# Patient Record
Sex: FEMALE | Race: BLACK OR AFRICAN AMERICAN | NOT HISPANIC OR LATINO | Employment: OTHER | URBAN - METROPOLITAN AREA
[De-identification: names, ages, dates, MRNs, and addresses within clinical notes are randomized per-mention and may not be internally consistent; named-entity substitution may affect disease eponyms.]

---

## 2023-04-27 LAB
CANCER AG 125 (U/ML) IN SERUM: 29.9 U/ML (ref 0–30.2)
CANCER AG 19-9 (U/ML) IN SER/PLAS: 59.81 U/ML
CARCINOEMBRYONIC AG (NG/ML) IN SER/PLAS: 0.6 UG/L

## 2023-05-04 LAB — VALPROIC ACID (UG/ML) IN SER/PLAS: <10 UG/ML (ref 50–100)

## 2023-05-08 LAB
MISCELLANEUOUS TEST RESULT: NORMAL
NAME OF SENDOUT TEST: NORMAL
OXCARB OR ESLICARB METABOLITE (MHD): 10 UG/ML (ref 3–35)

## 2023-08-25 PROBLEM — G40.909 SEIZURE DISORDER DURING PREGNANCY, ANTEPARTUM (MULTI): Status: ACTIVE | Noted: 2023-08-25

## 2023-08-25 PROBLEM — G43.709 CHRONIC MIGRAINE WITHOUT AURA WITHOUT STATUS MIGRAINOSUS, NOT INTRACTABLE: Status: ACTIVE | Noted: 2023-08-25

## 2023-08-25 PROBLEM — R97.8 ELEVATED TUMOR MARKERS: Status: ACTIVE | Noted: 2023-08-25

## 2023-08-25 PROBLEM — G43.109 MIGRAINE WITH AURA: Status: ACTIVE | Noted: 2023-08-25

## 2023-08-25 PROBLEM — O21.9 NAUSEA AND VOMITING OF PREGNANCY, ANTEPARTUM (HHS-HCC): Status: ACTIVE | Noted: 2023-08-25

## 2023-08-25 PROBLEM — O09.219 PREVIOUS PRETERM DELIVERY, ANTEPARTUM (HHS-HCC): Status: ACTIVE | Noted: 2023-08-25

## 2023-08-25 PROBLEM — U07.1 COVID-19 VIRUS INFECTION: Status: ACTIVE | Noted: 2023-08-25

## 2023-08-25 PROBLEM — B34.9 VIRAL INFECTION: Status: ACTIVE | Noted: 2023-08-25

## 2023-08-25 PROBLEM — O99.350 SEIZURE DISORDER DURING PREGNANCY, ANTEPARTUM (MULTI): Status: ACTIVE | Noted: 2023-08-25

## 2023-08-25 PROBLEM — R87.619 ABNORMAL PAP SMEAR OF CERVIX: Status: ACTIVE | Noted: 2023-08-25

## 2023-08-25 PROBLEM — O34.219 PREVIOUS CESAREAN DELIVERY AFFECTING PREGNANCY (HHS-HCC): Status: ACTIVE | Noted: 2023-08-25

## 2023-08-25 PROBLEM — G40.909: Status: ACTIVE | Noted: 2023-08-25

## 2023-08-25 PROBLEM — N94.6 MENSTRUAL CRAMPS: Status: ACTIVE | Noted: 2023-08-25

## 2023-08-25 PROBLEM — N83.299 OVARIAN CYST, COMPLEX: Status: ACTIVE | Noted: 2023-08-25

## 2023-08-25 PROBLEM — Z98.891 HISTORY OF UTERINE SCAR FROM PREVIOUS SURGERY: Status: ACTIVE | Noted: 2023-08-25

## 2023-08-25 PROBLEM — G40.219 LOCALIZATION-RELATED (FOCAL) (PARTIAL) SYMPTOMATIC EPILEPSY AND EPILEPTIC SYNDROMES WITH COMPLEX PARTIAL SEIZURES, INTRACTABLE, WITHOUT STATUS EPILEPTICUS (MULTI): Status: ACTIVE | Noted: 2023-08-25

## 2023-08-25 PROBLEM — O99.210 OBESITY IN PREGNANCY, ANTEPARTUM (HHS-HCC): Status: ACTIVE | Noted: 2023-08-25

## 2023-08-25 RX ORDER — IBUPROFEN 800 MG/1
1 TABLET ORAL 3 TIMES DAILY PRN
COMMUNITY
Start: 2021-06-22 | End: 2023-10-04 | Stop reason: WASHOUT

## 2023-08-25 RX ORDER — TOPIRAMATE 100 MG/1
1 TABLET, FILM COATED ORAL NIGHTLY
COMMUNITY
Start: 2020-10-26 | End: 2023-10-04 | Stop reason: WASHOUT

## 2023-08-25 RX ORDER — SUMATRIPTAN 50 MG/1
TABLET, FILM COATED ORAL
COMMUNITY
Start: 2020-10-26 | End: 2023-10-04 | Stop reason: WASHOUT

## 2023-08-25 RX ORDER — OXCARBAZEPINE 60 MG/ML
17 SUSPENSION ORAL DAILY
COMMUNITY
Start: 2018-02-19 | End: 2023-10-04 | Stop reason: WASHOUT

## 2023-08-25 RX ORDER — CYCLOBENZAPRINE HCL 10 MG
1 TABLET ORAL 3 TIMES DAILY PRN
COMMUNITY
Start: 2023-06-21 | End: 2023-10-04 | Stop reason: WASHOUT

## 2023-08-25 RX ORDER — DIVALPROEX SODIUM 250 MG/1
1 TABLET, DELAYED RELEASE ORAL 2 TIMES DAILY
COMMUNITY
Start: 2023-01-09 | End: 2024-02-14 | Stop reason: HOSPADM

## 2023-08-25 RX ORDER — KETOROLAC TROMETHAMINE 10 MG/1
1 TABLET, FILM COATED ORAL EVERY 8 HOURS PRN
COMMUNITY
Start: 2023-05-10 | End: 2023-10-04 | Stop reason: WASHOUT

## 2023-08-25 RX ORDER — DIVALPROEX SODIUM 500 MG/1
1 TABLET, DELAYED RELEASE ORAL 2 TIMES DAILY
COMMUNITY
Start: 2023-05-01 | End: 2023-12-18 | Stop reason: SDUPTHER

## 2023-08-25 RX ORDER — NALOXONE HYDROCHLORIDE 4 MG/.1ML
1 SPRAY NASAL AS NEEDED
COMMUNITY
Start: 2023-06-21 | End: 2023-10-04 | Stop reason: WASHOUT

## 2023-08-25 RX ORDER — DOCUSATE SODIUM 50 MG AND SENNOSIDES 8.6 MG 8.6; 5 MG/1; MG/1
1 TABLET, FILM COATED ORAL 2 TIMES DAILY
COMMUNITY
Start: 2023-06-20 | End: 2023-10-04 | Stop reason: WASHOUT

## 2023-08-25 RX ORDER — METHOCARBAMOL 500 MG/1
2 TABLET, FILM COATED ORAL 2 TIMES DAILY
COMMUNITY
Start: 2023-06-13 | End: 2023-10-04 | Stop reason: WASHOUT

## 2023-08-25 RX ORDER — OXCARBAZEPINE 600 MG/1
2 TABLET, FILM COATED ORAL 2 TIMES DAILY
COMMUNITY
Start: 2023-05-01 | End: 2024-04-26

## 2023-08-27 PROBLEM — U07.1 COVID-19 VIRUS INFECTION: Status: RESOLVED | Noted: 2023-08-25 | Resolved: 2023-08-27

## 2023-08-27 PROBLEM — B34.9 VIRAL INFECTION: Status: RESOLVED | Noted: 2023-08-25 | Resolved: 2023-08-27

## 2023-08-27 PROBLEM — G40.909 SEIZURE DISORDER (MULTI): Status: ACTIVE | Noted: 2023-08-27

## 2023-09-25 LAB — VALPROIC ACID (UG/ML) IN SER/PLAS: 28 UG/ML (ref 50–100)

## 2023-09-28 LAB — OXCARB OR ESLICARB METABOLITE (MHD): 3 UG/ML (ref 3–35)

## 2023-09-30 PROCEDURE — 99285 EMERGENCY DEPT VISIT HI MDM: CPT | Performed by: EMERGENCY MEDICINE

## 2023-09-30 ASSESSMENT — COLUMBIA-SUICIDE SEVERITY RATING SCALE - C-SSRS
2. HAVE YOU ACTUALLY HAD ANY THOUGHTS OF KILLING YOURSELF?: NO
6. HAVE YOU EVER DONE ANYTHING, STARTED TO DO ANYTHING, OR PREPARED TO DO ANYTHING TO END YOUR LIFE?: NO
1. IN THE PAST MONTH, HAVE YOU WISHED YOU WERE DEAD OR WISHED YOU COULD GO TO SLEEP AND NOT WAKE UP?: NO

## 2023-10-01 ENCOUNTER — HOSPITAL ENCOUNTER (EMERGENCY)
Facility: HOSPITAL | Age: 33
Discharge: HOME | End: 2023-10-01
Attending: EMERGENCY MEDICINE
Payer: MEDICARE

## 2023-10-01 ENCOUNTER — APPOINTMENT (OUTPATIENT)
Dept: RADIOLOGY | Facility: HOSPITAL | Age: 33
End: 2023-10-01
Payer: MEDICARE

## 2023-10-01 VITALS
SYSTOLIC BLOOD PRESSURE: 134 MMHG | HEART RATE: 87 BPM | OXYGEN SATURATION: 98 % | HEIGHT: 65 IN | BODY MASS INDEX: 42.45 KG/M2 | RESPIRATION RATE: 14 BRPM | WEIGHT: 254.79 LBS | TEMPERATURE: 98.3 F | DIASTOLIC BLOOD PRESSURE: 71 MMHG

## 2023-10-01 DIAGNOSIS — N83.201 CYST OF RIGHT OVARY: Primary | ICD-10-CM

## 2023-10-01 LAB
ALBUMIN SERPL BCP-MCNC: 3.8 G/DL (ref 3.4–5)
ALP SERPL-CCNC: 41 U/L (ref 33–110)
ALT SERPL W P-5'-P-CCNC: 16 U/L (ref 7–45)
ANION GAP SERPL CALC-SCNC: 8 MMOL/L (ref 10–20)
APPEARANCE UR: CLEAR
AST SERPL W P-5'-P-CCNC: 19 U/L (ref 9–39)
B-HCG SERPL-ACNC: <2 MIU/ML
BILIRUB SERPL-MCNC: 0.2 MG/DL (ref 0–1.2)
BILIRUB UR STRIP.AUTO-MCNC: NEGATIVE MG/DL
BUN SERPL-MCNC: 12 MG/DL (ref 6–23)
CALCIUM SERPL-MCNC: 8.7 MG/DL (ref 8.6–10.3)
CHLORIDE SERPL-SCNC: 104 MMOL/L (ref 98–107)
CO2 SERPL-SCNC: 28 MMOL/L (ref 21–32)
COLOR UR: YELLOW
CREAT SERPL-MCNC: 0.84 MG/DL (ref 0.5–1.05)
ERYTHROCYTE [DISTWIDTH] IN BLOOD BY AUTOMATED COUNT: 13.8 % (ref 11.5–14.5)
GFR SERPL CREATININE-BSD FRML MDRD: >90 ML/MIN/1.73M*2
GLUCOSE SERPL-MCNC: 103 MG/DL (ref 74–99)
GLUCOSE UR STRIP.AUTO-MCNC: NEGATIVE MG/DL
HCT VFR BLD AUTO: 34.3 % (ref 36–46)
HGB BLD-MCNC: 11.6 G/DL (ref 12–16)
KETONES UR STRIP.AUTO-MCNC: NEGATIVE MG/DL
LEUKOCYTE ESTERASE UR QL STRIP.AUTO: NEGATIVE
LIPASE SERPL-CCNC: 19 U/L (ref 9–82)
MCH RBC QN AUTO: 29.1 PG (ref 26–34)
MCHC RBC AUTO-ENTMCNC: 33.8 G/DL (ref 32–36)
MCV RBC AUTO: 86 FL (ref 80–100)
NITRITE UR QL STRIP.AUTO: NEGATIVE
NRBC BLD-RTO: 0 /100 WBCS (ref 0–0)
PH UR STRIP.AUTO: 8 [PH]
PLATELET # BLD AUTO: 396 X10*3/UL (ref 150–450)
PMV BLD AUTO: 10.6 FL (ref 7.5–11.5)
POTASSIUM SERPL-SCNC: 3.8 MMOL/L (ref 3.5–5.3)
PROT SERPL-MCNC: 7.7 G/DL (ref 6.4–8.2)
PROT UR STRIP.AUTO-MCNC: NEGATIVE MG/DL
RBC # BLD AUTO: 3.99 X10*6/UL (ref 4–5.2)
RBC # UR STRIP.AUTO: NEGATIVE /UL
SODIUM SERPL-SCNC: 136 MMOL/L (ref 136–145)
SP GR UR STRIP.AUTO: 1.01
UROBILINOGEN UR STRIP.AUTO-MCNC: 2 MG/DL
WBC # BLD AUTO: 7.8 X10*3/UL (ref 4.4–11.3)

## 2023-10-01 PROCEDURE — 74177 CT ABD & PELVIS W/CONTRAST: CPT | Mod: FOREIGN READ | Performed by: RADIOLOGY

## 2023-10-01 PROCEDURE — 2550000001 HC RX 255 CONTRASTS: Performed by: EMERGENCY MEDICINE

## 2023-10-01 PROCEDURE — 36415 COLL VENOUS BLD VENIPUNCTURE: CPT

## 2023-10-01 PROCEDURE — 76830 TRANSVAGINAL US NON-OB: CPT | Performed by: RADIOLOGY

## 2023-10-01 PROCEDURE — 83690 ASSAY OF LIPASE: CPT

## 2023-10-01 PROCEDURE — 96375 TX/PRO/DX INJ NEW DRUG ADDON: CPT

## 2023-10-01 PROCEDURE — 2500000004 HC RX 250 GENERAL PHARMACY W/ HCPCS (ALT 636 FOR OP/ED)

## 2023-10-01 PROCEDURE — 96376 TX/PRO/DX INJ SAME DRUG ADON: CPT

## 2023-10-01 PROCEDURE — 80053 COMPREHEN METABOLIC PANEL: CPT

## 2023-10-01 PROCEDURE — 81003 URINALYSIS AUTO W/O SCOPE: CPT

## 2023-10-01 PROCEDURE — 96374 THER/PROPH/DIAG INJ IV PUSH: CPT | Mod: XU

## 2023-10-01 PROCEDURE — 76856 US EXAM PELVIC COMPLETE: CPT | Mod: FR

## 2023-10-01 PROCEDURE — 76856 US EXAM PELVIC COMPLETE: CPT | Mod: FOREIGN READ | Performed by: RADIOLOGY

## 2023-10-01 PROCEDURE — 74177 CT ABD & PELVIS W/CONTRAST: CPT | Mod: FR

## 2023-10-01 PROCEDURE — 85027 COMPLETE CBC AUTOMATED: CPT

## 2023-10-01 PROCEDURE — 84702 CHORIONIC GONADOTROPIN TEST: CPT

## 2023-10-01 RX ORDER — ONDANSETRON HYDROCHLORIDE 2 MG/ML
4 INJECTION, SOLUTION INTRAVENOUS ONCE
Status: COMPLETED | OUTPATIENT
Start: 2023-10-01 | End: 2023-10-01

## 2023-10-01 RX ORDER — HYDROCODONE BITARTRATE AND ACETAMINOPHEN 5; 325 MG/1; MG/1
1 TABLET ORAL EVERY 6 HOURS PRN
Qty: 4 TABLET | Refills: 0 | Status: SHIPPED | OUTPATIENT
Start: 2023-10-01 | End: 2023-10-02 | Stop reason: SDUPTHER

## 2023-10-01 RX ORDER — MORPHINE SULFATE 4 MG/ML
4 INJECTION, SOLUTION INTRAMUSCULAR; INTRAVENOUS ONCE
Status: COMPLETED | OUTPATIENT
Start: 2023-10-01 | End: 2023-10-01

## 2023-10-01 RX ORDER — ACETAMINOPHEN 500 MG
1000 TABLET ORAL EVERY 8 HOURS PRN
Qty: 18 TABLET | Refills: 0 | Status: SHIPPED | OUTPATIENT
Start: 2023-10-01 | End: 2023-10-04

## 2023-10-01 RX ADMIN — IOHEXOL 75 ML: 350 INJECTION, SOLUTION INTRAVENOUS at 05:44

## 2023-10-01 RX ADMIN — MORPHINE SULFATE 4 MG: 4 INJECTION, SOLUTION INTRAMUSCULAR; INTRAVENOUS at 06:11

## 2023-10-01 RX ADMIN — MORPHINE SULFATE 4 MG: 4 INJECTION, SOLUTION INTRAMUSCULAR; INTRAVENOUS at 01:09

## 2023-10-01 RX ADMIN — ONDANSETRON 4 MG: 2 INJECTION INTRAMUSCULAR; INTRAVENOUS at 01:09

## 2023-10-01 ASSESSMENT — ENCOUNTER SYMPTOMS
DYSURIA: 0
FREQUENCY: 0
CONSTIPATION: 0
VOMITING: 0
HEMATOCHEZIA: 0
WEIGHT LOSS: 0
DIARRHEA: 0
FEVER: 0
NAUSEA: 1
ABDOMINAL PAIN: 1
BELCHING: 0
MYALGIAS: 0
ARTHRALGIAS: 1
HEADACHES: 0
ANOREXIA: 1
FLATUS: 0
HEMATURIA: 0

## 2023-10-01 ASSESSMENT — PAIN DESCRIPTION - PROGRESSION: CLINICAL_PROGRESSION: NOT CHANGED

## 2023-10-01 ASSESSMENT — PAIN DESCRIPTION - DESCRIPTORS: DESCRIPTORS: STABBING

## 2023-10-01 ASSESSMENT — PAIN DESCRIPTION - LOCATION: LOCATION: ABDOMEN

## 2023-10-01 ASSESSMENT — PAIN DESCRIPTION - PAIN TYPE: TYPE: ACUTE PAIN

## 2023-10-01 ASSESSMENT — PAIN - FUNCTIONAL ASSESSMENT: PAIN_FUNCTIONAL_ASSESSMENT: 0-10

## 2023-10-01 ASSESSMENT — PAIN SCALES - GENERAL
PAINLEVEL_OUTOF10: 10 - WORST POSSIBLE PAIN
PAINLEVEL_OUTOF10: 9
PAINLEVEL_OUTOF10: 7
PAINLEVEL_OUTOF10: 0 - NO PAIN

## 2023-10-01 ASSESSMENT — PAIN DESCRIPTION - ORIENTATION: ORIENTATION: LOWER

## 2023-10-01 NOTE — ED PROVIDER NOTES
"HPI   Chief Complaint   Patient presents with    Abdominal Pain     Inguinal pain that radiates down into leg       33-year-old female with past medical history of migraines, epilepsy, and endometriosis presents the ED today for chief concern of right-sided pelvic pain.  Patient states symptoms started about 14 hours ago.  She states that she was getting up when she felt an intense pain in her right inguinal area.  She states that due to the pain she has felt slightly nauseated but denies any vomiting.  She denies any fever.  Denies any diarrhea or hematochezia.  Denies any dysuria, urinary urgency/frequency, hematuria, vaginal discharge.  Her first date of last menstrual period was 5 days ago.  She states in the past she has been diagnosed with an enlarged right ovary and states it was \"slightly twisted\".  She states she has ovarian cyst as well and has had a partial hysterectomy in the past for this.  She states that she does have some chronic pelvic pain however she states this feels slightly different than her typical pelvic pain.  She did take some ibuprofen at home that helped her symptoms slightly.  No other symptoms or concerns at this time.      History provided by:  Patient   used: No                        Dina Coma Scale Score: 15                  Patient History   Past Medical History:   Diagnosis Date    Chlamydial infection, unspecified     Chlamydia    Encounter for initial prescription of injectable contraceptive 12/14/2015    Initiation of Depo Provera    Encounter for screening for malignant neoplasm of vagina     Vaginal Pap smear    Other conditions influencing health status     Menstruation    Other conditions influencing health status     H/O pregnancy    Other conditions influencing health status     LGSIL (low grade squamous intraepithelial dysplasia)    Personal history of other diseases of the female genital tract     History of vaginal bleeding    Personal history " of other diseases of the nervous system and sense organs     History of partial seizures    Personal history of other diseases of the nervous system and sense organs     History of migraine    Personal history of other specified conditions     History of abnormal Pap smear    Personal history of other specified conditions     History of abdominal pain    Type A blood, Rh positive     Blood type A+     Past Surgical History:   Procedure Laterality Date    BREAST LUMPECTOMY  2014    Breast Surgery Lumpectomy     SECTION, LOW TRANSVERSE  2014     Section Low Transverse    COLPOSCOPY  2014    Colposcopy    OTHER SURGICAL HISTORY  2014    Surgical Treatment Of Spontaneous      Family History   Problem Relation Name Age of Onset    Hypertension Mother      Stroke Mother      Heart attack Mother      Breast cancer Mother's Sister      Seizures Mother's Brother      Seizures Father's Brother      Breast cancer Maternal Grandmother      Other (cerebral infarction) Maternal Grandfather      Seizures Other Cousin      Social History     Tobacco Use    Smoking status: Not on file    Smokeless tobacco: Not on file   Substance Use Topics    Alcohol use: Not on file    Drug use: Not on file       Physical Exam   ED Triage Vitals   Temp Pulse Resp BP   -- -- -- --      SpO2 Temp src Heart Rate Source Patient Position   -- -- -- --      BP Location FiO2 (%)     -- --       Physical Exam  Constitutional:       Appearance: Normal appearance.   HENT:      Head: Normocephalic and atraumatic.      Nose: Nose normal.      Mouth/Throat:      Mouth: Mucous membranes are moist.      Pharynx: No oropharyngeal exudate or posterior oropharyngeal erythema.   Eyes:      General: No scleral icterus.        Right eye: No discharge.         Left eye: No discharge.      Extraocular Movements: Extraocular movements intact.      Conjunctiva/sclera: Conjunctivae normal.      Pupils: Pupils are equal,  round, and reactive to light.   Neck:      Vascular: No carotid bruit.   Cardiovascular:      Rate and Rhythm: Normal rate and regular rhythm.      Pulses: Normal pulses.      Heart sounds: No murmur heard.     No friction rub. No gallop.   Pulmonary:      Effort: Pulmonary effort is normal. No respiratory distress.      Breath sounds: Normal breath sounds. No stridor. No wheezing, rhonchi or rales.   Abdominal:      General: Abdomen is flat. There is no distension.      Palpations: Abdomen is soft. There is no mass.      Tenderness: There is no guarding or rebound.      Comments: Abdomen is soft and nondistended.  Minimal tenderness in right inguinal area.  Remainder of the abdomen is nontender.  No rebound tenderness or guarding.  No abdominal masses.  No pulsatile masses.  No CVA tenderness.  No hernias noted.   Musculoskeletal:         General: Normal range of motion.      Cervical back: Normal range of motion and neck supple.      Right lower leg: No edema.      Left lower leg: No edema.   Lymphadenopathy:      Cervical: No cervical adenopathy.   Skin:     General: Skin is warm.      Capillary Refill: Capillary refill takes less than 2 seconds.      Findings: No rash.   Neurological:      General: No focal deficit present.      Mental Status: She is alert and oriented to person, place, and time.      Cranial Nerves: No cranial nerve deficit.      Sensory: No sensory deficit.      Motor: No weakness.      Coordination: Coordination normal.      Gait: Gait normal.      Deep Tendon Reflexes: Reflexes normal.   Psychiatric:         Mood and Affect: Mood normal.         Behavior: Behavior normal.         Thought Content: Thought content normal.         Judgment: Judgment normal.         ED Course & [facility]   ED Course as of 10/03/23 1119   Sun Oct 01, 2023   0421 Urinalysis with Reflex Microscopic(!) []   0421 Lipase []   0421 CBC(!) []   0421 Comprehensive metabolic panel(!) []   0421 Human Chorionic  Gonadotropin, Serum Quantitative []   0421 Lactate []   Tue Oct 03, 2023   1118 CBC shows hemoglobin 11.6.  No leukocytosis.  CMP grossly within normal limits.  No KEN or acute liver injury.  Beta-hCG urinalysis shows no UTI.  Lipase unremarkable.  CT abdomen pelvis with IV contrast shows enlarging right ovarian lesion.  Consider follow-up MRI as outpatient or surgical consult since this lesion is now larger than 5.1 cm in AP diameter and nearly 7 cm on coronal images.  Cholelithiasis also found.  GYN ultrasound shows complex lesion of right ovary and adjacent posterior more uniform echoic lesion.  Right ovarian lesion has stable appearance.  Other lesion with more homogenous echoes could not be clearly identified on previous CT scan.  Could represent hemorrhagic cyst.  Patient was given a printed out copy of the CT and ultrasound findings. []      ED Course User Index  [] Marcell Arzate PA-C         Diagnoses as of 10/03/23 1119   Cyst of right ovary       Medical Decision Making  33-year-old female with past medical history of migraines, epilepsy, and endometriosis presents to the ED today for chief concern of right-sided pelvic pain.  Vital signs are reassuring.  Patient overall appears well and is nontoxic-appearing. No evidence of acute intra-abdominal pathology at this time.  No evidence of hernia.  No concern for any UTI or pyelonephritis.  Signs and symptoms likely related to ovarian cyst versus hemorrhagic cyst.  Discussed my impression and findings with patient.  I do not think a surgical consultation is necessary at this time.  Patient's pain is under control.  She does have an appointment with her GYN already scheduled in 2 days.  She was given morphine and Zofran in the ED with improvement in her symptoms.  We will treat with Norco at this time until patient is able to follow-up with her GYN.  Discussed my impression and findings with patient and she feels comfortable returning home.  We  discussed very strict return precautions including returning for any new or worsening signs or symptoms.  Patient is in agreement this plan.  She will follow-up with her GYN within 3 days.  She will follow-up with her PCP within 3 days.  Discussed very strict return precautions.    Differential diagnosis: Ovarian torsion, appendicitis, ruptured ovarian cyst, ruptured hemorrhagic cyst, acute intra-abdominal pathology, hernia, UTI, pyelonephritis    Shared decision-making was used patient feels comfortable returning home          Procedure  Procedures     Marcell Arzate PA-C  10/01/23 0033       Marcell Arzate PA-C  10/03/23 1121

## 2023-10-02 DIAGNOSIS — N83.201 CYST OF RIGHT OVARY: ICD-10-CM

## 2023-10-02 RX ORDER — HYDROCODONE BITARTRATE AND ACETAMINOPHEN 5; 325 MG/1; MG/1
1 TABLET ORAL EVERY 6 HOURS PRN
Qty: 3 TABLET | Refills: 0 | Status: SHIPPED | OUTPATIENT
Start: 2023-10-02 | End: 2023-10-05

## 2023-10-02 RX ORDER — HYDROCODONE BITARTRATE AND ACETAMINOPHEN 5; 325 MG/1; MG/1
1 TABLET ORAL EVERY 6 HOURS PRN
Qty: 3 TABLET | Refills: 0 | Status: SHIPPED | OUTPATIENT
Start: 2023-10-02 | End: 2023-10-03

## 2023-10-02 RX ORDER — HYDROCODONE BITARTRATE AND ACETAMINOPHEN 5; 325 MG/1; MG/1
1 TABLET ORAL EVERY 6 HOURS PRN
Qty: 3 TABLET | Refills: 0 | Status: CANCELLED | OUTPATIENT
Start: 2023-10-02 | End: 2023-10-05

## 2023-10-02 NOTE — PROGRESS NOTES
"Division of Minimally Invasive Gynecologic Surgery  [facility]    10/02/23 Gynecology Consult     HISTORY OF PRESENT ILLNESS:  Danette Buenrostro 33 y.o. 32 yo P2 (CS x2 +BTL) presents in referral from Dr. Lopez to discuss path-proven endometriosis and pelvic pain.    She has a long history of severe dysmenorrhea. Pain has limited her from attending school/work/activities multiple times over the years. This past April, she reached a breaking point with her pain and began going to the ER for pain flares.     Underwent robotic LSO and R cystectomy/salpingectomy on 2023. She did have a prior BTL. She was started on JESSICA's following surgery, but has had multiple ED visits for pain following surgery. Path was positive for endometriosis.     Her current pain is daily and severe. It is having a huge negative impact on her quality of life and ability to function on a daily basis. In addition, she reports irregular and breakthrough bleeding on JESSICA w/ increase in pain on bleeding days.     Labs:   - Recent CBC w/ Hgb 10.9   - Recent CMP unremarkable   Screening:   - Last pap  WNL per pt, no hx of CIN2-3  - Last mammogram NA  PMHx: obesity, partial complex seizures   PSHx: CS x 2 w/ BTL    Past Medical History:   Diagnosis Date    Endometriosis     Epilepsy with partial complex seizures (CMS/HCC)     Obesity     Pelvic floor dysfunction in female     Type A blood, Rh positive     Blood type A+     Past Surgical History:   Procedure Laterality Date    BREAST LUMPECTOMY  2014    Breast Surgery Lumpectomy     SECTION, LOW TRANSVERSE  04/14/2014    x 2 w/ BTL at second    COLPOSCOPY  2014    Colposcopy    OTHER SURGICAL HISTORY  2014    Surgical Treatment Of Spontaneous      PHYSICAL EXAMINATION:  VITAL SIGNS:  Ht 1.651 m (5' 5\")   Wt 118 kg (261 lb)   LMP 10/01/2023 (Exact Date)   Breastfeeding No   BMI 43.43 kg/m²      Constitutional:  No acute " distress, well-nourished and well-developed  HEENT: EOM grossly intact, MMM, neck supple and with full ROM  Pulm:  Effort normal. No accessory muscle usage.  No respiratory distress.  Neurological:  She is alert and oriented to person place and time.  Skin: Warm, no pallor.  Psychiatric:  She has normal mood and affect.    ASSESSMENT:  Danette Buenrostro 33 y.o. 32 yo P2 (CS x2 +BTL) presents in referral from Dr. Lopez to discuss path-proven endometriosis and pelvic pain.    We reviewed the etiology, natural history, and impact on endometriosis on pain symptoms today at length. We discussed contributory pain generators including pelvic floor dysfunction (commonly 2/2 to endometriosis), GI etiologies, and centralization of pain.     I suspect she is currently inadequately suppressed on JESSICA given significant breakthrough bleeding and pain symptoms. Trileptal can decrease JESSICA levels in the blood stream, which may be contributing to inadequate suppression. We discussed a switch to Myfembree today, as relugolix has no known interactions w/ either of her AEDs and offers improved suppression. Trileptal may decrease serum concentration of  hormonal components, so she will reach out if she has intolerable vasomotor symptoms. I would favor increased progesterone dosage should this issue arise. Divalproex levels can be decreased estrogens/progesterones, but as she has been on Estarylla without incident, I anticipate she will tolerate the estrogen/progesterone in Myfembree. None the less, I will reach out to her neurologist to make this provider aware. Staff message sent to Dr. Sigala.     Given symptoms she described today, but Danette and I agree that she likely suffers from high tone pelvic floor dysfunction. We discussed treatment options for this, including Flexeril, vaginal Valium, and PFPT. She elected to start w/ Flexeril (given severity of current pain levels) and PFPT. Web site information given.     We did  discuss the option of surgical intervention, but we will try to defer this as possible given recent surgery in June. I do think she will require more extensive excision and we also discussed the option of hysterectomy, which she is interested in undergoing.     PLAN:  [facility] Dr. Sigala (neuro) made aware  - PFPT and Flexeril PRN  - Consider future excisional surgery +/- hysterectomy  - RTC in 3 months for progress check, earlier PRN     Saritha Theodore MD  10/02/23  1:05 PM

## 2023-10-04 ENCOUNTER — OFFICE VISIT (OUTPATIENT)
Dept: OBSTETRICS AND GYNECOLOGY | Facility: CLINIC | Age: 33
End: 2023-10-04
Payer: MEDICARE

## 2023-10-04 VITALS — WEIGHT: 261 LBS | BODY MASS INDEX: 43.49 KG/M2 | HEIGHT: 65 IN

## 2023-10-04 DIAGNOSIS — N80.9 ENDOMETRIOSIS: ICD-10-CM

## 2023-10-04 DIAGNOSIS — R10.2 PELVIC PAIN: Primary | ICD-10-CM

## 2023-10-04 DIAGNOSIS — E66.9 OBESITY, UNSPECIFIED CLASSIFICATION, UNSPECIFIED OBESITY TYPE, UNSPECIFIED WHETHER SERIOUS COMORBIDITY PRESENT: ICD-10-CM

## 2023-10-04 DIAGNOSIS — N93.9 ABNORMAL UTERINE BLEEDING (AUB): ICD-10-CM

## 2023-10-04 DIAGNOSIS — M62.89 PELVIC FLOOR DYSFUNCTION: ICD-10-CM

## 2023-10-04 PROBLEM — Z30.013 ENCOUNTER FOR INITIAL PRESCRIPTION OF INJECTABLE CONTRACEPTIVE: Status: ACTIVE | Noted: 2023-10-04

## 2023-10-04 PROCEDURE — 99214 OFFICE O/P EST MOD 30 MIN: CPT | Performed by: STUDENT IN AN ORGANIZED HEALTH CARE EDUCATION/TRAINING PROGRAM

## 2023-10-04 PROCEDURE — 1036F TOBACCO NON-USER: CPT | Performed by: STUDENT IN AN ORGANIZED HEALTH CARE EDUCATION/TRAINING PROGRAM

## 2023-10-04 PROCEDURE — 3008F BODY MASS INDEX DOCD: CPT | Performed by: STUDENT IN AN ORGANIZED HEALTH CARE EDUCATION/TRAINING PROGRAM

## 2023-10-04 RX ORDER — CYCLOBENZAPRINE HCL 5 MG
5 TABLET ORAL 2 TIMES DAILY PRN
Qty: 30 TABLET | Refills: 2 | Status: SHIPPED | OUTPATIENT
Start: 2023-10-04 | End: 2023-11-16 | Stop reason: SDUPTHER

## 2023-10-04 ASSESSMENT — PAIN SCALES - GENERAL: PAINLEVEL: 8

## 2023-10-06 ENCOUNTER — TELEPHONE (OUTPATIENT)
Dept: OBSTETRICS AND GYNECOLOGY | Facility: CLINIC | Age: 33
End: 2023-10-06
Payer: COMMERCIAL

## 2023-10-11 ENCOUNTER — TELEPHONE (OUTPATIENT)
Dept: OBSTETRICS AND GYNECOLOGY | Facility: CLINIC | Age: 33
End: 2023-10-11
Payer: COMMERCIAL

## 2023-10-23 ENCOUNTER — APPOINTMENT (OUTPATIENT)
Dept: RADIOLOGY | Facility: HOSPITAL | Age: 33
End: 2023-10-23
Payer: MEDICARE

## 2023-10-23 ENCOUNTER — HOSPITAL ENCOUNTER (EMERGENCY)
Facility: HOSPITAL | Age: 33
Discharge: HOME | End: 2023-10-23
Attending: GENERAL PRACTICE
Payer: MEDICARE

## 2023-10-23 VITALS
HEIGHT: 65 IN | TEMPERATURE: 97.9 F | BODY MASS INDEX: 42.65 KG/M2 | OXYGEN SATURATION: 99 % | RESPIRATION RATE: 20 BRPM | DIASTOLIC BLOOD PRESSURE: 96 MMHG | HEART RATE: 71 BPM | SYSTOLIC BLOOD PRESSURE: 148 MMHG | WEIGHT: 256 LBS

## 2023-10-23 DIAGNOSIS — R03.0 ELEVATED BLOOD PRESSURE READING: ICD-10-CM

## 2023-10-23 DIAGNOSIS — R10.2 PELVIC PAIN: Primary | ICD-10-CM

## 2023-10-23 LAB
ALBUMIN SERPL BCP-MCNC: 4 G/DL (ref 3.4–5)
ALP SERPL-CCNC: 46 U/L (ref 33–110)
ALT SERPL W P-5'-P-CCNC: 21 U/L (ref 7–45)
ANION GAP SERPL CALC-SCNC: 8 MMOL/L (ref 10–20)
APPEARANCE UR: CLEAR
AST SERPL W P-5'-P-CCNC: 18 U/L (ref 9–39)
B-HCG SERPL-ACNC: <2 MIU/ML
BASOPHILS # BLD AUTO: 0.03 X10*3/UL (ref 0–0.1)
BASOPHILS NFR BLD AUTO: 0.5 %
BILIRUB SERPL-MCNC: 0.3 MG/DL (ref 0–1.2)
BILIRUB UR STRIP.AUTO-MCNC: NEGATIVE MG/DL
BUN SERPL-MCNC: 12 MG/DL (ref 6–23)
CALCIUM SERPL-MCNC: 8.8 MG/DL (ref 8.6–10.3)
CHLORIDE SERPL-SCNC: 100 MMOL/L (ref 98–107)
CO2 SERPL-SCNC: 30 MMOL/L (ref 21–32)
COLOR UR: YELLOW
CREAT SERPL-MCNC: 0.7 MG/DL (ref 0.5–1.05)
EOSINOPHIL # BLD AUTO: 0.11 X10*3/UL (ref 0–0.7)
EOSINOPHIL NFR BLD AUTO: 1.9 %
ERYTHROCYTE [DISTWIDTH] IN BLOOD BY AUTOMATED COUNT: 14 % (ref 11.5–14.5)
GFR SERPL CREATININE-BSD FRML MDRD: >90 ML/MIN/1.73M*2
GLUCOSE SERPL-MCNC: 113 MG/DL (ref 74–99)
GLUCOSE UR STRIP.AUTO-MCNC: NEGATIVE MG/DL
HCT VFR BLD AUTO: 34.5 % (ref 36–46)
HGB BLD-MCNC: 11.7 G/DL (ref 12–16)
IMM GRANULOCYTES # BLD AUTO: 0.01 X10*3/UL (ref 0–0.7)
IMM GRANULOCYTES NFR BLD AUTO: 0.2 % (ref 0–0.9)
KETONES UR STRIP.AUTO-MCNC: ABNORMAL MG/DL
LACTATE SERPL-SCNC: 1.1 MMOL/L (ref 0.4–2)
LEUKOCYTE ESTERASE UR QL STRIP.AUTO: NEGATIVE
LIPASE SERPL-CCNC: 7 U/L (ref 9–82)
LYMPHOCYTES # BLD AUTO: 2.5 X10*3/UL (ref 1.2–4.8)
LYMPHOCYTES NFR BLD AUTO: 42.4 %
MCH RBC QN AUTO: 29.1 PG (ref 26–34)
MCHC RBC AUTO-ENTMCNC: 33.9 G/DL (ref 32–36)
MCV RBC AUTO: 86 FL (ref 80–100)
MONOCYTES # BLD AUTO: 0.45 X10*3/UL (ref 0.1–1)
MONOCYTES NFR BLD AUTO: 7.6 %
NEUTROPHILS # BLD AUTO: 2.79 X10*3/UL (ref 1.2–7.7)
NEUTROPHILS NFR BLD AUTO: 47.4 %
NITRITE UR QL STRIP.AUTO: NEGATIVE
NRBC BLD-RTO: 0 /100 WBCS (ref 0–0)
PH UR STRIP.AUTO: 5 [PH]
PLATELET # BLD AUTO: 378 X10*3/UL (ref 150–450)
PMV BLD AUTO: 10 FL (ref 7.5–11.5)
POTASSIUM SERPL-SCNC: 4.1 MMOL/L (ref 3.5–5.3)
PROT SERPL-MCNC: 7.1 G/DL (ref 6.4–8.2)
PROT UR STRIP.AUTO-MCNC: NEGATIVE MG/DL
RBC # BLD AUTO: 4.02 X10*6/UL (ref 4–5.2)
RBC # UR STRIP.AUTO: NEGATIVE /UL
SODIUM SERPL-SCNC: 134 MMOL/L (ref 136–145)
SP GR UR STRIP.AUTO: 1.02
UROBILINOGEN UR STRIP.AUTO-MCNC: <2 MG/DL
WBC # BLD AUTO: 5.9 X10*3/UL (ref 4.4–11.3)

## 2023-10-23 PROCEDURE — 80053 COMPREHEN METABOLIC PANEL: CPT

## 2023-10-23 PROCEDURE — 84702 CHORIONIC GONADOTROPIN TEST: CPT

## 2023-10-23 PROCEDURE — 36415 COLL VENOUS BLD VENIPUNCTURE: CPT

## 2023-10-23 PROCEDURE — 83605 ASSAY OF LACTIC ACID: CPT

## 2023-10-23 PROCEDURE — 76856 US EXAM PELVIC COMPLETE: CPT | Performed by: RADIOLOGY

## 2023-10-23 PROCEDURE — 99284 EMERGENCY DEPT VISIT MOD MDM: CPT | Performed by: GENERAL PRACTICE

## 2023-10-23 PROCEDURE — 76830 TRANSVAGINAL US NON-OB: CPT | Performed by: RADIOLOGY

## 2023-10-23 PROCEDURE — 76830 TRANSVAGINAL US NON-OB: CPT

## 2023-10-23 PROCEDURE — 85025 COMPLETE CBC W/AUTO DIFF WBC: CPT

## 2023-10-23 PROCEDURE — 83690 ASSAY OF LIPASE: CPT

## 2023-10-23 PROCEDURE — 81003 URINALYSIS AUTO W/O SCOPE: CPT

## 2023-10-23 PROCEDURE — 2500000001 HC RX 250 WO HCPCS SELF ADMINISTERED DRUGS (ALT 637 FOR MEDICARE OP)

## 2023-10-23 RX ORDER — ACETAMINOPHEN 325 MG/1
650 TABLET ORAL ONCE
Status: DISCONTINUED | OUTPATIENT
Start: 2023-10-23 | End: 2023-10-23

## 2023-10-23 RX ORDER — KETOROLAC TROMETHAMINE 30 MG/ML
30 INJECTION, SOLUTION INTRAMUSCULAR; INTRAVENOUS ONCE
Status: DISCONTINUED | OUTPATIENT
Start: 2023-10-23 | End: 2023-10-23

## 2023-10-23 RX ORDER — NAPROXEN 500 MG/1
500 TABLET ORAL ONCE
Status: DISCONTINUED | OUTPATIENT
Start: 2023-10-23 | End: 2023-10-23

## 2023-10-23 RX ORDER — OXYCODONE AND ACETAMINOPHEN 5; 325 MG/1; MG/1
1 TABLET ORAL ONCE
Status: COMPLETED | OUTPATIENT
Start: 2023-10-23 | End: 2023-10-23

## 2023-10-23 RX ORDER — OXYCODONE AND ACETAMINOPHEN 5; 325 MG/1; MG/1
1 TABLET ORAL EVERY 8 HOURS PRN
Qty: 5 TABLET | Refills: 0 | Status: SHIPPED | OUTPATIENT
Start: 2023-10-23 | End: 2023-10-25

## 2023-10-23 RX ORDER — IBUPROFEN 400 MG/1
800 TABLET ORAL ONCE
Status: DISCONTINUED | OUTPATIENT
Start: 2023-10-23 | End: 2023-10-23

## 2023-10-23 RX ADMIN — OXYCODONE HYDROCHLORIDE AND ACETAMINOPHEN 1 TABLET: 5; 325 TABLET ORAL at 13:43

## 2023-10-23 ASSESSMENT — COLUMBIA-SUICIDE SEVERITY RATING SCALE - C-SSRS
6. HAVE YOU EVER DONE ANYTHING, STARTED TO DO ANYTHING, OR PREPARED TO DO ANYTHING TO END YOUR LIFE?: NO
1. IN THE PAST MONTH, HAVE YOU WISHED YOU WERE DEAD OR WISHED YOU COULD GO TO SLEEP AND NOT WAKE UP?: NO
2. HAVE YOU ACTUALLY HAD ANY THOUGHTS OF KILLING YOURSELF?: NO

## 2023-10-23 ASSESSMENT — PAIN SCALES - GENERAL
PAINLEVEL_OUTOF10: 10 - WORST POSSIBLE PAIN
PAINLEVEL_OUTOF10: 10 - WORST POSSIBLE PAIN

## 2023-10-23 ASSESSMENT — PAIN - FUNCTIONAL ASSESSMENT: PAIN_FUNCTIONAL_ASSESSMENT: 0-10

## 2023-10-23 ASSESSMENT — PAIN DESCRIPTION - DESCRIPTORS: DESCRIPTORS: ACHING

## 2023-10-23 ASSESSMENT — PAIN DESCRIPTION - LOCATION: LOCATION: PELVIS

## 2023-10-23 ASSESSMENT — PAIN DESCRIPTION - PAIN TYPE: TYPE: ACUTE PAIN

## 2023-10-23 NOTE — ED TRIAGE NOTES
Pt c/o right pelvic pain for the past couple of days. Denies vaginal discharge/bleeding. Denies urinary symptoms. Hx of cyst.

## 2023-10-23 NOTE — ED PROVIDER NOTES
HPI   Chief Complaint   Patient presents with    Pelvic Pain       33-year-old female with past medical history of endometriosis, ovarian cyst, epilepsy presents the ED today with a chief concern of right lower quadrant pain.  Patient states symptoms started last night.  States that she has had gradual onset right lower quadrant pain last night.  Describes the pain as stabbing/aching.  States that she was using a heating pad with little relief.  States she has history of endometriosis and feels as if her symptoms are related to this.  She denies any fever/chills, nausea/vomiting.  Denies any dysuria, urinary urgency frequency, hematuria.  Denies any vaginal discharge.  Denies any diarrhea or hematochezia.  Denies any radiation of the pain.  Denies any exacerbating or relieving factors.  She has history of 2  sections and tubal ligation.  She denies any other abdominal surgeries on her belly.  She still has her appendix.  Patient states she was seen here for similar symptoms at the beginning of October which was about 3 weeks ago.  She states that after that she was seen a couple days later at her GYN where they gave her any medication for endometriosis however she did not actually start this medication since it did not come in the mail yet.  She states they discussed following up again with OB/GYN in December or sooner if needed.  They discussed surgery for possible hysterectomy but did not actually set a date yet.      History provided by:  Patient   used: No                        No data recorded                Patient History   Past Medical History:   Diagnosis Date    Endometriosis     Epilepsy with partial complex seizures (CMS/HCC)     Obesity     Pelvic floor dysfunction in female     Type A blood, Rh positive     Blood type A+     Past Surgical History:   Procedure Laterality Date    BREAST LUMPECTOMY  2014    Breast Surgery Lumpectomy     SECTION, LOW TRANSVERSE   04/14/2014    x 2 w/ BTL at second    COLPOSCOPY  2014    Colposcopy    OTHER SURGICAL HISTORY  2014    Surgical Treatment Of Spontaneous      Family History   Problem Relation Name Age of Onset    Hypertension Mother      Stroke Mother      Heart attack Mother      Breast cancer Mother's Sister      Seizures Mother's Brother      Seizures Father's Brother      Breast cancer Maternal Grandmother      Other (cerebral infarction) Maternal Grandfather      Seizures Other Cousin      Social History     Tobacco Use    Smoking status: Never     Passive exposure: Never    Smokeless tobacco: Never   Substance Use Topics    Alcohol use: Never    Drug use: Never       Physical Exam   ED Triage Vitals [10/23/23 1104]   Temp Heart Rate Resp BP   36.6 °C (97.9 °F) 71 20 (!) 148/96      SpO2 Temp Source Heart Rate Source Patient Position   99 % Temporal Monitor Sitting      BP Location FiO2 (%)     Left arm --       Physical Exam  Constitutional: Vital signs per nursing notes.  Well developed, well nourished.  No acute distress.  Patient is eating chips as I walk in the room.  Psychiatric: alert and oriented to person, place, and time; no abnormalities of mood or affect; memory intact  Eyes: conjunctivae and lids normal  ENT: Ears normal externally; face symmetric. voice normal  Neck: neck supple, no meningismus; trachea midline without deviation  Respiratory: normal respiratory effort and excursion; no rales, rhonchi, or wheezes; equal air entry  Cardiovascular: RRR, 2+ symmetric radial pulses   Neurological: normal speech; CN II-XII grossly intact, normal motor and sensory function  GI: no distention, soft.  Minimal tenderness in right lower quadrant.  No rebound tenderness or guarding.  No palpable masses.  No pulsatile masses.  No CVA tenderness.  Negative Rovsing sign.  No hernias.  : Deferred  Musculoskeletal: normal gait and station; normal digits and nails; normal to palpation; normal strength/tone;  neurovascular status intact.  Skin: normal to inspection; normal to palpation; no rash    ED Course & MDM   ED Course as of 10/23/23 1344   Mon Oct 23, 2023   1256 IMPRESSION:  Persistent complex cystic lesion in the right ovary is grossly stable  in size but less heterogeneous in echogenicity than it was  previously. Still, suspect hemorrhagic cyst. Endometrioma is not  excluded.      Previous left oophorectomy.      Remainder of the exam was negative.   [MC]   1332 CBC shows no evidence of leukocytosis.  Hemoglobin stable.  CMP shows no KEN or acute liver injury.  Electrolytes are grossly within normal limits. [MC]   1335 Lactate unremarkable.  Lipase unremarkable.  Beta hCG unremarkable [MC]   1335 Patient was given a printed out copy of her ultrasound results [MC]   1344 Patient has a ride home today will not be driving her cell phone.  Her urinalysis shows no evidence of UTI.  Discussed with lab and reflux is not being resulted on this [MC]      ED Course User Index  [MC] Marcell Arzate PA-C         Diagnoses as of 10/23/23 1344   Elevated blood pressure reading   Pelvic pain       Medical Decision Making  33-year-old female with past medical history of endometriosis, ovarian cyst, epilepsy presents the ED today with a chief concern of right lower quadrant pain.  Vital signs are reassuring.  Patient overall appears well and is nontoxic-appearing.  Its reassuring that she is actually eating a bag of chips in the room.  Patient declines STD testing and pelvic examination in the ED. she has no leukocytosis or left shift.  I do not think CT imaging is necessary at this time.  Patient did have CT 3 weeks ago and her pain is similar to her pain last time.  I have low suspicion for any acute intra-abdominal pathology at this time.  No systemic signs or symptoms.  Patient was given Percocet in the ED.  She has a ride home today will not be driving herself home.  She was given a printed out copy of her ultrasound  findings.  Her ultrasound does show a stable cyst.  Discussed my pression findings with patient and she feels comfortable returning home.  We discussed very strict return precautions including returning for any new or worsening signs or symptoms.  Patient is in agreement this plan.  She will follow-up with her PCP within 3 days.  Patient will follow-up with her PCP regarding the elevated blood pressure reading.    Differential diagnosis: Ruptured ovarian cyst, ovarian torsion, endometriosis, PID, TOA, mittelschmerz, ectopic pregnancy, fibroid, appendicitis, constipation, UTI, pyelonephritis, ureterolithiasis, hernia, acute intra-abdominal pathology, AAA    Treatment/disposition  1.  Pelvic pain  2.  Elevated blood pressure reading    Shared decision-making was used patient feels comfortable returning home        Procedure  Procedures     Marcell Arzate PA-C  10/23/23 5032

## 2023-11-01 ENCOUNTER — HOSPITAL ENCOUNTER (EMERGENCY)
Facility: HOSPITAL | Age: 33
Discharge: HOME | End: 2023-11-01
Attending: STUDENT IN AN ORGANIZED HEALTH CARE EDUCATION/TRAINING PROGRAM
Payer: MEDICARE

## 2023-11-01 DIAGNOSIS — R10.2 PELVIC PAIN: Primary | ICD-10-CM

## 2023-11-01 DIAGNOSIS — N80.9 ENDOMETRIOSIS: ICD-10-CM

## 2023-11-01 LAB
ANION GAP SERPL CALC-SCNC: 14 MMOL/L (ref 10–20)
B-HCG SERPL-ACNC: <2 MIU/ML
BASOPHILS # BLD AUTO: 0.04 X10*3/UL (ref 0–0.1)
BASOPHILS NFR BLD AUTO: 0.5 %
BUN SERPL-MCNC: 12 MG/DL (ref 6–23)
CALCIUM SERPL-MCNC: 8.5 MG/DL (ref 8.6–10.3)
CHLORIDE SERPL-SCNC: 104 MMOL/L (ref 98–107)
CO2 SERPL-SCNC: 22 MMOL/L (ref 21–32)
CREAT SERPL-MCNC: 0.64 MG/DL (ref 0.5–1.05)
EOSINOPHIL # BLD AUTO: 0.1 X10*3/UL (ref 0–0.7)
EOSINOPHIL NFR BLD AUTO: 1.3 %
ERYTHROCYTE [DISTWIDTH] IN BLOOD BY AUTOMATED COUNT: 14.4 % (ref 11.5–14.5)
GFR SERPL CREATININE-BSD FRML MDRD: >90 ML/MIN/1.73M*2
GLUCOSE SERPL-MCNC: 132 MG/DL (ref 74–99)
HCT VFR BLD AUTO: 34.9 % (ref 36–46)
HGB BLD-MCNC: 11.9 G/DL (ref 12–16)
IMM GRANULOCYTES # BLD AUTO: 0.02 X10*3/UL (ref 0–0.7)
IMM GRANULOCYTES NFR BLD AUTO: 0.3 % (ref 0–0.9)
LYMPHOCYTES # BLD AUTO: 2.95 X10*3/UL (ref 1.2–4.8)
LYMPHOCYTES NFR BLD AUTO: 37.6 %
MCH RBC QN AUTO: 29.4 PG (ref 26–34)
MCHC RBC AUTO-ENTMCNC: 34.1 G/DL (ref 32–36)
MCV RBC AUTO: 86 FL (ref 80–100)
MONOCYTES # BLD AUTO: 0.61 X10*3/UL (ref 0.1–1)
MONOCYTES NFR BLD AUTO: 7.8 %
NEUTROPHILS # BLD AUTO: 4.12 X10*3/UL (ref 1.2–7.7)
NEUTROPHILS NFR BLD AUTO: 52.5 %
NRBC BLD-RTO: 0 /100 WBCS (ref 0–0)
PLATELET # BLD AUTO: 377 X10*3/UL (ref 150–450)
POTASSIUM SERPL-SCNC: 3.9 MMOL/L (ref 3.5–5.3)
RBC # BLD AUTO: 4.05 X10*6/UL (ref 4–5.2)
SODIUM SERPL-SCNC: 136 MMOL/L (ref 136–145)
WBC # BLD AUTO: 7.8 X10*3/UL (ref 4.4–11.3)

## 2023-11-01 PROCEDURE — 99284 EMERGENCY DEPT VISIT MOD MDM: CPT | Mod: 25 | Performed by: STUDENT IN AN ORGANIZED HEALTH CARE EDUCATION/TRAINING PROGRAM

## 2023-11-01 PROCEDURE — 84702 CHORIONIC GONADOTROPIN TEST: CPT | Performed by: STUDENT IN AN ORGANIZED HEALTH CARE EDUCATION/TRAINING PROGRAM

## 2023-11-01 PROCEDURE — 76830 TRANSVAGINAL US NON-OB: CPT | Performed by: STUDENT IN AN ORGANIZED HEALTH CARE EDUCATION/TRAINING PROGRAM

## 2023-11-01 PROCEDURE — 2500000001 HC RX 250 WO HCPCS SELF ADMINISTERED DRUGS (ALT 637 FOR MEDICARE OP): Performed by: INTERNAL MEDICINE

## 2023-11-01 PROCEDURE — 85025 COMPLETE CBC W/AUTO DIFF WBC: CPT | Performed by: INTERNAL MEDICINE

## 2023-11-01 PROCEDURE — 76856 US EXAM PELVIC COMPLETE: CPT | Performed by: STUDENT IN AN ORGANIZED HEALTH CARE EDUCATION/TRAINING PROGRAM

## 2023-11-01 PROCEDURE — 36415 COLL VENOUS BLD VENIPUNCTURE: CPT | Performed by: INTERNAL MEDICINE

## 2023-11-01 PROCEDURE — 80048 BASIC METABOLIC PNL TOTAL CA: CPT | Performed by: INTERNAL MEDICINE

## 2023-11-01 RX ORDER — IBUPROFEN 400 MG/1
800 TABLET ORAL ONCE
Status: COMPLETED | OUTPATIENT
Start: 2023-11-01 | End: 2023-11-01

## 2023-11-01 RX ORDER — OXYCODONE AND ACETAMINOPHEN 5; 325 MG/1; MG/1
1 TABLET ORAL ONCE
Status: COMPLETED | OUTPATIENT
Start: 2023-11-01 | End: 2023-11-01

## 2023-11-01 RX ORDER — HYDROCODONE BITARTRATE AND ACETAMINOPHEN 5; 325 MG/1; MG/1
1 TABLET ORAL EVERY 6 HOURS PRN
Qty: 12 TABLET | Refills: 0 | Status: SHIPPED | OUTPATIENT
Start: 2023-11-01 | End: 2023-11-04

## 2023-11-01 RX ORDER — IBUPROFEN 600 MG/1
600 TABLET ORAL EVERY 6 HOURS PRN
Qty: 27 TABLET | Refills: 0 | Status: SHIPPED | OUTPATIENT
Start: 2023-11-01 | End: 2023-11-08

## 2023-11-01 RX ADMIN — IBUPROFEN 800 MG: 400 TABLET, FILM COATED ORAL at 22:17

## 2023-11-01 RX ADMIN — OXYCODONE HYDROCHLORIDE AND ACETAMINOPHEN 1 TABLET: 5; 325 TABLET ORAL at 22:17

## 2023-11-01 ASSESSMENT — PAIN SCALES - GENERAL: PAINLEVEL_OUTOF10: 10 - WORST POSSIBLE PAIN

## 2023-11-01 ASSESSMENT — PAIN - FUNCTIONAL ASSESSMENT: PAIN_FUNCTIONAL_ASSESSMENT: 0-10

## 2023-11-01 ASSESSMENT — PAIN DESCRIPTION - LOCATION: LOCATION: ABDOMEN

## 2023-11-01 ASSESSMENT — PAIN DESCRIPTION - ORIENTATION: ORIENTATION: RIGHT

## 2023-11-02 ENCOUNTER — TELEPHONE (OUTPATIENT)
Dept: OBSTETRICS AND GYNECOLOGY | Facility: CLINIC | Age: 33
End: 2023-11-02
Payer: COMMERCIAL

## 2023-11-02 VITALS
RESPIRATION RATE: 16 BRPM | OXYGEN SATURATION: 99 % | DIASTOLIC BLOOD PRESSURE: 82 MMHG | SYSTOLIC BLOOD PRESSURE: 137 MMHG | BODY MASS INDEX: 43.45 KG/M2 | WEIGHT: 260.8 LBS | HEIGHT: 65 IN | HEART RATE: 85 BPM | TEMPERATURE: 98.5 F

## 2023-11-02 NOTE — ED PROVIDER NOTES
EMERGENCY MEDICINE EVALUATION NOTE    History of Present Illness     Chief Complaint:   Chief Complaint   Patient presents with    Abdominal Pain     Pt is complaining of right side abdominal since yesterday. Pt has a long hx of endometriosis and ruptured uterine cysts. Pt is a&ox4, warm and dry.        HPI: Danette Buenrostro is a 33 y.o. female with past medical history of endometriosis and ruptured ovarian cyst who presents with complaint of pelvic pain.  Patient states she has been dealing with right lower quadrant pain and pelvic pain for the past several months secondary to her known endometriosis and ovarian cyst.  States she is following with an OB/GYN and does have an appointment this upcoming December.  She does note worsening pain over the past 24 hours states she has no medications at home for pain control.  She denies any vaginal bleeding or discharge, dysuria, hematuria, nausea, vomiting, fever, chills.    Previous History     Past Medical History:   Diagnosis Date    Endometriosis     Epilepsy with partial complex seizures (CMS/HCC)     Obesity     Pelvic floor dysfunction in female     Type A blood, Rh positive     Blood type A+     Past Surgical History:   Procedure Laterality Date    BREAST LUMPECTOMY  2014    Breast Surgery Lumpectomy     SECTION, LOW TRANSVERSE  04/14/2014    x 2 w/ BTL at second    COLPOSCOPY  2014    Colposcopy    OTHER SURGICAL HISTORY  2014    Surgical Treatment Of Spontaneous      Social History     Tobacco Use    Smoking status: Never     Passive exposure: Never    Smokeless tobacco: Never   Substance Use Topics    Alcohol use: Never    Drug use: Never     Family History   Problem Relation Name Age of Onset    Hypertension Mother      Stroke Mother      Heart attack Mother      Breast cancer Mother's Sister      Seizures Mother's Brother      Seizures Father's Brother      Breast cancer Maternal Grandmother      Other (cerebral infarction)  Maternal Grandfather      Seizures Other Cousin      No Known Allergies  Current Outpatient Medications   Medication Instructions    cyclobenzaprine (FLEXERIL) 5 mg, oral, 2 times daily PRN    divalproex (Depakote) 250 mg EC tablet 1 tablet, oral, 2 times daily    divalproex (Depakote) 500 mg EC tablet 1 tablet, oral, 2 times daily    HYDROcodone-acetaminophen (Norco) 5-325 mg tablet 1 tablet, oral, Every 6 hours PRN    ibuprofen 600 mg, oral, Every 6 hours PRN    OXcarbazepine (Trileptal) 600 mg tablet 2 tablets, oral, 2 times daily    relugolix-estradiol-norethindr 40-1-0.5 mg tablet 1 tablet, oral, Daily       Physical Exam     Appearance: Alert, oriented , cooperative,  in acute distress. Well nourished & well hydrated.     Skin: Intact,  dry skin, no lesions, rash, petechiae or purpura.      Eyes: PERRLA, EOMs intact,  Conjunctiva pink with no redness or exudates. Cornea & anterior chamber are clear, Eyelids without lesions. No scleral icterus.      ENT: Hearing grossly intact. External auditory canals patent, tympanic membranes intact with visible landmarks. Nares patent, mucus membranes moist. Dentition without lesions. Pharynx clear, uvula midline.      Neck: Supple, without meningismus. Thyroid not palpable. Trachea at midline. No lymphadenopathy.     Pulmonary: Clear bilaterally with good chest wall excursion. No rales, rhonchi or wheezing. No accessory muscle use or stridor.     Cardiac: Normal S1, S2 without murmur, rub, gallop or extrasystole. No JVD, Carotids without bruits.     Abdomen: Soft, moderate tenderness to deep palpation in the right lower pelvic area, active bowel sounds.  No palpable organomegaly.  No rebound or guarding.  No CVA tenderness.     Genitourinary: Exam deferred.     Musculoskeletal: Full range of motion. no pain, edema, or deformity. Pulses full and equal. No cyanosis or clubbing.      Neurological:  Cranial nerves II through XII are grossly intact, finger-nose touch is normal,  normal sensation, no weakness, no focal findings identified.     Psychiatric: Appropriate mood and affect.      Results     Labs Reviewed   CBC WITH AUTO DIFFERENTIAL - Abnormal       Result Value    WBC 7.8      nRBC 0.0      RBC 4.05      Hemoglobin 11.9 (*)     Hematocrit 34.9 (*)     MCV 86      MCH 29.4      MCHC 34.1      RDW 14.4      Platelets 377      Neutrophils % 52.5      Immature Granulocytes %, Automated 0.3      Lymphocytes % 37.6      Monocytes % 7.8      Eosinophils % 1.3      Basophils % 0.5      Neutrophils Absolute 4.12      Immature Granulocytes Absolute, Automated 0.02      Lymphocytes Absolute 2.95      Monocytes Absolute 0.61      Eosinophils Absolute 0.10      Basophils Absolute 0.04     BASIC METABOLIC PANEL - Abnormal    Glucose 132 (*)     Sodium 136      Potassium 3.9      Chloride 104      Bicarbonate 22      Anion Gap 14      Urea Nitrogen 12      Creatinine 0.64      eGFR >90      Calcium 8.5 (*)    HUMAN CHORIONIC GONADOTROPIN, SERUM QUANTITATIVE - Normal    HCG, Beta-Quantitative <2      Narrative:      Total HCG measurement is performed using the [employer] Access   Immunoassay which detects intact HCG and free beta HCG subunit.    This test is not indicated for use as a tumor marker.   HCG testing is performed using a different test methodology at [facility] than other [facility]. Direct result comparison   should only be made within the same method.         US PELVIS TRANSABDOMINAL WITH TRANSVAGINAL   Final Result   1.  There is an enlarged right ovary with stable isoechoic cystic   lesion which may represent an endometrioma, as well as an increased   in size hypoechoic lesion within the inferior aspect of the ovary,   which may represent blood products. There is preserved flow to the   ovary.   2. No evidence of free pelvic fluid.        Signed by: Leodan Nieto 11/1/2023 9:57 PM   Dictation workstation:   [id number]            ED Course & Medical  "Decision Making     Medications   oxyCODONE-acetaminophen (Percocet) 5-325 mg per tablet 1 tablet (1 tablet oral Given 11/1/23 2217)   ibuprofen tablet 800 mg (800 mg oral Given 11/1/23 2217)     Diagnoses as of 11/01/23 2330   Pelvic pain   Endometriosis     Heart Rate:  [87]   Temp:  [36.9 °C (98.5 °F)]   Resp:  [18]   BP: (143)/(76)   Height:  [165.1 cm (5' 5\")]   Weight:  [118 kg (260 lb 12.9 oz)]   SpO2:  [100 %]      Danette Buenrostro is a 33 y.o. female with past medical history of endometriosis and ruptured ovarian cyst who presents with complaint of pelvic pain.  Patient is hemodynamically stable and afebrile.  She does have some tenderness noted to the pelvic area on initial examination although minimal.  Patient has known endometriosis and was likely experiencing acute on chronic endometriosis related pain due to active menstrual cycle.  Also considered ovarian torsion or other pelvic pathology.  Patient given Motrin and Percocet for pain control.  No significant renal dysfunction, electro abnormality, leukocytosis noted.  Stable anemia noted.  Beta-hCG negative.  Patient without any urinary symptoms of low concern for need of urinalysis at this time.  Transvaginal ultrasound was completed which did show enlarged right ovary although with normal blood flow representing a known endometrioma as well as a increased size of lesion which could present some blood products and cyst.  Patient for these findings he did have relief in reexamination.  She was informed she should follow-up with her OB/GYN as soon as possible for additional evaluation and possible surgical management.  She stated she was already on birth control and does not want to try that again.  Remained hemodynamically stable.    Procedures   Procedures    Diagnosis     1. Pelvic pain    2. Endometriosis        Disposition     DISCHARGE.  The patient is discharged back to their place of residence.  Discharge diagnosis, instructions and plan were " discussed and understood. At the time of discharge the patient was comfortable and was in no apparent distress. Patient is aware of diagnostic uncertainty and was notified though testing is negative here, there is a very small chance that pathology may be missed.  The patient understands these risks and the patient /family understood to return immediately to the emergency department if the symptoms worsen or if they have any additional concerns.    FOLLOW UP  Primary care provider in 1-2 days.        ED Prescriptions       Medication Sig Dispense Start Date End Date Auth. Provider    ibuprofen 600 mg tablet Take 1 tablet (600 mg) by mouth every 6 hours if needed for mild pain (1 - 3) for up to 7 days. 27 tablet 11/1/2023 11/8/2023 Yuan Beckham DO    HYDROcodone-acetaminophen (Norco) 5-325 mg tablet Take 1 tablet by mouth every 6 hours if needed for severe pain (7 - 10) for up to 3 days. 12 tablet 11/1/2023 11/4/2023 DO Yuan Dickerson DO  11/02/23 1614

## 2023-11-02 NOTE — DISCHARGE INSTRUCTIONS
You have been seen at a [facility] [place].  Please follow-up with your primary care provider in the next 1 to 2 days for further evaluation and routine follow-up.  Please return to the emergency room if having any worsening symptoms.  Please follow-up with any specialists if discussed during your emergency room stay.

## 2023-11-02 NOTE — ED TRIAGE NOTES
"Provider in Triage (PIT) Note    I evaluated this patient in triage with the RN. Due to the patient's complaint, labs, imaging, and/or interventions were ordered by me in an attempt to expedite/facilitate patient care, however I am not participating in care after evaluation. This is a preliminary assessment. Patient does not appear in acute distress at this time. They are stable and will have a full evaluation as soon as possible. They will be cared for by another provider who will possibly order more labs, imaging and/or interventions. Patient did not have a full ROS or PE completed by myself, however below is a summary with reasons for orders.    MDM: Danette Buenrostro is a 33 y.o. female who presents to the ED with lower abdominal pain.  Patient states she has a history of epilepsy, endometriosis, and ovarian cysts.  She had a partial hysterectomy on 6/20 when she was told about the endometriosis and has had intermittent pains after that.  Pain is primarily in the right lower quadrant with some dull aching across her lower abdomen into the left lower quadrant.  It feels like a \"twisting\" sensation or \"contractions.\"  She states she has been in and out of the ED for this.  She has tried Tylenol and Flexeril which did not help.  She has been given Percocet off and on which does help.  She denies any vaginal bleeding or discharge.  She denies chance of pregnancy.  She denies any nausea, vomiting, change in bowel habits, or urinary symptoms.  VSS. On exam, patient is well appearing and in no apparent distress. Heart has RRR, no MRG.  Lungs are CTAB, no WRR, no WOB. Abdomen is soft, with TTP in the R inguinal region, no rebound or guarding. MAEx4, extremities WWP, no CCE.  Presentation is concerning for possible endometriosis, symptomatic ovarian cyst, ruptured cyst, or torsion.  Patient without tenderness over McBurney's point pointing away from appendicitis.  Evaluation was initiated with labs and pelvic ultrasound.  " Treatment was initiated with Percocet and ibuprofen. Patient to be reevaluated once in formal ED bed.    Erendira Siddiqi MD  EM/IM/Peds    This note was dictated by speech recognition. Minor errors in transcription may be present.

## 2023-11-15 ENCOUNTER — TELEPHONE (OUTPATIENT)
Dept: OBSTETRICS AND GYNECOLOGY | Facility: CLINIC | Age: 33
End: 2023-11-15
Payer: COMMERCIAL

## 2023-11-16 ENCOUNTER — HOSPITAL ENCOUNTER (EMERGENCY)
Facility: HOSPITAL | Age: 33
Discharge: HOME | End: 2023-11-17
Attending: EMERGENCY MEDICINE
Payer: MEDICARE

## 2023-11-16 DIAGNOSIS — R10.2 PELVIC PAIN: ICD-10-CM

## 2023-11-16 DIAGNOSIS — N83.201 RIGHT OVARIAN CYST: ICD-10-CM

## 2023-11-16 DIAGNOSIS — N80.9 ENDOMETRIOSIS: ICD-10-CM

## 2023-11-16 DIAGNOSIS — R10.84 GENERALIZED ABDOMINAL PAIN: Primary | ICD-10-CM

## 2023-11-16 PROCEDURE — 96375 TX/PRO/DX INJ NEW DRUG ADDON: CPT

## 2023-11-16 PROCEDURE — 99284 EMERGENCY DEPT VISIT MOD MDM: CPT | Mod: 25

## 2023-11-16 PROCEDURE — 99285 EMERGENCY DEPT VISIT HI MDM: CPT | Performed by: EMERGENCY MEDICINE

## 2023-11-16 PROCEDURE — 96374 THER/PROPH/DIAG INJ IV PUSH: CPT

## 2023-11-16 RX ORDER — CYCLOBENZAPRINE HCL 5 MG
5 TABLET ORAL 2 TIMES DAILY PRN
Qty: 30 TABLET | Refills: 2 | OUTPATIENT
Start: 2023-11-16 | End: 2023-11-17

## 2023-11-16 ASSESSMENT — COLUMBIA-SUICIDE SEVERITY RATING SCALE - C-SSRS
6. HAVE YOU EVER DONE ANYTHING, STARTED TO DO ANYTHING, OR PREPARED TO DO ANYTHING TO END YOUR LIFE?: NO
2. HAVE YOU ACTUALLY HAD ANY THOUGHTS OF KILLING YOURSELF?: NO
1. IN THE PAST MONTH, HAVE YOU WISHED YOU WERE DEAD OR WISHED YOU COULD GO TO SLEEP AND NOT WAKE UP?: NO

## 2023-11-17 ENCOUNTER — APPOINTMENT (OUTPATIENT)
Dept: RADIOLOGY | Facility: HOSPITAL | Age: 33
End: 2023-11-17
Payer: MEDICARE

## 2023-11-17 VITALS
TEMPERATURE: 98.4 F | DIASTOLIC BLOOD PRESSURE: 82 MMHG | HEART RATE: 76 BPM | RESPIRATION RATE: 14 BRPM | SYSTOLIC BLOOD PRESSURE: 147 MMHG | OXYGEN SATURATION: 99 % | HEIGHT: 65 IN | WEIGHT: 253 LBS | BODY MASS INDEX: 42.15 KG/M2

## 2023-11-17 LAB
ALBUMIN SERPL BCP-MCNC: 3.9 G/DL (ref 3.4–5)
ALP SERPL-CCNC: 40 U/L (ref 33–110)
ALT SERPL W P-5'-P-CCNC: 21 U/L (ref 7–45)
ANION GAP SERPL CALC-SCNC: 10 MMOL/L (ref 10–20)
AST SERPL W P-5'-P-CCNC: 17 U/L (ref 9–39)
BASOPHILS # BLD AUTO: 0.04 X10*3/UL (ref 0–0.1)
BASOPHILS NFR BLD AUTO: 0.5 %
BILIRUB SERPL-MCNC: 0.3 MG/DL (ref 0–1.2)
BUN SERPL-MCNC: 13 MG/DL (ref 6–23)
CALCIUM SERPL-MCNC: 8.7 MG/DL (ref 8.6–10.3)
CHLORIDE SERPL-SCNC: 101 MMOL/L (ref 98–107)
CO2 SERPL-SCNC: 29 MMOL/L (ref 21–32)
CREAT SERPL-MCNC: 0.72 MG/DL (ref 0.5–1.05)
EOSINOPHIL # BLD AUTO: 0.09 X10*3/UL (ref 0–0.7)
EOSINOPHIL NFR BLD AUTO: 1.1 %
ERYTHROCYTE [DISTWIDTH] IN BLOOD BY AUTOMATED COUNT: 14.5 % (ref 11.5–14.5)
GFR SERPL CREATININE-BSD FRML MDRD: >90 ML/MIN/1.73M*2
GLUCOSE SERPL-MCNC: 93 MG/DL (ref 74–99)
HCT VFR BLD AUTO: 31.9 % (ref 36–46)
HGB BLD-MCNC: 10.8 G/DL (ref 12–16)
IMM GRANULOCYTES # BLD AUTO: 0.02 X10*3/UL (ref 0–0.7)
IMM GRANULOCYTES NFR BLD AUTO: 0.2 % (ref 0–0.9)
LYMPHOCYTES # BLD AUTO: 2.89 X10*3/UL (ref 1.2–4.8)
LYMPHOCYTES NFR BLD AUTO: 35 %
MCH RBC QN AUTO: 29.1 PG (ref 26–34)
MCHC RBC AUTO-ENTMCNC: 33.9 G/DL (ref 32–36)
MCV RBC AUTO: 86 FL (ref 80–100)
MONOCYTES # BLD AUTO: 0.87 X10*3/UL (ref 0.1–1)
MONOCYTES NFR BLD AUTO: 10.5 %
NEUTROPHILS # BLD AUTO: 4.35 X10*3/UL (ref 1.2–7.7)
NEUTROPHILS NFR BLD AUTO: 52.7 %
NRBC BLD-RTO: 0 /100 WBCS (ref 0–0)
PLATELET # BLD AUTO: 399 X10*3/UL (ref 150–450)
POTASSIUM SERPL-SCNC: 3.7 MMOL/L (ref 3.5–5.3)
PROT SERPL-MCNC: 7.6 G/DL (ref 6.4–8.2)
RBC # BLD AUTO: 3.71 X10*6/UL (ref 4–5.2)
SODIUM SERPL-SCNC: 136 MMOL/L (ref 136–145)
WBC # BLD AUTO: 8.3 X10*3/UL (ref 4.4–11.3)

## 2023-11-17 PROCEDURE — 80053 COMPREHEN METABOLIC PANEL: CPT | Performed by: EMERGENCY MEDICINE

## 2023-11-17 PROCEDURE — 2500000004 HC RX 250 GENERAL PHARMACY W/ HCPCS (ALT 636 FOR OP/ED): Performed by: EMERGENCY MEDICINE

## 2023-11-17 PROCEDURE — 76830 TRANSVAGINAL US NON-OB: CPT

## 2023-11-17 PROCEDURE — 76856 US EXAM PELVIC COMPLETE: CPT | Performed by: RADIOLOGY

## 2023-11-17 PROCEDURE — 36415 COLL VENOUS BLD VENIPUNCTURE: CPT | Performed by: EMERGENCY MEDICINE

## 2023-11-17 PROCEDURE — 85025 COMPLETE CBC W/AUTO DIFF WBC: CPT | Performed by: EMERGENCY MEDICINE

## 2023-11-17 PROCEDURE — 76830 TRANSVAGINAL US NON-OB: CPT | Mod: 59 | Performed by: RADIOLOGY

## 2023-11-17 RX ORDER — IBUPROFEN 600 MG/1
600 TABLET ORAL EVERY 6 HOURS PRN
Qty: 28 TABLET | Refills: 0 | Status: SHIPPED | OUTPATIENT
Start: 2023-11-17 | End: 2023-11-24

## 2023-11-17 RX ORDER — METHOCARBAMOL 500 MG/1
500 TABLET, FILM COATED ORAL 2 TIMES DAILY
Qty: 20 TABLET | Refills: 0 | Status: SHIPPED | OUTPATIENT
Start: 2023-11-17 | End: 2023-12-11 | Stop reason: WASHOUT

## 2023-11-17 RX ORDER — MORPHINE SULFATE 4 MG/ML
4 INJECTION, SOLUTION INTRAMUSCULAR; INTRAVENOUS ONCE
Status: COMPLETED | OUTPATIENT
Start: 2023-11-17 | End: 2023-11-17

## 2023-11-17 RX ORDER — KETOROLAC TROMETHAMINE 30 MG/ML
30 INJECTION, SOLUTION INTRAMUSCULAR; INTRAVENOUS ONCE
Status: COMPLETED | OUTPATIENT
Start: 2023-11-17 | End: 2023-11-17

## 2023-11-17 RX ADMIN — MORPHINE SULFATE 4 MG: 4 INJECTION, SOLUTION INTRAMUSCULAR; INTRAVENOUS at 01:56

## 2023-11-17 RX ADMIN — KETOROLAC TROMETHAMINE 30 MG: 30 INJECTION, SOLUTION INTRAMUSCULAR at 04:36

## 2023-11-17 ASSESSMENT — PAIN SCALES - GENERAL
PAINLEVEL_OUTOF10: 8
PAINLEVEL_OUTOF10: 8

## 2023-11-17 ASSESSMENT — PAIN - FUNCTIONAL ASSESSMENT: PAIN_FUNCTIONAL_ASSESSMENT: 0-10

## 2023-11-17 ASSESSMENT — PAIN DESCRIPTION - LOCATION: LOCATION: PELVIS

## 2023-11-17 ASSESSMENT — PAIN DESCRIPTION - ORIENTATION: ORIENTATION: RIGHT;LOWER

## 2023-11-17 NOTE — ED TRIAGE NOTES
"3 days  RLQ abd pain.  Has hx of endometriosis and PCOS always on that side. Had one \"gushing\" of blood today no increase or decrease of pain specifically surrounding that event pain 10-10.   "

## 2023-11-17 NOTE — ED NOTES
To ED from home, pt c/o RLQ abd/pelvis pain. States hx of ovarian cysts and states the pain has been worsening over the past couple of weeks.      Chava Enriquez RN  11/17/23 2447

## 2023-11-17 NOTE — ED PROVIDER NOTES
HPI   Chief Complaint   Patient presents with    Abdominal Pain       HPI  Abdominal pain.  Patient presents with right lower quadrant abdominal pain that has been intermittent ongoing for months.  The patient has a history of endometriosis and has had a hysterectomy partial in the past.  She still has her right ovary.  It took out her left ovary.  She states that she does have known cyst on this side.  She states her pain is going up today and she was told by her gynecologist come here for evaluation.  She denies any vomiting, diarrhea, constipation or dysuria, hematuria or other symptoms.                  No data recorded                Patient History   Past Medical History:   Diagnosis Date    Endometriosis     Epilepsy with partial complex seizures (CMS/HCC)     Obesity     Pelvic floor dysfunction in female     Type A blood, Rh positive     Blood type A+     Past Surgical History:   Procedure Laterality Date    BREAST LUMPECTOMY  2014    Breast Surgery Lumpectomy     SECTION, LOW TRANSVERSE  04/14/2014    x 2 w/ BTL at second    COLPOSCOPY  2014    Colposcopy    OTHER SURGICAL HISTORY  2014    Surgical Treatment Of Spontaneous      Family History   Problem Relation Name Age of Onset    Hypertension Mother      Stroke Mother      Heart attack Mother      Breast cancer Mother's Sister      Seizures Mother's Brother      Seizures Father's Brother      Breast cancer Maternal Grandmother      Other (cerebral infarction) Maternal Grandfather      Seizures Other Cousin      Social History     Tobacco Use    Smoking status: Never     Passive exposure: Never    Smokeless tobacco: Never   Substance Use Topics    Alcohol use: Never    Drug use: Never       Physical Exam   ED Triage Vitals [23]   Temp Heart Rate Resp BP   36.9 °C (98.4 °F) 87 18 (!) 156/92      SpO2 Temp Source Heart Rate Source Patient Position   99 % Tympanic -- --      BP Location FiO2 (%)     -- --        Physical Exam  Vitals and nursing note reviewed.   Constitutional:       General: She is not in acute distress.     Appearance: She is well-developed.   HENT:      Head: Normocephalic and atraumatic.   Eyes:      Conjunctiva/sclera: Conjunctivae normal.   Cardiovascular:      Rate and Rhythm: Normal rate and regular rhythm.      Heart sounds: No murmur heard.  Pulmonary:      Effort: Pulmonary effort is normal. No respiratory distress.      Breath sounds: Normal breath sounds.   Abdominal:      Palpations: Abdomen is soft.      Tenderness: There is abdominal tenderness in the right lower quadrant. There is no guarding or rebound.   Musculoskeletal:         General: No swelling.      Cervical back: Neck supple.   Skin:     General: Skin is warm and dry.      Capillary Refill: Capillary refill takes less than 2 seconds.   Neurological:      Mental Status: She is alert.   Psychiatric:         Mood and Affect: Mood normal.         ED Course & MDM   Diagnoses as of 11/17/23 0993   Generalized abdominal pain   Right ovarian cyst       Medical Decision Making  The patient is no guarding or rebound.  Do not feel that she requires a CT at this time for appendicitis.  I did want to rule out ovarian pathology including torsion.  Patient has no torsion but does have large cysts.  Will discharge home with NSAIDs.  Ultimately feel that she might need pain management.    Procedure  Procedures     Bryant Molina MD  11/17/23 6304

## 2023-11-17 NOTE — ED NOTES
VSS. D.c. papers given, scrips x 2 sent. Pt advised on using hot packs/heating pad, oral water hydration and follow up with OBGYN as scheduled on 11/21. Advised to return back to the ER immediately should symptoms change/worsen. Home with .      Chava Enriquez RN  11/17/23 0875

## 2023-11-21 ENCOUNTER — OFFICE VISIT (OUTPATIENT)
Dept: OBSTETRICS AND GYNECOLOGY | Facility: CLINIC | Age: 33
End: 2023-11-21
Payer: MEDICARE

## 2023-11-21 ENCOUNTER — PREP FOR PROCEDURE (OUTPATIENT)
Dept: OPERATING ROOM | Facility: HOSPITAL | Age: 33
End: 2023-11-21

## 2023-11-21 VITALS
WEIGHT: 266.5 LBS | SYSTOLIC BLOOD PRESSURE: 131 MMHG | HEIGHT: 65 IN | HEART RATE: 81 BPM | BODY MASS INDEX: 44.4 KG/M2 | DIASTOLIC BLOOD PRESSURE: 85 MMHG

## 2023-11-21 DIAGNOSIS — N83.201 CYST OF RIGHT OVARY: ICD-10-CM

## 2023-11-21 DIAGNOSIS — E66.9 OBESITY, UNSPECIFIED CLASSIFICATION, UNSPECIFIED OBESITY TYPE, UNSPECIFIED WHETHER SERIOUS COMORBIDITY PRESENT: ICD-10-CM

## 2023-11-21 DIAGNOSIS — N80.9 ENDOMETRIOSIS: Primary | ICD-10-CM

## 2023-11-21 DIAGNOSIS — G40.209 EPILEPSY WITH PARTIAL COMPLEX SEIZURES (MULTI): ICD-10-CM

## 2023-11-21 PROCEDURE — 3008F BODY MASS INDEX DOCD: CPT | Performed by: STUDENT IN AN ORGANIZED HEALTH CARE EDUCATION/TRAINING PROGRAM

## 2023-11-21 PROCEDURE — 1036F TOBACCO NON-USER: CPT | Performed by: STUDENT IN AN ORGANIZED HEALTH CARE EDUCATION/TRAINING PROGRAM

## 2023-11-21 PROCEDURE — 99215 OFFICE O/P EST HI 40 MIN: CPT | Performed by: STUDENT IN AN ORGANIZED HEALTH CARE EDUCATION/TRAINING PROGRAM

## 2023-11-21 PROCEDURE — 99215 OFFICE O/P EST HI 40 MIN: CPT | Mod: PO | Performed by: STUDENT IN AN ORGANIZED HEALTH CARE EDUCATION/TRAINING PROGRAM

## 2023-11-21 RX ORDER — METHOCARBAMOL 500 MG/1
500 TABLET, FILM COATED ORAL 4 TIMES DAILY PRN
Qty: 80 TABLET | Refills: 2 | Status: ON HOLD | OUTPATIENT
Start: 2023-11-21 | End: 2024-02-23 | Stop reason: SDUPTHER

## 2023-11-21 RX ORDER — ACETAMINOPHEN 325 MG/1
975 TABLET ORAL ONCE
Status: CANCELLED | OUTPATIENT
Start: 2023-11-21 | End: 2023-11-21

## 2023-11-21 RX ORDER — CELECOXIB 200 MG/1
400 CAPSULE ORAL ONCE
Status: CANCELLED | OUTPATIENT
Start: 2023-11-21 | End: 2023-11-21

## 2023-11-21 RX ORDER — GABAPENTIN 600 MG/1
600 TABLET ORAL ONCE
Status: CANCELLED | OUTPATIENT
Start: 2023-11-21 | End: 2023-11-21

## 2023-11-21 ASSESSMENT — PAIN SCALES - GENERAL: PAINLEVEL: 7

## 2023-11-21 NOTE — PROGRESS NOTES
"Division of Minimally Invasive Gynecologic Surgery  [facility]    11/20/23 Gynecology Visit    CC:   Follow up for endometriosis, pelvic pain     Expand All Collapse All       HISTORY OF PRESENT ILLNESS:  Danette Buenrostro 33 y.o. 34 yo P2 (CS x2 +BTL) presents in referral from Dr. Lopez to discuss path-proven endometriosis and pelvic pain. At last visit, she was started on Myfembree and PFPT/Flexeril. Consideration was given to future surgery.     She reports she did not do well with Flexeril. It didn't help with pain and made her extremely sedated for a couple days. She has not tried vaginal Valium. She was also not able to get Myfembree as pharmacy did not honor the coupon. She does report the combination of ibuprofen + Robaxin, which she received from the ED, did help somewhat.      She has had ED visits x 3 since last visit for pelvic pain. Ultrasounds have shown a persistent though stable right ovarian complex cystic lesion measuring 5cm x 3cm x 3cm. It does appear c/w endometrioma to my eye.     She reports she is interested in surgical excision and hysterectomy.     Labs:   - Recent CBC w/ Hgb 10.9   - Recent CMP unremarkable   Screening:   - Last pap 2021 WNL per pt, no hx of CIN2-3  - Last mammogram NA  PMHx: obesity, partial complex seizures   PSHx: CS x 2 w/ BTL, robotic LSO/R cystectomy + salpingectomy         Independent review of imaging performed by myself.     PMHx, PSHx, SHx, Allergies, and Medications updated in Epic.    ROS: reviewed and negative    PE: /85   Pulse 81   Ht 1.651 m (5' 5\")   Wt 121 kg (266 lb 8 oz)   LMP 11/01/2023 (Exact Date)   BMI 44.35 kg/m²    Constitutional:  No acute distress, well-nourished and well-developed  HEENT: EOM grossly intact, MMM, neck supple and with full ROM  Pulm:  Effort normal. No accessory muscle usage.  No respiratory distress.  Neurological:  She is alert and oriented to person place and time.  Skin: Warm, " no pallor.  Psychiatric:  She has normal mood and affect.    A/P: Danette Buenrostro 33 y.o. 32 yo P2 (CS x2 +BTL) presents in referral from Dr. Lopez to discuss path-proven endometriosis and pelvic pain.    We reviewed endometriosis again today, as well as pelvic floor tension myalgia, with regard to pain symptoms. We discussed that while in the setting of endometrioma surgical excision of endometriosis and cystectomy is appropriate, hysterectomy  would not guarantee resolution of pain or prevention of future recurrence. She reports she does understand this. She has no desire for future fertility and desires definitive management of menses.     We discussed the standard procedure for robotic myomectomy, excision of endometriosis, ovarian cystectomy, and possible appendectomy. We reviewed the risks/benefits/alternatives to surgery. She is aware of the risk of bleeding, infection, and damage to nearby structures, including bladder, ureters, bowel, and vasculature. She is agreeable to blood transfusion if recommended. We reviewed the small risk of laparotomy and the fact that fertility following hysterectomy is not an option.     We discussed options for pain management, including PFPT, muscle relaxers, and menstrual suppression. She is open to a trial of Slynd, PFPT and Robaxin.     Plan:  - Schedule for TRH-BS, excision of endometriosis, ovarian cystectomy, possible appendectomy, any indicated procedure. Will need PAT. Main campus only. Time: 3 hours  - Preop MRI  - Trial of Slynd, coupon information  - Will send message w/ PFPT website  - Refill of Robaxin    Saritha Theodore MD  Division of Minimally Invasive Gynecologic Surgery  [facility]

## 2023-11-22 PROBLEM — N80.9 ENDOMETRIOSIS: Status: ACTIVE | Noted: 2023-11-21

## 2023-12-06 ENCOUNTER — APPOINTMENT (OUTPATIENT)
Dept: OBSTETRICS AND GYNECOLOGY | Facility: CLINIC | Age: 33
End: 2023-12-06
Payer: COMMERCIAL

## 2023-12-07 ENCOUNTER — HOSPITAL ENCOUNTER (OUTPATIENT)
Dept: RADIOLOGY | Facility: HOSPITAL | Age: 33
Discharge: HOME | End: 2023-12-07
Payer: MEDICARE

## 2023-12-07 DIAGNOSIS — N80.9 ENDOMETRIOSIS: ICD-10-CM

## 2023-12-08 ENCOUNTER — TELEPHONE (OUTPATIENT)
Dept: OBSTETRICS AND GYNECOLOGY | Facility: CLINIC | Age: 33
End: 2023-12-08

## 2023-12-08 NOTE — TELEPHONE ENCOUNTER
Patient has a piercing that cannot be removed.  Cannot have MRI.  Surgery is 12/12.    Will let Dr. Theodore know.

## 2023-12-11 ENCOUNTER — OFFICE VISIT (OUTPATIENT)
Dept: PRIMARY CARE | Facility: HOSPITAL | Age: 33
End: 2023-12-11
Payer: MEDICARE

## 2023-12-11 ENCOUNTER — LAB (OUTPATIENT)
Dept: LAB | Facility: LAB | Age: 33
End: 2023-12-11
Payer: MEDICARE

## 2023-12-11 VITALS
HEART RATE: 86 BPM | RESPIRATION RATE: 18 BRPM | WEIGHT: 268.2 LBS | BODY MASS INDEX: 44.68 KG/M2 | DIASTOLIC BLOOD PRESSURE: 82 MMHG | OXYGEN SATURATION: 96 % | TEMPERATURE: 97.3 F | SYSTOLIC BLOOD PRESSURE: 139 MMHG | HEIGHT: 65 IN

## 2023-12-11 DIAGNOSIS — Z12.31 ENCOUNTER FOR SCREENING MAMMOGRAM FOR MALIGNANT NEOPLASM OF BREAST: ICD-10-CM

## 2023-12-11 DIAGNOSIS — G40.909 SEIZURE DISORDER (MULTI): ICD-10-CM

## 2023-12-11 DIAGNOSIS — Z00.00 HEALTHCARE MAINTENANCE: ICD-10-CM

## 2023-12-11 DIAGNOSIS — E55.9 HYPOVITAMINOSIS D: ICD-10-CM

## 2023-12-11 DIAGNOSIS — G43.709 CHRONIC MIGRAINE WITHOUT AURA WITHOUT STATUS MIGRAINOSUS, NOT INTRACTABLE: ICD-10-CM

## 2023-12-11 DIAGNOSIS — Z12.39 BREAST SCREENING: ICD-10-CM

## 2023-12-11 DIAGNOSIS — G40.909 SEIZURE DISORDER (MULTI): Primary | ICD-10-CM

## 2023-12-11 DIAGNOSIS — Z11.59 NEED FOR HEPATITIS C SCREENING TEST: ICD-10-CM

## 2023-12-11 LAB
ALBUMIN SERPL BCP-MCNC: 3.8 G/DL (ref 3.4–5)
ALP SERPL-CCNC: 45 U/L (ref 33–110)
ALT SERPL W P-5'-P-CCNC: 29 U/L (ref 7–45)
ANION GAP SERPL CALC-SCNC: 7 MMOL/L (ref 10–20)
AST SERPL W P-5'-P-CCNC: 27 U/L (ref 9–39)
BILIRUB SERPL-MCNC: 0.2 MG/DL (ref 0–1.2)
BUN SERPL-MCNC: 12 MG/DL (ref 6–23)
CALCIUM SERPL-MCNC: 9 MG/DL (ref 8.6–10.3)
CHLORIDE SERPL-SCNC: 103 MMOL/L (ref 98–107)
CHOLEST SERPL-MCNC: 131 MG/DL (ref 0–199)
CHOLESTEROL/HDL RATIO: 4.5
CO2 SERPL-SCNC: 30 MMOL/L (ref 21–32)
CREAT SERPL-MCNC: 0.69 MG/DL (ref 0.5–1.05)
EST. AVERAGE GLUCOSE BLD GHB EST-MCNC: 114 MG/DL
GFR SERPL CREATININE-BSD FRML MDRD: >90 ML/MIN/1.73M*2
GLUCOSE SERPL-MCNC: 81 MG/DL (ref 74–99)
HBA1C MFR BLD: 5.6 %
HDLC SERPL-MCNC: 29.1 MG/DL
MAGNESIUM SERPL-MCNC: 1.9 MG/DL (ref 1.6–2.4)
NON-HDL CHOLESTEROL: 102 MG/DL (ref 0–149)
POTASSIUM SERPL-SCNC: 4 MMOL/L (ref 3.5–5.3)
PROT SERPL-MCNC: 7 G/DL (ref 6.4–8.2)
SODIUM SERPL-SCNC: 136 MMOL/L (ref 136–145)
VALPROATE SERPL-MCNC: 21 UG/ML (ref 50–100)

## 2023-12-11 PROCEDURE — 83036 HEMOGLOBIN GLYCOSYLATED A1C: CPT

## 2023-12-11 PROCEDURE — 83718 ASSAY OF LIPOPROTEIN: CPT

## 2023-12-11 PROCEDURE — 99212 OFFICE O/P EST SF 10 MIN: CPT | Performed by: INTERNAL MEDICINE

## 2023-12-11 PROCEDURE — G0439 PPPS, SUBSEQ VISIT: HCPCS | Performed by: INTERNAL MEDICINE

## 2023-12-11 PROCEDURE — 99215 OFFICE O/P EST HI 40 MIN: CPT | Performed by: INTERNAL MEDICINE

## 2023-12-11 PROCEDURE — 1036F TOBACCO NON-USER: CPT | Performed by: INTERNAL MEDICINE

## 2023-12-11 PROCEDURE — 83735 ASSAY OF MAGNESIUM: CPT

## 2023-12-11 PROCEDURE — 82306 VITAMIN D 25 HYDROXY: CPT

## 2023-12-11 PROCEDURE — 86803 HEPATITIS C AB TEST: CPT

## 2023-12-11 PROCEDURE — 82465 ASSAY BLD/SERUM CHOLESTEROL: CPT

## 2023-12-11 PROCEDURE — 80164 ASSAY DIPROPYLACETIC ACD TOT: CPT

## 2023-12-11 PROCEDURE — 3008F BODY MASS INDEX DOCD: CPT | Performed by: INTERNAL MEDICINE

## 2023-12-11 PROCEDURE — 80053 COMPREHEN METABOLIC PANEL: CPT

## 2023-12-11 RX ORDER — NAPROXEN 500 MG/1
500 TABLET ORAL AS NEEDED
COMMUNITY
Start: 2023-07-24 | End: 2024-02-14 | Stop reason: HOSPADM

## 2023-12-11 SDOH — ECONOMIC STABILITY: HOUSING INSECURITY
IN THE LAST 12 MONTHS, WAS THERE A TIME WHEN YOU DID NOT HAVE A STEADY PLACE TO SLEEP OR SLEPT IN A SHELTER (INCLUDING NOW)?: NO

## 2023-12-11 SDOH — ECONOMIC STABILITY: FOOD INSECURITY: WITHIN THE PAST 12 MONTHS, THE FOOD YOU BOUGHT JUST DIDN'T LAST AND YOU DIDN'T HAVE MONEY TO GET MORE.: NEVER TRUE

## 2023-12-11 SDOH — ECONOMIC STABILITY: GENERAL
WHICH OF THE FOLLOWING DO YOU KNOW HOW TO USE AND HAVE ACCESS TO EVERY DAY? (CHOOSE ALL THAT APPLY): DESKTOP COMPUTER, LAPTOP COMPUTER, OR TABLET WITH BROADBAND INTERNET CONNECTION;SMARTPHONE WITH CELLULAR DATA PLAN

## 2023-12-11 SDOH — HEALTH STABILITY: PHYSICAL HEALTH: ON AVERAGE, HOW MANY MINUTES DO YOU ENGAGE IN EXERCISE AT THIS LEVEL?: 50 MIN

## 2023-12-11 SDOH — ECONOMIC STABILITY: FOOD INSECURITY: WITHIN THE PAST 12 MONTHS, YOU WORRIED THAT YOUR FOOD WOULD RUN OUT BEFORE YOU GOT MONEY TO BUY MORE.: NEVER TRUE

## 2023-12-11 SDOH — ECONOMIC STABILITY: INCOME INSECURITY: IN THE LAST 12 MONTHS, WAS THERE A TIME WHEN YOU WERE NOT ABLE TO PAY THE MORTGAGE OR RENT ON TIME?: NO

## 2023-12-11 SDOH — ECONOMIC STABILITY: TRANSPORTATION INSECURITY
IN THE PAST 12 MONTHS, HAS THE LACK OF TRANSPORTATION KEPT YOU FROM MEDICAL APPOINTMENTS OR FROM GETTING MEDICATIONS?: NO

## 2023-12-11 SDOH — HEALTH STABILITY: PHYSICAL HEALTH: ON AVERAGE, HOW MANY DAYS PER WEEK DO YOU ENGAGE IN MODERATE TO STRENUOUS EXERCISE (LIKE A BRISK WALK)?: 5 DAYS

## 2023-12-11 SDOH — ECONOMIC STABILITY: TRANSPORTATION INSECURITY
IN THE PAST 12 MONTHS, HAS LACK OF TRANSPORTATION KEPT YOU FROM MEETINGS, WORK, OR FROM GETTING THINGS NEEDED FOR DAILY LIVING?: NO

## 2023-12-11 ASSESSMENT — ENCOUNTER SYMPTOMS
LOSS OF SENSATION IN FEET: 0
CHEST TIGHTNESS: 0
PALPITATIONS: 0
MYALGIAS: 0
DIARRHEA: 0
ACTIVITY CHANGE: 0
NAUSEA: 0
SHORTNESS OF BREATH: 0
SINUS PRESSURE: 0
SORE THROAT: 0
WEAKNESS: 0
CHILLS: 0
OCCASIONAL FEELINGS OF UNSTEADINESS: 0
AGITATION: 0
DEPRESSION: 0

## 2023-12-11 ASSESSMENT — SOCIAL DETERMINANTS OF HEALTH (SDOH)
DO YOU BELONG TO ANY CLUBS OR ORGANIZATIONS SUCH AS CHURCH GROUPS UNIONS, FRATERNAL OR ATHLETIC GROUPS, OR SCHOOL GROUPS?: PATIENT DECLINED
WITHIN THE LAST YEAR, HAVE YOU BEEN KICKED, HIT, SLAPPED, OR OTHERWISE PHYSICALLY HURT BY YOUR PARTNER OR EX-PARTNER?: NO
WITHIN THE LAST YEAR, HAVE TO BEEN RAPED OR FORCED TO HAVE ANY KIND OF SEXUAL ACTIVITY BY YOUR PARTNER OR EX-PARTNER?: NO
ARE YOU MARRIED, WIDOWED, DIVORCED, SEPARATED, NEVER MARRIED, OR LIVING WITH A PARTNER?: PATIENT DECLINED
HOW OFTEN DO YOU GET TOGETHER WITH FRIENDS OR RELATIVES?: MORE THAN THREE TIMES A WEEK
HOW OFTEN DO YOU ATTEND CHURCH OR RELIGIOUS SERVICES?: MORE THAN 4 TIMES PER YEAR
WITHIN THE LAST YEAR, HAVE YOU BEEN AFRAID OF YOUR PARTNER OR EX-PARTNER?: NO
WITHIN THE LAST YEAR, HAVE YOU BEEN HUMILIATED OR EMOTIONALLY ABUSED IN OTHER WAYS BY YOUR PARTNER OR EX-PARTNER?: NO
HOW OFTEN DO YOU ATTENT MEETINGS OF THE CLUB OR ORGANIZATION YOU BELONG TO?: PATIENT DECLINED
IN A TYPICAL WEEK, HOW MANY TIMES DO YOU TALK ON THE PHONE WITH FAMILY, FRIENDS, OR NEIGHBORS?: MORE THAN THREE TIMES A WEEK
HOW HARD IS IT FOR YOU TO PAY FOR THE VERY BASICS LIKE FOOD, HOUSING, MEDICAL CARE, AND HEATING?: NOT VERY HARD
IN THE PAST 12 MONTHS, HAS THE ELECTRIC, GAS, OIL, OR WATER COMPANY THREATENED TO SHUT OFF SERVICE IN YOUR HOME?: NO

## 2023-12-11 ASSESSMENT — COLUMBIA-SUICIDE SEVERITY RATING SCALE - C-SSRS
1. IN THE PAST MONTH, HAVE YOU WISHED YOU WERE DEAD OR WISHED YOU COULD GO TO SLEEP AND NOT WAKE UP?: NO
6. HAVE YOU EVER DONE ANYTHING, STARTED TO DO ANYTHING, OR PREPARED TO DO ANYTHING TO END YOUR LIFE?: NO
2. HAVE YOU ACTUALLY HAD ANY THOUGHTS OF KILLING YOURSELF?: NO

## 2023-12-11 ASSESSMENT — ACTIVITIES OF DAILY LIVING (ADL)
TAKING_MEDICATION: INDEPENDENT
BATHING: INDEPENDENT
MANAGING_FINANCES: INDEPENDENT
GROCERY_SHOPPING: INDEPENDENT
DOING_HOUSEWORK: INDEPENDENT
DRESSING: INDEPENDENT

## 2023-12-11 ASSESSMENT — LIFESTYLE VARIABLES
HOW MANY STANDARD DRINKS CONTAINING ALCOHOL DO YOU HAVE ON A TYPICAL DAY: PATIENT DOES NOT DRINK
HOW OFTEN DO YOU HAVE SIX OR MORE DRINKS ON ONE OCCASION: NEVER
SKIP TO QUESTIONS 9-10: 1
AUDIT-C TOTAL SCORE: 0
HOW OFTEN DO YOU HAVE A DRINK CONTAINING ALCOHOL: NEVER

## 2023-12-11 ASSESSMENT — PATIENT HEALTH QUESTIONNAIRE - PHQ9
1. LITTLE INTEREST OR PLEASURE IN DOING THINGS: NOT AT ALL
2. FEELING DOWN, DEPRESSED OR HOPELESS: NOT AT ALL
SUM OF ALL RESPONSES TO PHQ9 QUESTIONS 1 AND 2: 0

## 2023-12-11 ASSESSMENT — ANXIETY QUESTIONNAIRES
4. TROUBLE RELAXING: NOT AT ALL
2. NOT BEING ABLE TO STOP OR CONTROL WORRYING: NOT AT ALL
1. FEELING NERVOUS, ANXIOUS, OR ON EDGE: NOT AT ALL
7. FEELING AFRAID AS IF SOMETHING AWFUL MIGHT HAPPEN: NOT AT ALL
5. BEING SO RESTLESS THAT IT IS HARD TO SIT STILL: NOT AT ALL
GAD7 TOTAL SCORE: 0
3. WORRYING TOO MUCH ABOUT DIFFERENT THINGS: NOT AT ALL
6. BECOMING EASILY ANNOYED OR IRRITABLE: NOT AT ALL

## 2023-12-11 ASSESSMENT — PAIN SCALES - GENERAL: PAINLEVEL: 8

## 2023-12-11 NOTE — PROGRESS NOTES
"Subjective   Patient ID: Danette Buenrostro is a 33 y.o. female who presents for Medicare Annual Wellness Visit Subsequent (NEW PATIENT TO ). Changing primary care. History of sciatic and pelvic area spasms helped with muscle relaxants.  She is here with her fiancé to establish care.  She has a history of a seizure disorder but has not had any seizures recently.  She has good compliance with her medications and a good understanding for what they do.  Her energy level is fairly good.  She has multiple people in her family with breast cancer and her grandmother had breast cancer in her 30s she believes.    HPI     Review of Systems   Constitutional:  Negative for activity change and chills.   HENT:  Negative for congestion, sinus pressure and sore throat.    Respiratory:  Negative for chest tightness and shortness of breath.    Cardiovascular:  Negative for chest pain and palpitations.   Gastrointestinal:  Negative for diarrhea and nausea.   Musculoskeletal:  Negative for myalgias.   Neurological:  Negative for weakness.   Psychiatric/Behavioral:  Negative for agitation and behavioral problems.        Objective   /82 (BP Location: Left arm, Patient Position: Sitting, BP Cuff Size: Large adult)   Pulse 86   Temp 36.3 °C (97.3 °F) (Temporal)   Resp 18   Ht 1.651 m (5' 5\")   Wt 122 kg (268 lb 3.2 oz)   LMP 11/01/2023 (Exact Date)   SpO2 96%   BMI 44.63 kg/m²     Physical Exam  Constitutional:       Appearance: Normal appearance.   HENT:      Head: Normocephalic and atraumatic.      Nose: Nose normal.      Mouth/Throat:      Mouth: Mucous membranes are moist.   Eyes:      Extraocular Movements: Extraocular movements intact.      Pupils: Pupils are equal, round, and reactive to light.   Cardiovascular:      Rate and Rhythm: Normal rate and regular rhythm.   Pulmonary:      Effort: No respiratory distress.      Breath sounds: No wheezing.   Abdominal:      General: Bowel sounds are normal.      Palpations: " Abdomen is soft.   Neurological:      General: No focal deficit present.         Assessment/Plan   Problem List Items Addressed This Visit             ICD-10-CM    Chronic migraine without aura without status migrainosus, not intractable G43.709    Seizure disorder (CMS/HCC) - Primary G40.909    Relevant Orders    Valproic Acid (Completed)    Healthcare maintenance Z00.00    Relevant Orders    Hemoglobin A1C    Lipid Panel Non-Fasting (Completed)    Comprehensive Metabolic Panel (Completed)    Magnesium (Completed)     Other Visit Diagnoses         Codes    Breast screening     Z12.39    Relevant Orders    BI mammo bilateral screening tomosynthesis    Hypovitaminosis D     E55.9    Relevant Orders    Vitamin D 25-Hydroxy,Total (for eval of Vitamin D levels)    Need for hepatitis C screening test     Z11.59    Relevant Orders    Hepatitis C Antibody    Encounter for screening mammogram for malignant neoplasm of breast     Z12.31    Relevant Orders    BI mammo bilateral screening tomosynthesis          Dedaysityesha Napier will make decisions if she cannot.  We will check screening labs as ordered as well has start mammograms because she has a strong family history of breast cancer in multiple family members.

## 2023-12-12 LAB
25(OH)D3 SERPL-MCNC: <6 NG/ML (ref 30–100)
HCV AB SER QL: NONREACTIVE

## 2023-12-18 ENCOUNTER — LAB (OUTPATIENT)
Dept: LAB | Facility: LAB | Age: 33
End: 2023-12-18
Payer: MEDICARE

## 2023-12-18 ENCOUNTER — OFFICE VISIT (OUTPATIENT)
Dept: NEUROLOGY | Facility: CLINIC | Age: 33
End: 2023-12-18
Payer: MEDICARE

## 2023-12-18 VITALS
HEART RATE: 80 BPM | TEMPERATURE: 97.1 F | SYSTOLIC BLOOD PRESSURE: 148 MMHG | WEIGHT: 261 LBS | DIASTOLIC BLOOD PRESSURE: 67 MMHG | HEIGHT: 67 IN | BODY MASS INDEX: 40.97 KG/M2

## 2023-12-18 DIAGNOSIS — G40.219 LOCALIZATION-RELATED (FOCAL) (PARTIAL) SYMPTOMATIC EPILEPSY AND EPILEPTIC SYNDROMES WITH COMPLEX PARTIAL SEIZURES, INTRACTABLE, WITHOUT STATUS EPILEPTICUS (MULTI): Primary | ICD-10-CM

## 2023-12-18 DIAGNOSIS — G43.709 CHRONIC MIGRAINE WITHOUT AURA WITHOUT STATUS MIGRAINOSUS, NOT INTRACTABLE: ICD-10-CM

## 2023-12-18 DIAGNOSIS — G40.219 LOCALIZATION-RELATED (FOCAL) (PARTIAL) SYMPTOMATIC EPILEPSY AND EPILEPTIC SYNDROMES WITH COMPLEX PARTIAL SEIZURES, INTRACTABLE, WITHOUT STATUS EPILEPTICUS (MULTI): ICD-10-CM

## 2023-12-18 LAB — VALPROATE SERPL-MCNC: <10 UG/ML (ref 50–100)

## 2023-12-18 PROCEDURE — 1036F TOBACCO NON-USER: CPT | Performed by: PSYCHIATRY & NEUROLOGY

## 2023-12-18 PROCEDURE — 80164 ASSAY DIPROPYLACETIC ACD TOT: CPT

## 2023-12-18 PROCEDURE — 99214 OFFICE O/P EST MOD 30 MIN: CPT | Performed by: PSYCHIATRY & NEUROLOGY

## 2023-12-18 PROCEDURE — 80183 DRUG SCRN QUANT OXCARBAZEPIN: CPT

## 2023-12-18 PROCEDURE — 3008F BODY MASS INDEX DOCD: CPT | Performed by: PSYCHIATRY & NEUROLOGY

## 2023-12-18 PROCEDURE — 36415 COLL VENOUS BLD VENIPUNCTURE: CPT

## 2023-12-18 RX ORDER — DIVALPROEX SODIUM 500 MG/1
TABLET, DELAYED RELEASE ORAL
Qty: 90 TABLET | Refills: 3 | Status: SHIPPED | OUTPATIENT
Start: 2023-12-18 | End: 2024-04-26

## 2023-12-18 NOTE — PATIENT INSTRUCTIONS
"Continue the oxcarbazepine (\"Trileptal\") twice a day as you are.    Change the Depakote:  1 500 mg and 1 250 mg tablet in the morning  2 500 mg tablets in the evening  "

## 2023-12-18 NOTE — PROGRESS NOTES
NEUROLOGY OUTPATIENT FOLLOW-UP NOTE    Assessment/Plan   Diagnoses and all orders for this visit:  Localization-related (focal) (partial) symptomatic epilepsy and epileptic syndromes with complex partial seizures, intractable, without status epilepticus (CMS/HCC)  -     Valproic acid level, total; Future  -     Oxcarbazepine level; Future  -     divalproex (Depakote) 500 mg EC tablet; 1 by mouth in the morning, 2 in the evening  Chronic migraine without aura without status migrainosus, not intractable  -     divalproex (Depakote) 500 mg EC tablet; 1 by mouth in the morning, 2 in the evening      IMPRESSION:  Complex partial seizures.  Migraine without aura.    PLAN:  Recheck valproic acid and oxcarbazepine metabolite levels.  I increased the evening divalproex to 1 g (2 x 500 mg) as she is already drowsy with the daytime dose of the antiepileptics.  To continue the current dose of oxcarbazepine.  I will see her in three months or prn.    Kip Sigala Jr., M.D., [facility]     ----------    Subjective     Danette Buenrostro is a 33 y.o. year old female here for follow-up.    First seizure since the last visit was last night.  Boyfriend describes generalized tonic-clonic spell lasting about three minutes, followed by mild confusion, then the patient went back to sleep.  Headache frequency is stably improved at 2 times a week, and ibuprofen aborts the headaches she does have.  She denies new focal neurological symptoms including dysarthria, dysphagia, diplopia, focal weakness, focal sensory change, ataxia, vertigo, or bowel/bladder incontinence, among others.    Patient Active Problem List   Diagnosis    Abnormal Pap smear of cervix    Chronic migraine without aura without status migrainosus, not intractable    Delivery of pregnancy by  section    Localization-related (focal) (partial) symptomatic epilepsy and epileptic syndromes with complex partial seizures, intractable, without status epilepticus (CMS/HCC)     Menstrual cramps    Previous  delivery, antepartum    Previous  delivery affecting pregnancy    History of uterine scar from previous surgery    Obesity in pregnancy, antepartum    Nausea and vomiting of pregnancy, antepartum    Migraine with aura    Seizure disorder during pregnancy, antepartum (CMS/HCC)    Seizure disorder during pregnancy, postpartum (CMS/HCC)    Elevated tumor markers    Ovarian cyst, complex    Seizure disorder (CMS/HCC)    Encounter for initial prescription of injectable contraceptive    Endometriosis    Benign neoplasm of breast    Lump or mass in breast    Vitamin B1 deficiency    Partial epilepsy with impairment of consciousness (CMS/HCC)    Healthcare maintenance     Past Medical History:   Diagnosis Date    Endometriosis     Epilepsy with partial complex seizures (CMS/HCC)     Obesity     Pelvic floor dysfunction in female     Type A blood, Rh positive     Blood type A+     Past Surgical History:   Procedure Laterality Date    BREAST LUMPECTOMY  2014    Breast Surgery Lumpectomy     SECTION, LOW TRANSVERSE  04/14/2014    x 2 w/ BTL at second    COLPOSCOPY  2014    Colposcopy    OOPHORECTOMY Left     Robotic LSO w/ R cystectomy/salpingectomy    OTHER SURGICAL HISTORY  2014    Surgical Treatment Of Spontaneous      Social History     Tobacco Use    Smoking status: Never     Passive exposure: Never    Smokeless tobacco: Never   Substance Use Topics    Alcohol use: Never     family history includes Breast cancer in her maternal grandmother and mother's sister; Heart attack in her mother; Hypertension in her mother; Seizures in her father's brother, mother's brother, and another family member; Stroke in her mother; cerebral infarction in her maternal grandfather.    Current Outpatient Medications:     divalproex (Depakote) 250 mg EC tablet, Take 1 tablet (250 mg) by mouth 2 times a day., Disp: , Rfl:     divalproex (Depakote) 500 mg EC  tablet, Take 1 tablet (500 mg) by mouth 2 times a day., Disp: , Rfl:     methocarbamol (Robaxin) 500 mg tablet, Take 1 tablet (500 mg) by mouth 4 times a day as needed for muscle spasms. May take 2 tablets at a time if needed., Disp: 80 tablet, Rfl: 2    naproxen (Naprosyn) 500 mg tablet, Take 1 tablet (500 mg) by mouth 2 times a day., Disp: , Rfl:     OXcarbazepine (Trileptal) 600 mg tablet, Take 2 tablets (1,200 mg) by mouth 2 times a day., Disp: , Rfl:   No Known Allergies    Objective     LMP 11/01/2023 (Exact Date)     CONSTITUTIONAL:  No acute distress    MENTAL STATUS:  Awake, alert, fully oriented to self, place, and time, with present short-term memory, good awareness of recent events, normal attention span, concentration, and fund of knowledge.    SPEECH AND LANGUAGE:  Can name and repeat, follows all commands, has no dysarthria    CRANIAL NERVES:  II-Vision present, visual fields full to confrontational testing    III/IV/VI--EOMs are present in all directions.  Pupils are symmetrically reactive in dim light.  No ptosis.    V--Normal facial sensation.    VII--No facial asymmetry.    VIII--Hearing present to voice bilaterally.    IX/X--Symmetric soft palate rise.    XI--Normal trapezius power bilaterally.    XII--Tongue protrudes without deviation.    MOTOR:  Normal power, tone, and bulk in both arms and both legs.    SENSORY:  Normal pin sensation in both arms and both legs without distal-proximal gradient, asymmetry, or spinal sensory level.    COORDINATION:  Normal finger-to-nose and heel-to-shin testing in both arms and both legs.    REFLEXES are normal and symmetric at the biceps, triceps, brachioradialis, patella, and ankle.  The plantar responses are flexor.    GAIT is normal, without steppage, ataxia, shuffling, or spasticity.      Kip Sigala Jr., M.D., FAAN

## 2023-12-20 RX ORDER — DIVALPROEX SODIUM 500 MG/1
TABLET, DELAYED RELEASE ORAL
Qty: 270 TABLET | OUTPATIENT
Start: 2023-12-20

## 2023-12-21 LAB — 10OH-CARBAZEPINE SERPL-MCNC: <1 UG/ML (ref 3–35)

## 2023-12-22 ENCOUNTER — TELEPHONE (OUTPATIENT)
Dept: NEUROLOGY | Facility: CLINIC | Age: 33
End: 2023-12-22
Payer: COMMERCIAL

## 2024-01-26 ENCOUNTER — PRE-ADMISSION TESTING (OUTPATIENT)
Dept: PREADMISSION TESTING | Facility: HOSPITAL | Age: 34
End: 2024-01-26
Payer: MEDICARE

## 2024-01-26 VITALS
HEIGHT: 65 IN | SYSTOLIC BLOOD PRESSURE: 116 MMHG | DIASTOLIC BLOOD PRESSURE: 71 MMHG | TEMPERATURE: 98.4 F | BODY MASS INDEX: 45.62 KG/M2 | HEART RATE: 86 BPM | OXYGEN SATURATION: 98 % | WEIGHT: 273.81 LBS

## 2024-01-26 DIAGNOSIS — Z01.818 PREOPERATIVE TESTING: Primary | ICD-10-CM

## 2024-01-26 LAB
ABO GROUP (TYPE) IN BLOOD: NORMAL
ANION GAP SERPL CALC-SCNC: 13 MMOL/L (ref 10–20)
ANTIBODY SCREEN: NORMAL
BUN SERPL-MCNC: 13 MG/DL (ref 6–23)
CALCIUM SERPL-MCNC: 9.3 MG/DL (ref 8.6–10.6)
CHLORIDE SERPL-SCNC: 104 MMOL/L (ref 98–107)
CO2 SERPL-SCNC: 26 MMOL/L (ref 21–32)
CREAT SERPL-MCNC: 0.88 MG/DL (ref 0.5–1.05)
EGFRCR SERPLBLD CKD-EPI 2021: 88 ML/MIN/1.73M*2
ERYTHROCYTE [DISTWIDTH] IN BLOOD BY AUTOMATED COUNT: 14.4 % (ref 11.5–14.5)
GLUCOSE SERPL-MCNC: 113 MG/DL (ref 74–99)
HCT VFR BLD AUTO: 35.2 % (ref 36–46)
HGB BLD-MCNC: 12 G/DL (ref 12–16)
MCH RBC QN AUTO: 30.4 PG (ref 26–34)
MCHC RBC AUTO-ENTMCNC: 34.1 G/DL (ref 32–36)
MCV RBC AUTO: 89 FL (ref 80–100)
NRBC BLD-RTO: 0 /100 WBCS (ref 0–0)
PLATELET # BLD AUTO: 418 X10*3/UL (ref 150–450)
POTASSIUM SERPL-SCNC: 4.6 MMOL/L (ref 3.5–5.3)
RBC # BLD AUTO: 3.95 X10*6/UL (ref 4–5.2)
RH FACTOR (ANTIGEN D): NORMAL
SODIUM SERPL-SCNC: 138 MMOL/L (ref 136–145)
WBC # BLD AUTO: 6.2 X10*3/UL (ref 4.4–11.3)

## 2024-01-26 PROCEDURE — 86901 BLOOD TYPING SEROLOGIC RH(D): CPT

## 2024-01-26 PROCEDURE — 99214 OFFICE O/P EST MOD 30 MIN: CPT | Performed by: NURSE PRACTITIONER

## 2024-01-26 PROCEDURE — 85027 COMPLETE CBC AUTOMATED: CPT

## 2024-01-26 PROCEDURE — 80048 BASIC METABOLIC PNL TOTAL CA: CPT

## 2024-01-26 PROCEDURE — 36415 COLL VENOUS BLD VENIPUNCTURE: CPT

## 2024-01-26 ASSESSMENT — ENCOUNTER SYMPTOMS
CONSTITUTIONAL NEGATIVE: 1
NEUROLOGICAL NEGATIVE: 1
ABDOMINAL PAIN: 1
CARDIOVASCULAR NEGATIVE: 1
ROS GI COMMENTS: INTERMITTENT
RESPIRATORY NEGATIVE: 1
NECK NEGATIVE: 1
EYES NEGATIVE: 1
MUSCULOSKELETAL NEGATIVE: 1
ENDOCRINE NEGATIVE: 1

## 2024-01-26 ASSESSMENT — DUKE ACTIVITY SCORE INDEX (DASI)
CAN YOU WALK INDOORS, SUCH AS AROUND YOUR HOUSE: YES
CAN YOU DO YARD WORK LIKE RAKING LEAVES, WEEDING OR PUSHING A MOWER: YES
TOTAL_SCORE: 58.2
CAN YOU HAVE SEXUAL RELATIONS: YES
CAN YOU PARTICIPATE IN STRENOUS SPORTS LIKE SWIMMING, SINGLES TENNIS, FOOTBALL, BASKETBALL, OR SKIING: YES
CAN YOU DO MODERATE WORK AROUND THE HOUSE LIKE VACUUMING, SWEEPING FLOORS OR CARRYING GROCERIES: YES
CAN YOU PARTICIPATE IN MODERATE RECREATIONAL ACTIVITIES LIKE GOLF, BOWLING, DANCING, DOUBLES TENNIS OR THROWING A BASEBALL OR FOOTBALL: YES
CAN YOU DO HEAVY WORK AROUND THE HOUSE LIKE SCRUBBING FLOORS OR LIFTING AND MOVING HEAVY FURNITURE: YES
CAN YOU WALK A BLOCK OR TWO ON LEVEL GROUND: YES
CAN YOU TAKE CARE OF YOURSELF (EAT, DRESS, BATHE, OR USE TOILET): YES
DASI METS SCORE: 9.9
CAN YOU DO LIGHT WORK AROUND THE HOUSE LIKE DUSTING OR WASHING DISHES: YES
CAN YOU CLIMB A FLIGHT OF STAIRS OR WALK UP A HILL: YES
CAN YOU RUN A SHORT DISTANCE: YES

## 2024-01-26 ASSESSMENT — CHADS2 SCORE
CHADS2 SCORE: 0
DIABETES: NO
HYPERTENSION: NO
PRIOR STROKE OR TIA OR THROMBOEMBOLISM: NO
CHF: NO
AGE GREATER THAN OR EQUAL TO 75: NO

## 2024-01-26 ASSESSMENT — LIFESTYLE VARIABLES: SMOKING_STATUS: NONSMOKER

## 2024-01-26 NOTE — PREPROCEDURE INSTRUCTIONS
NPO Instructions:    Do not eat any food after midnight the night before your surgery/procedure.  You may have up to TEN ounces of clear liquids until TWO hours before your instructed arrival time to the hospital. This includes water, black tea/coffee, (no milk or cream), apple juice, and/or electrolyte drinks (Gatorade).  You may chew gum up to TWO hours before your surgery/procedure.    Additional Instructions:     Avoid herbal supplements, multivitamins and NSAIDS (non-steroidal anti-inflammatory drugs) such as Advil, Aleve, Ibuprofen, Naproxen, Excedrin, Meloxicam or Celebrex for at least 7 days prior to surgery. May take Tylenol as needed.    Avoid tobacco and alcohol products for 24 hours prior to surgery.    Seven/Six Days before Surgery:  Review your medication instructions, stop indicated medications    Day of Surgery:  Review your medication instructions, take indicated medications  Wear comfortable loose fitting clothing  Do not use moisturizers, creams, lotions or perfume  All jewelry and valuables should be left at home    Rajat Nelson [facility]  [phone]  [phone]  Email-[email or web address]

## 2024-01-26 NOTE — CPM/PAT H&P
CPM/PAT Evaluation       Name: Danette Buenrostro (Danette Buenrostro)  /Age: 1990/33 y.o.     Visit Type:   In-Person       Chief Complaint: Endometriosis    HPI  The patient is a 33 year old female with path-proven endometriosis and pelvic pain. Ultrasounds have shown a persistent though stable right ovarian complex cystic lesion measuring 5cm x 3cm x 3cm. She is interested in definitive treatment and presents today for perioperative evaluation in anticipation of Total robotic hysterectomy, bilateral salpingectomy, excision of endometriosis, ovarian cystectomy, possible appendectomy, any indicated procedure - Bilateral on 24 with Dr. Theodore.     Past Medical History:   Diagnosis Date    Benign neoplasm of breast     Endometriosis     Epilepsy with partial complex seizures (CMS/HCC)     Migraine     Taking Depakote    Obesity     Ovarian cyst     measuring 5cm x 3cm x 3cm- Scheduled for surgery with Dr Thedoore on 24    Pelvic floor dysfunction in female     Pelvic pain     Seizure disorder (CMS/HCC) At 7y    F/W Dr. Sigala (Neuro) Taking Trileptal, Last seizure 2 days ago during sleep    Type A blood, Rh positive     Blood type A+    Vitamin B1 deficiency        Past Surgical History:   Procedure Laterality Date    BREAST LUMPECTOMY      Breast Surgery Lumpectomy     SECTION, LOW TRANSVERSE      x 2 w/ BTL at second    COLPOSCOPY      OOPHORECTOMY Left     Robotic LSO w/ R cystectomy/salpingectomy    OTHER SURGICAL HISTORY      Surgical Treatment Of Spontaneous     TUBAL LIGATION         Patient  reports being sexually active and has had partner(s) who are male. She reports using the following method of birth control/protection: Other.    Family History   Problem Relation Name Age of Onset    Hypertension Mother Mohini     Stroke Mother Mohini     Heart attack Mother Mohini     Breast cancer Maternal Grandmother Alexia     Other (cerebral infarction) Maternal Grandfather       Breast cancer Mother's Sister Karol     Cancer Mother's Brother      Seizures Father's Brother      Seizures Other Cousin        No Known Allergies    Prior to Admission medications    Medication Sig Start Date End Date Taking? Authorizing Provider   divalproex (Depakote) 250 mg EC tablet Take 1 tablet (250 mg) by mouth 2 times a day. 1/9/23  Yes Historical Provider, MD   divalproex (Depakote) 500 mg EC tablet 1 by mouth in the morning, 2 in the evening 12/18/23  Yes Kip Sigala MD   ibuprofen 600 mg tablet Take 1 tablet (600 mg) by mouth if needed for mild pain (1 - 3).   Yes Historical Provider, MD   naproxen (Naprosyn) 500 mg tablet Take 1 tablet (500 mg) by mouth if needed. 7/24/23  Yes Historical Provider, MD   OXcarbazepine (Trileptal) 600 mg tablet Take 2 tablets (1,200 mg) by mouth 2 times a day. 5/1/23  Yes Historical Provider, MD   methocarbamol (Robaxin) 500 mg tablet Take 1 tablet (500 mg) by mouth 4 times a day as needed for muscle spasms. May take 2 tablets at a time if needed. 11/21/23   Saritha Theodore MD        PAT ROS:   Constitutional:   neg    Neuro/Psych:   neg    Eyes:   neg    Ears:   neg    Nose:   neg    Mouth:   neg    Throat:   neg    Neck:   neg    Cardio:   neg    Respiratory:   neg    Endocrine:   neg    GI:    intermittent   abdominal pain  :   neg    Musculoskeletal:   neg    Hematologic:   neg    Skin:  neg        Physical Exam  Vitals reviewed.   Constitutional:       Appearance: Normal appearance. She is obese.   HENT:      Head: Normocephalic and atraumatic.      Nose: Nose normal.      Mouth/Throat:      Mouth: Mucous membranes are moist.      Pharynx: Oropharynx is clear.   Eyes:      Extraocular Movements: Extraocular movements intact.      Conjunctiva/sclera: Conjunctivae normal.      Pupils: Pupils are equal, round, and reactive to light.   Cardiovascular:      Rate and Rhythm: Normal rate and regular rhythm.      Pulses: Normal pulses.      Heart sounds: Normal  heart sounds.   Pulmonary:      Effort: Pulmonary effort is normal.      Breath sounds: Normal breath sounds.   Musculoskeletal:         General: Normal range of motion.      Cervical back: Normal range of motion.   Skin:     General: Skin is warm and dry.   Neurological:      General: No focal deficit present.      Mental Status: She is alert and oriented to person, place, and time.   Psychiatric:         Mood and Affect: Mood normal.         Behavior: Behavior normal.          PAT AIRWAY:   Airway:     Mallampati::  IV    TM distance::  >3 FB    Neck ROM::  Full  normal        Visit Vitals  /71   Pulse 86   Temp 36.9 °C (98.4 °F) (Oral)       DASI Risk Score      Flowsheet Row Most Recent Value   DASI SCORE 58.2   METS Score (Will be calculated only when all the questions are answered) 9.9          Caprini DVT Assessment      Flowsheet Row Most Recent Value   DVT Score 9   Current Status Major surgery planned, lasting over 3 hours   History Prior major surgery   Age Less than 40 years   BMI 41-50 (Morbid obesity)          Modified Frailty Index      Flowsheet Row Most Recent Value   Modified Frailty Index Calculator 0          CHADS2 Stroke Risk  Current as of 3 days ago        N/A 3 - 100%: High Risk   2 - 3%: Medium Risk   0 - 2%: Low Risk     Last Change: N/A          This score determines the patient's risk of having a stroke if the patient has atrial fibrillation.        This score is not applicable to this patient. Components are not calculated.          Revised Cardiac Risk Index      Flowsheet Row Most Recent Value   Revised Cardiac Risk Calculator 0          Apfel Simplified Score      Flowsheet Row Most Recent Value   Apfel Simplified Score Calculator 3          Risk Analysis Index Results This Encounter    No data found in the last 1 encounters.       Stop Bang Score      Flowsheet Row Most Recent Value   Do you snore loudly? 0   Do you often feel tired or fatigued after your sleep? 1   Has anyone  ever observed you stop breathing in your sleep? 0   Do you have or are you being treated for high blood pressure? 0   Recent BMI (Calculated) 40.9   Is BMI greater than 35 kg/m2? 1=Yes   Age older than 50 years old? 0=No   Is your neck circumference greater than 17 inches (Male) or 16 inches (Female)? 0   Gender - Male 0=No   STOP-BANG Total Score 2          Recent Results (from the past 168 hour(s))   CBC    Collection Time: 01/26/24  9:55 AM   Result Value Ref Range    WBC 6.2 4.4 - 11.3 x10*3/uL    nRBC 0.0 0.0 - 0.0 /100 WBCs    RBC 3.95 (L) 4.00 - 5.20 x10*6/uL    Hemoglobin 12.0 12.0 - 16.0 g/dL    Hematocrit 35.2 (L) 36.0 - 46.0 %    MCV 89 80 - 100 fL    MCH 30.4 26.0 - 34.0 pg    MCHC 34.1 32.0 - 36.0 g/dL    RDW 14.4 11.5 - 14.5 %    Platelets 418 150 - 450 x10*3/uL   Basic Metabolic Panel    Collection Time: 01/26/24  9:55 AM   Result Value Ref Range    Glucose 113 (H) 74 - 99 mg/dL    Sodium 138 136 - 145 mmol/L    Potassium 4.6 3.5 - 5.3 mmol/L    Chloride 104 98 - 107 mmol/L    Bicarbonate 26 21 - 32 mmol/L    Anion Gap 13 10 - 20 mmol/L    Urea Nitrogen 13 6 - 23 mg/dL    Creatinine 0.88 0.50 - 1.05 mg/dL    eGFR 88 >60 mL/min/1.73m*2    Calcium 9.3 8.6 - 10.6 mg/dL   Type And Screen    Collection Time: 01/26/24  9:55 AM   Result Value Ref Range    ABO TYPE A     Rh TYPE POS     ANTIBODY SCREEN NEG         Diagnostic Results    EKG 4/20/21  Normal sinus rhythm  Normal ECG  When compared with ECG of 10-JUL-2020 11:19,  Previous ECG has undetermined rhythm, needs review    Assessment and Plan:      Anesthesia:  The patient denies problems with anesthesia in the past such as PONV, prolonged sedation, awareness, dental damage, aspiration, cardiac arrest, difficult intubation, or unexpected hospital admissions.     Neuro:   The patient has history of chronic migraines and seizure disorder managed by neurology. She follows with Dr. Kip Sigala, last seen 12/18/23. Last seizure one week ago. She is  managed on Depakote, Valproic acid and Oxcarbazepine.   No grossly apparent perioperative risk. The patient is at increased risk for perioperative stroke secondary to female gender, general anesthesia, operative time >2.5 hours. Handouts for preoperative brain exercises given to patient.    HEENT/Airway  The patient has diagnoses, significant findings on chart review, clinical presentation or evaluation of obesity, short thick neck. No documented or reported history of airway difficulty.     Cardiovascular  The patient is scheduled for non-cardiac surgery associated with elevated risk.  The patient has no major cardiac contraindications to non- cardiac surgery.  RCRI  The patient meets 0-1 RCRI criteria and therefore has a less than 1% risk of major adverse cardiac complications.  METS  The patient's functional capacity capacity is greater than 4 METS.  EKG  The patient has no EKG or echocardiographic changes concerning for myocardial ischemia.   Heart Failure  The patient has no known history of heart failure.  Additionally, the patient reports no symptoms of heart failure and demonstrates no signs of heart failure.  Hypertension Evaluation  The patient has no known history of hypertension and has a normal blood pressure today.  Heart Rhythm Evaluation  The patient has no history of arrhythmias.  Heart Valve Evaluation  The patient has no known history of valvular heart disease. The patient has no symptoms or physical exam findings to suggest valvular heart disease.  CARDS EVAL  The patient is not followed by cardiology.  The patient has a 30-day risk for MACE of 0 predictors, 3.9% risk for cardiac death, nonfatal myocardial infarction, and nonfatal cardiac arrest.  ELLIOTT score which indicates a 0% risk of intraoperative or 30-day postoperative.    Pulmonary   No significant findings on chart review or clinical presentation and evaluation. The patient is at increased risk of perioperative pulmonary complications  secondary to morbid obesity.  The patient has a stop bang score of 2, which places patient at low risk for having SAMUEL.    ARISCAT 23, low, 1.6% risk of in-hospital postoperative pulmonary complications  PRODIGY 3, low risk of respiratory depression episode.    Hematology  No diagnoses or significant findings on chart review or clinical presentation and evaluation.  Antiplatelet management   The patient is not currently receiving antiplatelet therapy.  Anticoagulation management  The patient is not currently receiving anticoagulation therapy. Patient provided with DVT educational handout.    Caprini score 9, high risk of perioperative VTE  Transfusion Evaluation  A type and screen was obtained given the likelihood for perioperative transfusion of blood or blood products.    Gastrointestinal  No diagnoses or significant findings on chart review or clinical presentation and evaluation.  Eat 10- 0,  self-perceived oropharyngeal dysphagia scale (0-40)     Genitourinary  The patient has diagnoses or significant findings on chart review or clinical presentation and evaluation significant for pelvic pain, endometriosis scheduled for surgery with Dr. Theodore on 2/12/24/.    Renal  The patient has no known history of chronic kidney disease. No renal diagnoses or significant findings on chart review or clinical presentation and evaluation.    Musculoskeletal  No diagnoses or significant findings on chart review or clinical presentation and evaluation.    Endocrine  Diabetes Evaluation  The patient has no history of diabetes mellitus  Thyroid Disease Evaluation  The patient has no history of thyroid disease.    ID  No diagnoses or significant findings on chart review or clinical presentation and evaluation.    -Preoperative medication instructions were provided and reviewed with the patient.  Any additional testing or evaluation was explained to the patient.  NPO Instructions were discussed, and the patient's questions were  answered prior to conclusion of this encounter

## 2024-02-07 ENCOUNTER — HOSPITAL ENCOUNTER (OUTPATIENT)
Dept: RADIOLOGY | Facility: CLINIC | Age: 34
End: 2024-02-07
Payer: MEDICARE

## 2024-02-08 NOTE — HOSPITAL COURSE
Danette Buenrostro 33 y.o.  presenting for Total robotic hysterectomy, bilateral salpingectomy, excision of endometriosis, ovarian cystectomy, possible appendectomy, any indicated procedure  in the setting of path-proven endometriosis and pelvic pain.     She was started on Myfembree and PFPT/Flexeril. She reports she did not do well with Flexeril. It didn't help with pain and made her extremely sedated for a couple days. She has not tried vaginal Valium. She was also not able to get Myfembree as pharmacy did not honor the coupon. She does report the combination of ibuprofen + Robaxin, which she received from the ED, did help somewhat.    She has had ED visits x 3 since last visit for pelvic pain. Ultrasounds have shown a persistent though stable right ovarian complex cystic lesion measuring 5cm x 3cm x 3cm. It does appear c/w endometrioma to my eye.      She reports she is interested in surgical excision and hysterectomy.     PAT: cleared  Screening:   - Last pap 2021 WNL per pt, no hx of CIN2-3  - Last mammogram NA  PMHx: obesity, partial complex seizures   PSHx: CS x 2 w/ BTL, robotic LSO/R cystectomy + salpingectomy        Relevant Imaging:   Pelvic US: 11/17/2023: UTERUS: 9.5 x 4.9 x 4.1 cm. Homogeneous in echogenicity.  ENDOMETRIUM: Normal thickness, 6 mm. There is trace fluid in the  endometrium. RIGHT OVARY: 7.7 x 4.5 x 5.4 cm. Blood flow is present..  Anechoic 2.1 x 1.3 x 1.2 cm structure in the right ovary with  posterior acoustic enhancement compatible with a dominant follicle.  There are other complex cystic lesions, largest of which measures 4.9  x 3.3 x 3.4 cm and another measuring 2.3 x 2 x 2.5 cm. These  demonstrate internal echoes but have posterior acoustic enhancement  and lack internal vascularity. LEFT OVARY: Surgically absent.  CUL DE SAC: No free-fluid.

## 2024-02-09 ENCOUNTER — HOSPITAL ENCOUNTER (OUTPATIENT)
Dept: RADIOLOGY | Facility: CLINIC | Age: 34
Discharge: HOME | End: 2024-02-09
Payer: MEDICARE

## 2024-02-09 VITALS — WEIGHT: 273.37 LBS | HEIGHT: 65 IN | BODY MASS INDEX: 45.55 KG/M2

## 2024-02-09 DIAGNOSIS — Z12.39 BREAST SCREENING: ICD-10-CM

## 2024-02-09 DIAGNOSIS — Z12.31 ENCOUNTER FOR SCREENING MAMMOGRAM FOR MALIGNANT NEOPLASM OF BREAST: ICD-10-CM

## 2024-02-09 PROCEDURE — 77067 SCR MAMMO BI INCL CAD: CPT | Performed by: RADIOLOGY

## 2024-02-09 PROCEDURE — 77063 BREAST TOMOSYNTHESIS BI: CPT | Performed by: RADIOLOGY

## 2024-02-09 PROCEDURE — 77067 SCR MAMMO BI INCL CAD: CPT

## 2024-02-11 ENCOUNTER — ANESTHESIA EVENT (OUTPATIENT)
Dept: OPERATING ROOM | Facility: HOSPITAL | Age: 34
End: 2024-02-11
Payer: MEDICARE

## 2024-02-11 NOTE — H&P
History Of Present Illness   Danette Buenrostro 33 y.o.  presenting for Total robotic hysterectomy, bilateral salpingectomy, excision of endometriosis, ovarian cystectomy, possible appendectomy, any indicated procedure  in the setting of path-proven endometriosis and pelvic pain.     She was started on Myfembree and PFPT/Flexeril. She reports she did not do well with Flexeril. It didn't help with pain and made her extremely sedated for a couple days. She has not tried vaginal Valium. She was also not able to get Myfembree as pharmacy did not honor the coupon. She does report the combination of ibuprofen + Robaxin, which she received from the ED, did help somewhat.    She has had ED visits x 3 since last visit for pelvic pain. Ultrasounds have shown a persistent though stable right ovarian complex cystic lesion measuring 5cm x 3cm x 3cm. It does appear c/w endometrioma to my eye.      She reports she is interested in surgical excision and hysterectomy.     PAT: cleared  Screening:   - Last pap 2021 WNL per pt, no hx of CIN2-3  - Last mammogram NA  PMHx: obesity, partial complex seizures   PSHx: CS x 2 w/ BTL, robotic LSO/R cystectomy + salpingectomy       Relevant Imaging:   Pelvic US: 11/17/2023: UTERUS: 9.5 x 4.9 x 4.1 cm. Homogeneous in echogenicity.  ENDOMETRIUM: Normal thickness, 6 mm. There is trace fluid in the  endometrium. RIGHT OVARY: 7.7 x 4.5 x 5.4 cm. Blood flow is present..  Anechoic 2.1 x 1.3 x 1.2 cm structure in the right ovary with  posterior acoustic enhancement compatible with a dominant follicle.  There are other complex cystic lesions, largest of which measures 4.9  x 3.3 x 3.4 cm and another measuring 2.3 x 2 x 2.5 cm. These  demonstrate internal echoes but have posterior acoustic enhancement  and lack internal vascularity. LEFT OVARY: Surgically absent.  CUL DE SAC: No free-fluid.    Social History  She reports that she has never smoked. She has never been exposed to tobacco smoke. She has never  used smokeless tobacco. She reports current alcohol use. She reports that she does not use drugs.    Family History  Family History   Problem Relation Name Age of Onset    Hypertension Mother Mohini     Stroke Mother Mohini     Heart attack Mother Mohini     Breast cancer Maternal Grandmother Alexia     Other (cerebral infarction) Maternal Grandfather      Breast cancer Mother's Sister Karol     Cancer Mother's Brother      Seizures Father's Brother      Seizures Other Cousin         Allergies  Patient has no known allergies.    Review of Systems   Negative except as above    Physical Exam  General: no acute distress  HEENT: normocephalic, atraumatic  Heart: warm and well perfused, RRR  Lungs: no increased WOB, CTAB  Abd: soft, nontender  Extremities: moving all extremities  Neuro: awake and conversant  Psych: appropriate mood and affect     Last Recorded Vitals  not currently breastfeeding.    Relevant Results  Lab Results   Component Value Date    WBC 6.2 01/26/2024    HGB 12.0 01/26/2024    HCT 35.2 (L) 01/26/2024    MCV 89 01/26/2024     01/26/2024     Lab Results   Component Value Date    GLUCOSE 113 (H) 01/26/2024    CALCIUM 9.3 01/26/2024     01/26/2024    K 4.6 01/26/2024    CO2 26 01/26/2024     01/26/2024    BUN 13 01/26/2024    CREATININE 0.88 01/26/2024        Assessment/Plan   Principal Problem:    Endometriosis    - Consent signed  - Has completed PAT and is appropriate for OR  - Pre-procedure medications ordered  - Plan for same-day discharge    Seen and discussed with Dr. Arben Mooney MD PGY1

## 2024-02-12 ENCOUNTER — ANESTHESIA (OUTPATIENT)
Dept: OPERATING ROOM | Facility: HOSPITAL | Age: 34
End: 2024-02-12
Payer: MEDICARE

## 2024-02-12 ENCOUNTER — HOSPITAL ENCOUNTER (OUTPATIENT)
Facility: HOSPITAL | Age: 34
Setting detail: OBSERVATION
Discharge: HOME | End: 2024-02-14
Attending: STUDENT IN AN ORGANIZED HEALTH CARE EDUCATION/TRAINING PROGRAM | Admitting: STUDENT IN AN ORGANIZED HEALTH CARE EDUCATION/TRAINING PROGRAM
Payer: MEDICARE

## 2024-02-12 DIAGNOSIS — Z90.710 S/P HYSTERECTOMY: ICD-10-CM

## 2024-02-12 DIAGNOSIS — N80.9 ENDOMETRIOSIS: Primary | ICD-10-CM

## 2024-02-12 LAB
ABO GROUP (TYPE) IN BLOOD: NORMAL
ANION GAP SERPL CALC-SCNC: 14 MMOL/L (ref 10–20)
BUN SERPL-MCNC: 13 MG/DL (ref 6–23)
CALCIUM SERPL-MCNC: 8.7 MG/DL (ref 8.6–10.6)
CHLORIDE SERPL-SCNC: 99 MMOL/L (ref 98–107)
CO2 SERPL-SCNC: 27 MMOL/L (ref 21–32)
CREAT SERPL-MCNC: 0.78 MG/DL (ref 0.5–1.05)
EGFRCR SERPLBLD CKD-EPI 2021: >90 ML/MIN/1.73M*2
ERYTHROCYTE [DISTWIDTH] IN BLOOD BY AUTOMATED COUNT: 14.3 % (ref 11.5–14.5)
ERYTHROCYTE [DISTWIDTH] IN BLOOD BY AUTOMATED COUNT: 14.4 % (ref 11.5–14.5)
GLUCOSE SERPL-MCNC: 141 MG/DL (ref 74–99)
HCT VFR BLD AUTO: 31.3 % (ref 36–46)
HCT VFR BLD AUTO: 31.7 % (ref 36–46)
HGB BLD-MCNC: 10.3 G/DL (ref 12–16)
HGB BLD-MCNC: 10.6 G/DL (ref 12–16)
MCH RBC QN AUTO: 29.4 PG (ref 26–34)
MCH RBC QN AUTO: 29.9 PG (ref 26–34)
MCHC RBC AUTO-ENTMCNC: 32.5 G/DL (ref 32–36)
MCHC RBC AUTO-ENTMCNC: 33.9 G/DL (ref 32–36)
MCV RBC AUTO: 88 FL (ref 80–100)
MCV RBC AUTO: 91 FL (ref 80–100)
NRBC BLD-RTO: 0 /100 WBCS (ref 0–0)
NRBC BLD-RTO: 0 /100 WBCS (ref 0–0)
PLATELET # BLD AUTO: 355 X10*3/UL (ref 150–450)
PLATELET # BLD AUTO: 371 X10*3/UL (ref 150–450)
POTASSIUM SERPL-SCNC: 5.5 MMOL/L (ref 3.5–5.3)
PREGNANCY TEST URINE, POC: NEGATIVE
RBC # BLD AUTO: 3.5 X10*6/UL (ref 4–5.2)
RBC # BLD AUTO: 3.55 X10*6/UL (ref 4–5.2)
RH FACTOR (ANTIGEN D): NORMAL
SODIUM SERPL-SCNC: 134 MMOL/L (ref 136–145)
WBC # BLD AUTO: 16.2 X10*3/UL (ref 4.4–11.3)
WBC # BLD AUTO: 16.8 X10*3/UL (ref 4.4–11.3)

## 2024-02-12 PROCEDURE — 2500000004 HC RX 250 GENERAL PHARMACY W/ HCPCS (ALT 636 FOR OP/ED): Performed by: STUDENT IN AN ORGANIZED HEALTH CARE EDUCATION/TRAINING PROGRAM

## 2024-02-12 PROCEDURE — 85027 COMPLETE CBC AUTOMATED: CPT | Performed by: STUDENT IN AN ORGANIZED HEALTH CARE EDUCATION/TRAINING PROGRAM

## 2024-02-12 PROCEDURE — 88307 TISSUE EXAM BY PATHOLOGIST: CPT | Mod: TC,SUR | Performed by: STUDENT IN AN ORGANIZED HEALTH CARE EDUCATION/TRAINING PROGRAM

## 2024-02-12 PROCEDURE — 2780000003 HC OR 278 NO HCPCS: Performed by: STUDENT IN AN ORGANIZED HEALTH CARE EDUCATION/TRAINING PROGRAM

## 2024-02-12 PROCEDURE — 36415 COLL VENOUS BLD VENIPUNCTURE: CPT | Performed by: STUDENT IN AN ORGANIZED HEALTH CARE EDUCATION/TRAINING PROGRAM

## 2024-02-12 PROCEDURE — 2500000001 HC RX 250 WO HCPCS SELF ADMINISTERED DRUGS (ALT 637 FOR MEDICARE OP): Performed by: STUDENT IN AN ORGANIZED HEALTH CARE EDUCATION/TRAINING PROGRAM

## 2024-02-12 PROCEDURE — 2500000001 HC RX 250 WO HCPCS SELF ADMINISTERED DRUGS (ALT 637 FOR MEDICARE OP): Mod: SE | Performed by: ANESTHESIOLOGY

## 2024-02-12 PROCEDURE — 3600000017 HC OR TIME - EACH INCREMENTAL 1 MINUTE - PROCEDURE LEVEL SIX: Performed by: STUDENT IN AN ORGANIZED HEALTH CARE EDUCATION/TRAINING PROGRAM

## 2024-02-12 PROCEDURE — 96374 THER/PROPH/DIAG INJ IV PUSH: CPT | Mod: 59

## 2024-02-12 PROCEDURE — 81025 URINE PREGNANCY TEST: CPT | Performed by: STUDENT IN AN ORGANIZED HEALTH CARE EDUCATION/TRAINING PROGRAM

## 2024-02-12 PROCEDURE — 7100000001 HC RECOVERY ROOM TIME - INITIAL BASE CHARGE: Performed by: STUDENT IN AN ORGANIZED HEALTH CARE EDUCATION/TRAINING PROGRAM

## 2024-02-12 PROCEDURE — 2720000007 HC OR 272 NO HCPCS: Performed by: STUDENT IN AN ORGANIZED HEALTH CARE EDUCATION/TRAINING PROGRAM

## 2024-02-12 PROCEDURE — 3600000018 HC OR TIME - INITIAL BASE CHARGE - PROCEDURE LEVEL SIX: Performed by: STUDENT IN AN ORGANIZED HEALTH CARE EDUCATION/TRAINING PROGRAM

## 2024-02-12 PROCEDURE — 2500000004 HC RX 250 GENERAL PHARMACY W/ HCPCS (ALT 636 FOR OP/ED): Mod: SE | Performed by: ANESTHESIOLOGY

## 2024-02-12 PROCEDURE — 3700000002 HC GENERAL ANESTHESIA TIME - EACH INCREMENTAL 1 MINUTE: Performed by: STUDENT IN AN ORGANIZED HEALTH CARE EDUCATION/TRAINING PROGRAM

## 2024-02-12 PROCEDURE — 82374 ASSAY BLOOD CARBON DIOXIDE: CPT | Performed by: STUDENT IN AN ORGANIZED HEALTH CARE EDUCATION/TRAINING PROGRAM

## 2024-02-12 PROCEDURE — 88305 TISSUE EXAM BY PATHOLOGIST: CPT | Performed by: PATHOLOGY

## 2024-02-12 PROCEDURE — 88307 TISSUE EXAM BY PATHOLOGIST: CPT | Performed by: PATHOLOGY

## 2024-02-12 PROCEDURE — 3700000001 HC GENERAL ANESTHESIA TIME - INITIAL BASE CHARGE: Performed by: STUDENT IN AN ORGANIZED HEALTH CARE EDUCATION/TRAINING PROGRAM

## 2024-02-12 PROCEDURE — 2500000005 HC RX 250 GENERAL PHARMACY W/O HCPCS: Mod: SE

## 2024-02-12 PROCEDURE — 2500000001 HC RX 250 WO HCPCS SELF ADMINISTERED DRUGS (ALT 637 FOR MEDICARE OP): Mod: SE | Performed by: STUDENT IN AN ORGANIZED HEALTH CARE EDUCATION/TRAINING PROGRAM

## 2024-02-12 PROCEDURE — G0378 HOSPITAL OBSERVATION PER HR: HCPCS

## 2024-02-12 PROCEDURE — 2500000005 HC RX 250 GENERAL PHARMACY W/O HCPCS: Mod: SE | Performed by: ANESTHESIOLOGY

## 2024-02-12 PROCEDURE — 88304 TISSUE EXAM BY PATHOLOGIST: CPT | Performed by: PATHOLOGY

## 2024-02-12 PROCEDURE — 2500000004 HC RX 250 GENERAL PHARMACY W/ HCPCS (ALT 636 FOR OP/ED): Mod: SE

## 2024-02-12 PROCEDURE — 90686 IIV4 VACC NO PRSV 0.5 ML IM: CPT

## 2024-02-12 PROCEDURE — 7100000002 HC RECOVERY ROOM TIME - EACH INCREMENTAL 1 MINUTE: Performed by: STUDENT IN AN ORGANIZED HEALTH CARE EDUCATION/TRAINING PROGRAM

## 2024-02-12 PROCEDURE — A4217 STERILE WATER/SALINE, 500 ML: HCPCS | Performed by: STUDENT IN AN ORGANIZED HEALTH CARE EDUCATION/TRAINING PROGRAM

## 2024-02-12 RX ORDER — KETOROLAC TROMETHAMINE 15 MG/ML
15 INJECTION, SOLUTION INTRAMUSCULAR; INTRAVENOUS EVERY 6 HOURS
Status: ACTIVE | OUTPATIENT
Start: 2024-02-12 | End: 2024-02-13

## 2024-02-12 RX ORDER — DROPERIDOL 2.5 MG/ML
0.62 INJECTION, SOLUTION INTRAMUSCULAR; INTRAVENOUS ONCE AS NEEDED
Status: DISCONTINUED | OUTPATIENT
Start: 2024-02-12 | End: 2024-02-12 | Stop reason: HOSPADM

## 2024-02-12 RX ORDER — SODIUM CHLORIDE, SODIUM LACTATE, POTASSIUM CHLORIDE, CALCIUM CHLORIDE 600; 310; 30; 20 MG/100ML; MG/100ML; MG/100ML; MG/100ML
500 INJECTION, SOLUTION INTRAVENOUS CONTINUOUS
Status: DISCONTINUED | OUTPATIENT
Start: 2024-02-12 | End: 2024-02-12 | Stop reason: HOSPADM

## 2024-02-12 RX ORDER — PROPOFOL 10 MG/ML
INJECTION, EMULSION INTRAVENOUS AS NEEDED
Status: DISCONTINUED | OUTPATIENT
Start: 2024-02-12 | End: 2024-02-12

## 2024-02-12 RX ORDER — MEPERIDINE HYDROCHLORIDE 25 MG/ML
12.5 INJECTION INTRAMUSCULAR; INTRAVENOUS; SUBCUTANEOUS EVERY 10 MIN PRN
Status: DISCONTINUED | OUTPATIENT
Start: 2024-02-12 | End: 2024-02-12 | Stop reason: HOSPADM

## 2024-02-12 RX ORDER — OXCARBAZEPINE 600 MG/1
1200 TABLET, FILM COATED ORAL 2 TIMES DAILY
Status: DISCONTINUED | OUTPATIENT
Start: 2024-02-12 | End: 2024-02-14 | Stop reason: HOSPADM

## 2024-02-12 RX ORDER — MAGNESIUM SULFATE HEPTAHYDRATE 500 MG/ML
INJECTION, SOLUTION INTRAMUSCULAR; INTRAVENOUS AS NEEDED
Status: DISCONTINUED | OUTPATIENT
Start: 2024-02-12 | End: 2024-02-12

## 2024-02-12 RX ORDER — GABAPENTIN 600 MG/1
600 TABLET ORAL ONCE
Status: COMPLETED | OUTPATIENT
Start: 2024-02-12 | End: 2024-02-12

## 2024-02-12 RX ORDER — HYDROMORPHONE HYDROCHLORIDE 1 MG/ML
0.5 INJECTION, SOLUTION INTRAMUSCULAR; INTRAVENOUS; SUBCUTANEOUS EVERY 5 MIN PRN
Status: DISCONTINUED | OUTPATIENT
Start: 2024-02-12 | End: 2024-02-12 | Stop reason: HOSPADM

## 2024-02-12 RX ORDER — HYDROMORPHONE HYDROCHLORIDE 1 MG/ML
0.2 INJECTION, SOLUTION INTRAMUSCULAR; INTRAVENOUS; SUBCUTANEOUS EVERY 5 MIN PRN
Status: DISCONTINUED | OUTPATIENT
Start: 2024-02-12 | End: 2024-02-12 | Stop reason: HOSPADM

## 2024-02-12 RX ORDER — NEOSTIGMINE METHYLSULFATE 1 MG/ML
INJECTION, SOLUTION INTRAVENOUS AS NEEDED
Status: DISCONTINUED | OUTPATIENT
Start: 2024-02-12 | End: 2024-02-12

## 2024-02-12 RX ORDER — HYDROMORPHONE HYDROCHLORIDE 1 MG/ML
INJECTION, SOLUTION INTRAMUSCULAR; INTRAVENOUS; SUBCUTANEOUS AS NEEDED
Status: DISCONTINUED | OUTPATIENT
Start: 2024-02-12 | End: 2024-02-12

## 2024-02-12 RX ORDER — LIDOCAINE HYDROCHLORIDE 20 MG/ML
INJECTION, SOLUTION INFILTRATION; PERINEURAL AS NEEDED
Status: DISCONTINUED | OUTPATIENT
Start: 2024-02-12 | End: 2024-02-12

## 2024-02-12 RX ORDER — VASOPRESSIN 20 U/ML
INJECTION PARENTERAL CONTINUOUS PRN
Status: COMPLETED | OUTPATIENT
Start: 2024-02-12 | End: 2024-02-12

## 2024-02-12 RX ORDER — DROPERIDOL 2.5 MG/ML
INJECTION, SOLUTION INTRAMUSCULAR; INTRAVENOUS AS NEEDED
Status: DISCONTINUED | OUTPATIENT
Start: 2024-02-12 | End: 2024-02-12

## 2024-02-12 RX ORDER — DROPERIDOL 2.5 MG/ML
0.62 INJECTION, SOLUTION INTRAMUSCULAR; INTRAVENOUS ONCE AS NEEDED
Status: CANCELLED | OUTPATIENT
Start: 2024-02-12

## 2024-02-12 RX ORDER — CEFAZOLIN 1 G/1
INJECTION, POWDER, FOR SOLUTION INTRAVENOUS AS NEEDED
Status: DISCONTINUED | OUTPATIENT
Start: 2024-02-12 | End: 2024-02-12

## 2024-02-12 RX ORDER — KETOROLAC TROMETHAMINE 30 MG/ML
30 INJECTION, SOLUTION INTRAMUSCULAR; INTRAVENOUS EVERY 6 HOURS PRN
Status: DISCONTINUED | OUTPATIENT
Start: 2024-02-12 | End: 2024-02-12

## 2024-02-12 RX ORDER — ESMOLOL HYDROCHLORIDE 10 MG/ML
INJECTION INTRAVENOUS AS NEEDED
Status: DISCONTINUED | OUTPATIENT
Start: 2024-02-12 | End: 2024-02-12

## 2024-02-12 RX ORDER — POLYETHYLENE GLYCOL 3350 17 G/17G
17 POWDER, FOR SOLUTION ORAL DAILY
Status: DISCONTINUED | OUTPATIENT
Start: 2024-02-12 | End: 2024-02-14 | Stop reason: HOSPADM

## 2024-02-12 RX ORDER — ACETAMINOPHEN 325 MG/1
975 TABLET ORAL EVERY 6 HOURS
Status: DISCONTINUED | OUTPATIENT
Start: 2024-02-12 | End: 2024-02-14 | Stop reason: HOSPADM

## 2024-02-12 RX ORDER — MIDAZOLAM HYDROCHLORIDE 1 MG/ML
1 INJECTION INTRAMUSCULAR; INTRAVENOUS ONCE AS NEEDED
Status: DISCONTINUED | OUTPATIENT
Start: 2024-02-12 | End: 2024-02-12 | Stop reason: HOSPADM

## 2024-02-12 RX ORDER — MIDAZOLAM HYDROCHLORIDE 1 MG/ML
INJECTION INTRAMUSCULAR; INTRAVENOUS AS NEEDED
Status: DISCONTINUED | OUTPATIENT
Start: 2024-02-12 | End: 2024-02-12

## 2024-02-12 RX ORDER — HYDROMORPHONE HYDROCHLORIDE 1 MG/ML
0.5 INJECTION, SOLUTION INTRAMUSCULAR; INTRAVENOUS; SUBCUTANEOUS EVERY 5 MIN PRN
Status: CANCELLED | OUTPATIENT
Start: 2024-02-12

## 2024-02-12 RX ORDER — MIDAZOLAM HYDROCHLORIDE 1 MG/ML
1 INJECTION INTRAMUSCULAR; INTRAVENOUS ONCE AS NEEDED
Status: CANCELLED | OUTPATIENT
Start: 2024-02-12

## 2024-02-12 RX ORDER — NALOXONE HYDROCHLORIDE 0.4 MG/ML
0.1 INJECTION, SOLUTION INTRAMUSCULAR; INTRAVENOUS; SUBCUTANEOUS EVERY 5 MIN PRN
Status: DISCONTINUED | OUTPATIENT
Start: 2024-02-12 | End: 2024-02-14 | Stop reason: HOSPADM

## 2024-02-12 RX ORDER — HYDROMORPHONE HYDROCHLORIDE 1 MG/ML
0.2 INJECTION, SOLUTION INTRAMUSCULAR; INTRAVENOUS; SUBCUTANEOUS EVERY 5 MIN PRN
Status: CANCELLED | OUTPATIENT
Start: 2024-02-12

## 2024-02-12 RX ORDER — OXYCODONE HYDROCHLORIDE 5 MG/1
5 TABLET ORAL EVERY 4 HOURS PRN
Status: DISCONTINUED | OUTPATIENT
Start: 2024-02-12 | End: 2024-02-14 | Stop reason: HOSPADM

## 2024-02-12 RX ORDER — METRONIDAZOLE 500 MG/100ML
INJECTION, SOLUTION INTRAVENOUS AS NEEDED
Status: DISCONTINUED | OUTPATIENT
Start: 2024-02-12 | End: 2024-02-12

## 2024-02-12 RX ORDER — DIVALPROEX SODIUM 500 MG/1
500 TABLET, DELAYED RELEASE ORAL
Status: DISCONTINUED | OUTPATIENT
Start: 2024-02-13 | End: 2024-02-14 | Stop reason: HOSPADM

## 2024-02-12 RX ORDER — SODIUM CHLORIDE, SODIUM LACTATE, POTASSIUM CHLORIDE, CALCIUM CHLORIDE 600; 310; 30; 20 MG/100ML; MG/100ML; MG/100ML; MG/100ML
40 INJECTION, SOLUTION INTRAVENOUS CONTINUOUS
Status: DISCONTINUED | OUTPATIENT
Start: 2024-02-12 | End: 2024-02-14 | Stop reason: HOSPADM

## 2024-02-12 RX ORDER — SODIUM CHLORIDE, SODIUM LACTATE, POTASSIUM CHLORIDE, CALCIUM CHLORIDE 600; 310; 30; 20 MG/100ML; MG/100ML; MG/100ML; MG/100ML
100 INJECTION, SOLUTION INTRAVENOUS CONTINUOUS
Status: CANCELLED | OUTPATIENT
Start: 2024-02-12

## 2024-02-12 RX ORDER — FENTANYL CITRATE 50 UG/ML
INJECTION, SOLUTION INTRAMUSCULAR; INTRAVENOUS AS NEEDED
Status: DISCONTINUED | OUTPATIENT
Start: 2024-02-12 | End: 2024-02-12

## 2024-02-12 RX ORDER — IBUPROFEN 600 MG/1
600 TABLET ORAL EVERY 6 HOURS PRN
Status: DISCONTINUED | OUTPATIENT
Start: 2024-02-12 | End: 2024-02-12

## 2024-02-12 RX ORDER — LIDOCAINE HYDROCHLORIDE 10 MG/ML
0.1 INJECTION, SOLUTION EPIDURAL; INFILTRATION; INTRACAUDAL; PERINEURAL ONCE
Status: CANCELLED | OUTPATIENT
Start: 2024-02-12 | End: 2024-02-12

## 2024-02-12 RX ORDER — ALUMINUM HYDROXIDE, MAGNESIUM HYDROXIDE, AND SIMETHICONE 1200; 120; 1200 MG/30ML; MG/30ML; MG/30ML
10 SUSPENSION ORAL 4 TIMES DAILY PRN
Status: DISCONTINUED | OUTPATIENT
Start: 2024-02-12 | End: 2024-02-13 | Stop reason: SDUPTHER

## 2024-02-12 RX ORDER — ACETAMINOPHEN 325 MG/1
650 TABLET ORAL EVERY 4 HOURS PRN
Status: DISCONTINUED | OUTPATIENT
Start: 2024-02-12 | End: 2024-02-12

## 2024-02-12 RX ORDER — HYDROMORPHONE HYDROCHLORIDE 1 MG/ML
0.4 INJECTION, SOLUTION INTRAMUSCULAR; INTRAVENOUS; SUBCUTANEOUS
Status: DISCONTINUED | OUTPATIENT
Start: 2024-02-12 | End: 2024-02-14 | Stop reason: HOSPADM

## 2024-02-12 RX ORDER — CELECOXIB 200 MG/1
400 CAPSULE ORAL ONCE
Status: COMPLETED | OUTPATIENT
Start: 2024-02-12 | End: 2024-02-12

## 2024-02-12 RX ORDER — SODIUM CHLORIDE, SODIUM LACTATE, POTASSIUM CHLORIDE, CALCIUM CHLORIDE 600; 310; 30; 20 MG/100ML; MG/100ML; MG/100ML; MG/100ML
100 INJECTION, SOLUTION INTRAVENOUS CONTINUOUS
Status: DISCONTINUED | OUTPATIENT
Start: 2024-02-12 | End: 2024-02-12 | Stop reason: HOSPADM

## 2024-02-12 RX ORDER — DIVALPROEX SODIUM 500 MG/1
500 TABLET, FILM COATED, EXTENDED RELEASE ORAL NIGHTLY
Status: DISCONTINUED | OUTPATIENT
Start: 2024-02-12 | End: 2024-02-12

## 2024-02-12 RX ORDER — ALBUTEROL SULFATE 0.83 MG/ML
2.5 SOLUTION RESPIRATORY (INHALATION) ONCE AS NEEDED
Status: CANCELLED | OUTPATIENT
Start: 2024-02-12

## 2024-02-12 RX ORDER — METHOCARBAMOL 100 MG/ML
1000 INJECTION, SOLUTION INTRAMUSCULAR; INTRAVENOUS ONCE
Status: COMPLETED | OUTPATIENT
Start: 2024-02-12 | End: 2024-02-12

## 2024-02-12 RX ORDER — SODIUM CHLORIDE 0.9 G/100ML
IRRIGANT IRRIGATION AS NEEDED
Status: DISCONTINUED | OUTPATIENT
Start: 2024-02-12 | End: 2024-02-12 | Stop reason: HOSPADM

## 2024-02-12 RX ORDER — DEXAMETHASONE SODIUM PHOSPHATE 4 MG/ML
INJECTION, SOLUTION INTRA-ARTICULAR; INTRALESIONAL; INTRAMUSCULAR; INTRAVENOUS; SOFT TISSUE AS NEEDED
Status: DISCONTINUED | OUTPATIENT
Start: 2024-02-12 | End: 2024-02-12

## 2024-02-12 RX ORDER — OXYCODONE HYDROCHLORIDE 5 MG/1
5 TABLET ORAL EVERY 4 HOURS PRN
Status: CANCELLED | OUTPATIENT
Start: 2024-02-12

## 2024-02-12 RX ORDER — ONDANSETRON HYDROCHLORIDE 2 MG/ML
INJECTION, SOLUTION INTRAVENOUS AS NEEDED
Status: DISCONTINUED | OUTPATIENT
Start: 2024-02-12 | End: 2024-02-12

## 2024-02-12 RX ORDER — HEPARIN SODIUM 5000 [USP'U]/ML
5000 INJECTION, SOLUTION INTRAVENOUS; SUBCUTANEOUS ONCE
Status: COMPLETED | OUTPATIENT
Start: 2024-02-12 | End: 2024-02-12

## 2024-02-12 RX ORDER — LABETALOL HYDROCHLORIDE 5 MG/ML
5 INJECTION, SOLUTION INTRAVENOUS ONCE AS NEEDED
Status: COMPLETED | OUTPATIENT
Start: 2024-02-12 | End: 2024-02-12

## 2024-02-12 RX ORDER — ROCURONIUM BROMIDE 10 MG/ML
INJECTION, SOLUTION INTRAVENOUS AS NEEDED
Status: DISCONTINUED | OUTPATIENT
Start: 2024-02-12 | End: 2024-02-12

## 2024-02-12 RX ORDER — ONDANSETRON HYDROCHLORIDE 2 MG/ML
4 INJECTION, SOLUTION INTRAVENOUS EVERY 6 HOURS PRN
Status: DISCONTINUED | OUTPATIENT
Start: 2024-02-12 | End: 2024-02-14 | Stop reason: HOSPADM

## 2024-02-12 RX ORDER — MEPERIDINE HYDROCHLORIDE 25 MG/ML
12.5 INJECTION INTRAMUSCULAR; INTRAVENOUS; SUBCUTANEOUS EVERY 10 MIN PRN
Status: CANCELLED | OUTPATIENT
Start: 2024-02-12

## 2024-02-12 RX ORDER — LABETALOL HYDROCHLORIDE 5 MG/ML
5 INJECTION, SOLUTION INTRAVENOUS ONCE AS NEEDED
Status: CANCELLED | OUTPATIENT
Start: 2024-02-12

## 2024-02-12 RX ORDER — LIDOCAINE HYDROCHLORIDE 10 MG/ML
0.1 INJECTION, SOLUTION EPIDURAL; INFILTRATION; INTRACAUDAL; PERINEURAL ONCE
Status: DISCONTINUED | OUTPATIENT
Start: 2024-02-12 | End: 2024-02-12 | Stop reason: HOSPADM

## 2024-02-12 RX ORDER — DIVALPROEX SODIUM 500 MG/1
1000 TABLET, DELAYED RELEASE ORAL NIGHTLY
Status: DISCONTINUED | OUTPATIENT
Start: 2024-02-12 | End: 2024-02-14 | Stop reason: HOSPADM

## 2024-02-12 RX ORDER — OXYCODONE HYDROCHLORIDE 5 MG/1
10 TABLET ORAL EVERY 4 HOURS PRN
Status: DISCONTINUED | OUTPATIENT
Start: 2024-02-12 | End: 2024-02-14 | Stop reason: HOSPADM

## 2024-02-12 RX ORDER — METHOCARBAMOL 100 MG/ML
1000 INJECTION, SOLUTION INTRAMUSCULAR; INTRAVENOUS ONCE
Status: CANCELLED | OUTPATIENT
Start: 2024-02-12 | End: 2024-02-12

## 2024-02-12 RX ORDER — ALBUTEROL SULFATE 0.83 MG/ML
2.5 SOLUTION RESPIRATORY (INHALATION) ONCE AS NEEDED
Status: DISCONTINUED | OUTPATIENT
Start: 2024-02-12 | End: 2024-02-12 | Stop reason: HOSPADM

## 2024-02-12 RX ORDER — ACETAMINOPHEN 325 MG/1
975 TABLET ORAL ONCE
Status: COMPLETED | OUTPATIENT
Start: 2024-02-12 | End: 2024-02-12

## 2024-02-12 RX ORDER — IBUPROFEN 600 MG/1
600 TABLET ORAL EVERY 6 HOURS
Status: DISCONTINUED | OUTPATIENT
Start: 2024-02-14 | End: 2024-02-14 | Stop reason: HOSPADM

## 2024-02-12 RX ORDER — METOCLOPRAMIDE 10 MG/1
10 TABLET ORAL EVERY 6 HOURS PRN
Status: DISCONTINUED | OUTPATIENT
Start: 2024-02-12 | End: 2024-02-14 | Stop reason: HOSPADM

## 2024-02-12 RX ORDER — OXYCODONE HYDROCHLORIDE 5 MG/1
5 TABLET ORAL EVERY 4 HOURS PRN
Status: DISCONTINUED | OUTPATIENT
Start: 2024-02-12 | End: 2024-02-12 | Stop reason: HOSPADM

## 2024-02-12 RX ORDER — GLYCOPYRROLATE 0.2 MG/ML
INJECTION INTRAMUSCULAR; INTRAVENOUS AS NEEDED
Status: DISCONTINUED | OUTPATIENT
Start: 2024-02-12 | End: 2024-02-12

## 2024-02-12 RX ORDER — IBUPROFEN 600 MG/1
600 TABLET ORAL EVERY 6 HOURS
Status: DISCONTINUED | OUTPATIENT
Start: 2024-02-12 | End: 2024-02-12

## 2024-02-12 RX ADMIN — SODIUM CHLORIDE, POTASSIUM CHLORIDE, SODIUM LACTATE AND CALCIUM CHLORIDE 125 ML/HR: 600; 310; 30; 20 INJECTION, SOLUTION INTRAVENOUS at 17:10

## 2024-02-12 RX ADMIN — HYDROMORPHONE HYDROCHLORIDE 0.3 MG: 1 INJECTION, SOLUTION INTRAMUSCULAR; INTRAVENOUS; SUBCUTANEOUS at 13:12

## 2024-02-12 RX ADMIN — FENTANYL CITRATE 50 MCG: 50 INJECTION, SOLUTION INTRAMUSCULAR; INTRAVENOUS at 10:22

## 2024-02-12 RX ADMIN — CEFAZOLIN 2 G: 1 INJECTION, POWDER, FOR SOLUTION INTRAMUSCULAR; INTRAVENOUS at 11:50

## 2024-02-12 RX ADMIN — HYDROMORPHONE HYDROCHLORIDE 0.2 MG: 1 INJECTION, SOLUTION INTRAMUSCULAR; INTRAVENOUS; SUBCUTANEOUS at 12:41

## 2024-02-12 RX ADMIN — ONDANSETRON 4 MG: 2 INJECTION INTRAMUSCULAR; INTRAVENOUS at 13:10

## 2024-02-12 RX ADMIN — ROCURONIUM BROMIDE 20 MG: 10 INJECTION INTRAVENOUS at 08:59

## 2024-02-12 RX ADMIN — METRONIDAZOLE 500 MG: 500 INJECTION, SOLUTION INTRAVENOUS at 07:58

## 2024-02-12 RX ADMIN — MIDAZOLAM HYDROCHLORIDE 2 MG: 1 INJECTION, SOLUTION INTRAMUSCULAR; INTRAVENOUS at 07:27

## 2024-02-12 RX ADMIN — Medication 50 MG: at 07:39

## 2024-02-12 RX ADMIN — DROPERIDOL 0.62 MG: 2.5 INJECTION, SOLUTION INTRAMUSCULAR; INTRAVENOUS at 09:03

## 2024-02-12 RX ADMIN — LABETALOL HYDROCHLORIDE 5 MG: 5 INJECTION, SOLUTION INTRAVENOUS at 15:56

## 2024-02-12 RX ADMIN — OXYCODONE HYDROCHLORIDE 5 MG: 5 TABLET ORAL at 16:19

## 2024-02-12 RX ADMIN — NEOSTIGMINE METHYLSULFATE 5 MG: 1 INJECTION INTRAVENOUS at 13:22

## 2024-02-12 RX ADMIN — SODIUM CHLORIDE, POTASSIUM CHLORIDE, SODIUM LACTATE AND CALCIUM CHLORIDE 100 ML/HR: 600; 310; 30; 20 INJECTION, SOLUTION INTRAVENOUS at 13:40

## 2024-02-12 RX ADMIN — ACETAMINOPHEN 975 MG: 325 TABLET ORAL at 06:22

## 2024-02-12 RX ADMIN — ROCURONIUM BROMIDE 60 MG: 10 INJECTION INTRAVENOUS at 07:40

## 2024-02-12 RX ADMIN — ROCURONIUM BROMIDE 20 MG: 10 INJECTION INTRAVENOUS at 08:26

## 2024-02-12 RX ADMIN — MAGNESIUM SULFATE HEPTAHYDRATE 4 G: 500 INJECTION, SOLUTION INTRAMUSCULAR; INTRAVENOUS at 10:29

## 2024-02-12 RX ADMIN — ESMOLOL HYDROCHLORIDE 30 MG: 10 INJECTION, SOLUTION INTRAVENOUS at 10:32

## 2024-02-12 RX ADMIN — LIDOCAINE HYDROCHLORIDE 100 MG: 20 INJECTION, SOLUTION INFILTRATION; PERINEURAL at 07:39

## 2024-02-12 RX ADMIN — CEFAZOLIN 2 G: 1 INJECTION, POWDER, FOR SOLUTION INTRAMUSCULAR; INTRAVENOUS at 07:50

## 2024-02-12 RX ADMIN — KETOROLAC TROMETHAMINE 30 MG: 30 INJECTION, SOLUTION INTRAMUSCULAR; INTRAVENOUS at 16:02

## 2024-02-12 RX ADMIN — ROCURONIUM BROMIDE 10 MG: 10 INJECTION INTRAVENOUS at 10:07

## 2024-02-12 RX ADMIN — ESMOLOL HYDROCHLORIDE 20 MG: 10 INJECTION, SOLUTION INTRAVENOUS at 11:42

## 2024-02-12 RX ADMIN — Medication 6 L/MIN: at 13:40

## 2024-02-12 RX ADMIN — ROCURONIUM BROMIDE 10 MG: 10 INJECTION INTRAVENOUS at 10:48

## 2024-02-12 RX ADMIN — HYDROMORPHONE HYDROCHLORIDE 0.5 MG: 1 INJECTION, SOLUTION INTRAMUSCULAR; INTRAVENOUS; SUBCUTANEOUS at 14:53

## 2024-02-12 RX ADMIN — ACETAMINOPHEN 975 MG: 325 TABLET ORAL at 18:35

## 2024-02-12 RX ADMIN — HEPARIN SODIUM 5000 UNITS: 5000 INJECTION, SOLUTION INTRAVENOUS; SUBCUTANEOUS at 08:09

## 2024-02-12 RX ADMIN — DIVALPROEX SODIUM 1000 MG: 500 TABLET, DELAYED RELEASE ORAL at 22:32

## 2024-02-12 RX ADMIN — FENTANYL CITRATE 100 MCG: 50 INJECTION, SOLUTION INTRAMUSCULAR; INTRAVENOUS at 07:39

## 2024-02-12 RX ADMIN — METHOCARBAMOL 1000 MG: 1000 INJECTION, SOLUTION INTRAMUSCULAR; INTRAVENOUS at 14:24

## 2024-02-12 RX ADMIN — ROCURONIUM BROMIDE 10 MG: 10 INJECTION INTRAVENOUS at 12:06

## 2024-02-12 RX ADMIN — SODIUM CHLORIDE, POTASSIUM CHLORIDE, SODIUM LACTATE AND CALCIUM CHLORIDE: 600; 310; 30; 20 INJECTION, SOLUTION INTRAVENOUS at 07:25

## 2024-02-12 RX ADMIN — ALUMINUM HYDROXIDE, MAGNESIUM HYDROXIDE, AND SIMETHICONE 10 ML: 200; 200; 20 SUSPENSION ORAL at 20:43

## 2024-02-12 RX ADMIN — ROCURONIUM BROMIDE 10 MG: 10 INJECTION INTRAVENOUS at 12:36

## 2024-02-12 RX ADMIN — CELECOXIB 400 MG: 200 CAPSULE ORAL at 06:22

## 2024-02-12 RX ADMIN — SODIUM CHLORIDE, POTASSIUM CHLORIDE, SODIUM LACTATE AND CALCIUM CHLORIDE 500 ML/HR: 600; 310; 30; 20 INJECTION, SOLUTION INTRAVENOUS at 16:02

## 2024-02-12 RX ADMIN — DEXAMETHASONE SODIUM PHOSPHATE 4 MG: 4 INJECTION, SOLUTION INTRA-ARTICULAR; INTRALESIONAL; INTRAMUSCULAR; INTRAVENOUS; SOFT TISSUE at 07:58

## 2024-02-12 RX ADMIN — PROPOFOL 200 MG: 10 INJECTION, EMULSION INTRAVENOUS at 07:39

## 2024-02-12 RX ADMIN — GLYCOPYRROLATE 0.2 MG: 0.2 INJECTION INTRAMUSCULAR; INTRAVENOUS at 08:05

## 2024-02-12 RX ADMIN — HYDROMORPHONE HYDROCHLORIDE 0.4 MG: 1 INJECTION, SOLUTION INTRAMUSCULAR; INTRAVENOUS; SUBCUTANEOUS at 18:35

## 2024-02-12 RX ADMIN — GLYCOPYRROLATE 0.4 MG: 0.2 INJECTION INTRAMUSCULAR; INTRAVENOUS at 13:22

## 2024-02-12 RX ADMIN — OXCARBAZEPINE 1200 MG: 600 TABLET, FILM COATED ORAL at 22:32

## 2024-02-12 RX ADMIN — ROCURONIUM BROMIDE 10 MG: 10 INJECTION INTRAVENOUS at 09:37

## 2024-02-12 RX ADMIN — FENTANYL CITRATE 50 MCG: 50 INJECTION, SOLUTION INTRAMUSCULAR; INTRAVENOUS at 11:31

## 2024-02-12 RX ADMIN — ROCURONIUM BROMIDE 10 MG: 10 INJECTION INTRAVENOUS at 11:26

## 2024-02-12 RX ADMIN — HYDROMORPHONE HYDROCHLORIDE 0.5 MG: 1 INJECTION, SOLUTION INTRAMUSCULAR; INTRAVENOUS; SUBCUTANEOUS at 14:02

## 2024-02-12 RX ADMIN — OXYCODONE HYDROCHLORIDE 10 MG: 5 TABLET ORAL at 20:43

## 2024-02-12 RX ADMIN — GABAPENTIN 600 MG: 300 CAPSULE ORAL at 06:22

## 2024-02-12 SDOH — SOCIAL STABILITY: SOCIAL INSECURITY: DO YOU FEEL ANYONE HAS EXPLOITED OR TAKEN ADVANTAGE OF YOU FINANCIALLY OR OF YOUR PERSONAL PROPERTY?: NO

## 2024-02-12 SDOH — SOCIAL STABILITY: SOCIAL INSECURITY: ARE THERE ANY APPARENT SIGNS OF INJURIES/BEHAVIORS THAT COULD BE RELATED TO ABUSE/NEGLECT?: NO

## 2024-02-12 SDOH — SOCIAL STABILITY: SOCIAL INSECURITY: HAVE YOU HAD THOUGHTS OF HARMING ANYONE ELSE?: NO

## 2024-02-12 SDOH — SOCIAL STABILITY: SOCIAL INSECURITY: ABUSE: ADULT

## 2024-02-12 SDOH — SOCIAL STABILITY: SOCIAL INSECURITY: HAS ANYONE EVER THREATENED TO HURT YOUR FAMILY OR YOUR PETS?: NO

## 2024-02-12 SDOH — SOCIAL STABILITY: SOCIAL INSECURITY: DO YOU FEEL UNSAFE GOING BACK TO THE PLACE WHERE YOU ARE LIVING?: NO

## 2024-02-12 SDOH — SOCIAL STABILITY: SOCIAL INSECURITY: ARE YOU OR HAVE YOU BEEN THREATENED OR ABUSED PHYSICALLY, EMOTIONALLY, OR SEXUALLY BY ANYONE?: NO

## 2024-02-12 SDOH — SOCIAL STABILITY: SOCIAL INSECURITY: DOES ANYONE TRY TO KEEP YOU FROM HAVING/CONTACTING OTHER FRIENDS OR DOING THINGS OUTSIDE YOUR HOME?: NO

## 2024-02-12 SDOH — SOCIAL STABILITY: SOCIAL INSECURITY: WERE YOU ABLE TO COMPLETE ALL THE BEHAVIORAL HEALTH SCREENINGS?: YES

## 2024-02-12 ASSESSMENT — ACTIVITIES OF DAILY LIVING (ADL)
TOILETING: INDEPENDENT
FEEDING YOURSELF: INDEPENDENT
ADEQUATE_TO_COMPLETE_ADL: YES
DRESSING YOURSELF: INDEPENDENT
HEARING - RIGHT EAR: FUNCTIONAL
PATIENT'S MEMORY ADEQUATE TO SAFELY COMPLETE DAILY ACTIVITIES?: YES
GROOMING: INDEPENDENT
WALKS IN HOME: INDEPENDENT
BATHING: INDEPENDENT
LACK_OF_TRANSPORTATION: NO
HEARING - LEFT EAR: FUNCTIONAL
JUDGMENT_ADEQUATE_SAFELY_COMPLETE_DAILY_ACTIVITIES: YES

## 2024-02-12 ASSESSMENT — COGNITIVE AND FUNCTIONAL STATUS - GENERAL
DAILY ACTIVITIY SCORE: 24
MOBILITY SCORE: 24
PATIENT BASELINE BEDBOUND: NO

## 2024-02-12 ASSESSMENT — PAIN SCALES - GENERAL
PAINLEVEL_OUTOF10: 6
PAINLEVEL_OUTOF10: 6
PAINLEVEL_OUTOF10: 9
PAINLEVEL_OUTOF10: 6
PAINLEVEL_OUTOF10: 9
PAINLEVEL_OUTOF10: 10 - WORST POSSIBLE PAIN
PAINLEVEL_OUTOF10: 9
PAINLEVEL_OUTOF10: 10 - WORST POSSIBLE PAIN
PAINLEVEL_OUTOF10: 3
PAINLEVEL_OUTOF10: 8
PAINLEVEL_OUTOF10: 10 - WORST POSSIBLE PAIN
PAIN_LEVEL: 2
PAINLEVEL_OUTOF10: 5 - MODERATE PAIN
PAINLEVEL_OUTOF10: 9
PAINLEVEL_OUTOF10: 7
PAINLEVEL_OUTOF10: 10 - WORST POSSIBLE PAIN

## 2024-02-12 ASSESSMENT — PAIN DESCRIPTION - DESCRIPTORS
DESCRIPTORS: SORE;DISCOMFORT
DESCRIPTORS: DISCOMFORT;SORE
DESCRIPTORS: DISCOMFORT;SORE

## 2024-02-12 ASSESSMENT — LIFESTYLE VARIABLES
SKIP TO QUESTIONS 9-10: 1
HOW MANY STANDARD DRINKS CONTAINING ALCOHOL DO YOU HAVE ON A TYPICAL DAY: PATIENT DOES NOT DRINK
HOW OFTEN DO YOU HAVE A DRINK CONTAINING ALCOHOL: MONTHLY OR LESS
HOW OFTEN DO YOU HAVE 6 OR MORE DRINKS ON ONE OCCASION: NEVER
AUDIT-C TOTAL SCORE: 1
AUDIT-C TOTAL SCORE: 1

## 2024-02-12 ASSESSMENT — PAIN - FUNCTIONAL ASSESSMENT: PAIN_FUNCTIONAL_ASSESSMENT: 0-10

## 2024-02-12 ASSESSMENT — PATIENT HEALTH QUESTIONNAIRE - PHQ9
1. LITTLE INTEREST OR PLEASURE IN DOING THINGS: NOT AT ALL
2. FEELING DOWN, DEPRESSED OR HOPELESS: NOT AT ALL
SUM OF ALL RESPONSES TO PHQ9 QUESTIONS 1 & 2: 0

## 2024-02-12 NOTE — ANESTHESIA POSTPROCEDURE EVALUATION
Patient: Danette Buenrostro    Procedure Summary       Date: 02/12/24 Room / Location: [facility] OR 02 / [facility] OR    Anesthesia Start: 0725 Anesthesia Stop: 1347    Procedures:       Total robotic hysterectomy, right salpingectomy, excision of endometriosis, right ovarian cystectomy, appendectomy, oversewing of bowel and bladder. (Bilateral: Pelvis)      Cystogram (Bilateral)      Cystoscopy Rigid (Bilateral) Diagnosis:       Endometriosis      (Endometriosis [N80.9])    Surgeons: Saritha Theodore MD Responsible Provider: Ben Carroll MD    Anesthesia Type: general ASA Status: 3            Anesthesia Type: general    Vitals Value Taken Time   /66 02/12/24 1400   Temp 37 02/12/24 1405   Pulse 86 02/12/24 1401   Resp 12 02/12/24 1400   SpO2 100 % 02/12/24 1401   Vitals shown include unvalidated device data.    Anesthesia Post Evaluation    Patient location during evaluation: PACU  Patient participation: complete - patient participated  Level of consciousness: awake and alert  Pain score: 2  Pain management: adequate  Airway patency: patent  Cardiovascular status: acceptable  Respiratory status: acceptable  Hydration status: acceptable  Postoperative Nausea and Vomiting: none        No notable events documented.

## 2024-02-12 NOTE — BRIEF OP NOTE
Date: 2024  OR Location: [facility] OR    Name: Danette Buenrostro, : 1990, Age: 33 y.o., MRN: [id number], Sex: female    Diagnosis  Pre-op Diagnosis     * Endometriosis [N80.9] Post-op Diagnosis     * Endometriosis [N80.9]     Procedures  Total robotic hysterectomy, right salpingectomy, excision of endometriosis, right ovarian cystectomy, appendectomy, oversewing of bowel and bladder.  84604 - [place] LAPAROSCOPY TOTAL HYSTERECTOMY UTERUS >250 GM    Cystogram    Cystoscopy Rigid  73067 - [place] CYSTOURETHROSCOPY      Surgeons      * Saritha Theodore - Primary    Resident/Fellow/Other Assistant:  Surgeon(s) and Role: NED Em    Procedure Summary  Anesthesia: General  ASA: III  Anesthesia Staff: Anesthesiologist: Ben Carroll MD  CRNA: RICARDA Dunham-CRNA  C-AA: NAREN Moreno  JANNETH: Andrew Wellington  Estimated Blood Loss: 900mL  Intra-op Medications:   Administrations occurring from 0715 to 1050 on 24:   Medication Name Total Dose   surgical lubricant gel 1 Application   sodium chloride 0.9 % irrigation solution 2,000 mL   vasopressin (Vasostrict) injection 20 Units   heparin (porcine) injection 5,000 Units 5,000 Units              Anesthesia Record               Intraprocedure I/O Totals          Intake    LR bolus 2000.00 mL    metroNIDAZOLE (Flagyl)  mg/100 mL (premix) 100.00 mL    Total Intake 2100 mL       Output    Urine 800 mL    Est. Blood Loss 900 mL    Total Output 1700 mL       Net    Net Volume 400 mL          Specimen:   ID Type Source Tests Collected by Time   1 : Right Ovarian Cyst Wall Tissue CYST OVARY SURGICAL PATHOLOGY EXAM Saritha Theodore MD 2024 0854   2 : Left Pelvic Sidewall Tissue PELVIC SIDE WALL EXCISION SURGICAL PATHOLOGY EXAM Saritha Theodore MD 2024 0921   3 : Partial Right Ovary Tissue OVARY OOPHORECTOMY RIGHT SURGICAL PATHOLOGY EXAM Saritha Theodore MD 2024 1042   4 : Right Pelvic Side Wall Tissue PELVIC SIDE WALL EXCISION SURGICAL  PATHOLOGY EXAM Saritha Theodore MD 2/12/2024 1048   5 : POSTERIOR CULDESAC Tissue CUL DE SAC EXCISION SURGICAL PATHOLOGY EXAM Saritha Theodore MD 2/12/2024 1138   6 : BLADDER PERITONEUM Tissue SOFT TISSUE RESECTION SURGICAL PATHOLOGY EXAM Saritha Theodore MD 2/12/2024 1153   7 : APPENDIX Tissue APPENDIX SURGICAL PATHOLOGY EXAM Saritha Theodore MD 2/12/2024 1207   8 : Uterus, Cervix, Right Tube and Ovary Tissue UTERUS, CERVIX AND FALLOPIAN TUBE RIGHT SURGICAL PATHOLOGY EXAM Saritha Theodore MD 2/12/2024 1318        Staff:   Circulator: Saritha Joya RN; Johanna Marte, LYNNETTE  Relief Circulator: Krissy Franks RN  Relief Scrub: Elma Wyatt  Scrub Person: Mary Duenas RN      Complications:  None; patient tolerated the procedure well.     Disposition: PACU - hemodynamically stable.  Condition: stable  Specimens Collected:   ID Type Source Tests Collected by Time   1 : Right Ovarian Cyst Wall Tissue CYST OVARY SURGICAL PATHOLOGY EXAM Saritha Theodore MD 2/12/2024 0854   2 : Left Pelvic Sidewall Tissue PELVIC SIDE WALL EXCISION SURGICAL PATHOLOGY EXAM Saritha Theodore MD 2/12/2024 0921   3 : Partial Right Ovary Tissue OVARY OOPHORECTOMY RIGHT SURGICAL PATHOLOGY EXAM Saritha Theodore MD 2/12/2024 1042   4 : Right Pelvic Side Wall Tissue PELVIC SIDE WALL EXCISION SURGICAL PATHOLOGY EXAM Saritha Theodore MD 2/12/2024 1048   5 : POSTERIOR CULDESAC Tissue CUL DE SAC EXCISION SURGICAL PATHOLOGY EXAM Saritha Theodore MD 2/12/2024 1138   6 : BLADDER PERITONEUM Tissue SOFT TISSUE RESECTION SURGICAL PATHOLOGY EXAM Saritha Theodore MD 2/12/2024 1153   7 : APPENDIX Tissue APPENDIX SURGICAL PATHOLOGY EXAM Saritha Theodore MD 2/12/2024 1207   8 : Uterus, Cervix, Right Tube and Ovary Tissue UTERUS, CERVIX AND FALLOPIAN TUBE RIGHT SURGICAL PATHOLOGY EXAM Saritha Theodore MD 2/12/2024 1318     Saritha Beckett MD

## 2024-02-12 NOTE — PROGRESS NOTES
"Danette Buenrostro is a 33 y.o. female on day 0  from a  Total robotic hysterectomy, partial salpingectomy, excision of endometriosis, ovarian cystectomy,appendectomy, in the setting of  Endometriosis.    Subjective   Pt is resting in bed c/o right shoulder pain. She feels tired and sleepy currently.    Objective     Physical Exam  General: no acute distress  HEENT: normocephalic, atraumatic  Heart: warm and well perfused, RRR  Lungs: no increased WOB, CTAB  Abd: soft, appropriately tender, 4 island dressings covering port sites, dry, clean  Extremities: moving all extremities  Neuro: awake and conversant  Psych: appropriate mood and affect     Last Recorded Vitals  Blood pressure (!) 143/86, pulse 106, temperature 37 °C (98.6 °F), temperature source Temporal, resp. rate 14, height 1.651 m (5' 5\"), weight 125 kg (276 lb 7.3 oz), SpO2 (!) 95 %, not currently breastfeeding.    Assessment/Plan   Principal Problem:    Endometriosis  Active Problems:    S/P hysterectomy    34 yo s/p RA- excision of endometriosis, R ovarian cystectomy, right salpingectomy, oversewing of bowel and bladder, appendectomy on 2/12.    Neuro:   - pain control with scheduled tylenol, ibuprofen/toradol, prn oxy with dilaudid for breakthrough     CV/Heme:  - Hgb 12.0 -> procedure  ml -> postprocedure 10.3--> FU 4 hr results   - Tachycardiac to 120s, currently 105s,  satting 95 on room air     Pulm  - encourage incentive spirometry 10x/hr while awake     FEN/GI:   - regular diet, advance as tolerated  - IVF LR at 40 cc/hr    :   - Follow I&Os  - voided freely postop     DVT Prophylaxis:  - SCDs, ambulation    Nya Mooney MD PGY1  Gynecology [id number]   "

## 2024-02-12 NOTE — ANESTHESIA PROCEDURE NOTES
Airway  Date/Time: 2/12/2024 7:44 AM  Urgency: elective    Airway not difficult    Staffing  Performed: JANNETH   Authorized by: Ben Carroll MD    Performed by: NAREN Moreno  Patient location during procedure: OR    Indications and Patient Condition  Indications for airway management: anesthesia and airway protection  Spontaneous Ventilation: absent  Sedation level: deep  Preoxygenated: yes  Patient position: sniffing  Mask difficulty assessment: 1 - vent by mask  Planned trial extubation    Final Airway Details  Final airway type: endotracheal airway      Successful airway: ETT  Cuffed: yes   Successful intubation technique: direct laryngoscopy  Facilitating devices/methods: intubating stylet (LTA kit)  Endotracheal tube insertion site: oral  Blade: Liliana  Blade size: #4  ETT size (mm): 7.0  Cormack-Lehane Classification: grade IIa - partial view of glottis  Placement verified by: chest auscultation and capnometry   Measured from: teeth  ETT to teeth (cm): 21  Number of attempts at approach: 1    Additional Comments  Intubation successfully performed by MSAS1 under direct supervision of attending. LTA kit performed and no complications occurred.

## 2024-02-12 NOTE — ANESTHESIA PROCEDURE NOTES
Peripheral IV  Date/Time: 2/12/2024 7:47 AM  Inserted by: NAREN Moreno    Placement  Needle size: 18 G  Laterality: right  Location: hand  Local anesthetic: none  Site prep: chlorhexidine  Technique: anatomical landmarks  Attempts: 1

## 2024-02-12 NOTE — ANESTHESIA PREPROCEDURE EVALUATION
Patient: Danette Buenrostro    Procedure Information       Anesthesia Start Date/Time: 02/12/24 0725    Procedure: Total robotic hysterectomy, bilateral salpingectomy, excision of endometriosis, ovarian cystectomy, possible appendectomy, any indicated procedure (Bilateral: Pelvis)    Location: [facility] OR 02 / [facility] OR    Surgeons: Saritha Theodore MD            Relevant Problems   Endocrine   (+) Obesity in pregnancy, antepartum      Neuro/Psych   (+) Localization-related (focal) (partial) symptomatic epilepsy and epileptic syndromes with complex partial seizures, intractable, without status epilepticus (CMS/HCC)   (+) Partial epilepsy with impairment of consciousness (CMS/HCC)   (+) Seizure disorder (CMS/HCC)   (+) Seizure disorder during pregnancy, antepartum (CMS/HCC)   (+) Seizure disorder during pregnancy, postpartum (CMS/HCC)       Clinical information reviewed:   Tobacco  Allergies  Meds   Med Hx  Surg Hx  OB Status  Fam Hx  Soc   Hx        NPO Detail:  NPO/Void Status  Date of Last Liquid: 02/11/24  Time of Last Liquid: 2359  Date of Last Solid: 02/11/24  Time of Last Solid: 2359         Physical Exam    Airway  Mallampati: II     Cardiovascular - normal exam  Rhythm: regular  Rate: normal     Dental    Pulmonary - normal exam     Abdominal - normal exam             Anesthesia Plan    History of general anesthesia?: yes  History of complications of general anesthesia?: no    ASA 3     general     Anesthetic plan and risks discussed with patient.    Plan discussed with CRNA and CAA.

## 2024-02-13 LAB
ANION GAP SERPL CALC-SCNC: 13 MMOL/L (ref 10–20)
BUN SERPL-MCNC: 14 MG/DL (ref 6–23)
CALCIUM SERPL-MCNC: 8.5 MG/DL (ref 8.6–10.6)
CHLORIDE SERPL-SCNC: 99 MMOL/L (ref 98–107)
CO2 SERPL-SCNC: 26 MMOL/L (ref 21–32)
CREAT SERPL-MCNC: 0.74 MG/DL (ref 0.5–1.05)
EGFRCR SERPLBLD CKD-EPI 2021: >90 ML/MIN/1.73M*2
ERYTHROCYTE [DISTWIDTH] IN BLOOD BY AUTOMATED COUNT: 14.6 % (ref 11.5–14.5)
GLUCOSE SERPL-MCNC: 133 MG/DL (ref 74–99)
HCT VFR BLD AUTO: 30.4 % (ref 36–46)
HGB BLD-MCNC: 10.2 G/DL (ref 12–16)
MCH RBC QN AUTO: 30.1 PG (ref 26–34)
MCHC RBC AUTO-ENTMCNC: 33.6 G/DL (ref 32–36)
MCV RBC AUTO: 90 FL (ref 80–100)
NRBC BLD-RTO: 0 /100 WBCS (ref 0–0)
PLATELET # BLD AUTO: 330 X10*3/UL (ref 150–450)
POTASSIUM SERPL-SCNC: 4.2 MMOL/L (ref 3.5–5.3)
RBC # BLD AUTO: 3.39 X10*6/UL (ref 4–5.2)
SODIUM SERPL-SCNC: 134 MMOL/L (ref 136–145)
WBC # BLD AUTO: 13.4 X10*3/UL (ref 4.4–11.3)

## 2024-02-13 PROCEDURE — 2500000004 HC RX 250 GENERAL PHARMACY W/ HCPCS (ALT 636 FOR OP/ED): Performed by: STUDENT IN AN ORGANIZED HEALTH CARE EDUCATION/TRAINING PROGRAM

## 2024-02-13 PROCEDURE — 80048 BASIC METABOLIC PNL TOTAL CA: CPT | Performed by: STUDENT IN AN ORGANIZED HEALTH CARE EDUCATION/TRAINING PROGRAM

## 2024-02-13 PROCEDURE — 2500000001 HC RX 250 WO HCPCS SELF ADMINISTERED DRUGS (ALT 637 FOR MEDICARE OP): Performed by: STUDENT IN AN ORGANIZED HEALTH CARE EDUCATION/TRAINING PROGRAM

## 2024-02-13 PROCEDURE — 2500000001 HC RX 250 WO HCPCS SELF ADMINISTERED DRUGS (ALT 637 FOR MEDICARE OP)

## 2024-02-13 PROCEDURE — 96375 TX/PRO/DX INJ NEW DRUG ADDON: CPT

## 2024-02-13 PROCEDURE — 85027 COMPLETE CBC AUTOMATED: CPT | Performed by: STUDENT IN AN ORGANIZED HEALTH CARE EDUCATION/TRAINING PROGRAM

## 2024-02-13 PROCEDURE — 36415 COLL VENOUS BLD VENIPUNCTURE: CPT | Performed by: STUDENT IN AN ORGANIZED HEALTH CARE EDUCATION/TRAINING PROGRAM

## 2024-02-13 PROCEDURE — G0378 HOSPITAL OBSERVATION PER HR: HCPCS

## 2024-02-13 PROCEDURE — 96376 TX/PRO/DX INJ SAME DRUG ADON: CPT

## 2024-02-13 PROCEDURE — 2500000004 HC RX 250 GENERAL PHARMACY W/ HCPCS (ALT 636 FOR OP/ED)

## 2024-02-13 PROCEDURE — 99231 SBSQ HOSP IP/OBS SF/LOW 25: CPT | Performed by: STUDENT IN AN ORGANIZED HEALTH CARE EDUCATION/TRAINING PROGRAM

## 2024-02-13 RX ORDER — DIPHENHYDRAMINE HCL 25 MG
25 CAPSULE ORAL ONCE
Status: COMPLETED | OUTPATIENT
Start: 2024-02-13 | End: 2024-02-13

## 2024-02-13 RX ORDER — OXYCODONE HYDROCHLORIDE 5 MG/1
5 TABLET ORAL EVERY 4 HOURS PRN
Qty: 12 TABLET | Refills: 0 | Status: ON HOLD | OUTPATIENT
Start: 2024-02-13 | End: 2024-02-23 | Stop reason: SDUPTHER

## 2024-02-13 RX ORDER — IBUPROFEN 600 MG/1
600 TABLET ORAL AS NEEDED
Qty: 90 TABLET | Refills: 0 | Status: SHIPPED | OUTPATIENT
Start: 2024-02-13 | End: 2024-03-14

## 2024-02-13 RX ORDER — ALUMINUM HYDROXIDE, MAGNESIUM HYDROXIDE, AND SIMETHICONE 1200; 120; 1200 MG/30ML; MG/30ML; MG/30ML
10 SUSPENSION ORAL 4 TIMES DAILY PRN
Status: DISCONTINUED | OUTPATIENT
Start: 2024-02-13 | End: 2024-02-13

## 2024-02-13 RX ORDER — ACETAMINOPHEN 500 MG
1000 TABLET ORAL EVERY 6 HOURS PRN
Qty: 60 TABLET | Refills: 0 | Status: SHIPPED | OUTPATIENT
Start: 2024-02-13 | End: 2024-02-23 | Stop reason: HOSPADM

## 2024-02-13 RX ORDER — POLYETHYLENE GLYCOL 3350 17 G/17G
17 POWDER, FOR SOLUTION ORAL DAILY
Qty: 510 G | Refills: 0 | Status: SHIPPED | OUTPATIENT
Start: 2024-02-13 | End: 2024-03-14

## 2024-02-13 RX ORDER — HYDROMORPHONE HYDROCHLORIDE 1 MG/ML
0.2 INJECTION, SOLUTION INTRAMUSCULAR; INTRAVENOUS; SUBCUTANEOUS ONCE
Status: COMPLETED | OUTPATIENT
Start: 2024-02-13 | End: 2024-02-13

## 2024-02-13 RX ORDER — DIPHENHYDRAMINE HYDROCHLORIDE 50 MG/ML
25 INJECTION INTRAMUSCULAR; INTRAVENOUS ONCE
Status: COMPLETED | OUTPATIENT
Start: 2024-02-13 | End: 2024-02-13

## 2024-02-13 RX ORDER — SIMETHICONE 80 MG
80 TABLET,CHEWABLE ORAL 4 TIMES DAILY PRN
Status: DISCONTINUED | OUTPATIENT
Start: 2024-02-13 | End: 2024-02-14 | Stop reason: HOSPADM

## 2024-02-13 RX ORDER — ALUMINUM HYDROXIDE, MAGNESIUM HYDROXIDE, AND SIMETHICONE 1200; 120; 1200 MG/30ML; MG/30ML; MG/30ML
10 SUSPENSION ORAL 4 TIMES DAILY PRN
Qty: 355 ML | Refills: 0 | Status: CANCELLED | OUTPATIENT
Start: 2024-02-13

## 2024-02-13 RX ADMIN — ACETAMINOPHEN 975 MG: 325 TABLET ORAL at 18:02

## 2024-02-13 RX ADMIN — HYDROMORPHONE HYDROCHLORIDE 0.4 MG: 1 INJECTION, SOLUTION INTRAMUSCULAR; INTRAVENOUS; SUBCUTANEOUS at 23:50

## 2024-02-13 RX ADMIN — DIVALPROEX SODIUM 1000 MG: 500 TABLET, DELAYED RELEASE ORAL at 20:15

## 2024-02-13 RX ADMIN — OXCARBAZEPINE 1200 MG: 600 TABLET, FILM COATED ORAL at 08:55

## 2024-02-13 RX ADMIN — DIVALPROEX SODIUM 500 MG: 500 TABLET, DELAYED RELEASE ORAL at 07:04

## 2024-02-13 RX ADMIN — ACETAMINOPHEN 975 MG: 325 TABLET ORAL at 06:11

## 2024-02-13 RX ADMIN — OXYCODONE HYDROCHLORIDE 10 MG: 5 TABLET ORAL at 20:17

## 2024-02-13 RX ADMIN — HYDROMORPHONE HYDROCHLORIDE 0.4 MG: 1 INJECTION, SOLUTION INTRAMUSCULAR; INTRAVENOUS; SUBCUTANEOUS at 13:06

## 2024-02-13 RX ADMIN — POLYETHYLENE GLYCOL 3350 17 G: 17 POWDER, FOR SOLUTION ORAL at 07:11

## 2024-02-13 RX ADMIN — IBUPROFEN 600 MG: 600 TABLET, FILM COATED ORAL at 23:49

## 2024-02-13 RX ADMIN — SIMETHICONE 80 MG: 80 TABLET, CHEWABLE ORAL at 12:13

## 2024-02-13 RX ADMIN — DIPHENHYDRAMINE HYDROCHLORIDE 25 MG: 25 CAPSULE ORAL at 23:53

## 2024-02-13 RX ADMIN — OXYCODONE HYDROCHLORIDE 10 MG: 5 TABLET ORAL at 07:04

## 2024-02-13 RX ADMIN — OXYCODONE HYDROCHLORIDE 10 MG: 5 TABLET ORAL at 00:35

## 2024-02-13 RX ADMIN — ACETAMINOPHEN 975 MG: 325 TABLET ORAL at 12:12

## 2024-02-13 RX ADMIN — DIPHENHYDRAMINE HYDROCHLORIDE 25 MG: 50 INJECTION INTRAMUSCULAR; INTRAVENOUS at 09:00

## 2024-02-13 RX ADMIN — ACETAMINOPHEN 975 MG: 325 TABLET ORAL at 00:35

## 2024-02-13 RX ADMIN — ACETAMINOPHEN 975 MG: 325 TABLET ORAL at 23:49

## 2024-02-13 RX ADMIN — OXCARBAZEPINE 1200 MG: 600 TABLET, FILM COATED ORAL at 23:50

## 2024-02-13 RX ADMIN — OXYCODONE HYDROCHLORIDE 10 MG: 5 TABLET ORAL at 12:12

## 2024-02-13 RX ADMIN — HYDROMORPHONE HYDROCHLORIDE 0.2 MG: 1 INJECTION, SOLUTION INTRAMUSCULAR; INTRAVENOUS; SUBCUTANEOUS at 09:00

## 2024-02-13 RX ADMIN — SIMETHICONE 80 MG: 80 TABLET, CHEWABLE ORAL at 20:14

## 2024-02-13 ASSESSMENT — PAIN DESCRIPTION - LOCATION: LOCATION: ABDOMEN

## 2024-02-13 ASSESSMENT — PAIN DESCRIPTION - DESCRIPTORS
DESCRIPTORS: SORE
DESCRIPTORS: DISCOMFORT;SORE
DESCRIPTORS: SORE
DESCRIPTORS: DISCOMFORT;SORE
DESCRIPTORS: SORE
DESCRIPTORS: DISCOMFORT;SORE
DESCRIPTORS: SORE
DESCRIPTORS: DISCOMFORT;SORE
DESCRIPTORS: SORE

## 2024-02-13 ASSESSMENT — PAIN SCALES - GENERAL
PAINLEVEL_OUTOF10: 8
PAINLEVEL_OUTOF10: 10 - WORST POSSIBLE PAIN
PAINLEVEL_OUTOF10: 8
PAINLEVEL_OUTOF10: 9
PAINLEVEL_OUTOF10: 7
PAINLEVEL_OUTOF10: 8
PAINLEVEL_OUTOF10: 5 - MODERATE PAIN
PAINLEVEL_OUTOF10: 8
PAINLEVEL_OUTOF10: 9
PAINLEVEL_OUTOF10: 7
PAINLEVEL_OUTOF10: 8
PAINLEVEL_OUTOF10: 9
PAINLEVEL_OUTOF10: 9
PAINLEVEL_OUTOF10: 10 - WORST POSSIBLE PAIN
PAINLEVEL_OUTOF10: 5 - MODERATE PAIN
PAINLEVEL_OUTOF10: 10 - WORST POSSIBLE PAIN
PAINLEVEL_OUTOF10: 9
PAINLEVEL_OUTOF10: 10 - WORST POSSIBLE PAIN

## 2024-02-13 ASSESSMENT — PAIN - FUNCTIONAL ASSESSMENT
PAIN_FUNCTIONAL_ASSESSMENT: 0-10

## 2024-02-13 NOTE — DISCHARGE INSTRUCTIONS
Laparoscopic Hysterectomy Discharge Instructions  If you have any questions about your care, please contact us at [phone].    Medications and Pain Management  Common areas of pain after laparoscopic hysterectomy include the incision pain, pain in between your shoulder blades, the pelvis and lower back. The gas that was used to distend your abdomen for the surgery is absorbed slowly into your blood stream over the first 3-4 days after surgery. It is not passed intestinally, although, because your abdomen is distended, it may feel similar to intestinal gas. Staying active and walking is the best way to promote the absorption of this gas.  Immediately after surgery, nerve pain is the most intense, typically for the first 6 to 12 hours. As the body heals, it creates inflammation around the incisions sites adding pressure and creating soreness. After 5 days, the inflammation begins to recede and significant improvement in soreness is expected. Pulling on the incisions, especially if sudden, such as when you cough, will reactivate the nerve pain. Support your abdomen with a pillow during coughing or sneezing as this will be helpful to minimize pain. There are two types of pain pills typically used for post-operative pain management, narcotics such as tramadol and an anti-inflammatory such as Ibuprofen or Naprosyn.  Taking regular anti-inflammatory pills, such as 600mg of Ibuprofen every 6 hours for the first 5 days and then as needed is recommended. You can alternate ibuprofen with tylenol (975mg or 1000mg). The tylenol can be taken every 6 hours.  If you have problems using NSAIDs, be sure to discuss this with your doctor. The narcotic can be used on a schedule for the first 1 to 2 days but after that, only as you need it. Narcotics can cause constipation, nausea, sleepiness and headaches. You may begin your usual home medications as you were taking before unless directed by your doctor.    Incisional care  Paper  tape steri-strips are typically used for the abdominal incisions. The steri-strips will fall off on their own or can be removed at your first post-operative appointment. You may shower and use a mild soap around the incisions and pat dry. Do not use a washcloth or scrub the incisions. Using peroxide or antiseptics is not recommended for routine care. Avoid hot and steamy showers as this may cause you to feel faint. No tub baths for six weeks following surgery. There may be discoloration or bruising around the incisions. This is normal and may take several weeks to resolve. Firmness or a nodular area under the skin near the incision may represent a collection of blood, this too will resolve on its own after a little time. If any incision develops tenderness, redness in the skin layer or has drainage please call the office.    Vaginal Discharge  You may have a mildly malodorous discharge and occasional spotting for up to 6 weeks. Do not put anything in the vagina like tampons or have sexual intercourse for 6 weeks after surgery. If you are having bleeding like a period, that is abnormal and you should contact your doctor.    GI Function, Nausea and Constipation  Nausea can occasionally be an issue in the first few days after surgery. It is usually caused as a side effect from the anesthesia and pain medicine, particularly narcotics. Taking the pain pills with food is a food way to proactively minimize this. Throwing up, especially after the first day, is not expected and if this happens, you should call your doctor. Feeling gassy and constipation can be a problem for the first week after surgery. Limiting the use of narcotics may be helpful. Stool softeners twice a day and a high fiber diet are safe. If needed, Miralax once daily is a good choice. If no bowel movement after 3 days, you will need to increase the Miralax until soft, regular bowel movements are passing.    Urinating  Because the bladder is disturbed by  the surgery, the normal sensation may be temporarily altered. You may not be aware that your bladder is full. If the bladder is allowed to get over distended, it may make the problem worse. This is why we make sure that you are able to empty your bladder adequately before you go home. For the first few days at home, you should make a point to empty your bladder every 3 to 4 hours. Pain with voiding, especially after the first day, is not expected and may represent a bladder infection.    Activity  For the first two days post-operatively, your soreness and recovery from anesthesia will limit your activity  Stairs are safe, just take your time  At a minimum during this time, you should walk around for 10-15 minutes every 2-3 hours. After that, in the first week, any activity except for overt exercise is safe.   During the first week you should not commit to being on your feet for more than 30 minutes at a time.   During the second week, light exercise is encouraged.  After 2 weeks from surgery, you should try to get back into regular activity other than heavy lifting.   For healing, please limit the amount of weight lifted to 8-10 pounds (a gallon of milk) for the first 6 weeks after surgery.   Driving is usually okay after the first few days. You must be able to comfortably wear a seatbelt, press the gas/brake pedals, and drive defensively. You may not drive while taking narcotic pain medicines.    When to Call the Doctor  Call for any fever above 100.4 F (If you do not feel feverish you do not have to routinely check your temperature.)  Call for severe pain not improved by medications  Call for persistent nausea, vomiting  Call for vaginal bleeding that is heavy as a period or passing blood clots larger than a quarter  Call for unusual swelling in your legs  Call if the incisions develop painful redness and discharge

## 2024-02-13 NOTE — DISCHARGE SUMMARY
Discharge Diagnosis  Endometriosis    Test Results Pending At Discharge  Pending Labs       Order Current Status    Surgical Pathology Exam In process            Hospital Course  33 y.o. with endometriosis presenting for Total robotic hysterectomy, bilateral salpingectomy, excision of endometriosis, ovarian cystectomy. She underwent RA-TLH, excision of endo, R ovarian cystectomy, right salpingectomy, oversewing of bowel and bladder, appendectomy on 2/12. See op note for details.    By POD2 she was meeting all postoperative milestones including: tolerating PO diet and medications, voiding, ambulating. Her pain was well controlled with PO pain medications. She was appropriate for discharge home and will follow up as an outpatient with Dr. Theodore    Admission HPI:    Danette Buenrostro 33 y.o.  presenting for Total robotic hysterectomy, bilateral salpingectomy, excision of endometriosis, ovarian cystectomy, possible appendectomy, any indicated procedure  in the setting of path-proven endometriosis and pelvic pain.     She was started on Myfembree and PFPT/Flexeril. She reports she did not do well with Flexeril. It didn't help with pain and made her extremely sedated for a couple days. She has not tried vaginal Valium. She was also not able to get Myfembree as pharmacy did not honor the coupon. She does report the combination of ibuprofen + Robaxin, which she received from the ED, did help somewhat.    She has had ED visits x 3 since last visit for pelvic pain. Ultrasounds have shown a persistent though stable right ovarian complex cystic lesion measuring 5cm x 3cm x 3cm. It does appear c/w endometrioma to my eye.      She reports she is interested in surgical excision and hysterectomy.     PAT: cleared  Screening:   - Last pap 2021 WNL per pt, no hx of CIN2-3  - Last mammogram NA  PMHx: obesity, partial complex seizures   PSHx: CS x 2 w/ BTL, robotic LSO/R cystectomy + salpingectomy        Relevant Imaging:   Pelvic US:  11/17/2023: UTERUS: 9.5 x 4.9 x 4.1 cm. Homogeneous in echogenicity.  ENDOMETRIUM: Normal thickness, 6 mm. There is trace fluid in the  endometrium. RIGHT OVARY: 7.7 x 4.5 x 5.4 cm. Blood flow is present..  Anechoic 2.1 x 1.3 x 1.2 cm structure in the right ovary with  posterior acoustic enhancement compatible with a dominant follicle.  There are other complex cystic lesions, largest of which measures 4.9  x 3.3 x 3.4 cm and another measuring 2.3 x 2 x 2.5 cm. These  demonstrate internal echoes but have posterior acoustic enhancement  and lack internal vascularity. LEFT OVARY: Surgically absent.  CUL DE SAC: No free-fluid.      Pertinent Physical Exam At Time of Discharge  General:  AAOx3, No acute distress  Cardiovascular: Warm and well perfused  Respiratory: Normal respiratory effort   Abdominal:  Soft, mildly tender, slightly distended, laparoscopic incision with bandages c/d/i  Extremities: No edema, no calf tenderness   Skin: No rashes or lesions visualized      Home Medications     Medication List      START taking these medications     acetaminophen 500 mg tablet; Commonly known as: Tylenol; Take 2 tablets   (1,000 mg) by mouth every 6 hours if needed for mild pain (1 - 3) for up   to 7 days.   oxyCODONE 5 mg immediate release tablet; Commonly known as: Roxicodone;   Take 1 tablet (5 mg) by mouth every 4 hours if needed for severe pain (7 -   10).   polyethylene glycol 17 gram/dose powder; Commonly known as: Glycolax,   Miralax; Take 17 g by mouth once daily.     CHANGE how you take these medications     divalproex 500 mg EC tablet; Commonly known as: Depakote; 1 by mouth in   the morning, 2 in the evening; What changed: Another medication with the   same name was removed. Continue taking this medication, and follow the   directions you see here.     CONTINUE taking these medications     ibuprofen 600 mg tablet; Take 1 tablet (600 mg) by mouth if needed for   mild pain (1 - 3).   methocarbamol 500 mg tablet;  Commonly known as: Robaxin; Take 1 tablet   (500 mg) by mouth 4 times a day as needed for muscle spasms. May take 2   tablets at a time if needed.   OXcarbazepine 600 mg tablet; Commonly known as: Trileptal     ASK your doctor about these medications     naproxen 500 mg tablet; Commonly known as: Naprosyn       Outpatient Follow-Up  Future Appointments   Date Time Provider Department Center   3/18/2024 10:45 AM Kip Sigala MD [id number]   12/16/2024  2:30 PM Jorge Mckee MD [id number]       Saritha Beckett MD

## 2024-02-13 NOTE — CARE PLAN
Problem: Pain  Goal: My pain/discomfort is manageable  Outcome: Progressing     Problem: Safety  Goal: Patient will be injury free during hospitalization  Outcome: Progressing     Problem: Daily Care  Goal: Daily care needs are met  Outcome: Progressing     Problem: Meds/Post-op Pain  Goal: Pain controlled to tolerate pain level  Outcome: Progressing     Problem: Meds/Post-op Pain  Goal: Tolerates prescribed medication  Outcome: Progressing   The patient's goals for the shift include Pain control    The clinical goals for the shift include Rate pain at 5 or less, pass gas, and walk.    Over the shift, the patient continued to make progress toward the following goals. Pain was decreased to 5/10 after medicine regimen and patient got up to walk x1. Will continue to monitor pain control and encourage walking.

## 2024-02-13 NOTE — PROGRESS NOTES
"Danette Buenrostro is a 33 y.o. female on day 0  from a  Total robotic hysterectomy, partial salpingectomy, excision of endometriosis, ovarian cystectomy,appendectomy, in the setting of  Endometriosis.    Subjective   Pt is resting in bed c/o right shoulder pain. Feeling slightly bloated. Has been up to the bathroom. Voided.    Objective   Physical exam:  General:  AAOx3, No acute distress  Cardiovascular: Warm and well perfused  Respiratory: Normal respiratory effort   Abdominal:  Soft, appropriately tender, 4 laparoscopic incision with dressing overlaying c/d/i  Extremities: No edema, no calf tenderness   Skin: No rashes or lesions visualized    Last Recorded Vitals  Blood pressure 119/76, pulse (!) 116, temperature 36.9 °C (98.4 °F), temperature source Temporal, resp. rate 16, height 1.651 m (5' 5\"), weight 125 kg (275 lb 9.2 oz), SpO2 98 %, not currently breastfeeding.    Assessment/Plan   Principal Problem:    Endometriosis  Active Problems:    S/P hysterectomy    34 yo s/p RA- excision of endometriosis, R ovarian cystectomy, right salpingectomy, oversewing of bowel and bladder, appendectomy on 2/12.    Neuro:   - pain control with scheduled tylenol, ibuprofen/toradol, prn po dilaudid with IV dilaudid for breakthrough     CV/Heme:  - Hgb 12.0 -> procedure  ml -> 10.2  - Tachycardiac to 110s otherwise HDS    Pulm  - encourage incentive spirometry 10x/hr while awake     FEN/GI:   - regular diet, advance as tolerated  - IVF LR at 40 cc/hr    :   - Follow I&Os  - voided freely postop     DVT Prophylaxis:  - SCDs, ambulation    Discussed with Dr. Sandoval Beckett MD  Gynecology [id number]   "

## 2024-02-14 VITALS
OXYGEN SATURATION: 95 % | HEIGHT: 65 IN | BODY MASS INDEX: 45.91 KG/M2 | HEART RATE: 111 BPM | RESPIRATION RATE: 18 BRPM | SYSTOLIC BLOOD PRESSURE: 111 MMHG | WEIGHT: 275.57 LBS | DIASTOLIC BLOOD PRESSURE: 77 MMHG | TEMPERATURE: 98.4 F

## 2024-02-14 PROBLEM — Z90.710 S/P HYSTERECTOMY: Status: RESOLVED | Noted: 2024-02-12 | Resolved: 2024-02-14

## 2024-02-14 PROBLEM — Z90.710 H/O: HYSTERECTOMY: Status: ACTIVE | Noted: 2024-02-14

## 2024-02-14 PROBLEM — N80.9 ENDOMETRIOSIS: Status: RESOLVED | Noted: 2023-11-21 | Resolved: 2024-02-14

## 2024-02-14 PROCEDURE — 99238 HOSP IP/OBS DSCHRG MGMT 30/<: CPT | Performed by: STUDENT IN AN ORGANIZED HEALTH CARE EDUCATION/TRAINING PROGRAM

## 2024-02-14 PROCEDURE — 2500000004 HC RX 250 GENERAL PHARMACY W/ HCPCS (ALT 636 FOR OP/ED): Performed by: STUDENT IN AN ORGANIZED HEALTH CARE EDUCATION/TRAINING PROGRAM

## 2024-02-14 PROCEDURE — 90686 IIV4 VACC NO PRSV 0.5 ML IM: CPT | Performed by: STUDENT IN AN ORGANIZED HEALTH CARE EDUCATION/TRAINING PROGRAM

## 2024-02-14 PROCEDURE — 2500000001 HC RX 250 WO HCPCS SELF ADMINISTERED DRUGS (ALT 637 FOR MEDICARE OP): Performed by: STUDENT IN AN ORGANIZED HEALTH CARE EDUCATION/TRAINING PROGRAM

## 2024-02-14 PROCEDURE — 2500000001 HC RX 250 WO HCPCS SELF ADMINISTERED DRUGS (ALT 637 FOR MEDICARE OP)

## 2024-02-14 PROCEDURE — G0378 HOSPITAL OBSERVATION PER HR: HCPCS

## 2024-02-14 PROCEDURE — G0008 ADMIN INFLUENZA VIRUS VAC: HCPCS | Performed by: STUDENT IN AN ORGANIZED HEALTH CARE EDUCATION/TRAINING PROGRAM

## 2024-02-14 RX ADMIN — ACETAMINOPHEN 975 MG: 325 TABLET ORAL at 06:31

## 2024-02-14 RX ADMIN — ACETAMINOPHEN 975 MG: 325 TABLET ORAL at 12:04

## 2024-02-14 RX ADMIN — SIMETHICONE 80 MG: 80 TABLET, CHEWABLE ORAL at 07:42

## 2024-02-14 RX ADMIN — IBUPROFEN 600 MG: 600 TABLET, FILM COATED ORAL at 06:31

## 2024-02-14 RX ADMIN — SIMETHICONE 80 MG: 80 TABLET, CHEWABLE ORAL at 12:04

## 2024-02-14 RX ADMIN — POLYETHYLENE GLYCOL 3350 17 G: 17 POWDER, FOR SOLUTION ORAL at 08:37

## 2024-02-14 RX ADMIN — OXCARBAZEPINE 1200 MG: 600 TABLET, FILM COATED ORAL at 08:37

## 2024-02-14 RX ADMIN — IBUPROFEN 600 MG: 600 TABLET, FILM COATED ORAL at 12:04

## 2024-02-14 RX ADMIN — DIVALPROEX SODIUM 500 MG: 500 TABLET, DELAYED RELEASE ORAL at 06:31

## 2024-02-14 RX ADMIN — INFLUENZA VIRUS VACCINE 0.5 ML: 15; 15; 15; 15 SUSPENSION INTRAMUSCULAR at 12:04

## 2024-02-14 ASSESSMENT — PAIN DESCRIPTION - DESCRIPTORS
DESCRIPTORS: SORE

## 2024-02-14 ASSESSMENT — PAIN SCALES - GENERAL
PAINLEVEL_OUTOF10: 6
PAINLEVEL_OUTOF10: 7
PAINLEVEL_OUTOF10: 6

## 2024-02-14 ASSESSMENT — PAIN - FUNCTIONAL ASSESSMENT: PAIN_FUNCTIONAL_ASSESSMENT: 0-10

## 2024-02-14 NOTE — CARE PLAN
The patient's goals for the shift include pain control;adequate sleep    The clinical goals for the shift include pass gas; pain control      Problem: Pain  Goal: My pain/discomfort is manageable  Outcome: Progressing     Problem: Safety  Goal: Patient will be injury free during hospitalization  Outcome: Progressing     Problem: Daily Care  Goal: Daily care needs are met  Outcome: Progressing     Problem: Meds/Post-op Pain  Goal: Pain controlled to tolerate pain level  Outcome: Progressing  Goal: Tolerates prescribed medication  Outcome: Progressing     Problem: DVT/VTE Prevention/Activity  Goal: No decrease in circulation/sensation  Outcome: Progressing  Goal: Prevent skin breakdown  Outcome: Progressing  Goal: Return to preop oxygenation status  Outcome: Progressing  Goal: Tolerates optimal activity  Outcome: Progressing  Goal: Increase self care and/or family involvement in 24 hrs.  Outcome: Progressing     Problem: Wound care/infection prevention  Goal: No signs of infection in 24 hrs.  Outcome: Progressing  Goal: No unexpected bleeding from incision this shift  Outcome: Progressing     Problem: Diet/fluid balance  Goal: Adequate urinary output  Outcome: Progressing  Goal: Free from nausea/vomiting  Outcome: Progressing  Goal: Return in bowel function  Outcome: Progressing  Goal: Tolerates prescribed diet  Outcome: Progressing     Problem: Other goals  Goal: No change in neurological status  Outcome: Progressing  Goal: Stabilize vital signs (return to 10% of baseline)  Outcome: Progressing

## 2024-02-14 NOTE — CARE PLAN
The patient's goals for the shift include pain control and pass gas.    The clinical goals for the shift include Rate pain 5 or less, pass gas, and walk    Over the shift, the patient met all the following goals and is cleared for discharge. RN reviewed discharge instructions with patient and patient has clear understanding of home going instructions.

## 2024-02-17 ENCOUNTER — APPOINTMENT (OUTPATIENT)
Dept: RADIOLOGY | Facility: HOSPITAL | Age: 34
DRG: 176 | End: 2024-02-17
Payer: MEDICARE

## 2024-02-17 ENCOUNTER — APPOINTMENT (OUTPATIENT)
Dept: CARDIOLOGY | Facility: HOSPITAL | Age: 34
DRG: 176 | End: 2024-02-17
Payer: MEDICARE

## 2024-02-17 ENCOUNTER — HOSPITAL ENCOUNTER (INPATIENT)
Facility: HOSPITAL | Age: 34
LOS: 6 days | Discharge: HOME | DRG: 176 | End: 2024-02-23
Attending: INTERNAL MEDICINE | Admitting: INTERNAL MEDICINE
Payer: MEDICARE

## 2024-02-17 DIAGNOSIS — T81.72XA COMPLICATION OF VEIN FOLLOWING A PROCEDURE, NOT ELSEWHERE CLASSIFIED, INITIAL ENCOUNTER: ICD-10-CM

## 2024-02-17 DIAGNOSIS — Z98.890 S/P REMOVAL OF OVARIAN CYST: ICD-10-CM

## 2024-02-17 DIAGNOSIS — N80.9 ENDOMETRIOSIS: ICD-10-CM

## 2024-02-17 DIAGNOSIS — M25.511 ACUTE PAIN OF RIGHT SHOULDER: ICD-10-CM

## 2024-02-17 DIAGNOSIS — I26.99 PE (PULMONARY THROMBOEMBOLISM) (MULTI): ICD-10-CM

## 2024-02-17 DIAGNOSIS — Z90.49 S/P APPENDECTOMY: ICD-10-CM

## 2024-02-17 DIAGNOSIS — R00.0 TACHYCARDIA: Primary | ICD-10-CM

## 2024-02-17 DIAGNOSIS — Z87.42 S/P REMOVAL OF OVARIAN CYST: ICD-10-CM

## 2024-02-17 DIAGNOSIS — Z90.710 S/P HYSTERECTOMY: ICD-10-CM

## 2024-02-17 LAB
ALBUMIN SERPL BCP-MCNC: 3.5 G/DL (ref 3.4–5)
ALP SERPL-CCNC: 54 U/L (ref 33–110)
ALT SERPL W P-5'-P-CCNC: 60 U/L (ref 7–45)
ANION GAP SERPL CALC-SCNC: 12 MMOL/L (ref 10–20)
APPEARANCE UR: ABNORMAL
APTT PPP: 21 SECONDS (ref 27–38)
AST SERPL W P-5'-P-CCNC: 75 U/L (ref 9–39)
BACTERIA #/AREA URNS AUTO: ABNORMAL /HPF
BASOPHILS # BLD AUTO: 0.05 X10*3/UL (ref 0–0.1)
BASOPHILS NFR BLD AUTO: 0.4 %
BILIRUB SERPL-MCNC: 0.3 MG/DL (ref 0–1.2)
BILIRUB UR STRIP.AUTO-MCNC: NEGATIVE MG/DL
BUN SERPL-MCNC: 8 MG/DL (ref 6–23)
CALCIUM SERPL-MCNC: 8.4 MG/DL (ref 8.6–10.3)
CARDIAC TROPONIN I PNL SERPL HS: 3 NG/L (ref 0–13)
CARDIAC TROPONIN I PNL SERPL HS: 3 NG/L (ref 0–13)
CHLORIDE SERPL-SCNC: 101 MMOL/L (ref 98–107)
CO2 SERPL-SCNC: 27 MMOL/L (ref 21–32)
COLOR UR: YELLOW
CREAT SERPL-MCNC: 0.64 MG/DL (ref 0.5–1.05)
EGFRCR SERPLBLD CKD-EPI 2021: >90 ML/MIN/1.73M*2
EOSINOPHIL # BLD AUTO: 0.72 X10*3/UL (ref 0–0.7)
EOSINOPHIL NFR BLD AUTO: 5.8 %
ERYTHROCYTE [DISTWIDTH] IN BLOOD BY AUTOMATED COUNT: 14.5 % (ref 11.5–14.5)
GLUCOSE SERPL-MCNC: 105 MG/DL (ref 74–99)
GLUCOSE UR STRIP.AUTO-MCNC: NEGATIVE MG/DL
HCT VFR BLD AUTO: 26.2 % (ref 36–46)
HGB BLD-MCNC: 8.5 G/DL (ref 12–16)
IMM GRANULOCYTES # BLD AUTO: 0.1 X10*3/UL (ref 0–0.7)
IMM GRANULOCYTES NFR BLD AUTO: 0.8 % (ref 0–0.9)
INR PPP: 1.3 (ref 0.9–1.1)
KETONES UR STRIP.AUTO-MCNC: NEGATIVE MG/DL
LACTATE SERPL-SCNC: 0.8 MMOL/L (ref 0.4–2)
LEUKOCYTE ESTERASE UR QL STRIP.AUTO: NEGATIVE
LYMPHOCYTES # BLD AUTO: 2.11 X10*3/UL (ref 1.2–4.8)
LYMPHOCYTES NFR BLD AUTO: 16.9 %
MCH RBC QN AUTO: 29 PG (ref 26–34)
MCHC RBC AUTO-ENTMCNC: 32.4 G/DL (ref 32–36)
MCV RBC AUTO: 89 FL (ref 80–100)
MONOCYTES # BLD AUTO: 1.13 X10*3/UL (ref 0.1–1)
MONOCYTES NFR BLD AUTO: 9 %
MUCOUS THREADS #/AREA URNS AUTO: ABNORMAL /LPF
NEUTROPHILS # BLD AUTO: 8.39 X10*3/UL (ref 1.2–7.7)
NEUTROPHILS NFR BLD AUTO: 67.1 %
NITRITE UR QL STRIP.AUTO: NEGATIVE
NRBC BLD-RTO: 0.2 /100 WBCS (ref 0–0)
PH UR STRIP.AUTO: 8 [PH]
PLATELET # BLD AUTO: 454 X10*3/UL (ref 150–450)
POTASSIUM SERPL-SCNC: 4.1 MMOL/L (ref 3.5–5.3)
PROT SERPL-MCNC: 7.4 G/DL (ref 6.4–8.2)
PROT UR STRIP.AUTO-MCNC: NEGATIVE MG/DL
PROTHROMBIN TIME: 14.5 SECONDS (ref 9.8–12.8)
RBC # BLD AUTO: 2.93 X10*6/UL (ref 4–5.2)
RBC # UR STRIP.AUTO: ABNORMAL /UL
RBC #/AREA URNS AUTO: ABNORMAL /HPF
SODIUM SERPL-SCNC: 136 MMOL/L (ref 136–145)
SP GR UR STRIP.AUTO: 1.02
SQUAMOUS #/AREA URNS AUTO: ABNORMAL /HPF
UROBILINOGEN UR STRIP.AUTO-MCNC: 4 MG/DL
WBC # BLD AUTO: 12.5 X10*3/UL (ref 4.4–11.3)
WBC #/AREA URNS AUTO: ABNORMAL /HPF

## 2024-02-17 PROCEDURE — 83605 ASSAY OF LACTIC ACID: CPT | Performed by: PHYSICIAN ASSISTANT

## 2024-02-17 PROCEDURE — 2500000004 HC RX 250 GENERAL PHARMACY W/ HCPCS (ALT 636 FOR OP/ED): Performed by: PHYSICIAN ASSISTANT

## 2024-02-17 PROCEDURE — 36415 COLL VENOUS BLD VENIPUNCTURE: CPT | Performed by: PHYSICIAN ASSISTANT

## 2024-02-17 PROCEDURE — 2500000001 HC RX 250 WO HCPCS SELF ADMINISTERED DRUGS (ALT 637 FOR MEDICARE OP): Performed by: INTERNAL MEDICINE

## 2024-02-17 PROCEDURE — 96376 TX/PRO/DX INJ SAME DRUG ADON: CPT

## 2024-02-17 PROCEDURE — 3430000001 HC RX 343 DIAGNOSTIC RADIOPHARMACEUTICALS: Performed by: INTERNAL MEDICINE

## 2024-02-17 PROCEDURE — 84484 ASSAY OF TROPONIN QUANT: CPT | Performed by: PHYSICIAN ASSISTANT

## 2024-02-17 PROCEDURE — 2550000001 HC RX 255 CONTRASTS: Performed by: PHYSICIAN ASSISTANT

## 2024-02-17 PROCEDURE — 2500000004 HC RX 250 GENERAL PHARMACY W/ HCPCS (ALT 636 FOR OP/ED): Performed by: INTERNAL MEDICINE

## 2024-02-17 PROCEDURE — 71275 CT ANGIOGRAPHY CHEST: CPT

## 2024-02-17 PROCEDURE — 80053 COMPREHEN METABOLIC PANEL: CPT | Performed by: PHYSICIAN ASSISTANT

## 2024-02-17 PROCEDURE — 78830 RP LOCLZJ TUM SPECT W/CT 1: CPT

## 2024-02-17 PROCEDURE — 93005 ELECTROCARDIOGRAM TRACING: CPT

## 2024-02-17 PROCEDURE — 85730 THROMBOPLASTIN TIME PARTIAL: CPT | Performed by: PHYSICIAN ASSISTANT

## 2024-02-17 PROCEDURE — 96375 TX/PRO/DX INJ NEW DRUG ADDON: CPT

## 2024-02-17 PROCEDURE — 99285 EMERGENCY DEPT VISIT HI MDM: CPT | Mod: 25

## 2024-02-17 PROCEDURE — 1200000002 HC GENERAL ROOM WITH TELEMETRY DAILY

## 2024-02-17 PROCEDURE — 71275 CT ANGIOGRAPHY CHEST: CPT | Performed by: RADIOLOGY

## 2024-02-17 PROCEDURE — 85025 COMPLETE CBC W/AUTO DIFF WBC: CPT | Performed by: PHYSICIAN ASSISTANT

## 2024-02-17 PROCEDURE — A9540 TC99M MAA: HCPCS | Performed by: INTERNAL MEDICINE

## 2024-02-17 PROCEDURE — 87040 BLOOD CULTURE FOR BACTERIA: CPT | Mod: AHULAB,91 | Performed by: PHYSICIAN ASSISTANT

## 2024-02-17 PROCEDURE — 73030 X-RAY EXAM OF SHOULDER: CPT | Mod: RIGHT SIDE | Performed by: RADIOLOGY

## 2024-02-17 PROCEDURE — 99222 1ST HOSP IP/OBS MODERATE 55: CPT | Performed by: INTERNAL MEDICINE

## 2024-02-17 PROCEDURE — 2550000001 HC RX 255 CONTRASTS: Performed by: INTERNAL MEDICINE

## 2024-02-17 PROCEDURE — 96365 THER/PROPH/DIAG IV INF INIT: CPT

## 2024-02-17 PROCEDURE — 81001 URINALYSIS AUTO W/SCOPE: CPT | Performed by: PHYSICIAN ASSISTANT

## 2024-02-17 PROCEDURE — 85610 PROTHROMBIN TIME: CPT | Performed by: PHYSICIAN ASSISTANT

## 2024-02-17 PROCEDURE — 78830 RP LOCLZJ TUM SPECT W/CT 1: CPT | Performed by: STUDENT IN AN ORGANIZED HEALTH CARE EDUCATION/TRAINING PROGRAM

## 2024-02-17 PROCEDURE — 73030 X-RAY EXAM OF SHOULDER: CPT | Mod: RT

## 2024-02-17 RX ORDER — DIVALPROEX SODIUM 500 MG/1
1000 TABLET, DELAYED RELEASE ORAL EVERY EVENING
COMMUNITY
End: 2024-04-04 | Stop reason: WASHOUT

## 2024-02-17 RX ORDER — OXCARBAZEPINE 300 MG/1
1200 TABLET, FILM COATED ORAL 2 TIMES DAILY
Status: DISCONTINUED | OUTPATIENT
Start: 2024-02-17 | End: 2024-02-23 | Stop reason: HOSPADM

## 2024-02-17 RX ORDER — ACETAMINOPHEN 325 MG/1
650 TABLET ORAL EVERY 4 HOURS PRN
Status: DISCONTINUED | OUTPATIENT
Start: 2024-02-17 | End: 2024-02-23 | Stop reason: HOSPADM

## 2024-02-17 RX ORDER — ENOXAPARIN SODIUM 150 MG/ML
1 INJECTION SUBCUTANEOUS 2 TIMES DAILY
Status: DISCONTINUED | OUTPATIENT
Start: 2024-02-17 | End: 2024-02-23

## 2024-02-17 RX ORDER — ONDANSETRON HYDROCHLORIDE 2 MG/ML
4 INJECTION, SOLUTION INTRAVENOUS ONCE
Status: COMPLETED | OUTPATIENT
Start: 2024-02-17 | End: 2024-02-17

## 2024-02-17 RX ORDER — ENOXAPARIN SODIUM 150 MG/ML
1 INJECTION SUBCUTANEOUS ONCE
Status: DISCONTINUED | OUTPATIENT
Start: 2024-02-17 | End: 2024-02-17

## 2024-02-17 RX ORDER — POLYETHYLENE GLYCOL 3350 17 G/17G
17 POWDER, FOR SOLUTION ORAL DAILY
Status: DISCONTINUED | OUTPATIENT
Start: 2024-02-17 | End: 2024-02-23 | Stop reason: HOSPADM

## 2024-02-17 RX ORDER — ACETAMINOPHEN 650 MG/1
650 SUPPOSITORY RECTAL EVERY 4 HOURS PRN
Status: DISCONTINUED | OUTPATIENT
Start: 2024-02-17 | End: 2024-02-23 | Stop reason: HOSPADM

## 2024-02-17 RX ORDER — METHOCARBAMOL 500 MG/1
500 TABLET, FILM COATED ORAL 4 TIMES DAILY PRN
Status: DISCONTINUED | OUTPATIENT
Start: 2024-02-17 | End: 2024-02-23 | Stop reason: HOSPADM

## 2024-02-17 RX ORDER — MORPHINE SULFATE 4 MG/ML
4 INJECTION, SOLUTION INTRAMUSCULAR; INTRAVENOUS ONCE
Status: COMPLETED | OUTPATIENT
Start: 2024-02-17 | End: 2024-02-17

## 2024-02-17 RX ORDER — OXYCODONE HYDROCHLORIDE 5 MG/1
5 TABLET ORAL EVERY 4 HOURS PRN
Status: DISCONTINUED | OUTPATIENT
Start: 2024-02-17 | End: 2024-02-23 | Stop reason: HOSPADM

## 2024-02-17 RX ORDER — DIVALPROEX SODIUM 250 MG/1
1000 TABLET, DELAYED RELEASE ORAL EVERY EVENING
Status: DISCONTINUED | OUTPATIENT
Start: 2024-02-17 | End: 2024-02-23 | Stop reason: HOSPADM

## 2024-02-17 RX ORDER — POLYETHYLENE GLYCOL 3350 17 G/17G
17 POWDER, FOR SOLUTION ORAL DAILY PRN
Status: DISCONTINUED | OUTPATIENT
Start: 2024-02-17 | End: 2024-02-23

## 2024-02-17 RX ORDER — ACETAMINOPHEN 160 MG/5ML
650 SOLUTION ORAL EVERY 4 HOURS PRN
Status: DISCONTINUED | OUTPATIENT
Start: 2024-02-17 | End: 2024-02-23 | Stop reason: HOSPADM

## 2024-02-17 RX ORDER — ACETAMINOPHEN 325 MG/1
1000 TABLET ORAL EVERY 6 HOURS PRN
Status: DISCONTINUED | OUTPATIENT
Start: 2024-02-17 | End: 2024-02-23 | Stop reason: HOSPADM

## 2024-02-17 RX ORDER — HYDROMORPHONE HYDROCHLORIDE 1 MG/ML
1 INJECTION, SOLUTION INTRAMUSCULAR; INTRAVENOUS; SUBCUTANEOUS ONCE
Status: COMPLETED | OUTPATIENT
Start: 2024-02-17 | End: 2024-02-17

## 2024-02-17 RX ORDER — DIVALPROEX SODIUM 250 MG/1
500 TABLET, DELAYED RELEASE ORAL EVERY MORNING
Status: DISCONTINUED | OUTPATIENT
Start: 2024-02-17 | End: 2024-02-23 | Stop reason: HOSPADM

## 2024-02-17 RX ADMIN — OXYCODONE HYDROCHLORIDE 5 MG: 5 TABLET ORAL at 20:44

## 2024-02-17 RX ADMIN — IOHEXOL 75 ML: 350 INJECTION, SOLUTION INTRAVENOUS at 09:22

## 2024-02-17 RX ADMIN — MORPHINE SULFATE 4 MG: 4 INJECTION, SOLUTION INTRAMUSCULAR; INTRAVENOUS at 08:08

## 2024-02-17 RX ADMIN — ONDANSETRON 4 MG: 2 INJECTION INTRAMUSCULAR; INTRAVENOUS at 08:08

## 2024-02-17 RX ADMIN — DIVALPROEX SODIUM 1000 MG: 250 TABLET, DELAYED RELEASE ORAL at 20:43

## 2024-02-17 RX ADMIN — METHOCARBAMOL TABLETS 500 MG: 500 TABLET, COATED ORAL at 20:47

## 2024-02-17 RX ADMIN — DIVALPROEX SODIUM 500 MG: 250 TABLET, DELAYED RELEASE ORAL at 17:34

## 2024-02-17 RX ADMIN — SODIUM CHLORIDE 1000 ML: 9 INJECTION, SOLUTION INTRAVENOUS at 08:08

## 2024-02-17 RX ADMIN — MORPHINE SULFATE 4 MG: 4 INJECTION, SOLUTION INTRAMUSCULAR; INTRAVENOUS at 10:17

## 2024-02-17 RX ADMIN — OXCARBAZEPINE 1200 MG: 300 TABLET, FILM COATED ORAL at 20:43

## 2024-02-17 RX ADMIN — OXCARBAZEPINE 1200 MG: 300 TABLET, FILM COATED ORAL at 17:34

## 2024-02-17 RX ADMIN — HYDROMORPHONE HYDROCHLORIDE 1 MG: 1 INJECTION, SOLUTION INTRAMUSCULAR; INTRAVENOUS; SUBCUTANEOUS at 16:51

## 2024-02-17 RX ADMIN — CEFTRIAXONE 2 G: 2 INJECTION, POWDER, FOR SOLUTION INTRAMUSCULAR; INTRAVENOUS at 10:50

## 2024-02-17 RX ADMIN — KIT FOR THE PREPARATION OF TECHNETIUM TC 99M ALBUMIN AGGREGATED 4.4 MILLICURIE: 2.5 INJECTION, POWDER, FOR SOLUTION INTRAVENOUS at 11:40

## 2024-02-17 RX ADMIN — OXYCODONE HYDROCHLORIDE 5 MG: 5 TABLET ORAL at 14:23

## 2024-02-17 RX ADMIN — ENOXAPARIN SODIUM 120 MG: 120 INJECTION SUBCUTANEOUS at 20:52

## 2024-02-17 SDOH — SOCIAL STABILITY: SOCIAL INSECURITY: ARE THERE ANY APPARENT SIGNS OF INJURIES/BEHAVIORS THAT COULD BE RELATED TO ABUSE/NEGLECT?: NO

## 2024-02-17 SDOH — SOCIAL STABILITY: SOCIAL INSECURITY: DO YOU FEEL ANYONE HAS EXPLOITED OR TAKEN ADVANTAGE OF YOU FINANCIALLY OR OF YOUR PERSONAL PROPERTY?: NO

## 2024-02-17 SDOH — SOCIAL STABILITY: SOCIAL INSECURITY: ARE YOU OR HAVE YOU BEEN THREATENED OR ABUSED PHYSICALLY, EMOTIONALLY, OR SEXUALLY BY ANYONE?: NO

## 2024-02-17 SDOH — SOCIAL STABILITY: SOCIAL INSECURITY: ABUSE: ADULT

## 2024-02-17 SDOH — SOCIAL STABILITY: SOCIAL INSECURITY: DO YOU FEEL UNSAFE GOING BACK TO THE PLACE WHERE YOU ARE LIVING?: NO

## 2024-02-17 SDOH — SOCIAL STABILITY: SOCIAL INSECURITY: WERE YOU ABLE TO COMPLETE ALL THE BEHAVIORAL HEALTH SCREENINGS?: YES

## 2024-02-17 SDOH — SOCIAL STABILITY: SOCIAL INSECURITY: HAVE YOU HAD THOUGHTS OF HARMING ANYONE ELSE?: NO

## 2024-02-17 SDOH — SOCIAL STABILITY: SOCIAL INSECURITY: DOES ANYONE TRY TO KEEP YOU FROM HAVING/CONTACTING OTHER FRIENDS OR DOING THINGS OUTSIDE YOUR HOME?: NO

## 2024-02-17 SDOH — SOCIAL STABILITY: SOCIAL INSECURITY: HAS ANYONE EVER THREATENED TO HURT YOUR FAMILY OR YOUR PETS?: NO

## 2024-02-17 ASSESSMENT — PAIN DESCRIPTION - ORIENTATION
ORIENTATION: RIGHT

## 2024-02-17 ASSESSMENT — COGNITIVE AND FUNCTIONAL STATUS - GENERAL
DRESSING REGULAR UPPER BODY CLOTHING: A LITTLE
MOBILITY SCORE: 24
DAILY ACTIVITIY SCORE: 23
PATIENT BASELINE BEDBOUND: NO
MOBILITY SCORE: 24
DAILY ACTIVITIY SCORE: 23
DRESSING REGULAR UPPER BODY CLOTHING: A LITTLE

## 2024-02-17 ASSESSMENT — PATIENT HEALTH QUESTIONNAIRE - PHQ9
SUM OF ALL RESPONSES TO PHQ9 QUESTIONS 1 & 2: 0
2. FEELING DOWN, DEPRESSED OR HOPELESS: NOT AT ALL
1. LITTLE INTEREST OR PLEASURE IN DOING THINGS: NOT AT ALL

## 2024-02-17 ASSESSMENT — PAIN - FUNCTIONAL ASSESSMENT
PAIN_FUNCTIONAL_ASSESSMENT: 0-10

## 2024-02-17 ASSESSMENT — LIFESTYLE VARIABLES
AUDIT-C TOTAL SCORE: 1
AUDIT-C TOTAL SCORE: 1
HOW OFTEN DO YOU HAVE A DRINK CONTAINING ALCOHOL: MONTHLY OR LESS
HOW OFTEN DO YOU HAVE 6 OR MORE DRINKS ON ONE OCCASION: NEVER
SKIP TO QUESTIONS 9-10: 1
HOW MANY STANDARD DRINKS CONTAINING ALCOHOL DO YOU HAVE ON A TYPICAL DAY: PATIENT DOES NOT DRINK
PRESCIPTION_ABUSE_PAST_12_MONTHS: NO
SUBSTANCE_ABUSE_PAST_12_MONTHS: NO

## 2024-02-17 ASSESSMENT — ACTIVITIES OF DAILY LIVING (ADL)
PATIENT'S MEMORY ADEQUATE TO SAFELY COMPLETE DAILY ACTIVITIES?: YES
ADEQUATE_TO_COMPLETE_ADL: YES
BATHING: NEEDS ASSISTANCE
DRESSING YOURSELF: NEEDS ASSISTANCE
GROOMING: INDEPENDENT
HEARING - RIGHT EAR: FUNCTIONAL
LACK_OF_TRANSPORTATION: NO
JUDGMENT_ADEQUATE_SAFELY_COMPLETE_DAILY_ACTIVITIES: YES
HEARING - LEFT EAR: FUNCTIONAL
TOILETING: NEEDS ASSISTANCE
FEEDING YOURSELF: INDEPENDENT
WALKS IN HOME: INDEPENDENT

## 2024-02-17 ASSESSMENT — PAIN SCALES - GENERAL
PAINLEVEL_OUTOF10: 10 - WORST POSSIBLE PAIN
PAINLEVEL_OUTOF10: 9
PAINLEVEL_OUTOF10: 2
PAINLEVEL_OUTOF10: 9
PAINLEVEL_OUTOF10: 10 - WORST POSSIBLE PAIN
PAINLEVEL_OUTOF10: 6

## 2024-02-17 ASSESSMENT — PAIN DESCRIPTION - LOCATION
LOCATION: ARM

## 2024-02-17 ASSESSMENT — PAIN DESCRIPTION - PROGRESSION: CLINICAL_PROGRESSION: GRADUALLY IMPROVING

## 2024-02-17 NOTE — PROGRESS NOTES
Pharmacy Medication History Review    Danette Buenrostro is a 33 y.o. female admitted for No Principal Problem: There is no principal problem currently on the Problem List. Please update the Problem List and refresh.. Pharmacy reviewed the patient's [id number] medications and allergies for accuracy.  Prior to Admission Medications   Prescriptions Last Dose Informant     OXcarbazepine (Trileptal) 600 mg tablet 2024      Sig: Take 2 tablets (1,200 mg) by mouth 2 times a day.   acetaminophen (Tylenol) 500 mg tablet       Sig: Take 2 tablets (1,000 mg) by mouth every 6 hours if needed for mild pain (1 - 3) for up to 7 days.   divalproex (Depakote) 500 mg EC tablet 2024      Si by mouth in the morning, 2 in the evening   Patient taking differently: Take 1 tablet (500 mg) by mouth once daily in the morning.   divalproex (Depakote) 500 mg EC tablet 2024      Sig: Take 2 tablets (1,000 mg) by mouth once daily in the evening. Do not crush, chew, or split.   ibuprofen 600 mg tablet       Sig: Take 1 tablet (600 mg) by mouth if needed for mild pain (1 - 3).   methocarbamol (Robaxin) 500 mg tablet 2024      Sig: Take 1 tablet (500 mg) by mouth 4 times a day as needed for muscle spasms. May take 2 tablets at a time if needed.   oxyCODONE (Roxicodone) 5 mg immediate release tablet       Sig: Take 1 tablet (5 mg) by mouth every 4 hours if needed for severe pain (7 - 10).   polyethylene glycol (Glycolax, Miralax) 17 gram/dose powder       Sig: Take 17 g by mouth once daily.      Facility-Administered Medications: None      Spoke to the patient. Patient is on Depakote          The list below reflectives the updated PTA list. Please review each medication in order reconciliation for additional clarification and justification.    The list below reflectives the updated allergy list. Please review each documented allergy for additional clarification and justification.  Allergies  Reviewed by Gloria MARINO  SHANI Swan on 2/17/2024   No Known Allergies         Below are additional concerns with the patient's PTA list.      Neah Macdonald CPhT

## 2024-02-17 NOTE — ED PROVIDER NOTES
"Chief Complaint   Patient presents with    right arm pain            Right arm pain      HPI:   Danette Buenrostro is an 33 y.o. female with complicated PMH including migraine disorder, epilepsy, vitamin deficiency, endometriosis who is postop day 5 following total robotic hysterectomy, right salpingectomy, excision of endometriosis, right ovarian cystectomy, appendectomy and oversewing of bowel and bladder that presents to the ED for evaluation of right shoulder pain.  Patient localizes pain to her anterior shoulder.  She said when she woke up from surgery she did not have feeling in the first 3 digits of her right hand and she had extreme pain in her right shoulder.  She said she was told \"gas from her stomach moved up into her arm\".  She says she has not slept since being discharged from the hospital due to the pain.  Rates pain 10/10.  Took 5 mg of Oxy at 0 245 with no relief in symptoms.  She said she took ibuprofen around midnight also no relief in symptoms.  She denies any abdominal pain, chest pain, hemoptysis, leg swelling, palpitations, shortness of breath, history of bleeding or clotting disorder, dizziness or lightheadedness, neck pain, muscle weakness.  She is right-hand dominant.  Patient says she has been having bowel movements and passing gas.  Denies any bloody stools, dysuria, hematuria.    Medications: \"Seizure medicine\"   Soc HX:  No Known Allergies: NKDA  Past Medical History:   Diagnosis Date    Benign neoplasm of breast     Endometriosis     Epilepsy with partial complex seizures (CMS/HCC)     Migraine     Taking Depakote    Obesity     Ovarian cyst     measuring 5cm x 3cm x 3cm- Scheduled for surgery with Dr Theodore on 02/12/24    Pelvic floor dysfunction in female     Pelvic pain     Seizure disorder (CMS/HCC) At 7y    F/W Dr. Sigala (Neuro) Taking Trileptal, Last seizure 2 days ago during sleep    Type A blood, Rh positive     Blood type A+    Vitamin B1 deficiency      Past Surgical " History:   Procedure Laterality Date     SECTION, LOW TRANSVERSE      x 2 w/ BTL at second    COLPOSCOPY      OOPHORECTOMY Left     Robotic LSO w/ R cystectomy/salpingectomy    OTHER SURGICAL HISTORY      Surgical Treatment Of Spontaneous     TUBAL LIGATION       Family History   Problem Relation Name Age of Onset    Hypertension Mother Mohini     Stroke Mother Mohini     Heart attack Mother Mohini     Breast cancer Maternal Grandmother Alexia     Other (cerebral infarction) Maternal Grandfather      Breast cancer Mother's Sister Karol     Cancer Mother's Brother      Seizures Father's Brother      Seizures Other Cousin       Physical Exam  Vitals and nursing note reviewed.   Constitutional:       General: She is not in acute distress.     Appearance: She is not ill-appearing or toxic-appearing.      Comments: Elevated BMI.  Appears uncomfortable.   HENT:      Mouth/Throat:      Mouth: Mucous membranes are moist.   Eyes:      Pupils: Pupils are equal, round, and reactive to light.   Cardiovascular:      Rate and Rhythm: Regular rhythm. Tachycardia present.      Pulses: Normal pulses.      Heart sounds: No murmur heard.  Pulmonary:      Effort: Pulmonary effort is normal. No respiratory distress.      Breath sounds: Normal breath sounds.   Abdominal:      General: Bowel sounds are normal. There is distension.      Palpations: Abdomen is soft.      Tenderness: There is no abdominal tenderness. There is no right CVA tenderness, left CVA tenderness, guarding or rebound.      Comments: Multiple laparoscopic incisional sites without evidence of erythema, purulence.  Abdomen mildly distended with no tenderness to palpation.   Musculoskeletal:         General: No deformity or signs of injury. Normal range of motion.      Cervical back: Normal range of motion. No tenderness.      Comments: 5/5  strength right upper extremity.  Isolated passive ROM of right wrist and elbow do not elicit pain.  Limited ROM  of right shoulder with TTP right anterior shoulder.  No erythema, warmth, ecchymosis nor edema.   Skin:     General: Skin is warm and dry.      Capillary Refill: Capillary refill takes less than 2 seconds.   Neurological:      General: No focal deficit present.      Mental Status: She is alert.      Cranial Nerves: No cranial nerve deficit.     VS: As documented in the triage note and EMR flowsheet from this visit were reviewed.    External Records Reviewed: I reviewed recent and relevant outside records including: Reviewed OB/GYN note.  Patient with history of endometriosis presenting for total robotic hysterectomy and bilateral salpingectomy excision of endometriosis and ovarian cystectomy, appendectomy.      EKG INTERPRETATION:      Personally Reviewed      Rhythm: Sinus tachycardia      Rate: 101 bpm      Axis: Normal axis      Intervals:  Normal MT interval 158 ms     QRS Complex:  Normal      ST Segment: T wave flattening lead III, T wave inversion V2     QT Interval: QTc 456 ms      Compared with Prior: Similar to prior  Medical Decision Making:   ED Course as of 02/17/24 1111   Sat Feb 17, 2024   0726 Vitals Reviewed: Afebrile. Normotensive. Tachycardic; not tachypneic. No hypoxia.   [KA]   0751 Patient is a 33-year-old female who presents to the ED for evaluation of right upper arm pain.  She is postop day 5 from complex surgery including appendectomy, hysterectomy, oophorectomy.  Her incision sites appear to be healing well.  Her abdomen is nontender.  She has significant tenderness with palpation along the anterior aspect of her right shoulder with decreased range of motion of the right shoulder.  I suspect it may be secondary to positioning during surgery however differential also includes sepsis, PE/upper extremity emboli, pinched nerve.  I have ordered workup to include blood cultures [KA]   0756 Patient has strong right-sided radial pulse.  Have canceled the angio upper extremity.  Will obtain  shoulder x-ray. [KA]   6937 I personally viewed labs.  CBC shows leukocytosis with hemoglobin 8.5, 2 point drop from 4 days ago, when it was 10.2.  CMP shows normal electrolytes, normal renal function, slightly elevated AST and ALT.  Coags slightly abnormal.  No significant derangement.  Lactate normal.  Troponin normal.  I personally reviewed shoulder x-ray and see no evidence of dislocation.  Radiology reads no dislocation or fracture. [KA]   1015 Patient reassessed.  Continuing to complain of pain; have added another 4 mg IV morphine.  Delta troponin also 3.  CT imaging nondiagnostic for PE due to timing of imaging.  Patient will need VQ scan.  I have ordered VQ scan.  Reviewed CT with my attending, concern for possible left lower lobe infiltrate versus atelectasis.  Covering with 1 g IV Rocephin as prophylaxis.  Patient has had low oxygen and tachycardia while in the ED.  She will necessitate admission for further monitoring, VQ scan.  Have paged hospitalist.  Will discuss with hospitalist whether he would like to initiate heparin or give prophylactic Lovenox. [KA]   1054 Spoke with Dr. Szymanski, he has agreed to accept patient to his care.  Telemetry inpatient.  Will give 1 dose of prophylactic Lovenox. [KA]      ED Course User Index  [KA] Gloria Swan PA-C         Diagnoses as of 02/17/24 1111   Tachycardia   Acute pain of right shoulder   S/P appendectomy   S/P hysterectomy   S/P removal of ovarian cyst     Procedures     Chronic Medical Conditions Significantly Affecting Care:      Escalation of Care: Appropriate for hospitalization       Discussion of Management with Other Providers:  I discussed the patient/results with: Attending Teresita, internal medicine Dr. Means    Counseling: Spoke with the patient and discussed today´s findings, in addition to providing specific details for the plan of care and expected course.  Patient was given the opportunity to ask questions.  Educated on the common  potential side effects of medications prescribed.    The plan of care was mutually agreed upon with the patient. The patient and/or family were given the opportunity to ask questions. All questions asked today in the ED were answered to the best of my ability with today's information.    This patient was cared for in the setting of nationwide stress on resources and staffing.    This report was transcribed using voice recognition software.  Every effort was made to ensure accuracy, however, inadvertently computerized transcription errors may be present.       Gloria Swan PA-C  02/17/24 1111

## 2024-02-18 LAB
ANION GAP SERPL CALC-SCNC: 13 MMOL/L (ref 10–20)
BUN SERPL-MCNC: 7 MG/DL (ref 6–23)
CALCIUM SERPL-MCNC: 8.5 MG/DL (ref 8.6–10.3)
CHLORIDE SERPL-SCNC: 98 MMOL/L (ref 98–107)
CO2 SERPL-SCNC: 27 MMOL/L (ref 21–32)
CREAT SERPL-MCNC: 0.71 MG/DL (ref 0.5–1.05)
EGFRCR SERPLBLD CKD-EPI 2021: >90 ML/MIN/1.73M*2
ERYTHROCYTE [DISTWIDTH] IN BLOOD BY AUTOMATED COUNT: 14.5 % (ref 11.5–14.5)
GLUCOSE SERPL-MCNC: 107 MG/DL (ref 74–99)
HCT VFR BLD AUTO: 25.7 % (ref 36–46)
HGB BLD-MCNC: 8.4 G/DL (ref 12–16)
MCH RBC QN AUTO: 29.6 PG (ref 26–34)
MCHC RBC AUTO-ENTMCNC: 32.7 G/DL (ref 32–36)
MCV RBC AUTO: 91 FL (ref 80–100)
NRBC BLD-RTO: 0.3 /100 WBCS (ref 0–0)
PLATELET # BLD AUTO: 524 X10*3/UL (ref 150–450)
POTASSIUM SERPL-SCNC: 4 MMOL/L (ref 3.5–5.3)
RBC # BLD AUTO: 2.84 X10*6/UL (ref 4–5.2)
SODIUM SERPL-SCNC: 134 MMOL/L (ref 136–145)
WBC # BLD AUTO: 14 X10*3/UL (ref 4.4–11.3)

## 2024-02-18 PROCEDURE — 99231 SBSQ HOSP IP/OBS SF/LOW 25: CPT | Performed by: INTERNAL MEDICINE

## 2024-02-18 PROCEDURE — 2500000004 HC RX 250 GENERAL PHARMACY W/ HCPCS (ALT 636 FOR OP/ED): Performed by: INTERNAL MEDICINE

## 2024-02-18 PROCEDURE — 85027 COMPLETE CBC AUTOMATED: CPT | Performed by: INTERNAL MEDICINE

## 2024-02-18 PROCEDURE — 2500000001 HC RX 250 WO HCPCS SELF ADMINISTERED DRUGS (ALT 637 FOR MEDICARE OP): Performed by: INTERNAL MEDICINE

## 2024-02-18 PROCEDURE — 80048 BASIC METABOLIC PNL TOTAL CA: CPT | Performed by: INTERNAL MEDICINE

## 2024-02-18 PROCEDURE — 1200000002 HC GENERAL ROOM WITH TELEMETRY DAILY

## 2024-02-18 PROCEDURE — 36415 COLL VENOUS BLD VENIPUNCTURE: CPT | Performed by: INTERNAL MEDICINE

## 2024-02-18 RX ADMIN — ENOXAPARIN SODIUM 120 MG: 120 INJECTION SUBCUTANEOUS at 20:40

## 2024-02-18 RX ADMIN — OXCARBAZEPINE 1200 MG: 300 TABLET, FILM COATED ORAL at 20:41

## 2024-02-18 RX ADMIN — ACETAMINOPHEN 650 MG: 325 TABLET ORAL at 18:27

## 2024-02-18 RX ADMIN — DIVALPROEX SODIUM 500 MG: 250 TABLET, DELAYED RELEASE ORAL at 09:42

## 2024-02-18 RX ADMIN — METHOCARBAMOL TABLETS 500 MG: 500 TABLET, COATED ORAL at 06:29

## 2024-02-18 RX ADMIN — OXYCODONE HYDROCHLORIDE 5 MG: 5 TABLET ORAL at 16:34

## 2024-02-18 RX ADMIN — OXYCODONE HYDROCHLORIDE 5 MG: 5 TABLET ORAL at 06:29

## 2024-02-18 RX ADMIN — METHOCARBAMOL TABLETS 500 MG: 500 TABLET, COATED ORAL at 10:24

## 2024-02-18 RX ADMIN — OXCARBAZEPINE 1200 MG: 300 TABLET, FILM COATED ORAL at 09:43

## 2024-02-18 RX ADMIN — DIVALPROEX SODIUM 1000 MG: 250 TABLET, DELAYED RELEASE ORAL at 20:41

## 2024-02-18 RX ADMIN — METHOCARBAMOL TABLETS 500 MG: 500 TABLET, COATED ORAL at 16:34

## 2024-02-18 RX ADMIN — ENOXAPARIN SODIUM 120 MG: 120 INJECTION SUBCUTANEOUS at 09:00

## 2024-02-18 RX ADMIN — OXYCODONE HYDROCHLORIDE 5 MG: 5 TABLET ORAL at 00:51

## 2024-02-18 RX ADMIN — OXYCODONE HYDROCHLORIDE 5 MG: 5 TABLET ORAL at 20:41

## 2024-02-18 RX ADMIN — OXYCODONE HYDROCHLORIDE 5 MG: 5 TABLET ORAL at 10:23

## 2024-02-18 ASSESSMENT — ENCOUNTER SYMPTOMS
MYALGIAS: 1
DYSPHORIC MOOD: 0
VOMITING: 0
CHEST TIGHTNESS: 0
WOUND: 0
FREQUENCY: 0
ARTHRALGIAS: 1
SHORTNESS OF BREATH: 1
HEADACHES: 0
CONFUSION: 1
EYE PAIN: 0
SEIZURES: 1
RHINORRHEA: 0
APPETITE CHANGE: 1
CONSTIPATION: 0
PALPITATIONS: 0
HEMATURIA: 0
FATIGUE: 1
ABDOMINAL PAIN: 0
CHILLS: 1
NAUSEA: 0
LIGHT-HEADEDNESS: 1
SORE THROAT: 0
NECK PAIN: 1
EYE ITCHING: 0
DYSURIA: 0
NERVOUS/ANXIOUS: 0
WHEEZING: 0
EYE REDNESS: 0
COUGH: 0
DIARRHEA: 0
DIZZINESS: 1
BACK PAIN: 0

## 2024-02-18 ASSESSMENT — COGNITIVE AND FUNCTIONAL STATUS - GENERAL
DAILY ACTIVITIY SCORE: 23
DRESSING REGULAR UPPER BODY CLOTHING: A LITTLE
MOBILITY SCORE: 24
MOBILITY SCORE: 24
DAILY ACTIVITIY SCORE: 23
DRESSING REGULAR UPPER BODY CLOTHING: A LITTLE

## 2024-02-18 ASSESSMENT — PAIN - FUNCTIONAL ASSESSMENT
PAIN_FUNCTIONAL_ASSESSMENT: 0-10

## 2024-02-18 ASSESSMENT — PAIN SCALES - GENERAL
PAINLEVEL_OUTOF10: 9
PAINLEVEL_OUTOF10: 6
PAINLEVEL_OUTOF10: 6
PAINLEVEL_OUTOF10: 2
PAINLEVEL_OUTOF10: 6
PAINLEVEL_OUTOF10: 3

## 2024-02-18 ASSESSMENT — PAIN DESCRIPTION - LOCATION
LOCATION: ARM

## 2024-02-18 ASSESSMENT — PAIN DESCRIPTION - ORIENTATION
ORIENTATION: RIGHT

## 2024-02-18 NOTE — CONSULTS
Inpatient consult to Pulmonology  Consult performed by: Philippe Chester MD  Consult ordered by: Abelardo Szymanski MD      Reason For Consult  PE  History Of Present Illness  Danette Buenrostro is a 33 y.o. female with h/o migraines, epilepsy, Vit Deficiency, endometriosis with recent total robotic hysterectomy, R salpingectomy, excision of endometriosis, right ovarian cystectomy, appendectomy and oversewing of bowel and bladder who p/w R shoulder pain that started suddenly after her surgery (5 days ago), and associated with numbness in R fingers. In the ED initial work up was unremarkable. However given she also had SOB, a V/Q scan was done that showed PE, admitted to the [facility] for further management. Pulmonary is consulted for the PE.   States she developed a sudden onset pain in both arms just after her surgery. Associated with the tingling and numbness in R fingers. She was told this was due to the gas which was used during the procedure. L sided pain improved since then. However, the pain on the R side persisted. It is pressure like, with intermittent episodes of sharp pains. At worse 10/10, took pain medications without improvement.   Denies SOB at rest, but for the last days had a sudden onset FIGUEROA. No associated wheezing or CP. No cough, sputum, hemoptysis or wheezing. Denies orthopnea, PND or LE edema.   Full ROS as below.   Past Medical History  Past Medical History:   Diagnosis Date    Benign neoplasm of breast     Endometriosis     Epilepsy with partial complex seizures (CMS/HCC)     Migraine     Taking Depakote    Obesity     Ovarian cyst     measuring 5cm x 3cm x 3cm- Scheduled for surgery with Dr Theodore on 02/12/24    Pelvic floor dysfunction in female     Pelvic pain     Seizure disorder (CMS/HCC) At 7y    F/W Dr. Sigala (Neuro) Taking Trileptal, Last seizure 2 days ago during sleep    Type A blood, Rh positive     Blood type A+    Vitamin B1 deficiency    Surgical History  Past Surgical History:    Procedure Laterality Date     SECTION, LOW TRANSVERSE      x 2 w/ BTL at second    COLPOSCOPY      OOPHORECTOMY Left     Robotic LSO w/ R cystectomy/salpingectomy    OTHER SURGICAL HISTORY      Surgical Treatment Of Spontaneous     TUBAL LIGATION     Social History  Social History     Tobacco Use    Smoking status: Never     Passive exposure: Never    Smokeless tobacco: Never   Vaping Use    Vaping Use: Never used   Substance Use Topics    Alcohol use: Yes     Comment: Occasionally    Drug use: Never   Worked as a teacher and sales, 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   Family History  Family History   Problem Relation Name Age of Onset    Hypertension Mother Mohini     Stroke Mother Mohini     Heart attack Mother Mohini     Breast cancer Maternal Grandmother Alexia     Other (cerebral infarction) Maternal Grandfather      Breast cancer Mother's Sister Karol     Cancer Mother's Brother      Seizures Father's Brother      Seizures Other Cousin      Current Outpatient Medications   Medication Instructions    acetaminophen (TYLENOL) 1,000 mg, oral, Every 6 hours PRN    divalproex (Depakote) 500 mg EC tablet 1 by mouth in the morning, 2 in the evening    divalproex (DEPAKOTE) 1,000 mg, oral, Every evening, Do not crush, chew, or split.    ibuprofen 600 mg, oral, As needed    methocarbamol (ROBAXIN) 500 mg, oral, 4 times daily PRN, May take 2 tablets at a time if needed.    OXcarbazepine (Trileptal) 600 mg tablet 2 tablets, oral, 2 times daily    oxyCODONE (ROXICODONE) 5 mg, oral, Every 4 hours PRN    polyethylene glycol (GLYCOLAX, MIRALAX) 17 g, oral, Daily   Allergies  Patient has no known allergies.  Review of Systems   Constitutional:  Positive for appetite change, chills and fatigue.   HENT:  Negative for congestion, ear pain, rhinorrhea and sore throat.    Eyes:  Negative for pain, redness, itching and visual disturbance.   Respiratory:  Positive for shortness of breath. Negative for cough, chest tightness and  wheezing.    Cardiovascular:  Negative for chest pain, palpitations and leg swelling.   Gastrointestinal:  Negative for abdominal pain, constipation, diarrhea, nausea and vomiting.   Genitourinary:  Negative for dysuria, frequency and hematuria.   Musculoskeletal:  Positive for arthralgias, myalgias and neck pain. Negative for back pain.   Skin:  Negative for pallor, rash and wound.   Neurological:  Positive for dizziness, seizures and light-headedness. Negative for syncope and headaches.   Psychiatric/Behavioral:  Positive for confusion. Negative for dysphoric mood. The patient is not nervous/anxious.    Scheduled medications  divalproex, 1,000 mg, oral, q PM  divalproex, 500 mg, oral, q AM  enoxaparin, 1 mg/kg, subcutaneous, BID  OXcarbazepine, 1,200 mg, oral, BID  polyethylene glycol, 17 g, oral, Daily    Continuous medications     PRN medications  PRN medications: acetaminophen **OR** acetaminophen **OR** acetaminophen, acetaminophen, methocarbamol, oxyCODONE, polyethylene glycol    Physical Exam  Constitutional:       General: She is not in acute distress.     Appearance: Normal appearance. She is obese. She is not ill-appearing or toxic-appearing.   HENT:      Head: Normocephalic and atraumatic.      Nose:      Comments: On RA.      Mouth/Throat:      Mouth: Mucous membranes are moist.      Comments: Mallampati 4.   Eyes:      General: No scleral icterus.     Extraocular Movements: Extraocular movements intact.      Pupils: Pupils are equal, round, and reactive to light.   Cardiovascular:      Rate and Rhythm: Regular rhythm. Tachycardia present.      Heart sounds: No murmur heard.     No friction rub. No gallop.   Pulmonary:      Effort: Pulmonary effort is normal. No respiratory distress.      Breath sounds: Normal breath sounds. No wheezing or rales.   Abdominal:      General: There is no distension.      Palpations: Abdomen is soft.      Tenderness: There is no abdominal tenderness.      Comments: Surgical  "site clean.    Musculoskeletal:         General: Tenderness (R shoulder with decreased active and passive ROM) present.      Cervical back: Neck supple. No rigidity.      Right lower leg: Edema (Trace) present.      Left lower leg: Edema (Trace) present.   Lymphadenopathy:      Cervical: No cervical adenopathy.   Skin:     General: Skin is warm and dry.      Coloration: Skin is not jaundiced.   Neurological:      General: No focal deficit present.      Mental Status: She is alert and oriented to person, place, and time.      Cranial Nerves: No cranial nerve deficit.      Motor: No weakness.   Psychiatric:         Mood and Affect: Mood normal.         Behavior: Behavior normal.     Vital Signs  Blood pressure 142/67, pulse 101, temperature 36.8 °C (98.2 °F), temperature source Temporal, resp. rate 18, height 1.651 m (5' 5\"), weight 116 kg (256 lb), SpO2 98 %, not currently breastfeeding.  Oxygen Therapy  SpO2: 98 %  Medical Gas Therapy: None (Room air)        Relevant Results  CT angio chest for pulmonary embolism 02/17/2024    Narrative  Interpreted By:  Abelardo Mane,  STUDY:  CT ANGIO CHEST FOR PULMONARY EMBOLISM;  2/17/2024 9:43 am    INDICATION:  32 y/o   F with  Signs/Symptoms:recent surgery, R shoulder pain,  tachycardia.    LIMITATIONS:  The exam was limited by body habitus and also by timing of image  acquisition relative to contrast enhancement..    ACCESSION NUMBER(S):  [id number]    ORDERING CLINICIAN:  WILLIAM KHAN    TECHNIQUE:  After the administration of intravenous nonionic contrast,  thin-section arterial phase images were obtained  from the thoracic  inlet down through the iliac crest. Sagittal and coronal  reconstruction images were generated. Axial and coronal MIP vascular  reconstruction images were also generated.   Mediastinal, lung, bone,  and liver windows were reviewed. IV contrast agent was Omnipaque 350,  75 mL.      COMPARISON:  Previous exam from 07/10/2020.    FINDINGS:  CHEST " WALL/BASE OF THE NECK:  Stable 15 mm diameter nodule posterior to the nipple of the left  breast, anteriorly in the left breast parenchyma on axial image 146.  There are multiple nonspecific but mildly asymmetrically enlarged  left axillary lymph nodes measuring up to 12 x 18 mm. No thyromegaly  or thyroid mass.    MEDIASTINUM/CELSO:  No mediastinal or hilar adenopathy in this exam.  Mild cardiomegaly.  No pericardial effusion.  No thoracic aortic aneurysm or dissection.  Imaging occurred late in the levo phase of enhancement. Therefore,  the exam was nondiagnostic for pulmonary embolism.    LUNGS/ PLEURA/ AND TRACHEA:  Hypoventilatory lungs. There is bandlike atelectasis versus scarring  laterally and posteriorly in the left lower lobe. No mass or  pneumonia in either lung. No  pleural effusion or pneumothorax.  The trachea was grossly intact.    BONES:  No destructive lytic or blastic bone lesion.    UPPER ABDOMEN:  The imaged upper abdomen was grossly intact.    Impression  Imaging occurred late in the levo phase of enhancement. The exam is  nondiagnostic for pulmonary embolism.    Cardiomegaly. No indication of ongoing CHF.    Band like atelectasis versus scarring at the posterior and lateral  left lung base.    Stable 15 mm retroareolar nodule on the left. This could be stable or  cyst. However, there is also mild asymmetric left axillary  adenopathy. Clinical correlation needed. Suggest further evaluation  with mammography and ultrasound, and breast MRI if clinically needed.    MACRO:  None    Signed by: Abelardo Mane 2/17/2024 10:06 AM  Dictation workstation:   [id number]    Results for orders placed or performed during the hospital encounter of 02/17/24 (from the past 24 hour(s))   CBC and Auto Differential   Result Value Ref Range    WBC 12.5 (H) 4.4 - 11.3 x10*3/uL    nRBC 0.2 (H) 0.0 - 0.0 /100 WBCs    RBC 2.93 (L) 4.00 - 5.20 x10*6/uL    Hemoglobin 8.5 (L) 12.0 - 16.0 g/dL    Hematocrit 26.2 (L) 36.0 -  46.0 %    MCV 89 80 - 100 fL    MCH 29.0 26.0 - 34.0 pg    MCHC 32.4 32.0 - 36.0 g/dL    RDW 14.5 11.5 - 14.5 %    Platelets 454 (H) 150 - 450 x10*3/uL    Neutrophils % 67.1 40.0 - 80.0 %    Immature Granulocytes %, Automated 0.8 0.0 - 0.9 %    Lymphocytes % 16.9 13.0 - 44.0 %    Monocytes % 9.0 2.0 - 10.0 %    Eosinophils % 5.8 0.0 - 6.0 %    Basophils % 0.4 0.0 - 2.0 %    Neutrophils Absolute 8.39 (H) 1.20 - 7.70 x10*3/uL    Immature Granulocytes Absolute, Automated 0.10 0.00 - 0.70 x10*3/uL    Lymphocytes Absolute 2.11 1.20 - 4.80 x10*3/uL    Monocytes Absolute 1.13 (H) 0.10 - 1.00 x10*3/uL    Eosinophils Absolute 0.72 (H) 0.00 - 0.70 x10*3/uL    Basophils Absolute 0.05 0.00 - 0.10 x10*3/uL   Comprehensive Metabolic Panel   Result Value Ref Range    Glucose 105 (H) 74 - 99 mg/dL    Sodium 136 136 - 145 mmol/L    Potassium 4.1 3.5 - 5.3 mmol/L    Chloride 101 98 - 107 mmol/L    Bicarbonate 27 21 - 32 mmol/L    Anion Gap 12 10 - 20 mmol/L    Urea Nitrogen 8 6 - 23 mg/dL    Creatinine 0.64 0.50 - 1.05 mg/dL    eGFR >90 >60 mL/min/1.73m*2    Calcium 8.4 (L) 8.6 - 10.3 mg/dL    Albumin 3.5 3.4 - 5.0 g/dL    Alkaline Phosphatase 54 33 - 110 U/L    Total Protein 7.4 6.4 - 8.2 g/dL    AST 75 (H) 9 - 39 U/L    Bilirubin, Total 0.3 0.0 - 1.2 mg/dL    ALT 60 (H) 7 - 45 U/L   Blood Culture    Specimen: Peripheral Venipuncture; Blood culture   Result Value Ref Range    Blood Culture Loaded on Instrument - Culture in progress    Blood Culture    Specimen: Peripheral Venipuncture; Blood culture   Result Value Ref Range    Blood Culture Loaded on Instrument - Culture in progress    Troponin I, High Sensitivity, Initial   Result Value Ref Range    Troponin I, High Sensitivity 3 0 - 13 ng/L   Lactate   Result Value Ref Range    Lactate 0.8 0.4 - 2.0 mmol/L   Protime-INR   Result Value Ref Range    Protime 14.5 (H) 9.8 - 12.8 seconds    INR 1.3 (H) 0.9 - 1.1   APTT   Result Value Ref Range    aPTT 21 (L) 27 - 38 seconds   Troponin,  High Sensitivity, 1 Hour   Result Value Ref Range    Troponin I, High Sensitivity 3 0 - 13 ng/L   Urinalysis with Reflex Microscopic   Result Value Ref Range    Color, Urine Yellow Straw, Yellow    Appearance, Urine Hazy (N) Clear    Specific Gravity, Urine 1.017 1.005 - 1.035    pH, Urine 8.0 5.0, 5.5, 6.0, 6.5, 7.0, 7.5, 8.0    Protein, Urine NEGATIVE NEGATIVE mg/dL    Glucose, Urine NEGATIVE NEGATIVE mg/dL    Blood, Urine MODERATE (2+) (A) NEGATIVE    Ketones, Urine NEGATIVE NEGATIVE mg/dL    Bilirubin, Urine NEGATIVE NEGATIVE    Urobilinogen, Urine 4.0 (N) <2.0 mg/dL    Nitrite, Urine NEGATIVE NEGATIVE    Leukocyte Esterase, Urine NEGATIVE NEGATIVE   Microscopic Only, Urine   Result Value Ref Range    WBC, Urine 1-5 1-5, NONE /HPF    RBC, Urine 3-5 NONE, 1-2, 3-5 /HPF    Squamous Epithelial Cells, Urine 26-50 (1+) Reference range not established. /HPF    Bacteria, Urine 1+ (A) NONE SEEN /HPF    Mucus, Urine 1+ Reference range not established. /LPF   Assessment/Plan   33 YOF with h/o migraines, epilepsy, Vit Deficiency, endometriosis with recent total robotic hysterectomy, R salpingectomy, excision of endometriosis, right ovarian cystectomy, appendectomy and oversewing of bowel and bladder who p/w R shoulder pain that started suddenly after her surgery (5 days ago), and associated with numbness in R fingers. In the ED initial work up was unremarkable. However given she also had SOB, a V/Q scan was done that showed PE, admitted to the [facility] for further management. Pulmonary is consulted for the PE.     Pulmonary embolism: first episodes, seems to be provoked due to her recent surgery. CT PE non diagnostic  due to timing, but V/Q scan showed DEEPAK wedge shape filling defects. Currently slightly tachycardic, otherwise hemodynamically stable and not on home O2.      Continue Lovenox can transition to OAC after 24 hours     Will need hypercoagulable state work up     Supplemental O2 as needed     LE cathy    Thanks for  the consult.  Pulmonary will FU while in house.   Philippe Chester MD

## 2024-02-18 NOTE — PROGRESS NOTES
Danette Buenrostro is a 33 y.o. female on day 1 of admission presenting with Tachycardia.  Patient with h/o migraines, epilepsy, Vit Deficiency, endometriosis with recent total robotic hysterectomy, R salpingectomy, excision of endometriosis, right ovarian cystectomy, appendectomy and oversewing of bowel and bladder who p/w R shoulder pain that started suddenly after her surgery (5 days ago), and associated with numbness in R fingers. In the ED initial work up was unremarkable. However given she also had SOB, a V/Q scan was done that showed PE, admitted to the [facility] for further management. Pulmonary is consulted for the PE.   Subjective   Episode of high BP and fever over night. Remains on RA.     SOB and R shoulder pain both have improved. Denies any other complaints. Overall is feeling better.   Objective   Scheduled medications  divalproex, 1,000 mg, oral, q PM  divalproex, 500 mg, oral, q AM  enoxaparin, 1 mg/kg, subcutaneous, BID  OXcarbazepine, 1,200 mg, oral, BID  polyethylene glycol, 17 g, oral, Daily    Continuous medications     PRN medications  PRN medications: acetaminophen **OR** acetaminophen **OR** acetaminophen, acetaminophen, methocarbamol, oxyCODONE, polyethylene glycol   Physical Exam  Constitutional:       General: She is not in acute distress.     Appearance: Normal appearance. She is obese. She is not ill-appearing or toxic-appearing.   HENT:      Head: Normocephalic and atraumatic.      Nose:      Comments: On RA.     Mouth/Throat:      Mouth: Mucous membranes are moist.      Comments: Mallampati 4.   Eyes:      General: No scleral icterus.     Pupils: Pupils are equal, round, and reactive to light.   Cardiovascular:      Rate and Rhythm: Regular rhythm. Tachycardia present.      Heart sounds: No murmur heard.     No friction rub. No gallop.   Pulmonary:      Effort: Pulmonary effort is normal. No respiratory distress.      Breath sounds: Normal breath sounds. No wheezing or rales.   Abdominal:      " General: There is no distension.      Palpations: Abdomen is soft.      Tenderness: There is no abdominal tenderness.      Comments: Surgical site clean.   Musculoskeletal:         General: Tenderness (R shoulder but improved) present.      Cervical back: Normal range of motion and neck supple. No rigidity.      Right lower leg: Edema (Trace) present.      Left lower leg: Edema (Trace) present.   Lymphadenopathy:      Cervical: No cervical adenopathy.   Skin:     General: Skin is warm and dry.      Coloration: Skin is not jaundiced.   Neurological:      General: No focal deficit present.      Mental Status: She is alert.      Cranial Nerves: No cranial nerve deficit.      Motor: No weakness.   Psychiatric:         Mood and Affect: Mood normal.         Behavior: Behavior normal.     Last Recorded Vitals  Blood pressure 164/79, pulse 110, temperature 37.4 °C (99.3 °F), temperature source Temporal, resp. rate 18, height 1.651 m (5' 5\"), weight 116 kg (256 lb), SpO2 95 %, not currently breastfeeding.  Intake/Output last 3 Shifts:  No intake/output data recorded.    Relevant Results  Results for orders placed or performed during the hospital encounter of 02/17/24 (from the past 24 hour(s))   CBC   Result Value Ref Range    WBC 14.0 (H) 4.4 - 11.3 x10*3/uL    nRBC 0.3 (H) 0.0 - 0.0 /100 WBCs    RBC 2.84 (L) 4.00 - 5.20 x10*6/uL    Hemoglobin 8.4 (L) 12.0 - 16.0 g/dL    Hematocrit 25.7 (L) 36.0 - 46.0 %    MCV 91 80 - 100 fL    MCH 29.6 26.0 - 34.0 pg    MCHC 32.7 32.0 - 36.0 g/dL    RDW 14.5 11.5 - 14.5 %    Platelets 524 (H) 150 - 450 x10*3/uL   Basic metabolic panel   Result Value Ref Range    Glucose 107 (H) 74 - 99 mg/dL    Sodium 134 (L) 136 - 145 mmol/L    Potassium 4.0 3.5 - 5.3 mmol/L    Chloride 98 98 - 107 mmol/L    Bicarbonate 27 21 - 32 mmol/L    Anion Gap 13 10 - 20 mmol/L    Urea Nitrogen 7 6 - 23 mg/dL    Creatinine 0.71 0.50 - 1.05 mg/dL    eGFR >90 >60 mL/min/1.73m*2    Calcium 8.5 (L) 8.6 - 10.3 mg/dL "   NM Lung perfusion with spect/ct    Result Date: 2/17/2024  Interpreted By:  Charbel Bro, STUDY: NM LUNG PERFUSION WITH SPECT/CT;  2/17/2024 12:22 pm   INDICATION: Signs/Symptoms:concern for PE, CT PE non-diagnostic; tachy, POD 5 after multiple surgery.   COMPARISON: Chest x-ray from 02/17/2024   ACCESSION NUMBER(S): [id number]   ORDERING CLINICIAN: JARAD SZYMANSKI   TECHNIQUE: DIVISION OF NUCLEAR MEDICINE PERFUSION LUNG SCANS   Multiple perfusion images of the lungs were acquired after the intravenous administration of 4.4 mCi of Tc-99m macroaggregated albumin (MAA). In addition, SPECT/CT of the chest was performed.   FINDINGS: Planar perfusion images of both lungs demonstrate mild heterogeneity throughout the lung fields bilaterally. Wedge-shaped segmental perfusion defect in the left upper lobe seen on SPECT/CT suggestive of acute pulmonary embolism..       1. Wedge-shaped segmental perfusion defect in the left upper lobe seen on SPECT/CT suggestive of acute pulmonary embolism..   Critical Finding:  See findings. Notification was initiated on 2/17/2024 at 3:26 pm by Dr. Charbel Bro to Dr. Jarad Szymanski. (**-YCF-**)   The interpretation above is based on modified PIOPED II and PISAPED criteria.   This study was analyzed and interpreted at [place], [place].   Signed by: Charbel Bro 2/17/2024 3:33 PM Dictation workstation:   [id number]    CT angio chest for pulmonary embolism    Result Date: 2/17/2024  Interpreted By:  Jarad Mane, STUDY: CT ANGIO CHEST FOR PULMONARY EMBOLISM;  2/17/2024 9:43 am   INDICATION: 34 y/o   F with  Signs/Symptoms:recent surgery, R shoulder pain, tachycardia.   LIMITATIONS: The exam was limited by body habitus and also by timing of image acquisition relative to contrast enhancement..   ACCESSION NUMBER(S): [id number]   ORDERING CLINICIAN: WILLIAM KHAN   TECHNIQUE: After the administration of intravenous nonionic contrast, thin-section arterial phase images were  obtained  from the thoracic inlet down through the iliac crest. Sagittal and coronal reconstruction images were generated. Axial and coronal MIP vascular reconstruction images were also generated.   Mediastinal, lung, bone, and liver windows were reviewed. IV contrast agent was Omnipaque 350, 75 mL.     COMPARISON: Previous exam from 07/10/2020.   FINDINGS: CHEST WALL/BASE OF THE NECK: Stable 15 mm diameter nodule posterior to the nipple of the left breast, anteriorly in the left breast parenchyma on axial image 146. There are multiple nonspecific but mildly asymmetrically enlarged left axillary lymph nodes measuring up to 12 x 18 mm. No thyromegaly or thyroid mass.   MEDIASTINUM/CELSO: No mediastinal or hilar adenopathy in this exam. Mild cardiomegaly. No pericardial effusion. No thoracic aortic aneurysm or dissection. Imaging occurred late in the levo phase of enhancement. Therefore, the exam was nondiagnostic for pulmonary embolism.   LUNGS/ PLEURA/ AND TRACHEA: Hypoventilatory lungs. There is bandlike atelectasis versus scarring laterally and posteriorly in the left lower lobe. No mass or pneumonia in either lung. No  pleural effusion or pneumothorax. The trachea was grossly intact.   BONES: No destructive lytic or blastic bone lesion.   UPPER ABDOMEN: The imaged upper abdomen was grossly intact.       Imaging occurred late in the levo phase of enhancement. The exam is nondiagnostic for pulmonary embolism.   Cardiomegaly. No indication of ongoing CHF.   Band like atelectasis versus scarring at the posterior and lateral left lung base.   Stable 15 mm retroareolar nodule on the left. This could be stable or cyst. However, there is also mild asymmetric left axillary adenopathy. Clinical correlation needed. Suggest further evaluation with mammography and ultrasound, and breast MRI if clinically needed.   MACRO: None   Signed by: Abelardo Mane 2/17/2024 10:06 AM Dictation workstation:   [id number]    XR shoulder  right 2+ views    Result Date: 2/17/2024  Interpreted By:  Valerio Real, STUDY: XR SHOULDER RIGHT 2+ VIEWS;  2/17/2024 8:29 am   INDICATION: Signs/Symptoms:anterior R shoulder pain w/decreased ROM.   COMPARISON: None.   ACCESSION NUMBER(S): [id number]   ORDERING CLINICIAN: WILLIAM KHAN   FINDINGS: Bony structures:  Intact   Joint spaces:  Maintained   Soft tissues:  Unremarkable without significant edema or radiodense foreign body   Other:  None significant       No acute findings.   MACRO: None   Signed by: Valerio Real 2/17/2024 9:06 AM Dictation workstation:   [id number]    Assessment/Plan   Principal Problem:    Tachycardia  33 YOF with h/o migraines, epilepsy, Vit Deficiency, endometriosis with recent total robotic hysterectomy, R salpingectomy, excision of endometriosis, right ovarian cystectomy, appendectomy and oversewing of bowel and bladder who p/w R shoulder pain that started suddenly after her surgery (5 days ago), and associated with numbness in R fingers. In the ED initial work up was unremarkable. However given she also had SOB, a V/Q scan was done that showed PE, admitted to the [facility] for further management. Pulmonary is consulted for the PE.      Pulmonary embolism: first episodes, seems to be provoked due to her recent surgery. CT PE non diagnostic  due to timing, but V/Q scan showed DEEPAK wedge shape filling defects. Currently slightly tachycardic, otherwise hemodynamically stable and not on home O2.      Continue Lovenox can transition to OAC      Will need hypercoagulable state work up, can be done as outpatient     FU with hematology after DC     Supplemental O2 as needed     LE doppler     Pulmonary will FU while in house.     Philippe Chester MD

## 2024-02-19 ENCOUNTER — APPOINTMENT (OUTPATIENT)
Dept: VASCULAR MEDICINE | Facility: HOSPITAL | Age: 34
DRG: 176 | End: 2024-02-19
Payer: MEDICARE

## 2024-02-19 ENCOUNTER — TELEPHONE (OUTPATIENT)
Dept: PRIMARY CARE | Facility: CLINIC | Age: 34
End: 2024-02-19
Payer: COMMERCIAL

## 2024-02-19 DIAGNOSIS — R92.8 ABNORMAL MAMMOGRAM OF LEFT BREAST: Primary | ICD-10-CM

## 2024-02-19 LAB
ANION GAP SERPL CALC-SCNC: 12 MMOL/L (ref 10–20)
BASOPHILS # BLD AUTO: 0.05 X10*3/UL (ref 0–0.1)
BASOPHILS NFR BLD AUTO: 0.3 %
BUN SERPL-MCNC: 7 MG/DL (ref 6–23)
CALCIUM SERPL-MCNC: 8.3 MG/DL (ref 8.6–10.3)
CHLORIDE SERPL-SCNC: 97 MMOL/L (ref 98–107)
CO2 SERPL-SCNC: 30 MMOL/L (ref 21–32)
CREAT SERPL-MCNC: 0.66 MG/DL (ref 0.5–1.05)
EGFRCR SERPLBLD CKD-EPI 2021: >90 ML/MIN/1.73M*2
EOSINOPHIL # BLD AUTO: 0.92 X10*3/UL (ref 0–0.7)
EOSINOPHIL NFR BLD AUTO: 4.8 %
ERYTHROCYTE [DISTWIDTH] IN BLOOD BY AUTOMATED COUNT: 14.3 % (ref 11.5–14.5)
GLUCOSE SERPL-MCNC: 102 MG/DL (ref 74–99)
HCT VFR BLD AUTO: 24.7 % (ref 36–46)
HGB BLD-MCNC: 8.3 G/DL (ref 12–16)
IMM GRANULOCYTES # BLD AUTO: 0.39 X10*3/UL (ref 0–0.7)
IMM GRANULOCYTES NFR BLD AUTO: 2 % (ref 0–0.9)
LYMPHOCYTES # BLD AUTO: 3.71 X10*3/UL (ref 1.2–4.8)
LYMPHOCYTES NFR BLD AUTO: 19.4 %
MCH RBC QN AUTO: 30.1 PG (ref 26–34)
MCHC RBC AUTO-ENTMCNC: 33.6 G/DL (ref 32–36)
MCV RBC AUTO: 90 FL (ref 80–100)
MONOCYTES # BLD AUTO: 1.71 X10*3/UL (ref 0.1–1)
MONOCYTES NFR BLD AUTO: 8.9 %
NEUTROPHILS # BLD AUTO: 12.35 X10*3/UL (ref 1.2–7.7)
NEUTROPHILS NFR BLD AUTO: 64.6 %
NRBC BLD-RTO: 0.1 /100 WBCS (ref 0–0)
PLATELET # BLD AUTO: 538 X10*3/UL (ref 150–450)
POTASSIUM SERPL-SCNC: 4.4 MMOL/L (ref 3.5–5.3)
RBC # BLD AUTO: 2.76 X10*6/UL (ref 4–5.2)
SODIUM SERPL-SCNC: 135 MMOL/L (ref 136–145)
WBC # BLD AUTO: 19.1 X10*3/UL (ref 4.4–11.3)

## 2024-02-19 PROCEDURE — 93970 EXTREMITY STUDY: CPT | Performed by: SURGERY

## 2024-02-19 PROCEDURE — 93970 EXTREMITY STUDY: CPT | Mod: MUE

## 2024-02-19 PROCEDURE — 85025 COMPLETE CBC W/AUTO DIFF WBC: CPT | Performed by: INTERNAL MEDICINE

## 2024-02-19 PROCEDURE — 36415 COLL VENOUS BLD VENIPUNCTURE: CPT | Performed by: INTERNAL MEDICINE

## 2024-02-19 PROCEDURE — 80048 BASIC METABOLIC PNL TOTAL CA: CPT | Performed by: INTERNAL MEDICINE

## 2024-02-19 PROCEDURE — 99231 SBSQ HOSP IP/OBS SF/LOW 25: CPT | Performed by: INTERNAL MEDICINE

## 2024-02-19 PROCEDURE — 2500000001 HC RX 250 WO HCPCS SELF ADMINISTERED DRUGS (ALT 637 FOR MEDICARE OP): Performed by: INTERNAL MEDICINE

## 2024-02-19 PROCEDURE — 93970 EXTREMITY STUDY: CPT

## 2024-02-19 PROCEDURE — 2500000004 HC RX 250 GENERAL PHARMACY W/ HCPCS (ALT 636 FOR OP/ED): Performed by: INTERNAL MEDICINE

## 2024-02-19 PROCEDURE — 1200000002 HC GENERAL ROOM WITH TELEMETRY DAILY

## 2024-02-19 RX ORDER — GABAPENTIN 100 MG/1
100 CAPSULE ORAL EVERY 8 HOURS SCHEDULED
Status: DISCONTINUED | OUTPATIENT
Start: 2024-02-19 | End: 2024-02-23 | Stop reason: HOSPADM

## 2024-02-19 RX ADMIN — OXCARBAZEPINE 1200 MG: 300 TABLET, FILM COATED ORAL at 08:40

## 2024-02-19 RX ADMIN — DIVALPROEX SODIUM 1000 MG: 250 TABLET, DELAYED RELEASE ORAL at 20:09

## 2024-02-19 RX ADMIN — ENOXAPARIN SODIUM 120 MG: 120 INJECTION SUBCUTANEOUS at 08:40

## 2024-02-19 RX ADMIN — OXYCODONE HYDROCHLORIDE 5 MG: 5 TABLET ORAL at 08:35

## 2024-02-19 RX ADMIN — ENOXAPARIN SODIUM 120 MG: 120 INJECTION SUBCUTANEOUS at 20:09

## 2024-02-19 RX ADMIN — POLYETHYLENE GLYCOL 3350 17 G: 17 POWDER, FOR SOLUTION ORAL at 08:40

## 2024-02-19 RX ADMIN — DIVALPROEX SODIUM 500 MG: 250 TABLET, DELAYED RELEASE ORAL at 08:40

## 2024-02-19 RX ADMIN — OXCARBAZEPINE 1200 MG: 300 TABLET, FILM COATED ORAL at 20:09

## 2024-02-19 RX ADMIN — OXYCODONE HYDROCHLORIDE 5 MG: 5 TABLET ORAL at 17:49

## 2024-02-19 RX ADMIN — ACETAMINOPHEN 650 MG: 325 TABLET ORAL at 20:08

## 2024-02-19 RX ADMIN — GABAPENTIN 100 MG: 100 CAPSULE ORAL at 22:27

## 2024-02-19 RX ADMIN — HYDROMORPHONE HYDROCHLORIDE 0.4 MG: 1 INJECTION, SOLUTION INTRAMUSCULAR; INTRAVENOUS; SUBCUTANEOUS at 11:46

## 2024-02-19 ASSESSMENT — PAIN DESCRIPTION - LOCATION
LOCATION: ARM

## 2024-02-19 ASSESSMENT — COGNITIVE AND FUNCTIONAL STATUS - GENERAL
DAILY ACTIVITIY SCORE: 24
CLIMB 3 TO 5 STEPS WITH RAILING: A LITTLE
MOBILITY SCORE: 23

## 2024-02-19 ASSESSMENT — PAIN - FUNCTIONAL ASSESSMENT
PAIN_FUNCTIONAL_ASSESSMENT: 0-10

## 2024-02-19 ASSESSMENT — PAIN SCALES - GENERAL
PAINLEVEL_OUTOF10: 6
PAINLEVEL_OUTOF10: 10 - WORST POSSIBLE PAIN
PAINLEVEL_OUTOF10: 10 - WORST POSSIBLE PAIN
PAINLEVEL_OUTOF10: 3
PAINLEVEL_OUTOF10: 0 - NO PAIN
PAINLEVEL_OUTOF10: 3

## 2024-02-19 ASSESSMENT — PAIN DESCRIPTION - ORIENTATION
ORIENTATION: RIGHT

## 2024-02-19 ASSESSMENT — PAIN DESCRIPTION - DESCRIPTORS: DESCRIPTORS: ACHING

## 2024-02-19 NOTE — CARE PLAN
Problem: Pain  Goal: My pain/discomfort is manageable  Outcome: Progressing     Problem: Safety  Goal: Patient will be injury free during hospitalization  Outcome: Progressing  Goal: I will remain free of falls  Outcome: Progressing     Problem: Daily Care  Goal: Daily care needs are met  Outcome: Progressing     Problem: Psychosocial Needs  Goal: Demonstrates ability to cope with hospitalization/illness  Outcome: Progressing  Goal: Collaborate with me, my family, and caregiver to identify my specific goals  Outcome: Progressing     Problem: Discharge Barriers  Goal: My discharge needs are met  Outcome: Progressing     Problem: Pain - Adult  Goal: Verbalizes/displays adequate comfort level or baseline comfort level  Outcome: Progressing     Problem: Safety - Adult  Goal: Free from fall injury  Outcome: Progressing     Problem: Discharge Planning  Goal: Discharge to home or other facility with appropriate resources  Outcome: Progressing     Problem: Chronic Conditions and Co-morbidities  Goal: Patient's chronic conditions and co-morbidity symptoms are monitored and maintained or improved  Outcome: Progressing     Problem: Pain  Goal: Takes deep breaths with improved pain control throughout the shift  Outcome: Progressing  Goal: Turns in bed with improved pain control throughout the shift  Outcome: Progressing  Goal: Walks with improved pain control throughout the shift  Outcome: Progressing  Goal: Performs ADL's with improved pain control throughout shift  Outcome: Progressing  Goal: Participates in PT with improved pain control throughout the shift  Outcome: Progressing  Goal: Free from opioid side effects throughout the shift  Outcome: Progressing  Goal: Free from acute confusion related to pain meds throughout the shift  Outcome: Progressing   The patient's goals for the shift include Pt will utilize call light during shifft    The clinical goals for the shift include pain will be 5/10 during shift    Over the  shift, the patient did not make progress toward the following goals. Barriers to progression include . Recommendations to address these barriers include

## 2024-02-19 NOTE — TELEPHONE ENCOUNTER
Result Communication    Resulted Orders   BI mammo bilateral screening tomosynthesis    Narrative    Interpreted By:  Theresa Ponce,   STUDY:  BI MAMMO BILATERAL SCREENING TOMOSYNTHESIS;  2/9/2024 4:43 pm      ACCESSION NUMBER(S):  [id number]      ORDERING CLINICIAN:  ARNOLD HAIRSTON      INDICATION:  Baseline screening. Family history of breast cancer      COMPARISON:  None.      FINDINGS:  2D and tomosynthesis images were reviewed at 1 mm slice thickness.      Density:  The breast tissue is heterogeneously dense, which may  obscure small masses.      There is a focal asymmetry in the medial inferior left breast at  anterior depth. No suspicious masses or calcifications are identified  in the right breast.        Impression    Left breast focal asymmetry. Sonographic evaluation recommended.  Additional mammographic views may be necessary.      No evidence of malignancy right breast.      BI-RADS CATEGORY:      BI-RADS Category:  0 Incomplete; Need Additional Imaging Evaluation  and/or Prior Mammograms for Comparison. Recommendation:  Additional  Imaging. Recommended Date:  Immediate.  Laterality:  Left.      For any future breast imaging appointments, please call [phone]).          MACRO:  None      Signed by: Theresa Ponce 2/14/2024 10:06 AM  Dictation workstation:   [id number]       10:07 AM      Results were successfully communicated with the patient and they did not acknowledge their understanding. I left a voicemail message.

## 2024-02-19 NOTE — CARE PLAN
The patient's goals for the shift include Pt will utilize call light during shifft    The clinical goals for the shift include pain will be 5/10 during shift

## 2024-02-19 NOTE — H&P
INTERNAL MEDICINE     HISTORY AND PHYSICAL      Chief Complaint:  Arm pain    History Of Present Illness  Danette Buenrostro is a 33 y.o. female presenting with right shoulder pain.  Patient recently underwent robotic hysterectomy with right salpingectomy and excision of endometriosis as well as a right ovarian cystectomy and appendectomy with oversewing of the bowel and bladder.  She was initially well at home.  Over the past 2 days she has had increasing pain in her right shoulder.  She has had some numbness in her right hand.  There was no neck pain.  She denies calf tenderness.  She had mild shortness of breath with exertion.  She had a VQ scan which showed a PE.  She was started on Lovenox.  She was given analgesics and admitted for further treatment.     Past Medical History  She has a past medical history of Benign neoplasm of breast, Endometriosis, Epilepsy with partial complex seizures (CMS/HCC), Migraine, Obesity, Ovarian cyst, Pelvic floor dysfunction in female, Pelvic pain, Seizure disorder (CMS/HCC) (At 7y), Type A blood, Rh positive, and Vitamin B1 deficiency.    She has no past medical history of KEN (acute kidney injury) (CMS/HCC), Anxiety, Arthritis, Autoimmune disorder (CMS/HCC), Bipolar disorder (CMS/HCC), BPH (benign prostatic hyperplasia), Cerebral aneurysm, Cervical cancer (CMS/HCC), Cervical disc disease, Chronic kidney disease, CKD (chronic kidney disease), Cognitive decline, Crohn's disease (CMS/HCC), Dementia (CMS/HCC), Depression, Dizziness, Dysphagia, Endometrial cancer (CMS/HCC), Esophageal cancer (CMS/HCC), Esophageal disease, ESRD (end stage renal disease) (CMS/HCC), Fibromyalgia, primary, Fractures, Gastric cancer (CMS/HCC), Gender dysphoria, GERD (gastroesophageal reflux disease), GI (gastrointestinal bleed), Hemodialysis status (CMS/HCC), Hernia, internal, History of peritoneal dialysis, HIV disease (CMS/HCC), Immunocompromised (CMS/HCC), Irritable bowel syndrome, Liver disease,  Lumbar disc disease, Mastocytosis, MS (multiple sclerosis) (CMS/HCC), Muscular dystrophy (CMS/HCC), Myasthenia gravis (CMS/HCC), Neuromuscular disorder (CMS/HCC), Ovarian cancer (CMS/HCC), Pancreatitis, Peptic ulcer disease, Prematurity, PTSD (post-traumatic stress disorder), Schizophrenia (CMS/HCC), Spinal stenosis, Substance addiction (CMS/HCC), Syncope, TIA (transient ischemic attack), Ulcerative colitis (CMS/HCC), Urinary tract infection, Uterine cancer (CMS/HCC), or Vertigo.    Surgical History  She has a past surgical history that includes  section, low transverse; Colposcopy; Other surgical history; Oophorectomy (Left); and Tubal ligation.     Social History  She reports that she has never smoked. She has never been exposed to tobacco smoke. She has never used smokeless tobacco. She reports current alcohol use. She reports that she does not use drugs.    Family History  Family History   Problem Relation Name Age of Onset    Hypertension Mother Mohini     Stroke Mother Mohini     Heart attack Mother Mohini     Breast cancer Maternal Grandmother Alexia     Other (cerebral infarction) Maternal Grandfather      Breast cancer Mother's Sister Karol     Cancer Mother's Brother      Seizures Father's Brother      Seizures Other Cousin         Allergies  Patient has no known allergies.    Home Medications:  Prior to Admission medications    Medication Sig Start Date End Date Taking? Authorizing Provider   acetaminophen (Tylenol) 500 mg tablet Take 2 tablets (1,000 mg) by mouth every 6 hours if needed for mild pain (1 - 3) for up to 7 days. 24  Saritha Beckett MD   divalproex (Depakote) 500 mg EC tablet 1 by mouth in the morning, 2 in the evening  Patient taking differently: Take 1 tablet (500 mg) by mouth once daily in the morning. 23   Kip Sigala MD   divalproex (Depakote) 500 mg EC tablet Take 2 tablets (1,000 mg) by mouth once daily in the evening. Do not crush, chew, or  "split.    Historical Provider, MD   ibuprofen 600 mg tablet Take 1 tablet (600 mg) by mouth if needed for mild pain (1 - 3). 2/13/24 3/14/24  Saritha Beckett MD   methocarbamol (Robaxin) 500 mg tablet Take 1 tablet (500 mg) by mouth 4 times a day as needed for muscle spasms. May take 2 tablets at a time if needed. 11/21/23   Saritha Theodore MD   OXcarbazepine (Trileptal) 600 mg tablet Take 2 tablets (1,200 mg) by mouth 2 times a day. 5/1/23   Historical Provider, MD   oxyCODONE (Roxicodone) 5 mg immediate release tablet Take 1 tablet (5 mg) by mouth every 4 hours if needed for severe pain (7 - 10). 2/13/24   Saritha Beckett MD   polyethylene glycol (Glycolax, Miralax) 17 gram/dose powder Take 17 g by mouth once daily. 2/13/24 3/14/24  Saritha Beckett MD   divalproex (Depakote) 250 mg EC tablet Take 1 tablet (250 mg) by mouth 2 times a day. 1/9/23 2/14/24  Historical Provider, MD   ibuprofen 600 mg tablet Take 1 tablet (600 mg) by mouth if needed for mild pain (1 - 3).  2/13/24  Historical Provider, MD   naproxen (Naprosyn) 500 mg tablet Take 1 tablet (500 mg) by mouth if needed. 7/24/23 2/14/24  Historical Provider, MD        Review of Systems     Last Recorded Vitals  Blood pressure 109/63, pulse 107, temperature 37.2 °C (98.9 °F), temperature source Temporal, resp. rate 18, height 1.651 m (5' 5\"), weight 116 kg (256 lb), SpO2 97 %, not currently breastfeeding.    Physical Exam      Constitutional:       Appearance: Young, overweight, in no distress.  HENT:      Head: Normocephalic and atraumatic.   Eyes:      Extraocular Movements: Extraocular movements intact.      Pupils: Pupils are equal, round, and reactive to light.   Cardiovascular:      Rate and Rhythm: Normal rate and regular rhythm.      Pulses: Normal pulses.      Heart sounds: Normal heart sounds.   Pulmonary:      Effort: Pulmonary effort is normal.      Breath sounds: Normal breath sounds.   Abdominal:      General: Abdomen is flat. Bowel " sounds are normal.      Palpations: Abdomen is soft.   Musculoskeletal:         General: Normal range of motion.      Cervical back: Normal range of motion and neck supple.   Skin:     General: Skin is warm and dry.   Neurological:      General: No focal deficit present.      Mental Status: Alert and oriented x 3 with no focal motor deficit.     Relevant Results    Problem list:  Principal Problem:    Tachycardia      ASSESSMENT AND PLAN:    Acute pulmonary embolism -continue with anticoagulation, transition to oral agent soon.  Pulmonary consulted.  Right arm pain -will obtain ultrasound to rule out upper extremity DVT.  Recent surgery -continue with analgesics.  No active bleeding.  Anemia -secondary to acute blood loss related to recent surgery.  Monitor CBC.      Abelardo Szymanski MD  02/18/24   10:15

## 2024-02-19 NOTE — PROGRESS NOTES
02/19/24 [id number]   Discharge Planning   Patient expects to be discharged to: home, no needs anticipated at this time

## 2024-02-20 LAB
ANION GAP SERPL CALC-SCNC: 10 MMOL/L (ref 10–20)
BASOPHILS # BLD AUTO: 0.07 X10*3/UL (ref 0–0.1)
BASOPHILS NFR BLD AUTO: 0.4 %
BUN SERPL-MCNC: 7 MG/DL (ref 6–23)
CALCIUM SERPL-MCNC: 8 MG/DL (ref 8.6–10.3)
CHLORIDE SERPL-SCNC: 98 MMOL/L (ref 98–107)
CO2 SERPL-SCNC: 30 MMOL/L (ref 21–32)
CREAT SERPL-MCNC: 0.66 MG/DL (ref 0.5–1.05)
EGFRCR SERPLBLD CKD-EPI 2021: >90 ML/MIN/1.73M*2
EOSINOPHIL # BLD AUTO: 0.96 X10*3/UL (ref 0–0.7)
EOSINOPHIL NFR BLD AUTO: 6.1 %
ERYTHROCYTE [DISTWIDTH] IN BLOOD BY AUTOMATED COUNT: 14.3 % (ref 11.5–14.5)
GLUCOSE SERPL-MCNC: 111 MG/DL (ref 74–99)
HCT VFR BLD AUTO: 23.7 % (ref 36–46)
HGB BLD-MCNC: 7.8 G/DL (ref 12–16)
IMM GRANULOCYTES # BLD AUTO: 0.39 X10*3/UL (ref 0–0.7)
IMM GRANULOCYTES NFR BLD AUTO: 2.5 % (ref 0–0.9)
LYMPHOCYTES # BLD AUTO: 2.65 X10*3/UL (ref 1.2–4.8)
LYMPHOCYTES NFR BLD AUTO: 17 %
MCH RBC QN AUTO: 29.5 PG (ref 26–34)
MCHC RBC AUTO-ENTMCNC: 32.9 G/DL (ref 32–36)
MCV RBC AUTO: 90 FL (ref 80–100)
MONOCYTES # BLD AUTO: 1.38 X10*3/UL (ref 0.1–1)
MONOCYTES NFR BLD AUTO: 8.8 %
NEUTROPHILS # BLD AUTO: 10.16 X10*3/UL (ref 1.2–7.7)
NEUTROPHILS NFR BLD AUTO: 65.2 %
NRBC BLD-RTO: 0 /100 WBCS (ref 0–0)
PLATELET # BLD AUTO: 536 X10*3/UL (ref 150–450)
POTASSIUM SERPL-SCNC: 4.1 MMOL/L (ref 3.5–5.3)
RBC # BLD AUTO: 2.64 X10*6/UL (ref 4–5.2)
SODIUM SERPL-SCNC: 134 MMOL/L (ref 136–145)
WBC # BLD AUTO: 15.6 X10*3/UL (ref 4.4–11.3)

## 2024-02-20 PROCEDURE — 87040 BLOOD CULTURE FOR BACTERIA: CPT | Mod: AHULAB | Performed by: INTERNAL MEDICINE

## 2024-02-20 PROCEDURE — 2500000004 HC RX 250 GENERAL PHARMACY W/ HCPCS (ALT 636 FOR OP/ED): Performed by: INTERNAL MEDICINE

## 2024-02-20 PROCEDURE — 80048 BASIC METABOLIC PNL TOTAL CA: CPT | Performed by: INTERNAL MEDICINE

## 2024-02-20 PROCEDURE — 2500000001 HC RX 250 WO HCPCS SELF ADMINISTERED DRUGS (ALT 637 FOR MEDICARE OP): Performed by: INTERNAL MEDICINE

## 2024-02-20 PROCEDURE — 36415 COLL VENOUS BLD VENIPUNCTURE: CPT | Performed by: INTERNAL MEDICINE

## 2024-02-20 PROCEDURE — 99231 SBSQ HOSP IP/OBS SF/LOW 25: CPT | Performed by: INTERNAL MEDICINE

## 2024-02-20 PROCEDURE — 1200000002 HC GENERAL ROOM WITH TELEMETRY DAILY

## 2024-02-20 PROCEDURE — 2500000005 HC RX 250 GENERAL PHARMACY W/O HCPCS: Performed by: INTERNAL MEDICINE

## 2024-02-20 PROCEDURE — 85025 COMPLETE CBC W/AUTO DIFF WBC: CPT | Performed by: INTERNAL MEDICINE

## 2024-02-20 RX ORDER — LIDOCAINE 560 MG/1
1 PATCH PERCUTANEOUS; TOPICAL; TRANSDERMAL DAILY
Status: DISCONTINUED | OUTPATIENT
Start: 2024-02-20 | End: 2024-02-23 | Stop reason: HOSPADM

## 2024-02-20 RX ADMIN — ENOXAPARIN SODIUM 120 MG: 120 INJECTION SUBCUTANEOUS at 08:28

## 2024-02-20 RX ADMIN — POLYETHYLENE GLYCOL 3350 17 G: 17 POWDER, FOR SOLUTION ORAL at 08:28

## 2024-02-20 RX ADMIN — OXCARBAZEPINE 1200 MG: 300 TABLET, FILM COATED ORAL at 20:28

## 2024-02-20 RX ADMIN — HYDROMORPHONE HYDROCHLORIDE 0.4 MG: 1 INJECTION, SOLUTION INTRAMUSCULAR; INTRAVENOUS; SUBCUTANEOUS at 19:44

## 2024-02-20 RX ADMIN — GABAPENTIN 100 MG: 100 CAPSULE ORAL at 23:03

## 2024-02-20 RX ADMIN — LIDOCAINE 1 PATCH: 4 PATCH TOPICAL at 08:29

## 2024-02-20 RX ADMIN — ACETAMINOPHEN 650 MG: 325 TABLET ORAL at 20:28

## 2024-02-20 RX ADMIN — DIVALPROEX SODIUM 500 MG: 250 TABLET, DELAYED RELEASE ORAL at 08:28

## 2024-02-20 RX ADMIN — DIVALPROEX SODIUM 1000 MG: 250 TABLET, DELAYED RELEASE ORAL at 20:28

## 2024-02-20 RX ADMIN — OXYCODONE HYDROCHLORIDE 5 MG: 5 TABLET ORAL at 13:32

## 2024-02-20 RX ADMIN — OXYCODONE HYDROCHLORIDE 5 MG: 5 TABLET ORAL at 05:06

## 2024-02-20 RX ADMIN — HYDROMORPHONE HYDROCHLORIDE 0.4 MG: 1 INJECTION, SOLUTION INTRAMUSCULAR; INTRAVENOUS; SUBCUTANEOUS at 08:21

## 2024-02-20 RX ADMIN — ENOXAPARIN SODIUM 120 MG: 120 INJECTION SUBCUTANEOUS at 20:28

## 2024-02-20 RX ADMIN — OXCARBAZEPINE 1200 MG: 300 TABLET, FILM COATED ORAL at 08:28

## 2024-02-20 RX ADMIN — GABAPENTIN 100 MG: 100 CAPSULE ORAL at 05:06

## 2024-02-20 RX ADMIN — GABAPENTIN 100 MG: 100 CAPSULE ORAL at 13:32

## 2024-02-20 ASSESSMENT — COGNITIVE AND FUNCTIONAL STATUS - GENERAL
CLIMB 3 TO 5 STEPS WITH RAILING: A LITTLE
DAILY ACTIVITIY SCORE: 24
MOBILITY SCORE: 23

## 2024-02-20 ASSESSMENT — PAIN DESCRIPTION - LOCATION
LOCATION: SHOULDER

## 2024-02-20 ASSESSMENT — PAIN SCALES - GENERAL
PAINLEVEL_OUTOF10: 0 - NO PAIN
PAINLEVEL_OUTOF10: 6
PAINLEVEL_OUTOF10: 6
PAINLEVEL_OUTOF10: 10 - WORST POSSIBLE PAIN
PAINLEVEL_OUTOF10: 9
PAINLEVEL_OUTOF10: 8
PAINLEVEL_OUTOF10: 0 - NO PAIN
PAINLEVEL_OUTOF10: 10 - WORST POSSIBLE PAIN
PAINLEVEL_OUTOF10: 6

## 2024-02-20 ASSESSMENT — PAIN - FUNCTIONAL ASSESSMENT
PAIN_FUNCTIONAL_ASSESSMENT: 0-10

## 2024-02-20 ASSESSMENT — PAIN DESCRIPTION - ORIENTATION
ORIENTATION: RIGHT

## 2024-02-20 ASSESSMENT — PAIN SCALES - WONG BAKER: WONGBAKER_NUMERICALRESPONSE: HURTS WHOLE LOT

## 2024-02-20 NOTE — PROGRESS NOTES
Danette Buenrostro is a 33 y.o. female on day 2 of admission presenting with Tachycardia.  Patient with h/o migraines, epilepsy, Vit Deficiency, endometriosis with recent total robotic hysterectomy, R salpingectomy, excision of endometriosis, right ovarian cystectomy, appendectomy and oversewing of bowel and bladder who p/w R shoulder pain that started suddenly after her surgery (5 days ago), and associated with numbness in R fingers. In the ED initial work up was unremarkable. However given she also had SOB, a V/Q scan was done that showed PE, admitted to the [facility] for further management. Pulmonary is consulted for the PE.   Subjective   No acute overnight events. Remains on RA.     SOB and R shoulder pain continue to improve. Denies any other complaints. Overall is feeling better.   Objective   Scheduled medications  divalproex, 1,000 mg, oral, q PM  divalproex, 500 mg, oral, q AM  enoxaparin, 1 mg/kg, subcutaneous, BID  gabapentin, 100 mg, oral, q8h FANY  OXcarbazepine, 1,200 mg, oral, BID  polyethylene glycol, 17 g, oral, Daily    Continuous medications     PRN medications  PRN medications: acetaminophen **OR** acetaminophen **OR** acetaminophen, acetaminophen, methocarbamol, oxyCODONE, polyethylene glycol   Physical Exam  Constitutional:       General: She is not in acute distress.     Appearance: Normal appearance. She is obese. She is not ill-appearing or toxic-appearing.   HENT:      Head: Normocephalic and atraumatic.      Nose:      Comments: On RA.     Mouth/Throat:      Mouth: Mucous membranes are moist.      Comments: Mallampati 4.   Eyes:      General: No scleral icterus.     Pupils: Pupils are equal, round, and reactive to light.   Cardiovascular:      Rate and Rhythm: Regular rhythm. Tachycardia present.      Heart sounds: No murmur heard.     No friction rub. No gallop.   Pulmonary:      Effort: Pulmonary effort is normal. No respiratory distress.      Breath sounds: Normal breath sounds. No wheezing or  "rales.   Abdominal:      General: There is no distension.      Palpations: Abdomen is soft.      Tenderness: There is no abdominal tenderness.      Comments: Surgical site clean.   Musculoskeletal:         General: Tenderness (R shoulder but improved) present.      Cervical back: Normal range of motion and neck supple. No rigidity.      Right lower leg: Edema (Trace) present.      Left lower leg: Edema (Trace) present.   Lymphadenopathy:      Cervical: No cervical adenopathy.   Skin:     General: Skin is warm and dry.      Coloration: Skin is not jaundiced.   Neurological:      General: No focal deficit present.      Mental Status: She is alert.      Cranial Nerves: No cranial nerve deficit.      Motor: No weakness.   Psychiatric:         Mood and Affect: Mood normal.         Behavior: Behavior normal.     Last Recorded Vitals  Blood pressure 159/79, pulse (!) 112, temperature 37.8 °C (100 °F), temperature source Oral, resp. rate 18, height 1.651 m (5' 5\"), weight 116 kg (256 lb), SpO2 98 %, not currently breastfeeding.  Intake/Output last 3 Shifts:  No intake/output data recorded.    Relevant Results  Results for orders placed or performed during the hospital encounter of 02/17/24 (from the past 24 hour(s))   Vascular US lower extremity venous duplex bilateral   Result Value Ref Range    BSA 2.31 m2   Upper extremity venous duplex bilateral   Result Value Ref Range    BSA 2.31 m2   Basic metabolic panel   Result Value Ref Range    Glucose 102 (H) 74 - 99 mg/dL    Sodium 135 (L) 136 - 145 mmol/L    Potassium 4.4 3.5 - 5.3 mmol/L    Chloride 97 (L) 98 - 107 mmol/L    Bicarbonate 30 21 - 32 mmol/L    Anion Gap 12 10 - 20 mmol/L    Urea Nitrogen 7 6 - 23 mg/dL    Creatinine 0.66 0.50 - 1.05 mg/dL    eGFR >90 >60 mL/min/1.73m*2    Calcium 8.3 (L) 8.6 - 10.3 mg/dL   CBC and Auto Differential   Result Value Ref Range    WBC 19.1 (H) 4.4 - 11.3 x10*3/uL    nRBC 0.1 (H) 0.0 - 0.0 /100 WBCs    RBC 2.76 (L) 4.00 - 5.20 " x10*6/uL    Hemoglobin 8.3 (L) 12.0 - 16.0 g/dL    Hematocrit 24.7 (L) 36.0 - 46.0 %    MCV 90 80 - 100 fL    MCH 30.1 26.0 - 34.0 pg    MCHC 33.6 32.0 - 36.0 g/dL    RDW 14.3 11.5 - 14.5 %    Platelets 538 (H) 150 - 450 x10*3/uL    Neutrophils % 64.6 40.0 - 80.0 %    Immature Granulocytes %, Automated 2.0 (H) 0.0 - 0.9 %    Lymphocytes % 19.4 13.0 - 44.0 %    Monocytes % 8.9 2.0 - 10.0 %    Eosinophils % 4.8 0.0 - 6.0 %    Basophils % 0.3 0.0 - 2.0 %    Neutrophils Absolute 12.35 (H) 1.20 - 7.70 x10*3/uL    Immature Granulocytes Absolute, Automated 0.39 0.00 - 0.70 x10*3/uL    Lymphocytes Absolute 3.71 1.20 - 4.80 x10*3/uL    Monocytes Absolute 1.71 (H) 0.10 - 1.00 x10*3/uL    Eosinophils Absolute 0.92 (H) 0.00 - 0.70 x10*3/uL    Basophils Absolute 0.05 0.00 - 0.10 x10*3/uL   NM Lung perfusion with spect/ct    Result Date: 2/17/2024  Interpreted By:  Charbel Bro, STUDY: NM LUNG PERFUSION WITH SPECT/CT;  2/17/2024 12:22 pm   INDICATION: Signs/Symptoms:concern for PE, CT PE non-diagnostic; tachy, POD 5 after multiple surgery.   COMPARISON: Chest x-ray from 02/17/2024   ACCESSION NUMBER(S): [id number]   ORDERING CLINICIAN: JARAD WEAVER   TECHNIQUE: DIVISION OF NUCLEAR MEDICINE PERFUSION LUNG SCANS   Multiple perfusion images of the lungs were acquired after the intravenous administration of 4.4 mCi of Tc-99m macroaggregated albumin (MAA). In addition, SPECT/CT of the chest was performed.   FINDINGS: Planar perfusion images of both lungs demonstrate mild heterogeneity throughout the lung fields bilaterally. Wedge-shaped segmental perfusion defect in the left upper lobe seen on SPECT/CT suggestive of acute pulmonary embolism..       1. Wedge-shaped segmental perfusion defect in the left upper lobe seen on SPECT/CT suggestive of acute pulmonary embolism..   Critical Finding:  See findings. Notification was initiated on 2/17/2024 at 3:26 pm by Dr. Charbel Bro to Dr. Jarad Weaver. (**-YCF-**)   The interpretation above  is based on modified PIOPED II and PISAPED criteria.   This study was analyzed and interpreted at [place], [place].   Signed by: Charbel Bro 2/17/2024 3:33 PM Dictation workstation:   [id number]    CT angio chest for pulmonary embolism    Result Date: 2/17/2024  Interpreted By:  Abelardo Mane, STUDY: CT ANGIO CHEST FOR PULMONARY EMBOLISM;  2/17/2024 9:43 am   INDICATION: 32 y/o   F with  Signs/Symptoms:recent surgery, R shoulder pain, tachycardia.   LIMITATIONS: The exam was limited by body habitus and also by timing of image acquisition relative to contrast enhancement..   ACCESSION NUMBER(S): [id number]   ORDERING CLINICIAN: WILLIAM KHAN   TECHNIQUE: After the administration of intravenous nonionic contrast, thin-section arterial phase images were obtained  from the thoracic inlet down through the iliac crest. Sagittal and coronal reconstruction images were generated. Axial and coronal MIP vascular reconstruction images were also generated.   Mediastinal, lung, bone, and liver windows were reviewed. IV contrast agent was Omnipaque 350, 75 mL.     COMPARISON: Previous exam from 07/10/2020.   FINDINGS: CHEST WALL/BASE OF THE NECK: Stable 15 mm diameter nodule posterior to the nipple of the left breast, anteriorly in the left breast parenchyma on axial image 146. There are multiple nonspecific but mildly asymmetrically enlarged left axillary lymph nodes measuring up to 12 x 18 mm. No thyromegaly or thyroid mass.   MEDIASTINUM/CELSO: No mediastinal or hilar adenopathy in this exam. Mild cardiomegaly. No pericardial effusion. No thoracic aortic aneurysm or dissection. Imaging occurred late in the levo phase of enhancement. Therefore, the exam was nondiagnostic for pulmonary embolism.   LUNGS/ PLEURA/ AND TRACHEA: Hypoventilatory lungs. There is bandlike atelectasis versus scarring laterally and posteriorly in the left lower lobe. No mass or pneumonia in either lung. No  pleural effusion or  pneumothorax. The trachea was grossly intact.   BONES: No destructive lytic or blastic bone lesion.   UPPER ABDOMEN: The imaged upper abdomen was grossly intact.       Imaging occurred late in the levo phase of enhancement. The exam is nondiagnostic for pulmonary embolism.   Cardiomegaly. No indication of ongoing CHF.   Band like atelectasis versus scarring at the posterior and lateral left lung base.   Stable 15 mm retroareolar nodule on the left. This could be stable or cyst. However, there is also mild asymmetric left axillary adenopathy. Clinical correlation needed. Suggest further evaluation with mammography and ultrasound, and breast MRI if clinically needed.   MACRO: None   Signed by: Abelardo Mane 2/17/2024 10:06 AM Dictation workstation:   [id number]    XR shoulder right 2+ views    Result Date: 2/17/2024  Interpreted By:  Valerio Real, STUDY: XR SHOULDER RIGHT 2+ VIEWS;  2/17/2024 8:29 am   INDICATION: Signs/Symptoms:anterior R shoulder pain w/decreased ROM.   COMPARISON: None.   ACCESSION NUMBER(S): [id number]   ORDERING CLINICIAN: WILLIAM KHAN   FINDINGS: Bony structures:  Intact   Joint spaces:  Maintained   Soft tissues:  Unremarkable without significant edema or radiodense foreign body   Other:  None significant       No acute findings.   MACRO: None   Signed by: Valerio Real 2/17/2024 9:06 AM Dictation workstation:   [id number]    Assessment/Plan   Principal Problem:    Tachycardia  33 YOF with h/o migraines, epilepsy, Vit Deficiency, endometriosis with recent total robotic hysterectomy, R salpingectomy, excision of endometriosis, right ovarian cystectomy, appendectomy and oversewing of bowel and bladder who p/w R shoulder pain that started suddenly after her surgery (5 days ago), and associated with numbness in R fingers. In the ED initial work up was unremarkable. However given she also had SOB, a V/Q scan was done that showed PE, admitted to the [facility] for further management. Pulmonary is  consulted for the PE.      Pulmonary embolism: first episodes, seems to be provoked due to her recent surgery. CT PE non diagnostic  due to timing, but V/Q scan showed DEEPAK wedge shape filling defects. Currently slightly tachycardic, otherwise hemodynamically stable and not on home O2.      Continue Lovenox can transition to OAC      Will need hypercoagulable state work up, can be done as outpatient     FU with hematology after DC     Supplemental O2 as needed     LE and RUE doppler negative for DVT.      Pulmonary will FU while in house.     Philippe Chester MD

## 2024-02-20 NOTE — CONSULTS
INFECTIOUS DISEASE INPATIENT INITIAL CONSULTATION    Referred By: Abelardo Szymanski    Reason For Consult: leukocytosis    HPI:  This is a 33 y.o. female with PMH of migraines, epilepsy, recent MARLEY and right salpingectomy for endometriosis, cystectomy, appendectomy who presented with right shoulder pain.    Surgery was last week. She developed right shoulder pain and numbness in right fingers. No fevers/chills. Found to have PE on VQ scan.     Tmax 100 here. WBC was 12.5 and increasing to 19.1. Today is 15. Mostly neutrophilic although there are about 900 eosinophils also on 2 latest differentials. Was given 1x dose IV CTX on admission but not on abx now. No steroid use. On Lovenox. Blood cx x2 NGTD.    He main complaint is right shoulder pain still. Has weakness and number in the 1st 3 fingertips. Abd feels fine from surgery without pain and incisions are healing OK. She denies any rash/itching.    Allergies:  Patient has no known allergies.     Vitals (Last 24 Hours):  Heart Rate:  []   Temp:  [36.7 °C (98 °F)-37.8 °C (100 °F)]   Resp:  [16-18]   BP: (109-159)/(62-81)   SpO2:  [97 %-99 %]      PHYSICAL EXAM:  Gen - NAD  Heart - RRR  Lungs - clear bilaterally, no wheezing  Abd - soft, no ttp, BS present, incisions healing fine  MSK - right shoulder is swollen and warm compared to the left  Skin - no rash    MEDS:    Current Facility-Administered Medications:     acetaminophen (Tylenol) tablet 650 mg, 650 mg, oral, q4h PRN, 650 mg at 02/19/24 2008 **OR** acetaminophen (Tylenol) oral liquid 650 mg, 650 mg, oral, q4h PRN **OR** acetaminophen (Tylenol) suppository 650 mg, 650 mg, rectal, q4h PRN, Abelardo Szymanski MD    acetaminophen (Tylenol) tablet 975 mg, 975 mg, oral, q6h PRN, Abelardo Szymanski MD    divalproex (Depakote) EC tablet 1,000 mg, 1,000 mg, oral, q PM, Abelardo Szymanski MD, 1,000 mg at 02/19/24 2009    divalproex (Depakote) EC tablet 500 mg, 500 mg, oral, q AM, Abelardo Szymanski MD, 500 mg at  02/19/24 0840    enoxaparin (Lovenox) syringe 120 mg, 1 mg/kg, subcutaneous, BID, Abelardo Szymanski MD, 120 mg at 02/19/24 2009    gabapentin (Neurontin) capsule 100 mg, 100 mg, oral, q8h FANY, Abelardo Szymanski MD, 100 mg at 02/20/24 0506    lidocaine 4 % patch 1 patch, 1 patch, transdermal, Daily, Abelardo Szymanski MD    methocarbamol (Robaxin) tablet 500 mg, 500 mg, oral, 4x daily PRN, Abelardo Szymanski MD, 500 mg at 02/18/24 1634    OXcarbazepine (Trileptal) tablet 1,200 mg, 1,200 mg, oral, BID, Abelardo Szymanski MD, 1,200 mg at 02/19/24 2009    oxyCODONE (Roxicodone) immediate release tablet 5 mg, 5 mg, oral, q4h PRN, Abelardo Szymanski MD, 5 mg at 02/20/24 0506    polyethylene glycol (Glycolax, Miralax) packet 17 g, 17 g, oral, Daily PRN, Abelardo Szymanski MD    polyethylene glycol (Glycolax, Miralax) packet 17 g, 17 g, oral, Daily, Abelardo Szymanski MD, 17 g at 02/19/24 0840     LABS:  Lab Results   Component Value Date    WBC 19.1 (H) 02/19/2024    HGB 8.3 (L) 02/19/2024    HCT 24.7 (L) 02/19/2024    MCV 90 02/19/2024     (H) 02/19/2024      Results from last 72 hours   Lab Units 02/19/24  1127   SODIUM mmol/L 135*   POTASSIUM mmol/L 4.4   CHLORIDE mmol/L 97*   CO2 mmol/L 30   BUN mg/dL 7   CREATININE mg/dL 0.66   GLUCOSE mg/dL 102*   CALCIUM mg/dL 8.3*   ANION GAP mmol/L 12   EGFR mL/min/1.73m*2 >90     Results from last 72 hours   Lab Units 02/17/24  0807   ALK PHOS U/L 54   BILIRUBIN TOTAL mg/dL 0.3   PROTEIN TOTAL g/dL 7.4   ALT U/L 60*   AST U/L 75*   ALBUMIN g/dL 3.5     Estimated Creatinine Clearance: 125 mL/min (by C-G formula based on SCr of 0.66 mg/dL).    IMAGING:  VQ Scan 2/17  Impression:     1. Wedge-shaped segmental perfusion defect in the left upper lobe  seen on SPECT/CT suggestive of acute pulmonary embolism..     CT/PE 2/17  Impression:     Imaging occurred late in the levo phase of enhancement. The exam is  nondiagnostic for pulmonary embolism.    Cardiomegaly. No indication of  ongoing CHF.    Band like atelectasis versus scarring at the posterior and lateral  left lung base.    Stable 15 mm retroareolar nodule on the left. This could be stable or  cyst. However, there is also mild asymmetric left axillary  adenopathy. Clinical correlation needed. Suggest further evaluation  with mammography and ultrasound, and breast MRI if clinically needed.     X-Ray Right Shoulder 2/17  Impression:     No acute findings.       ASSESSMENT/PLAN:    Leukocytosis - unclear etiology. Stress related? Doesn't seem to have an infection. WBC is coming down now without any specific intervention for infection. Eosinophils are elevated but no signs/symptoms of an allergic reaction.  Right Shoulder Pain/Radiculopathy - seems like a brachial plexus related issue. I doubt this would be infected  Recent MARLEY and abd surgery - no abd pain and incisions are healing. Doubtful there would be infection here.     I am OK with holding off on antibiotics for now. We can consider additional imagine of the shoulder or abdomen but I do think these will be relatively low yield. Might just need more time for the shoulder to improve. If she remains admitted we can continue to monitor WBC. D/w Dr. Szymanski    Ordered MRI right shoulder with and without contrast - assess for septic arthritis vs other injury.    Henok Vincent MD  [facility]  Office #[phone]

## 2024-02-20 NOTE — PROGRESS NOTES
Danette Buenrostro is a 33 y.o. female on day 3 of admission presenting with Tachycardia.  Patient with h/o migraines, epilepsy, Vit Deficiency, endometriosis with recent total robotic hysterectomy, R salpingectomy, excision of endometriosis, right ovarian cystectomy, appendectomy and oversewing of bowel and bladder who p/w R shoulder pain that started suddenly after her surgery (5 days ago), and associated with numbness in R fingers. In the ED initial work up was unremarkable. However given she also had SOB, a V/Q scan was done that showed PE, admitted to the [facility] for further management. Pulmonary is consulted for the PE.   Subjective   No acute overnight events. Remains on RA.     SOB now improved. R shoulder pain also much better. Denies any other complaints. Overall is feeling better.   Objective   Scheduled medications  divalproex, 1,000 mg, oral, q PM  divalproex, 500 mg, oral, q AM  enoxaparin, 1 mg/kg, subcutaneous, BID  gabapentin, 100 mg, oral, q8h FANY  lidocaine, 1 patch, transdermal, Daily  OXcarbazepine, 1,200 mg, oral, BID  polyethylene glycol, 17 g, oral, Daily    Continuous medications     PRN medications  PRN medications: acetaminophen **OR** acetaminophen **OR** acetaminophen, acetaminophen, methocarbamol, oxyCODONE, polyethylene glycol   Physical Exam  Constitutional:       General: She is not in acute distress.     Appearance: Normal appearance. She is obese. She is not ill-appearing or toxic-appearing.   HENT:      Head: Normocephalic and atraumatic.      Nose:      Comments: On RA.     Mouth/Throat:      Mouth: Mucous membranes are moist.      Comments: Mallampati 4.   Eyes:      General: No scleral icterus.     Pupils: Pupils are equal, round, and reactive to light.   Cardiovascular:      Rate and Rhythm: Regular rhythm. Tachycardia present.      Heart sounds: No murmur heard.     No friction rub. No gallop.   Pulmonary:      Effort: Pulmonary effort is normal. No respiratory distress.      Breath  "sounds: Normal breath sounds. No wheezing or rales.   Abdominal:      General: There is no distension.      Palpations: Abdomen is soft.      Tenderness: There is no abdominal tenderness.      Comments: Surgical site clean.   Musculoskeletal:         General: Tenderness (R shoulder but markedly improved) present.      Cervical back: Normal range of motion and neck supple. No rigidity.      Right lower leg: Edema (Trace) present.      Left lower leg: Edema (Trace) present.   Lymphadenopathy:      Cervical: No cervical adenopathy.   Skin:     General: Skin is warm and dry.      Coloration: Skin is not jaundiced.   Neurological:      General: No focal deficit present.      Mental Status: She is alert.      Cranial Nerves: No cranial nerve deficit.      Motor: No weakness.   Psychiatric:         Mood and Affect: Mood normal.         Behavior: Behavior normal.     Last Recorded Vitals  Blood pressure 132/74, pulse 104, temperature 36.6 °C (97.9 °F), temperature source Temporal, resp. rate 17, height 1.651 m (5' 5\"), weight 116 kg (256 lb), SpO2 99 %, not currently breastfeeding.  Intake/Output last 3 Shifts:  No intake/output data recorded.    Relevant Results  Results for orders placed or performed during the hospital encounter of 02/17/24 (from the past 24 hour(s))   CBC and Auto Differential   Result Value Ref Range    WBC 15.6 (H) 4.4 - 11.3 x10*3/uL    nRBC 0.0 0.0 - 0.0 /100 WBCs    RBC 2.64 (L) 4.00 - 5.20 x10*6/uL    Hemoglobin 7.8 (L) 12.0 - 16.0 g/dL    Hematocrit 23.7 (L) 36.0 - 46.0 %    MCV 90 80 - 100 fL    MCH 29.5 26.0 - 34.0 pg    MCHC 32.9 32.0 - 36.0 g/dL    RDW 14.3 11.5 - 14.5 %    Platelets 536 (H) 150 - 450 x10*3/uL    Neutrophils % 65.2 40.0 - 80.0 %    Immature Granulocytes %, Automated 2.5 (H) 0.0 - 0.9 %    Lymphocytes % 17.0 13.0 - 44.0 %    Monocytes % 8.8 2.0 - 10.0 %    Eosinophils % 6.1 0.0 - 6.0 %    Basophils % 0.4 0.0 - 2.0 %    Neutrophils Absolute 10.16 (H) 1.20 - 7.70 x10*3/uL    " Immature Granulocytes Absolute, Automated 0.39 0.00 - 0.70 x10*3/uL    Lymphocytes Absolute 2.65 1.20 - 4.80 x10*3/uL    Monocytes Absolute 1.38 (H) 0.10 - 1.00 x10*3/uL    Eosinophils Absolute 0.96 (H) 0.00 - 0.70 x10*3/uL    Basophils Absolute 0.07 0.00 - 0.10 x10*3/uL   Basic metabolic panel   Result Value Ref Range    Glucose 111 (H) 74 - 99 mg/dL    Sodium 134 (L) 136 - 145 mmol/L    Potassium 4.1 3.5 - 5.3 mmol/L    Chloride 98 98 - 107 mmol/L    Bicarbonate 30 21 - 32 mmol/L    Anion Gap 10 10 - 20 mmol/L    Urea Nitrogen 7 6 - 23 mg/dL    Creatinine 0.66 0.50 - 1.05 mg/dL    eGFR >90 >60 mL/min/1.73m*2    Calcium 8.0 (L) 8.6 - 10.3 mg/dL   NM Lung perfusion with spect/ct    Result Date: 2/17/2024  Interpreted By:  Charbel Bro, STUDY: NM LUNG PERFUSION WITH SPECT/CT;  2/17/2024 12:22 pm   INDICATION: Signs/Symptoms:concern for PE, CT PE non-diagnostic; tachy, POD 5 after multiple surgery.   COMPARISON: Chest x-ray from 02/17/2024   ACCESSION NUMBER(S): [id number]   ORDERING CLINICIAN: JARAD WEAVER   TECHNIQUE: DIVISION OF NUCLEAR MEDICINE PERFUSION LUNG SCANS   Multiple perfusion images of the lungs were acquired after the intravenous administration of 4.4 mCi of Tc-99m macroaggregated albumin (MAA). In addition, SPECT/CT of the chest was performed.   FINDINGS: Planar perfusion images of both lungs demonstrate mild heterogeneity throughout the lung fields bilaterally. Wedge-shaped segmental perfusion defect in the left upper lobe seen on SPECT/CT suggestive of acute pulmonary embolism..       1. Wedge-shaped segmental perfusion defect in the left upper lobe seen on SPECT/CT suggestive of acute pulmonary embolism..   Critical Finding:  See findings. Notification was initiated on 2/17/2024 at 3:26 pm by Dr. Charbel Bro to Dr. Jarad Weaver. (**-YCF-**)   The interpretation above is based on modified PIOPED II and PISAPED criteria.   This study was analyzed and interpreted at [facility],  [place].   Signed by: Charbel Bro 2/17/2024 3:33 PM Dictation workstation:   [id number]    CT angio chest for pulmonary embolism    Result Date: 2/17/2024  Interpreted By:  Abelardo Mane, STUDY: CT ANGIO CHEST FOR PULMONARY EMBOLISM;  2/17/2024 9:43 am   INDICATION: 32 y/o   F with  Signs/Symptoms:recent surgery, R shoulder pain, tachycardia.   LIMITATIONS: The exam was limited by body habitus and also by timing of image acquisition relative to contrast enhancement..   ACCESSION NUMBER(S): [id number]   ORDERING CLINICIAN: WILLIAM KHAN   TECHNIQUE: After the administration of intravenous nonionic contrast, thin-section arterial phase images were obtained  from the thoracic inlet down through the iliac crest. Sagittal and coronal reconstruction images were generated. Axial and coronal MIP vascular reconstruction images were also generated.   Mediastinal, lung, bone, and liver windows were reviewed. IV contrast agent was Omnipaque 350, 75 mL.     COMPARISON: Previous exam from 07/10/2020.   FINDINGS: CHEST WALL/BASE OF THE NECK: Stable 15 mm diameter nodule posterior to the nipple of the left breast, anteriorly in the left breast parenchyma on axial image 146. There are multiple nonspecific but mildly asymmetrically enlarged left axillary lymph nodes measuring up to 12 x 18 mm. No thyromegaly or thyroid mass.   MEDIASTINUM/CELSO: No mediastinal or hilar adenopathy in this exam. Mild cardiomegaly. No pericardial effusion. No thoracic aortic aneurysm or dissection. Imaging occurred late in the levo phase of enhancement. Therefore, the exam was nondiagnostic for pulmonary embolism.   LUNGS/ PLEURA/ AND TRACHEA: Hypoventilatory lungs. There is bandlike atelectasis versus scarring laterally and posteriorly in the left lower lobe. No mass or pneumonia in either lung. No  pleural effusion or pneumothorax. The trachea was grossly intact.   BONES: No destructive lytic or blastic bone lesion.   UPPER ABDOMEN: The imaged upper  abdomen was grossly intact.       Imaging occurred late in the levo phase of enhancement. The exam is nondiagnostic for pulmonary embolism.   Cardiomegaly. No indication of ongoing CHF.   Band like atelectasis versus scarring at the posterior and lateral left lung base.   Stable 15 mm retroareolar nodule on the left. This could be stable or cyst. However, there is also mild asymmetric left axillary adenopathy. Clinical correlation needed. Suggest further evaluation with mammography and ultrasound, and breast MRI if clinically needed.   MACRO: None   Signed by: Abelardo Mane 2/17/2024 10:06 AM Dictation workstation:   [id number]    XR shoulder right 2+ views    Result Date: 2/17/2024  Interpreted By:  Valerio Real, STUDY: XR SHOULDER RIGHT 2+ VIEWS;  2/17/2024 8:29 am   INDICATION: Signs/Symptoms:anterior R shoulder pain w/decreased ROM.   COMPARISON: None.   ACCESSION NUMBER(S): [id number]   ORDERING CLINICIAN: WILLIAM KHAN   FINDINGS: Bony structures:  Intact   Joint spaces:  Maintained   Soft tissues:  Unremarkable without significant edema or radiodense foreign body   Other:  None significant       No acute findings.   MACRO: None   Signed by: Valerio Real 2/17/2024 9:06 AM Dictation workstation:   [id number]    Assessment/Plan   Principal Problem:    Tachycardia  33 YOF with h/o migraines, epilepsy, Vit Deficiency, endometriosis with recent total robotic hysterectomy, R salpingectomy, excision of endometriosis, right ovarian cystectomy, appendectomy and oversewing of bowel and bladder who p/w R shoulder pain that started suddenly after her surgery (5 days ago), and associated with numbness in R fingers. In the ED initial work up was unremarkable. However given she also had SOB, a V/Q scan was done that showed PE, admitted to the [facility] for further management. Pulmonary is consulted for the PE.      Pulmonary embolism: first episodes, seems to be provoked due to her recent surgery. CT PE non diagnostic  due to  timing, but V/Q scan showed DEEPAK wedge shape filling defects. Currently slightly tachycardic, otherwise hemodynamically stable and not on home O2.      Continue Lovenox can transition to OAC      Will need hypercoagulable state work up, can be done as outpatient     FU with hematology after DC     Supplemental O2 as needed     LE and RUE doppler done, negative for DVT.      Pulmonary will FU while in house.     Philippe Chester MD

## 2024-02-20 NOTE — PROGRESS NOTES
INTERNAL MEDICINE PROGRESS NOTE      HPI:    Has had intermittent right shoulder pain. Has occasional numbness in the fingers.     Vital signs in last 24 hours:  Temp:  [36.7 °C (98 °F)-37.8 °C (100 °F)] 37.1 °C (98.8 °F)  Heart Rate:  [] 93  Resp:  [16-18] 17  BP: (109-159)/(62-81) 125/62    Physical Examination:  Physical Exam    Constitutional:       Appearance: Young, overweight, in no distress.  HENT:      Head: Normocephalic and atraumatic.   Eyes:      Extraocular Movements: Extraocular movements intact.      Pupils: Pupils are equal, round, and reactive to light.   Cardiovascular:      Rate and Rhythm: Normal rate and regular rhythm.      Pulses: Normal pulses.      Heart sounds: Normal heart sounds.   Pulmonary:      Effort: Pulmonary effort is normal.      Breath sounds: Normal breath sounds.   Abdominal:      General: Abdomen is flat. Bowel sounds are normal.      Palpations: Abdomen is soft.   Musculoskeletal:         General: Normal range of motion.      Cervical back: Normal range of motion and neck supple. Tender anterior aspect of right shoulder.  Skin:     General: Skin is warm and dry.   Neurological:      General: No focal deficit present.      Mental Status: Alert and oriented x 3 with no focal motor deficit.       Medications:    Current Facility-Administered Medications:     acetaminophen (Tylenol) tablet 650 mg, 650 mg, oral, q4h PRN, 650 mg at 02/19/24 2008 **OR** acetaminophen (Tylenol) oral liquid 650 mg, 650 mg, oral, q4h PRN **OR** acetaminophen (Tylenol) suppository 650 mg, 650 mg, rectal, q4h PRN, Abelardo Szymanski MD    acetaminophen (Tylenol) tablet 975 mg, 975 mg, oral, q6h PRN, Abelardo Szymanski MD    divalproex (Depakote) EC tablet 1,000 mg, 1,000 mg, oral, q PM, Abelardo Szymanski MD, 1,000 mg at 02/19/24 2009    divalproex (Depakote) EC tablet 500 mg, 500 mg, oral, q AM, Abelardo Szymanski MD, 500 mg at 02/19/24 0840    enoxaparin (Lovenox) syringe 120 mg, 1 mg/kg,  subcutaneous, BID, Abelardo Szymanski MD, 120 mg at 02/19/24 2009    gabapentin (Neurontin) capsule 100 mg, 100 mg, oral, q8h FANY, Abelardo Szymanski MD, 100 mg at 02/20/24 0506    methocarbamol (Robaxin) tablet 500 mg, 500 mg, oral, 4x daily PRN, Abelardo Szymanski MD, 500 mg at 02/18/24 1634    OXcarbazepine (Trileptal) tablet 1,200 mg, 1,200 mg, oral, BID, Abelardo Szymanski MD, 1,200 mg at 02/19/24 2009    oxyCODONE (Roxicodone) immediate release tablet 5 mg, 5 mg, oral, q4h PRN, Abelardo Szymanski MD, 5 mg at 02/20/24 0506    polyethylene glycol (Glycolax, Miralax) packet 17 g, 17 g, oral, Daily PRN, Abelardo Szymanski MD    polyethylene glycol (Glycolax, Miralax) packet 17 g, 17 g, oral, Daily, Abelardo Szymanski MD, 17 g at 02/19/24 0840    Laboratory Findings:  Lab Results   Component Value Date    WBC 19.1 (H) 02/19/2024    HGB 8.3 (L) 02/19/2024    HCT 24.7 (L) 02/19/2024    MCV 90 02/19/2024     (H) 02/19/2024     Lab Results   Component Value Date    INR 1.3 (H) 02/17/2024    INR 1.1 04/13/2018    INR 1.2 (H) 02/09/2018    PROTIME 14.5 (H) 02/17/2024    PROTIME 12.7 04/13/2018    PROTIME 13.6 (H) 02/09/2018     Lab Results   Component Value Date    GLUCOSE 102 (H) 02/19/2024    CALCIUM 8.3 (L) 02/19/2024     (L) 02/19/2024    K 4.4 02/19/2024    CO2 30 02/19/2024    CL 97 (L) 02/19/2024    BUN 7 02/19/2024    CREATININE 0.66 02/19/2024       Assessment and Plan:     Acute pulmonary embolism -continue with anticoagulation, transition to oral agent.   Right arm pain -no DVT, likely musculoskeletal. Will offer gabapentin and lidocaine patch.  Recent surgery -continue with analgesics.  No active bleeding.  Anemia -secondary to acute blood loss related to recent surgery.  Monitor CBC.  Leucocytosis - ID opinion requested.       Abelardo Szymanski MD  02/19/24  10:40 AM

## 2024-02-21 LAB
ANION GAP SERPL CALC-SCNC: 14 MMOL/L (ref 10–20)
ATRIAL RATE: 101 BPM
BACTERIA BLD CULT: NORMAL
BACTERIA BLD CULT: NORMAL
BASOPHILS # BLD AUTO: 0.06 X10*3/UL (ref 0–0.1)
BASOPHILS NFR BLD AUTO: 0.4 %
BUN SERPL-MCNC: 7 MG/DL (ref 6–23)
CALCIUM SERPL-MCNC: 8.1 MG/DL (ref 8.6–10.3)
CHLORIDE SERPL-SCNC: 98 MMOL/L (ref 98–107)
CO2 SERPL-SCNC: 28 MMOL/L (ref 21–32)
CREAT SERPL-MCNC: 0.65 MG/DL (ref 0.5–1.05)
EGFRCR SERPLBLD CKD-EPI 2021: >90 ML/MIN/1.73M*2
EOSINOPHIL # BLD AUTO: 1.02 X10*3/UL (ref 0–0.7)
EOSINOPHIL NFR BLD AUTO: 6.9 %
ERYTHROCYTE [DISTWIDTH] IN BLOOD BY AUTOMATED COUNT: 13.7 % (ref 11.5–14.5)
GLUCOSE SERPL-MCNC: 118 MG/DL (ref 74–99)
HCT VFR BLD AUTO: 23.8 % (ref 36–46)
HGB BLD-MCNC: 8.1 G/DL (ref 12–16)
IMM GRANULOCYTES # BLD AUTO: 0.62 X10*3/UL (ref 0–0.7)
IMM GRANULOCYTES NFR BLD AUTO: 4.2 % (ref 0–0.9)
LYMPHOCYTES # BLD AUTO: 2.77 X10*3/UL (ref 1.2–4.8)
LYMPHOCYTES NFR BLD AUTO: 18.7 %
MCH RBC QN AUTO: 28.9 PG (ref 26–34)
MCHC RBC AUTO-ENTMCNC: 34 G/DL (ref 32–36)
MCV RBC AUTO: 85 FL (ref 80–100)
MONOCYTES # BLD AUTO: 1.15 X10*3/UL (ref 0.1–1)
MONOCYTES NFR BLD AUTO: 7.7 %
NEUTROPHILS # BLD AUTO: 9.23 X10*3/UL (ref 1.2–7.7)
NEUTROPHILS NFR BLD AUTO: 62.1 %
NRBC BLD-RTO: 0.2 /100 WBCS (ref 0–0)
P AXIS: 45 DEGREES
P OFFSET: 184 MS
P ONSET: 141 MS
PLATELET # BLD AUTO: 621 X10*3/UL (ref 150–450)
POTASSIUM SERPL-SCNC: 4.5 MMOL/L (ref 3.5–5.3)
PR INTERVAL: 158 MS
Q ONSET: 220 MS
QRS COUNT: 16 BEATS
QRS DURATION: 84 MS
QT INTERVAL: 352 MS
QTC CALCULATION(BAZETT): 456 MS
QTC FREDERICIA: 418 MS
R AXIS: 49 DEGREES
RBC # BLD AUTO: 2.8 X10*6/UL (ref 4–5.2)
SODIUM SERPL-SCNC: 135 MMOL/L (ref 136–145)
T AXIS: 25 DEGREES
T OFFSET: 396 MS
VENTRICULAR RATE: 101 BPM
WBC # BLD AUTO: 14.9 X10*3/UL (ref 4.4–11.3)

## 2024-02-21 PROCEDURE — 1200000002 HC GENERAL ROOM WITH TELEMETRY DAILY

## 2024-02-21 PROCEDURE — 2500000005 HC RX 250 GENERAL PHARMACY W/O HCPCS: Performed by: INTERNAL MEDICINE

## 2024-02-21 PROCEDURE — 80048 BASIC METABOLIC PNL TOTAL CA: CPT | Performed by: INTERNAL MEDICINE

## 2024-02-21 PROCEDURE — 2500000001 HC RX 250 WO HCPCS SELF ADMINISTERED DRUGS (ALT 637 FOR MEDICARE OP): Performed by: INTERNAL MEDICINE

## 2024-02-21 PROCEDURE — 2500000004 HC RX 250 GENERAL PHARMACY W/ HCPCS (ALT 636 FOR OP/ED): Performed by: INTERNAL MEDICINE

## 2024-02-21 PROCEDURE — 99232 SBSQ HOSP IP/OBS MODERATE 35: CPT | Performed by: CLINICAL NURSE SPECIALIST

## 2024-02-21 PROCEDURE — 36415 COLL VENOUS BLD VENIPUNCTURE: CPT | Performed by: INTERNAL MEDICINE

## 2024-02-21 PROCEDURE — 85025 COMPLETE CBC W/AUTO DIFF WBC: CPT | Performed by: INTERNAL MEDICINE

## 2024-02-21 RX ORDER — FERROUS SULFATE 325(65) MG
65 TABLET ORAL 2 TIMES DAILY
Status: DISCONTINUED | OUTPATIENT
Start: 2024-02-21 | End: 2024-02-23 | Stop reason: HOSPADM

## 2024-02-21 RX ADMIN — LIDOCAINE 1 PATCH: 4 PATCH TOPICAL at 08:00

## 2024-02-21 RX ADMIN — FERROUS SULFATE TAB 325 MG (65 MG ELEMENTAL FE) 1 TABLET: 325 (65 FE) TAB at 20:24

## 2024-02-21 RX ADMIN — DIVALPROEX SODIUM 500 MG: 250 TABLET, DELAYED RELEASE ORAL at 08:00

## 2024-02-21 RX ADMIN — ENOXAPARIN SODIUM 120 MG: 120 INJECTION SUBCUTANEOUS at 20:23

## 2024-02-21 RX ADMIN — GABAPENTIN 100 MG: 100 CAPSULE ORAL at 14:59

## 2024-02-21 RX ADMIN — OXCARBAZEPINE 1200 MG: 300 TABLET, FILM COATED ORAL at 20:24

## 2024-02-21 RX ADMIN — OXYCODONE HYDROCHLORIDE 5 MG: 5 TABLET ORAL at 07:56

## 2024-02-21 RX ADMIN — FERROUS SULFATE TAB 325 MG (65 MG ELEMENTAL FE) 1 TABLET: 325 (65 FE) TAB at 11:40

## 2024-02-21 RX ADMIN — ENOXAPARIN SODIUM 120 MG: 120 INJECTION SUBCUTANEOUS at 08:00

## 2024-02-21 RX ADMIN — POLYETHYLENE GLYCOL 3350 17 G: 17 POWDER, FOR SOLUTION ORAL at 08:00

## 2024-02-21 RX ADMIN — METHOCARBAMOL TABLETS 500 MG: 500 TABLET, COATED ORAL at 07:56

## 2024-02-21 RX ADMIN — GABAPENTIN 100 MG: 100 CAPSULE ORAL at 21:07

## 2024-02-21 RX ADMIN — DIVALPROEX SODIUM 1000 MG: 250 TABLET, DELAYED RELEASE ORAL at 20:23

## 2024-02-21 RX ADMIN — OXYCODONE HYDROCHLORIDE 5 MG: 5 TABLET ORAL at 20:33

## 2024-02-21 RX ADMIN — GABAPENTIN 100 MG: 100 CAPSULE ORAL at 05:24

## 2024-02-21 RX ADMIN — ACETAMINOPHEN 650 MG: 650 SOLUTION ORAL at 20:24

## 2024-02-21 RX ADMIN — OXCARBAZEPINE 1200 MG: 300 TABLET, FILM COATED ORAL at 08:00

## 2024-02-21 ASSESSMENT — COGNITIVE AND FUNCTIONAL STATUS - GENERAL
MOBILITY SCORE: 24
DAILY ACTIVITIY SCORE: 24

## 2024-02-21 ASSESSMENT — PAIN DESCRIPTION - LOCATION: LOCATION: SHOULDER

## 2024-02-21 ASSESSMENT — PAIN DESCRIPTION - ORIENTATION: ORIENTATION: RIGHT

## 2024-02-21 ASSESSMENT — PAIN - FUNCTIONAL ASSESSMENT
PAIN_FUNCTIONAL_ASSESSMENT: 0-10
PAIN_FUNCTIONAL_ASSESSMENT: 0-10

## 2024-02-21 ASSESSMENT — PAIN SCALES - GENERAL
PAINLEVEL_OUTOF10: 9
PAINLEVEL_OUTOF10: 0 - NO PAIN

## 2024-02-21 NOTE — PROGRESS NOTES
02/21/24 1106   Discharge Planning   Patient expects to be discharged to: Home, no needs anticipated at this time     Care Coordinator Note:  Plan: MRI pending, cx pending, cont anticoagulation transition   Admitted d/t Tachycardia  Cece GOMEZN, RN TCC

## 2024-02-21 NOTE — PROGRESS NOTES
Danette Buenrostro is a 33 y.o. female on day 4 of admission presenting with Tachycardia.  Patient with h/o migraines, epilepsy, Vit Deficiency, endometriosis with recent total robotic hysterectomy, R salpingectomy, excision of endometriosis, right ovarian cystectomy, appendectomy and oversewing of bowel and bladder who p/w R shoulder pain that started suddenly after her surgery (5 days ago), and associated with numbness in R fingers. In the ED initial work up was unremarkable. However given she also had SOB, a V/Q scan was done that showed PE, admitted to the [facility] for further management. Pulmonary is consulted for the PE.     Subjective     Seen and examined sitting up in bed.  Patient reports that she is continuing to have her right shoulder pain and still awaiting MRI.  Reports shortness of breath is greatly improved with no chest pain.  Patient reported feeling warm overnight, Tmax 37.8 with blood cultures drawn.  Patient provided with and instructed on use of incentive spirometer; used appropriately x 5 reaching max 1500 mL.  Encouraged patient to ambulate frequently.  Denies signs and symptoms of infection.    Objective   Physical Exam:  Constitutional:   Obese, NAD, cooperative  HENT: Atraumatic, moist mucous membranes  Eyes: Nonicteric  Neck: Supple  Cardiovascular: S1-S2 distinct, tachycardic HR 100s, no murmur appreciated  Pulmonary: Good air entry with clear lung fields and slightly diminished bases; no rhonchi, crackles or wheezes noted; no conversational dyspnea  Abdominal: Obese, soft, nontender, + BS  Musculoskeletal: Fair active range of motion with good strength  Extremities:   Trace BLE edema  Lymphadenopathy: No nuchal LAP  Skin: Warm and dry  Neurological: Alert and oriented x 3; speech slow clear and appropriate  Psychiatric:     Appropriate mood and behavior    Scheduled medications  divalproex, 1,000 mg, oral, q PM  divalproex, 500 mg, oral, q AM  enoxaparin, 1 mg/kg, subcutaneous, BID  ferrous  "sulfate (325 mg ferrous sulfate), 65 mg of iron, oral, BID  gabapentin, 100 mg, oral, q8h FANY  lidocaine, 1 patch, transdermal, Daily  OXcarbazepine, 1,200 mg, oral, BID  polyethylene glycol, 17 g, oral, Daily    Continuous medications     PRN medications  PRN medications: acetaminophen **OR** acetaminophen **OR** acetaminophen, acetaminophen, methocarbamol, oxyCODONE, polyethylene glycol   Last Recorded Vitals  Blood pressure 117/82, pulse 101, temperature 36.6 °C (97.9 °F), temperature source Axillary, resp. rate 16, height 1.651 m (5' 5\"), weight 116 kg (256 lb), SpO2 99 %, not currently breastfeeding.  Intake/Output last 3 Shifts:  No intake/output data recorded.    Relevant Results  Results for orders placed or performed during the hospital encounter of 02/17/24 (from the past 24 hour(s))   Blood Culture    Specimen: Peripheral Venipuncture; Blood culture   Result Value Ref Range    Blood Culture Loaded on Instrument - Culture in progress    Blood Culture    Specimen: Peripheral Venipuncture; Blood culture   Result Value Ref Range    Blood Culture Loaded on Instrument - Culture in progress    CBC and Auto Differential   Result Value Ref Range    WBC 14.9 (H) 4.4 - 11.3 x10*3/uL    nRBC 0.2 (H) 0.0 - 0.0 /100 WBCs    RBC 2.80 (L) 4.00 - 5.20 x10*6/uL    Hemoglobin 8.1 (L) 12.0 - 16.0 g/dL    Hematocrit 23.8 (L) 36.0 - 46.0 %    MCV 85 80 - 100 fL    MCH 28.9 26.0 - 34.0 pg    MCHC 34.0 32.0 - 36.0 g/dL    RDW 13.7 11.5 - 14.5 %    Platelets 621 (H) 150 - 450 x10*3/uL    Neutrophils % 62.1 40.0 - 80.0 %    Immature Granulocytes %, Automated 4.2 (H) 0.0 - 0.9 %    Lymphocytes % 18.7 13.0 - 44.0 %    Monocytes % 7.7 2.0 - 10.0 %    Eosinophils % 6.9 0.0 - 6.0 %    Basophils % 0.4 0.0 - 2.0 %    Neutrophils Absolute 9.23 (H) 1.20 - 7.70 x10*3/uL    Immature Granulocytes Absolute, Automated 0.62 0.00 - 0.70 x10*3/uL    Lymphocytes Absolute 2.77 1.20 - 4.80 x10*3/uL    Monocytes Absolute 1.15 (H) 0.10 - 1.00 x10*3/uL "    Eosinophils Absolute 1.02 (H) 0.00 - 0.70 x10*3/uL    Basophils Absolute 0.06 0.00 - 0.10 x10*3/uL   Basic Metabolic Panel   Result Value Ref Range    Glucose 118 (H) 74 - 99 mg/dL    Sodium 135 (L) 136 - 145 mmol/L    Potassium 4.5 3.5 - 5.3 mmol/L    Chloride 98 98 - 107 mmol/L    Bicarbonate 28 21 - 32 mmol/L    Anion Gap 14 10 - 20 mmol/L    Urea Nitrogen 7 6 - 23 mg/dL    Creatinine 0.65 0.50 - 1.05 mg/dL    eGFR >90 >60 mL/min/1.73m*2    Calcium 8.1 (L) 8.6 - 10.3 mg/dL   NM Lung perfusion with spect/ct    Result Date: 2/17/2024  Interpreted By:  Charbel Bro, STUDY: NM LUNG PERFUSION WITH SPECT/CT;  2/17/2024 12:22 pm   INDICATION: Signs/Symptoms:concern for PE, CT PE non-diagnostic; tachy, POD 5 after multiple surgery.   COMPARISON: Chest x-ray from 02/17/2024   ACCESSION NUMBER(S): [id number]   ORDERING CLINICIAN: JARAD WEAVER   TECHNIQUE: DIVISION OF NUCLEAR MEDICINE PERFUSION LUNG SCANS   Multiple perfusion images of the lungs were acquired after the intravenous administration of 4.4 mCi of Tc-99m macroaggregated albumin (MAA). In addition, SPECT/CT of the chest was performed.   FINDINGS: Planar perfusion images of both lungs demonstrate mild heterogeneity throughout the lung fields bilaterally. Wedge-shaped segmental perfusion defect in the left upper lobe seen on SPECT/CT suggestive of acute pulmonary embolism..       1. Wedge-shaped segmental perfusion defect in the left upper lobe seen on SPECT/CT suggestive of acute pulmonary embolism..   Critical Finding:  See findings. Notification was initiated on 2/17/2024 at 3:26 pm by Dr. Charbel Bro to Dr. Jarad Weaver. (**[id number]-**)   The interpretation above is based on modified PIOPED II and PISAPED criteria.   This study was analyzed and interpreted at [place], [place].   Signed by: Charbel Bro 2/17/2024 3:33 PM Dictation workstation:   [id number]    CT angio chest for pulmonary embolism    Result Date: 2/17/2024  Interpreted By:   Abelardo Mane, STUDY: CT ANGIO CHEST FOR PULMONARY EMBOLISM;  2/17/2024 9:43 am   INDICATION: 34 y/o   F with  Signs/Symptoms:recent surgery, R shoulder pain, tachycardia.   LIMITATIONS: The exam was limited by body habitus and also by timing of image acquisition relative to contrast enhancement..   ACCESSION NUMBER(S): [id number]   ORDERING CLINICIAN: WILLIAM KHAN   TECHNIQUE: After the administration of intravenous nonionic contrast, thin-section arterial phase images were obtained  from the thoracic inlet down through the iliac crest. Sagittal and coronal reconstruction images were generated. Axial and coronal MIP vascular reconstruction images were also generated.   Mediastinal, lung, bone, and liver windows were reviewed. IV contrast agent was Omnipaque 350, 75 mL.     COMPARISON: Previous exam from 07/10/2020.   FINDINGS: CHEST WALL/BASE OF THE NECK: Stable 15 mm diameter nodule posterior to the nipple of the left breast, anteriorly in the left breast parenchyma on axial image 146. There are multiple nonspecific but mildly asymmetrically enlarged left axillary lymph nodes measuring up to 12 x 18 mm. No thyromegaly or thyroid mass.   MEDIASTINUM/CELSO: No mediastinal or hilar adenopathy in this exam. Mild cardiomegaly. No pericardial effusion. No thoracic aortic aneurysm or dissection. Imaging occurred late in the levo phase of enhancement. Therefore, the exam was nondiagnostic for pulmonary embolism.   LUNGS/ PLEURA/ AND TRACHEA: Hypoventilatory lungs. There is bandlike atelectasis versus scarring laterally and posteriorly in the left lower lobe. No mass or pneumonia in either lung. No  pleural effusion or pneumothorax. The trachea was grossly intact.   BONES: No destructive lytic or blastic bone lesion.   UPPER ABDOMEN: The imaged upper abdomen was grossly intact.       Imaging occurred late in the levo phase of enhancement. The exam is nondiagnostic for pulmonary embolism.   Cardiomegaly. No indication of  ongoing CHF.   Band like atelectasis versus scarring at the posterior and lateral left lung base.   Stable 15 mm retroareolar nodule on the left. This could be stable or cyst. However, there is also mild asymmetric left axillary adenopathy. Clinical correlation needed. Suggest further evaluation with mammography and ultrasound, and breast MRI if clinically needed.   MACRO: None   Signed by: Abelardo Mane 2/17/2024 10:06 AM Dictation workstation:   [id number]    XR shoulder right 2+ views    Result Date: 2/17/2024  Interpreted By:  Valerio Real, STUDY: XR SHOULDER RIGHT 2+ VIEWS;  2/17/2024 8:29 am   INDICATION: Signs/Symptoms:anterior R shoulder pain w/decreased ROM.   COMPARISON: None.   ACCESSION NUMBER(S): [id number]   ORDERING CLINICIAN: WILLIAM KHAN   FINDINGS: Bony structures:  Intact   Joint spaces:  Maintained   Soft tissues:  Unremarkable without significant edema or radiodense foreign body   Other:  None significant       No acute findings.   MACRO: None   Signed by: Valerio Real 2/17/2024 9:06 AM Dictation workstation:   [id number]    Assessment/Plan   Principal Problem:    Tachycardia  33 YOF with h/o migraines, epilepsy, Vit Deficiency, endometriosis with recent total robotic hysterectomy, R salpingectomy, excision of endometriosis, right ovarian cystectomy, appendectomy and oversewing of bowel and bladder who p/w R shoulder pain that started suddenly after her surgery (5 days ago), and associated with numbness in R fingers. In the ED initial work up was unremarkable. However given she also had SOB, a V/Q scan was done that showed PE, admitted to the [facility] for further management. Pulmonary is consulted for the PE.      Pulmonary embolism: first episodes, seems to be provoked due to her recent surgery, obesity. CT PE non diagnostic  due to timing, but V/Q scan showed DEEPAK wedge shape filling defects. Currently slightly tachycardic, otherwise hemodynamically stable and not on home O2.      Continue  Lovenox can transition to OAC      Will need hypercoagulable state work up, can be done as outpatient     FU with hematology after DC     Supplemental O2 as needed     LE and RUE doppler done, negative for DVT.      Pulmonary will FU while in house.     Alexia Guerrero, APRN-CNS

## 2024-02-21 NOTE — PROGRESS NOTES
"  INFECTIOUS DISEASE DAILY PROGRESS NOTE    SUBJECTIVE:    No new events. WBC had come down. MRI not done yet - question if can be with dermal piercing in the right cheek. No fevers. Does feel like shoulder is getting better.    OBJECTIVE:  VITALS (Last 24 Hours)  BP (!) 161/92 (BP Location: Left arm, Patient Position: Sitting)   Pulse 98   Temp 37.1 °C (98.8 °F) (Temporal)   Resp 17   Ht 1.651 m (5' 5\")   Wt 116 kg (256 lb)   SpO2 100%   BMI 42.60 kg/m²     PHYSICAL EXAM:  Gen - NAD  Heart - RRR  Lungs - clear bilaterally, no wheezing  Abd - soft, no ttp, BS present, incisions healing fine  MSK - right shoulder is swollen and warm compared to the left  Skin - no rash    ABX: None    LABS:  Lab Results   Component Value Date    WBC 15.6 (H) 02/20/2024    HGB 7.8 (L) 02/20/2024    HCT 23.7 (L) 02/20/2024    MCV 90 02/20/2024     (H) 02/20/2024     Lab Results   Component Value Date    GLUCOSE 111 (H) 02/20/2024    CALCIUM 8.0 (L) 02/20/2024     (L) 02/20/2024    K 4.1 02/20/2024    CO2 30 02/20/2024    CL 98 02/20/2024    BUN 7 02/20/2024    CREATININE 0.66 02/20/2024         Estimated Creatinine Clearance: 125 mL/min (by C-G formula based on SCr of 0.66 mg/dL).      ASSESSMENT/PLAN:     Leukocytosis - unclear etiology. Is improved overall. Stress related from other medical issues seems most likely.  Right Shoulder Pain/Radiculopathy - seems like a brachial plexus related issue. I doubt this would be infected  Recent MARLEY and abd surgery - no abd pain and incisions are healing. Doubtful there would be infection here.      MRI shoulder pending. OK to remain off abx for now.    Will follow. Thanks!    Henok Vincent MD  [facility]  Office #[phone]      "

## 2024-02-21 NOTE — PROGRESS NOTES
INTERNAL MEDICINE PROGRESS NOTE      HPI:    Continues to have pain in the shoulder. Has had no bleeding.     Vital signs in last 24 hours:  Temp:  [36.6 °C (97.8 °F)-37.8 °C (100.1 °F)] 37.5 °C (99.5 °F)  Heart Rate:  [] 108  Resp:  [16-18] 18  BP: (125-144)/(62-88) 139/88    Physical Examination:  Physical Exam    Constitutional:       Appearance: Young, overweight, in no distress.  HENT:      Head: Normocephalic and atraumatic.   Eyes:      Extraocular Movements: Extraocular movements intact.      Pupils: Pupils are equal, round, and reactive to light.   Cardiovascular:      Rate and Rhythm: Normal rate and regular rhythm.      Pulses: Normal pulses.      Heart sounds: Normal heart sounds.   Pulmonary:      Effort: Pulmonary effort is normal.      Breath sounds: Normal breath sounds.   Abdominal:      General: Abdomen is flat. Bowel sounds are normal.      Palpations: Abdomen is soft.   Musculoskeletal:         General: Normal range of motion.      Cervical back: Normal range of motion and neck supple. Tender anterior aspect of right shoulder.  Skin:     General: Skin is warm and dry.   Neurological:      General: No focal deficit present.      Mental Status: Alert and oriented x 3 with no focal motor deficit.       Medications:    Current Facility-Administered Medications:     acetaminophen (Tylenol) tablet 650 mg, 650 mg, oral, q4h PRN, 650 mg at 02/20/24 2028 **OR** acetaminophen (Tylenol) oral liquid 650 mg, 650 mg, oral, q4h PRN **OR** acetaminophen (Tylenol) suppository 650 mg, 650 mg, rectal, q4h PRN, Abelardo Szymanksi MD    acetaminophen (Tylenol) tablet 975 mg, 975 mg, oral, q6h PRN, Abelardo Szymanski MD    divalproex (Depakote) EC tablet 1,000 mg, 1,000 mg, oral, q PM, Abelardo Szymanski MD, 1,000 mg at 02/20/24 2028    divalproex (Depakote) EC tablet 500 mg, 500 mg, oral, q AM, Abelardo Szymanski MD, 500 mg at 02/20/24 0828    enoxaparin (Lovenox) syringe 120 mg, 1 mg/kg, subcutaneous,  BID, Abelardo Szymanski MD, 120 mg at 02/20/24 2028    gabapentin (Neurontin) capsule 100 mg, 100 mg, oral, q8h FANY, Abelardo Szymanski MD, 100 mg at 02/20/24 1332    lidocaine 4 % patch 1 patch, 1 patch, transdermal, Daily, Abelardo Szymanski MD, 1 patch at 02/20/24 0829    methocarbamol (Robaxin) tablet 500 mg, 500 mg, oral, 4x daily PRN, Abelardo Szymanski MD, 500 mg at 02/18/24 1634    OXcarbazepine (Trileptal) tablet 1,200 mg, 1,200 mg, oral, BID, Abelardo Szymanski MD, 1,200 mg at 02/20/24 2028    oxyCODONE (Roxicodone) immediate release tablet 5 mg, 5 mg, oral, q4h PRN, Abelardo Szymanski MD, 5 mg at 02/20/24 1332    polyethylene glycol (Glycolax, Miralax) packet 17 g, 17 g, oral, Daily PRN, Abelardo Szymanski MD    polyethylene glycol (Glycolax, Miralax) packet 17 g, 17 g, oral, Daily, Abelardo Szymanski MD, 17 g at 02/20/24 0828    Laboratory Findings:  Lab Results   Component Value Date    WBC 15.6 (H) 02/20/2024    HGB 7.8 (L) 02/20/2024    HCT 23.7 (L) 02/20/2024    MCV 90 02/20/2024     (H) 02/20/2024     Lab Results   Component Value Date    INR 1.3 (H) 02/17/2024    INR 1.1 04/13/2018    INR 1.2 (H) 02/09/2018    PROTIME 14.5 (H) 02/17/2024    PROTIME 12.7 04/13/2018    PROTIME 13.6 (H) 02/09/2018     Lab Results   Component Value Date    GLUCOSE 111 (H) 02/20/2024    CALCIUM 8.0 (L) 02/20/2024     (L) 02/20/2024    K 4.1 02/20/2024    CO2 30 02/20/2024    CL 98 02/20/2024    BUN 7 02/20/2024    CREATININE 0.66 02/20/2024       Assessment and Plan:     Acute pulmonary embolism -continue with anticoagulation, transition to oral agent soon  Right arm pain - will obtain MRI  Recent surgery -continue with analgesics.  No active bleeding.  Anemia -secondary to acute blood loss related to recent surgery.  Monitor CBC. Iv iron.   Leucocytosis - ID opinion appreciated       Abelardo Szymanski MD  02/20/24  10:40 AM

## 2024-02-21 NOTE — PROGRESS NOTES
INTERNAL MEDICINE PROGRESS NOTE      HPI:    Patient has had decreased shoulder pain.  She had fever last night.    Vital signs in last 24 hours:  Temp:  [36.6 °C (97.8 °F)-37.8 °C (100.1 °F)] 37.1 °C (98.8 °F)  Heart Rate:  [] 98  Resp:  [17-18] 17  BP: (111-161)/(69-92) 161/92    Physical Examination:  Physical Exam    Constitutional:       Appearance: Young, overweight, in no distress.  HENT:      Head: Normocephalic and atraumatic.   Eyes:      Extraocular Movements: Extraocular movements intact.      Pupils: Pupils are equal, round, and reactive to light.   Cardiovascular:      Rate and Rhythm: Normal rate and regular rhythm.      Pulses: Normal pulses.      Heart sounds: Normal heart sounds.   Pulmonary:      Effort: Pulmonary effort is normal.      Breath sounds: Normal breath sounds.   Abdominal:      General: Abdomen is flat. Bowel sounds are normal.      Palpations: Abdomen is soft.   Musculoskeletal:         General: Normal range of motion.      Cervical back: Normal range of motion and neck supple. Tender anterior aspect of right shoulder.  Skin:     General: Skin is warm and dry.   Neurological:      General: No focal deficit present.      Mental Status: Alert and oriented x 3 with no focal motor deficit.       Medications:    Current Facility-Administered Medications:     acetaminophen (Tylenol) tablet 650 mg, 650 mg, oral, q4h PRN, 650 mg at 02/20/24 2028 **OR** acetaminophen (Tylenol) oral liquid 650 mg, 650 mg, oral, q4h PRN **OR** acetaminophen (Tylenol) suppository 650 mg, 650 mg, rectal, q4h PRN, Abelardo Szymanski MD    acetaminophen (Tylenol) tablet 975 mg, 975 mg, oral, q6h PRN, Abelardo Szymanski MD    divalproex (Depakote) EC tablet 1,000 mg, 1,000 mg, oral, q PM, Abelardo Szymanski MD, 1,000 mg at 02/20/24 2028    divalproex (Depakote) EC tablet 500 mg, 500 mg, oral, q AM, Abelardo Szymanski MD, 500 mg at 02/21/24 0800    enoxaparin (Lovenox) syringe 120 mg, 1 mg/kg,  subcutaneous, BID, Abelardo Szymanski MD, 120 mg at 02/21/24 0800    gabapentin (Neurontin) capsule 100 mg, 100 mg, oral, q8h FANY, Abelardo Szymanski MD, 100 mg at 02/21/24 0524    lidocaine 4 % patch 1 patch, 1 patch, transdermal, Daily, Abelardo Szymanski MD, 1 patch at 02/21/24 0800    methocarbamol (Robaxin) tablet 500 mg, 500 mg, oral, 4x daily PRN, Abelardo Szymanski MD, 500 mg at 02/21/24 0756    OXcarbazepine (Trileptal) tablet 1,200 mg, 1,200 mg, oral, BID, Abelardo Szymanski MD, 1,200 mg at 02/21/24 0800    oxyCODONE (Roxicodone) immediate release tablet 5 mg, 5 mg, oral, q4h PRN, Abelardo Szymanski MD, 5 mg at 02/21/24 0756    polyethylene glycol (Glycolax, Miralax) packet 17 g, 17 g, oral, Daily PRN, Abelardo Szymanski MD    polyethylene glycol (Glycolax, Miralax) packet 17 g, 17 g, oral, Daily, bAelardo Szymanski MD, 17 g at 02/21/24 0800    Laboratory Findings:  Lab Results   Component Value Date    WBC 15.6 (H) 02/20/2024    HGB 7.8 (L) 02/20/2024    HCT 23.7 (L) 02/20/2024    MCV 90 02/20/2024     (H) 02/20/2024     Lab Results   Component Value Date    INR 1.3 (H) 02/17/2024    INR 1.1 04/13/2018    INR 1.2 (H) 02/09/2018    PROTIME 14.5 (H) 02/17/2024    PROTIME 12.7 04/13/2018    PROTIME 13.6 (H) 02/09/2018     Lab Results   Component Value Date    GLUCOSE 111 (H) 02/20/2024    CALCIUM 8.0 (L) 02/20/2024     (L) 02/20/2024    K 4.1 02/20/2024    CO2 30 02/20/2024    CL 98 02/20/2024    BUN 7 02/20/2024    CREATININE 0.66 02/20/2024       Assessment and Plan:     Acute pulmonary embolism -continue with anticoagulation, transition to oral agent soon  Right arm pain -MRI still pending  Recent surgery -continue with analgesics.  No active bleeding.  Anemia -secondary to acute blood loss related to recent surgery.  Will start on oral iron.  Leucocytosis -cultures sent overnight.       Abelardo Szymanski MD  02/21/24  10:10 AM

## 2024-02-22 ENCOUNTER — APPOINTMENT (OUTPATIENT)
Dept: RADIOLOGY | Facility: HOSPITAL | Age: 34
DRG: 176 | End: 2024-02-22
Payer: MEDICARE

## 2024-02-22 LAB
ANION GAP SERPL CALC-SCNC: 11 MMOL/L (ref 10–20)
BASOPHILS # BLD MANUAL: 0.27 X10*3/UL (ref 0–0.1)
BASOPHILS NFR BLD MANUAL: 2 %
BUN SERPL-MCNC: 9 MG/DL (ref 6–23)
CALCIUM SERPL-MCNC: 7.9 MG/DL (ref 8.6–10.3)
CHLORIDE SERPL-SCNC: 99 MMOL/L (ref 98–107)
CO2 SERPL-SCNC: 29 MMOL/L (ref 21–32)
CREAT SERPL-MCNC: 0.73 MG/DL (ref 0.5–1.05)
EGFRCR SERPLBLD CKD-EPI 2021: >90 ML/MIN/1.73M*2
EOSINOPHIL # BLD MANUAL: 0.82 X10*3/UL (ref 0–0.7)
EOSINOPHIL NFR BLD MANUAL: 6 %
ERYTHROCYTE [DISTWIDTH] IN BLOOD BY AUTOMATED COUNT: 14.4 % (ref 11.5–14.5)
GLUCOSE SERPL-MCNC: 92 MG/DL (ref 74–99)
HCT VFR BLD AUTO: 23.9 % (ref 36–46)
HGB BLD-MCNC: 7.6 G/DL (ref 12–16)
IMM GRANULOCYTES # BLD AUTO: 0.88 X10*3/UL (ref 0–0.7)
IMM GRANULOCYTES NFR BLD AUTO: 6.4 % (ref 0–0.9)
LYMPHOCYTES # BLD MANUAL: 2.19 X10*3/UL (ref 1.2–4.8)
LYMPHOCYTES NFR BLD MANUAL: 16 %
MCH RBC QN AUTO: 29.1 PG (ref 26–34)
MCHC RBC AUTO-ENTMCNC: 31.8 G/DL (ref 32–36)
MCV RBC AUTO: 92 FL (ref 80–100)
MONOCYTES # BLD MANUAL: 0.82 X10*3/UL (ref 0.1–1)
MONOCYTES NFR BLD MANUAL: 6 %
MYELOCYTES # BLD MANUAL: 0.27 X10*3/UL
MYELOCYTES NFR BLD MANUAL: 2 %
NEUTS SEG # BLD MANUAL: 9.04 X10*3/UL (ref 1.2–7)
NEUTS SEG NFR BLD MANUAL: 66 %
NRBC BLD-RTO: 0.4 /100 WBCS (ref 0–0)
PLATELET # BLD AUTO: 588 X10*3/UL (ref 150–450)
POLYCHROMASIA BLD QL SMEAR: ABNORMAL
POTASSIUM SERPL-SCNC: 4.3 MMOL/L (ref 3.5–5.3)
RBC # BLD AUTO: 2.61 X10*6/UL (ref 4–5.2)
RBC MORPH BLD: ABNORMAL
SODIUM SERPL-SCNC: 135 MMOL/L (ref 136–145)
TOTAL CELLS COUNTED BLD: 100
VARIANT LYMPHS # BLD MANUAL: 0.27 X10*3/UL (ref 0–0.5)
VARIANT LYMPHS NFR BLD: 2 %
WBC # BLD AUTO: 13.7 X10*3/UL (ref 4.4–11.3)

## 2024-02-22 PROCEDURE — 73200 CT UPPER EXTREMITY W/O DYE: CPT | Mod: RT

## 2024-02-22 PROCEDURE — 2500000001 HC RX 250 WO HCPCS SELF ADMINISTERED DRUGS (ALT 637 FOR MEDICARE OP): Performed by: INTERNAL MEDICINE

## 2024-02-22 PROCEDURE — 73200 CT UPPER EXTREMITY W/O DYE: CPT | Mod: RIGHT SIDE | Performed by: STUDENT IN AN ORGANIZED HEALTH CARE EDUCATION/TRAINING PROGRAM

## 2024-02-22 PROCEDURE — 82374 ASSAY BLOOD CARBON DIOXIDE: CPT | Performed by: INTERNAL MEDICINE

## 2024-02-22 PROCEDURE — 2500000005 HC RX 250 GENERAL PHARMACY W/O HCPCS: Performed by: INTERNAL MEDICINE

## 2024-02-22 PROCEDURE — 2500000004 HC RX 250 GENERAL PHARMACY W/ HCPCS (ALT 636 FOR OP/ED): Performed by: INTERNAL MEDICINE

## 2024-02-22 PROCEDURE — 99222 1ST HOSP IP/OBS MODERATE 55: CPT | Performed by: STUDENT IN AN ORGANIZED HEALTH CARE EDUCATION/TRAINING PROGRAM

## 2024-02-22 PROCEDURE — 85007 BL SMEAR W/DIFF WBC COUNT: CPT | Performed by: INTERNAL MEDICINE

## 2024-02-22 PROCEDURE — 85027 COMPLETE CBC AUTOMATED: CPT | Performed by: INTERNAL MEDICINE

## 2024-02-22 PROCEDURE — 1200000002 HC GENERAL ROOM WITH TELEMETRY DAILY

## 2024-02-22 RX ADMIN — ENOXAPARIN SODIUM 120 MG: 120 INJECTION SUBCUTANEOUS at 09:57

## 2024-02-22 RX ADMIN — ENOXAPARIN SODIUM 120 MG: 120 INJECTION SUBCUTANEOUS at 20:40

## 2024-02-22 RX ADMIN — METHOCARBAMOL TABLETS 500 MG: 500 TABLET, COATED ORAL at 16:37

## 2024-02-22 RX ADMIN — OXYCODONE HYDROCHLORIDE 5 MG: 5 TABLET ORAL at 20:40

## 2024-02-22 RX ADMIN — DIVALPROEX SODIUM 1000 MG: 250 TABLET, DELAYED RELEASE ORAL at 20:39

## 2024-02-22 RX ADMIN — DIVALPROEX SODIUM 500 MG: 250 TABLET, DELAYED RELEASE ORAL at 09:57

## 2024-02-22 RX ADMIN — LIDOCAINE 1 PATCH: 4 PATCH TOPICAL at 09:57

## 2024-02-22 RX ADMIN — ACETAMINOPHEN 650 MG: 325 TABLET ORAL at 10:03

## 2024-02-22 RX ADMIN — OXCARBAZEPINE 1200 MG: 300 TABLET, FILM COATED ORAL at 20:39

## 2024-02-22 RX ADMIN — OXCARBAZEPINE 1200 MG: 300 TABLET, FILM COATED ORAL at 09:57

## 2024-02-22 RX ADMIN — FERROUS SULFATE TAB 325 MG (65 MG ELEMENTAL FE) 1 TABLET: 325 (65 FE) TAB at 20:39

## 2024-02-22 RX ADMIN — GABAPENTIN 100 MG: 100 CAPSULE ORAL at 06:11

## 2024-02-22 RX ADMIN — GABAPENTIN 100 MG: 100 CAPSULE ORAL at 14:27

## 2024-02-22 RX ADMIN — GABAPENTIN 100 MG: 100 CAPSULE ORAL at 22:31

## 2024-02-22 RX ADMIN — FERROUS SULFATE TAB 325 MG (65 MG ELEMENTAL FE) 1 TABLET: 325 (65 FE) TAB at 09:57

## 2024-02-22 RX ADMIN — ACETAMINOPHEN 650 MG: 325 TABLET ORAL at 22:34

## 2024-02-22 ASSESSMENT — COGNITIVE AND FUNCTIONAL STATUS - GENERAL
DAILY ACTIVITIY SCORE: 24
CLIMB 3 TO 5 STEPS WITH RAILING: A LITTLE
MOBILITY SCORE: 24
MOBILITY SCORE: 23
CLIMB 3 TO 5 STEPS WITH RAILING: A LITTLE
MOBILITY SCORE: 23
DAILY ACTIVITIY SCORE: 24
DAILY ACTIVITIY SCORE: 24

## 2024-02-22 ASSESSMENT — PAIN DESCRIPTION - LOCATION: LOCATION: SHOULDER

## 2024-02-22 ASSESSMENT — PAIN SCALES - GENERAL
PAINLEVEL_OUTOF10: 3
PAINLEVEL_OUTOF10: 8

## 2024-02-22 ASSESSMENT — PAIN DESCRIPTION - ORIENTATION: ORIENTATION: RIGHT

## 2024-02-22 ASSESSMENT — PAIN - FUNCTIONAL ASSESSMENT: PAIN_FUNCTIONAL_ASSESSMENT: 0-10

## 2024-02-22 NOTE — PROGRESS NOTES
"Danette Buenrostro is a 33 y.o. female on day 5 of admission presenting with Tachycardia.    Subjective   Interval History: still with right shoulder pain today. Unable to get MRI due to a piercing that can't be removed. CT pending       Objective   Physical Exam    Range of Vitals (last 24 hours)  /71 (BP Location: Left arm, Patient Position: Lying)   Pulse 97   Temp 37.9 °C (100.2 °F) (Temporal)   Resp 18   Ht 1.651 m (5' 5\")   Wt 116 kg (256 lb)   SpO2 100%   BMI 42.60 kg/m²   Daily Weight  02/17/24 : 116 kg (256 lb)    Body mass index is 42.6 kg/m².    General: AAOx3, NAD, nontoxic appearing  Ext: right shoulder reduced ROM  Abd: incisions c/d/i      Current Facility-Administered Medications:     acetaminophen (Tylenol) tablet 650 mg, 650 mg, oral, q4h PRN, 650 mg at 02/22/24 1003 **OR** acetaminophen (Tylenol) oral liquid 650 mg, 650 mg, oral, q4h PRN, 650 mg at 02/21/24 2024 **OR** acetaminophen (Tylenol) suppository 650 mg, 650 mg, rectal, q4h PRN, Abelardo Szymanski MD    acetaminophen (Tylenol) tablet 975 mg, 975 mg, oral, q6h PRN, Abelardo Szymanski MD    divalproex (Depakote) EC tablet 1,000 mg, 1,000 mg, oral, q PM, Abelardo Szymanski MD, 1,000 mg at 02/21/24 2023    divalproex (Depakote) EC tablet 500 mg, 500 mg, oral, q AM, Abelardo Szymanski MD, 500 mg at 02/22/24 0957    enoxaparin (Lovenox) syringe 120 mg, 1 mg/kg, subcutaneous, BID, Abelardo Szymanski MD, 120 mg at 02/22/24 0957    ferrous sulfate (325 mg ferrous sulfate) tablet 1 tablet, 65 mg of iron, oral, BID, Abelardo Szymanski MD, 1 tablet at 02/22/24 0957    gabapentin (Neurontin) capsule 100 mg, 100 mg, oral, q8h FANY, Abelardo Szymanski MD, 100 mg at 02/22/24 0611    lidocaine 4 % patch 1 patch, 1 patch, transdermal, Daily, Abelardo Szymanski MD, 1 patch at 02/22/24 0957    methocarbamol (Robaxin) tablet 500 mg, 500 mg, oral, 4x daily PRN, Abelardo Szymanski MD, 500 mg at 02/21/24 0756    OXcarbazepine (Trileptal) tablet 1,200 mg, 1,200 " mg, oral, BID, Jarad Szymanski MD, 1,200 mg at 02/22/24 0957    oxyCODONE (Roxicodone) immediate release tablet 5 mg, 5 mg, oral, q4h PRN, Jarad Szymanski MD, 5 mg at 02/21/24 2033    polyethylene glycol (Glycolax, Miralax) packet 17 g, 17 g, oral, Daily PRN, Jarad Szymanski MD    polyethylene glycol (Glycolax, Miralax) packet 17 g, 17 g, oral, Daily, Jarad Szymanski MD, 17 g at 02/21/24 0800    Relevant Results  Labs:  Lab Results   Component Value Date    WBC 13.7 (H) 02/22/2024    HGB 7.6 (L) 02/22/2024    HCT 23.9 (L) 02/22/2024    MCV 92 02/22/2024     (H) 02/22/2024     Lab Results   Component Value Date    GLUCOSE 92 02/22/2024    CALCIUM 7.9 (L) 02/22/2024     (L) 02/22/2024    K 4.3 02/22/2024    CO2 29 02/22/2024    CL 99 02/22/2024    BUN 9 02/22/2024    CREATININE 0.73 02/22/2024         Estimated Creatinine Clearance: 125 mL/min (by C-G formula based on SCr of 0.73 mg/dL).  CRP   Date/Time Value Ref Range Status   07/11/2020 05:36 AM 3.92 (A) mg/dL Final     Comment:     REF VALUE  < 1.00         Micro:  No results found for the last 7 days.      Imaging:  ECG 12 Lead    Result Date: 2/21/2024  Sinus tachycardia Otherwise normal ECG When compared with ECG of 20-APR-2021 10:38, Non-specific change in ST segment in Inferior leads See ED provider note for full interpretation and clinical correlation Confirmed by Nicole Gao ([id number]) on 2/21/2024 10:36:35 AM    Vascular US lower extremity venous duplex bilateral    Result Date: 2/20/2024             [place] [address]   Tel [phone] and Fax [phone]  Vascular Lab Report VASC US LOWER EXTREMITY VENOUS DUPLEX BILATERAL  Patient Name:     NESSA ALMODOVAR     Reading           [id number] Randell Ferris                                       Physician:        MD Study Date:       2/19/2024           Ordering          [id number] JARAD SZYMANSKI                                       Physician:  MRN/PID:          [id number]            Technologist:     Ilda Park RVT Accession#:       [id number]        Technologist 2: Date of           1990 / 33      Encounter#:       [id number] Birth/Age:        years Gender:           F Admission Status: Inpatient           Location          [facility]                                       Performed:  Diagnosis/ICD: Other pulmonary embolism without acute cor pulmonale-I26.99 CPT Codes:     49648 Peripheral venous duplex scan for DVT complete  CONCLUSIONS: Right Lower Venous: No evidence of acute deep vein thrombus visualized in the right lower extremity. Left Lower Venous: No evidence of acute deep vein thrombus visualized in the left lower extremity.  Imaging & Doppler Findings:  Right                 Compressible Thrombus        Flow Distal External Iliac     Yes        None   Spontaneous/Phasic CFV                       Yes        None   Spontaneous/Phasic PFV                       Yes        None FV Proximal               Yes        None   Spontaneous/Phasic FV Mid                    Yes        None FV Distal                 Yes        None Popliteal                 Yes        None   Spontaneous/Phasic Peroneal                  Yes        None PTV                       Yes        None  Left                  Compress Thrombus        Flow Distal External Iliac   Yes      None   Spontaneous/Phasic CFV                     Yes      None   Spontaneous/Phasic PFV                     Yes      None FV Proximal             Yes      None   Spontaneous/Phasic FV Mid                  Yes      None FV Distal               Yes      None Popliteal               Yes      None   Spontaneous/Phasic Peroneal                Yes      None PTV                     Yes      None  [id number] Randell Ferris MD Electronically signed by [id number] Randell Ferris MD on 2/20/2024 at 6:02:57 PM  ** Final **     Upper extremity venous duplex bilateral    Result Date: 2/20/2024              [place] [address]   Tel [phone] and Fax [phone]  Vascular Lab Report [facility] US UPPER EXTREMITY VENOUS DUPLEX BILATERAL  Patient Name:     NESSA ALMODOVAR     Reading           [id number] Randell Martínez[address]olabear                                       Physician:        MD Study Date:       2/19/2024           Ordering          [id number] JARAD WEAVER                                       Physician: MRN/PID:          [id number]            Technologist:     Ilda Park RVT Accession#:       [id number]        Technologist 2: Date of           1990 / 33      Encounter#:       [id number] Birth/Age:        years Gender:           F Admission Status: Inpatient           Location          [facility]                                       Performed:  Diagnosis/ICD: Other pulmonary embolism without acute cor pulmonale-I26.99 CPT Codes:     95790 Peripheral venous duplex scan for DVT complete  CONCLUSIONS: Right Upper Venous: No evidence of acute deep vein thrombus visualized in the right upper extremity. Left Upper Venous: No evidence of acute deep vein thrombus visualized in the left upper extremity.  Imaging & Doppler Findings:  Right               Compressible Thrombus   Flow Internal Jugular        Yes        None   Pulsatile Subclavian              Yes        None   Pulsatile Subclavian Proximal     Yes        None Subclavian Mid          Yes        None Subclavian Distal       Yes        None Axillary                Yes        None   Pulsatile Brachial                Yes        None Cephalic                Yes        None Basilic                 Yes        None  Left                Compress Thrombus   Flow Internal Jugular      Yes      None   Pulsatile Subclavian            Yes      None   Pulsatile Subclavian Proximal   Yes      None Subclavian Mid        Yes      None Subclavian Distal     Yes      None Axillary              Yes      None   Pulsatile Brachial               Yes      None Cephalic              Yes      None Basilic               Yes      None  [id number] Randell Ferris MD Electronically signed by [id number] Randell Ferris MD on 2/20/2024 at 5:53:32 PM  ** Final **     NM Lung perfusion with spect/ct    Result Date: 2/17/2024  Interpreted By:  Charbel Bro, STUDY: NM LUNG PERFUSION WITH SPECT/CT;  2/17/2024 12:22 pm   INDICATION: Signs/Symptoms:concern for PE, CT PE non-diagnostic; tachy, POD 5 after multiple surgery.   COMPARISON: Chest x-ray from 02/17/2024   ACCESSION NUMBER(S): [id number]   ORDERING CLINICIAN: JARAD SZYMANSKI   TECHNIQUE: DIVISION OF NUCLEAR MEDICINE PERFUSION LUNG SCANS   Multiple perfusion images of the lungs were acquired after the intravenous administration of 4.4 mCi of Tc-99m macroaggregated albumin (MAA). In addition, SPECT/CT of the chest was performed.   FINDINGS: Planar perfusion images of both lungs demonstrate mild heterogeneity throughout the lung fields bilaterally. Wedge-shaped segmental perfusion defect in the left upper lobe seen on SPECT/CT suggestive of acute pulmonary embolism..       1. Wedge-shaped segmental perfusion defect in the left upper lobe seen on SPECT/CT suggestive of acute pulmonary embolism..   Critical Finding:  See findings. Notification was initiated on 2/17/2024 at 3:26 pm by Dr. Charbel Bro to Dr. Jarad Szymanski. (**-YCF-**)   The interpretation above is based on modified PIOPED II and PISAPED criteria.   This study was analyzed and interpreted at [place], [place].   Signed by: Charbel Bro 2/17/2024 3:33 PM Dictation workstation:   [id number]    CT angio chest for pulmonary embolism    Result Date: 2/17/2024  Interpreted By:  Jarad Mane, STUDY: CT ANGIO CHEST FOR PULMONARY EMBOLISM;  2/17/2024 9:43 am   INDICATION: 32 y/o   F with  Signs/Symptoms:recent surgery, R shoulder pain, tachycardia.   LIMITATIONS: The exam was limited by body habitus and also by timing of image acquisition  relative to contrast enhancement..   ACCESSION NUMBER(S): [id number]   ORDERING CLINICIAN: WILLIAM KHAN   TECHNIQUE: After the administration of intravenous nonionic contrast, thin-section arterial phase images were obtained  from the thoracic inlet down through the iliac crest. Sagittal and coronal reconstruction images were generated. Axial and coronal MIP vascular reconstruction images were also generated.   Mediastinal, lung, bone, and liver windows were reviewed. IV contrast agent was Omnipaque 350, 75 mL.     COMPARISON: Previous exam from 07/10/2020.   FINDINGS: CHEST WALL/BASE OF THE NECK: Stable 15 mm diameter nodule posterior to the nipple of the left breast, anteriorly in the left breast parenchyma on axial image 146. There are multiple nonspecific but mildly asymmetrically enlarged left axillary lymph nodes measuring up to 12 x 18 mm. No thyromegaly or thyroid mass.   MEDIASTINUM/CELSO: No mediastinal or hilar adenopathy in this exam. Mild cardiomegaly. No pericardial effusion. No thoracic aortic aneurysm or dissection. Imaging occurred late in the levo phase of enhancement. Therefore, the exam was nondiagnostic for pulmonary embolism.   LUNGS/ PLEURA/ AND TRACHEA: Hypoventilatory lungs. There is bandlike atelectasis versus scarring laterally and posteriorly in the left lower lobe. No mass or pneumonia in either lung. No  pleural effusion or pneumothorax. The trachea was grossly intact.   BONES: No destructive lytic or blastic bone lesion.   UPPER ABDOMEN: The imaged upper abdomen was grossly intact.       Imaging occurred late in the levo phase of enhancement. The exam is nondiagnostic for pulmonary embolism.   Cardiomegaly. No indication of ongoing CHF.   Band like atelectasis versus scarring at the posterior and lateral left lung base.   Stable 15 mm retroareolar nodule on the left. This could be stable or cyst. However, there is also mild asymmetric left axillary adenopathy. Clinical correlation  needed. Suggest further evaluation with mammography and ultrasound, and breast MRI if clinically needed.   MACRO: None   Signed by: Abelardo Mane 2/17/2024 10:06 AM Dictation workstation:   [id number]    XR shoulder right 2+ views    Result Date: 2/17/2024  Interpreted By:  Valerio Real, STUDY: XR SHOULDER RIGHT 2+ VIEWS;  2/17/2024 8:29 am   INDICATION: Signs/Symptoms:anterior R shoulder pain w/decreased ROM.   COMPARISON: None.   ACCESSION NUMBER(S): [id number]   ORDERING CLINICIAN: WILLIAM KHAN   FINDINGS: Bony structures:  Intact   Joint spaces:  Maintained   Soft tissues:  Unremarkable without significant edema or radiodense foreign body   Other:  None significant       No acute findings.   MACRO: None   Signed by: Valerio Real 2/17/2024 9:06 AM Dictation workstation:   [id number]    BI mammo bilateral screening tomosynthesis    Result Date: 2/14/2024  Interpreted By:  Theresa Ponce, STUDY: BI MAMMO BILATERAL SCREENING TOMOSYNTHESIS;  2/9/2024 4:43 pm   ACCESSION NUMBER(S): [id number]   ORDERING CLINICIAN: ARNOLD HAIRSTON   INDICATION: Baseline screening. Family history of breast cancer   COMPARISON: None.   FINDINGS: 2D and tomosynthesis images were reviewed at 1 mm slice thickness.   Density:  The breast tissue is heterogeneously dense, which may obscure small masses.   There is a focal asymmetry in the medial inferior left breast at anterior depth. No suspicious masses or calcifications are identified in the right breast.       Left breast focal asymmetry. Sonographic evaluation recommended. Additional mammographic views may be necessary.   No evidence of malignancy right breast.   BI-RADS CATEGORY:   BI-RADS Category:  0 Incomplete; Need Additional Imaging Evaluation and/or Prior Mammograms for Comparison. Recommendation:  Additional Imaging. Recommended Date:  Immediate. Laterality:  Left.   For any future breast imaging appointments, please call [phone]).     MACRO: None   Signed by: Theresa  Kris 2/14/2024 10:06 AM Dictation workstation:   [id number]    No results found for this or any previous visit from the past 1095 days.    ASSESSMENT/PLAN:     Leukocytosis - unclear etiology. Stress related from other medical issues seems most likely. Nearly WNL  Right Shoulder Pain/Radiculopathy - seems like a brachial plexus related issue. I doubt this would be infected but CT pending since MRI unable to be obtained. Ortho eval pending  Recent MARLEY and abd surgery - no abd pain and incisions are healing. Doubtful there would be infection here.     Will follow    Discussed with Dr Nessa Mcmahan, DO  [facility]  #[phone]

## 2024-02-22 NOTE — CONSULTS
Reason For Consult  Right shoulder pain     History Of Present Illness  Danette Buenrostro is a 33 y.o. female with PMH of epilepsy, migraines, and endometriosis presenting with right shoulder pain. Patient underwent total robotic hysterectomy, right salpingectomy, excision of endometriosis, right ovarian cystectomy, appendectomy, oversewing of bowel and bladder, and cystoscopy for endometriosis on  with Dr. Theodore. Patient reports upon awakening after surgery she had severe 10/10 bilateral shoulder pain and numbness in the 1st 3 digits of right hand, worse than her abdominal pain post-operatively. Her left shoulder pain resolved within 48 hours but the right shoulder pain persisted up until she presented to [facility] on the . She also reports she had a minimum amount of numbness remaining in her distal fingers tips upon presentation. So far this admission her shoulder pain has improved and her numbness has resolved.     She describes her pain as an aching sensation on the anterior aspect of her shoulder over her deltoid muscle that intermittently becomes sharp without inciting event. It does not radiate into the axilla, down to the elbow, or up past her left trapezius muscle.  Her pain is NOT worsened by movement but she is limited in her range of motion, reports difficulty lifting arm overhead and feeling more shakey in general with bilateral arms. Denies weakness.     Workup so far has revealed leukocytosis of unknown etiology, left upper lobe pulmonary embolism and left axillary adenopathy. XR right shoulder was negative for fx or dislocation. MRI was ordered but unable to be obtained due to permanent piercing. CT ordered by medicine today and orthopedics was consulted. I adjusted CT order to stat given that the care team has been trying to get imaging of that extremity since the .      Past Medical History  As above    Surgical History  She has a past surgical history that includes  section, low  transverse; Colposcopy; Other surgical history; Oophorectomy (Left); and Tubal ligation.  As above     Social History  She reports that she has never smoked. She has never been exposed to tobacco smoke. She has never used smokeless tobacco. She reports current alcohol use. She reports that she does not use drugs.    Family History  Family History   Problem Relation Name Age of Onset    Hypertension Mother Mohini     Stroke Mother Mohini     Heart attack Mother Mohini     Breast cancer Maternal Grandmother Alexia     Other (cerebral infarction) Maternal Grandfather      Breast cancer Mother's Sister Karol     Cancer Mother's Brother      Seizures Father's Brother      Seizures Other Cousin         Allergies  Patient has no known allergies.    Review of Systems  All systems have been reviewed and are negative except for what is mentioned in the HPI.           Physical Exam  PE:  Constitutional: A&Ox3, calm and cooperative, NAD  Eyes: EOMI, clear sclera   Cardiovascular: Normal rate and regular rhythm. No murmurs  Respiratory/Thorax: CTAB, on RA  Gastrointestinal: abd soft, nontender, prior lap sites well approximated and healing without drainage or erythema, steri strips in place   Genitourinary: Voiding independently   Neurological: A&Ox3, No focal deficits   Psychological: Appropriate mood and behavior    Right Shoulder Exam     Tenderness   The patient is experiencing tenderness in the biceps tendon and acromion.    Range of Motion   Active abduction:  70 abnormal   Passive abduction:  90 abnormal   Extension:  20 abnormal   External rotation:  50 abnormal   Forward flexion:  100 abnormal   Internal rotation 0 degrees:  Sacrum (hand to lateral side right hip, unable to touch right butt pocket) abnormal     Muscle Strength   The patient has normal right shoulder strength.    Other   Erythema: absent  Scars: absent  Sensation: normal (fires AIN/PIN/U)  Pulse: present    Comments:  No pain with active or passive  range of motion, only with palpation of deltoid    Wiggles fingers, 5/5  strength, fires AIN/PIN/U, sensation intact in M/R/U distribution.           Last Recorded Vitals  Heart Rate:  []   Temp:  [36.6 °C (97.9 °F)-37.9 °C (100.2 °F)]   Resp:  [16-18]   BP: (106-146)/(66-82)   SpO2:  [97 %-100 %]     Vitals:    02/21/24 2328 02/22/24 0318 02/22/24 0802 02/22/24 1142   BP: 146/79 106/69 112/71 115/66   BP Location:   Left arm Left arm   Patient Position:   Lying Lying   Pulse: 98 87 97 85   Resp: 16 16 18 18   Temp: 37.1 °C (98.8 °F) 36.8 °C (98.2 °F) 37.9 °C (100.2 °F) 36.9 °C (98.5 °F)   TempSrc: Oral Temporal Temporal Oral   SpO2: 97% 100% 100% 100%   Weight:       Height:             Relevant Results    .=== 02/17/24 ===    XR SHOULDER 2+ VIEWS RIGHT    - Impression -  No acute findings.    MACRO:  None    Signed by: Valerio Real 2/17/2024 9:06 AM  Dictation workstation:   [id number]   .=== 02/17/24 ===    CT ANGIO CHEST FOR PULMONARY EMBOLISM    - Impression -  Imaging occurred late in the levo phase of enhancement. The exam is  nondiagnostic for pulmonary embolism.    Cardiomegaly. No indication of ongoing CHF.    Band like atelectasis versus scarring at the posterior and lateral  left lung base.    Stable 15 mm retroareolar nodule on the left. This could be stable or  cyst. However, there is also mild asymmetric left axillary  adenopathy. Clinical correlation needed. Suggest further evaluation  with mammography and ultrasound, and breast MRI if clinically needed.    MACRO:  None    Signed by: Abelardo Mane 2/17/2024 10:06 AM  Dictation workstation:   [id number]       Lab Results   Component Value Date    WBC 13.7 (H) 02/22/2024    HGB 7.6 (L) 02/22/2024    HCT 23.9 (L) 02/22/2024    MCV 92 02/22/2024     (H) 02/22/2024     Lab Results   Component Value Date    GLUCOSE 92 02/22/2024    CALCIUM 7.9 (L) 02/22/2024     (L) 02/22/2024    K 4.3 02/22/2024    CO2 29 02/22/2024    CL 99  02/22/2024    BUN 9 02/22/2024    CREATININE 0.73 02/22/2024         Estimated Creatinine Clearance: 125 mL/min (by C-G formula based on SCr of 0.73 mg/dL).  CRP   Date/Time Value Ref Range Status   07/11/2020 05:36 AM 3.92 (A) mg/dL Final     Comment:     REF VALUE  < 1.00           Scheduled medications  divalproex, 1,000 mg, oral, q PM  divalproex, 500 mg, oral, q AM  enoxaparin, 1 mg/kg, subcutaneous, BID  ferrous sulfate (325 mg ferrous sulfate), 65 mg of iron, oral, BID  gabapentin, 100 mg, oral, q8h FANY  lidocaine, 1 patch, transdermal, Daily  OXcarbazepine, 1,200 mg, oral, BID  polyethylene glycol, 17 g, oral, Daily      Continuous medications     PRN medications  PRN medications: acetaminophen **OR** acetaminophen **OR** acetaminophen, acetaminophen, methocarbamol, oxyCODONE, polyethylene glycol  Results for orders placed or performed during the hospital encounter of 02/17/24 (from the past 24 hour(s))   CBC and Auto Differential   Result Value Ref Range    WBC 13.7 (H) 4.4 - 11.3 x10*3/uL    nRBC 0.4 (H) 0.0 - 0.0 /100 WBCs    RBC 2.61 (L) 4.00 - 5.20 x10*6/uL    Hemoglobin 7.6 (L) 12.0 - 16.0 g/dL    Hematocrit 23.9 (L) 36.0 - 46.0 %    MCV 92 80 - 100 fL    MCH 29.1 26.0 - 34.0 pg    MCHC 31.8 (L) 32.0 - 36.0 g/dL    RDW 14.4 11.5 - 14.5 %    Platelets 588 (H) 150 - 450 x10*3/uL    Immature Granulocytes %, Automated 6.4 (H) 0.0 - 0.9 %    Immature Granulocytes Absolute, Automated 0.88 (H) 0.00 - 0.70 x10*3/uL   Basic Metabolic Panel   Result Value Ref Range    Glucose 92 74 - 99 mg/dL    Sodium 135 (L) 136 - 145 mmol/L    Potassium 4.3 3.5 - 5.3 mmol/L    Chloride 99 98 - 107 mmol/L    Bicarbonate 29 21 - 32 mmol/L    Anion Gap 11 10 - 20 mmol/L    Urea Nitrogen 9 6 - 23 mg/dL    Creatinine 0.73 0.50 - 1.05 mg/dL    eGFR >90 >60 mL/min/1.73m*2    Calcium 7.9 (L) 8.6 - 10.3 mg/dL   Manual Differential   Result Value Ref Range    Neutrophils %, Manual 66.0 40.0 - 80.0 %    Lymphocytes %, Manual 16.0 13.0 -  44.0 %    Monocytes %, Manual 6.0 2.0 - 10.0 %    Eosinophils %, Manual 6.0 0.0 - 6.0 %    Basophils %, Manual 2.0 0.0 - 2.0 %    Atypical Lymphocytes %, Manual 2.0 0.0 - 2.0 %    Myelocytes %, Manual 2.0 0.0 - 0.0 %    Seg Neutrophils Absolute, Manual 9.04 (H) 1.20 - 7.00 x10*3/uL    Lymphocytes Absolute, Manual 2.19 1.20 - 4.80 x10*3/uL    Monocytes Absolute, Manual 0.82 0.10 - 1.00 x10*3/uL    Eosinophils Absolute, Manual 0.82 (H) 0.00 - 0.70 x10*3/uL    Basophils Absolute, Manual 0.27 (H) 0.00 - 0.10 x10*3/uL    Atypical Lymphs Absolute, Manual 0.27 0.00 - 0.50 x10*3/uL    Myelocytes Absolute, Manual 0.27 0.00 - 0.00 x10*3/uL    Total Cells Counted 100     RBC Morphology See Below     Polychromasia Mild         Assessment/Plan     Danette Buenrostro is a 33 y.o. female with PMH of epilepsy, migraines, and endometriosis presenting with right shoulder pain and temporary neuropathy in the right hand since she underwent a complex OBGYN procedure on 2/12.    DDx: brachial plexus transient traction injury from positioning during surgery, rotator cuff pathology, infected joint, referred pain, labral tear   -First it was thought it was related to post-operative intra-abdominal air, shoulder pain can be quite common from it but usually resolves in 48 hours with lidocaine patches and ambulation.   -Low suspicion for joint or bone injury or fracture, no known inciting injury and XR obtained on 17th negative  -Could be a brachial plexus injury from positioning during surgery  -Very low suspicion for joint infection, no evidence of erythema, effusion/subcutaneous swelling. However patient did have temp of 100.2 this morning and has had leukocytosis of unknown etiology throughout admission. ID following, believes its stress related but unsure of etiology.     Plan:  - Will follow up on CT of right shoulder   - if demonstrates joint effusion will possibly perform arthrocentesis to test for infection   - if negative could  investigate spinal pathologies though given her neuropathy has resolved and her sharp pain does not radiate up past trapezius, low suspicion for it  - agree with pain control regimen: lidocaine patches, gabapentin, and robaxin, oxycodone  - recommend PT consult to work on ROM, throughout exam shoulder felt stiff but was able to get more ROM with each act of active/passive ROM.     Dispo: Discussed with Dr. Toussaint. Will follow up on imaging     I spent 60 minutes in the professional and overall care of this patient.       Olive Ballard PA-C

## 2024-02-22 NOTE — CARE PLAN
Problem: Pain  Goal: My pain/discomfort is manageable  Outcome: Progressing     Problem: Safety  Goal: I will remain free of falls  Outcome: Progressing     Problem: Daily Care  Goal: Daily care needs are met  Outcome: Progressing     Problem: Pain  Goal: Performs ADL's with improved pain control throughout shift  Outcome: Progressing   The patient's goals for the shift include Pt will utilize call light during shifft    The clinical goals for the shift include CT completed    Over the shift, the patient did make progress toward the following goals.

## 2024-02-22 NOTE — PROGRESS NOTES
INTERNAL MEDICINE PROGRESS NOTE      HPI:    Patient has had persistent right shoulder pain.  She has limited range of motion.    Vital signs in last 24 hours:  Temp:  [36.6 °C (97.9 °F)-37.9 °C (100.2 °F)] 37.9 °C (100.2 °F)  Heart Rate:  [] 97  Resp:  [16-18] 18  BP: (106-159)/(69-92) 112/71    Physical Examination:  Physical Exam    Constitutional:       Appearance: Young, overweight, in no distress.  HENT:      Head: Normocephalic and atraumatic.   Eyes:      Extraocular Movements: Extraocular movements intact.      Pupils: Pupils are equal, round, and reactive to light.   Cardiovascular:      Rate and Rhythm: Normal rate and regular rhythm.      Pulses: Normal pulses.      Heart sounds: Normal heart sounds.   Pulmonary:      Effort: Pulmonary effort is normal.      Breath sounds: Normal breath sounds.   Abdominal:      General: Abdomen is flat. Bowel sounds are normal.      Palpations: Abdomen is soft.   Musculoskeletal:         General: Normal range of motion.      Cervical back: Normal range of motion and neck supple. Tender anterior aspect of right shoulder.  Limited flexion.  Skin:     General: Skin is warm and dry.   Neurological:      General: No focal deficit present.      Mental Status: Alert and oriented x 3 with no focal motor deficit.       Medications:    Current Facility-Administered Medications:     acetaminophen (Tylenol) tablet 650 mg, 650 mg, oral, q4h PRN, 650 mg at 02/22/24 1003 **OR** acetaminophen (Tylenol) oral liquid 650 mg, 650 mg, oral, q4h PRN, 650 mg at 02/21/24 2024 **OR** acetaminophen (Tylenol) suppository 650 mg, 650 mg, rectal, q4h PRN, Abelardo Szymanski MD    acetaminophen (Tylenol) tablet 975 mg, 975 mg, oral, q6h PRN, Abelardo Szymanski MD    divalproex (Depakote) EC tablet 1,000 mg, 1,000 mg, oral, q PM, Abelardo Szymanski MD, 1,000 mg at 02/21/24 2023    divalproex (Depakote) EC tablet 500 mg, 500 mg, oral, q AM, Abelardo Szymanski MD, 500 mg at 02/22/24 0957     enoxaparin (Lovenox) syringe 120 mg, 1 mg/kg, subcutaneous, BID, Abelardo Szymanski MD, 120 mg at 02/22/24 0957    ferrous sulfate (325 mg ferrous sulfate) tablet 1 tablet, 65 mg of iron, oral, BID, Abelardo Szymanski MD, 1 tablet at 02/22/24 0957    gabapentin (Neurontin) capsule 100 mg, 100 mg, oral, q8h FANY, Abelardo Szymanski MD, 100 mg at 02/22/24 0611    lidocaine 4 % patch 1 patch, 1 patch, transdermal, Daily, Ableardo Szymanski MD, 1 patch at 02/22/24 0957    methocarbamol (Robaxin) tablet 500 mg, 500 mg, oral, 4x daily PRN, Abelardo Szymanski MD, 500 mg at 02/21/24 0756    OXcarbazepine (Trileptal) tablet 1,200 mg, 1,200 mg, oral, BID, Abelardo Szymanski MD, 1,200 mg at 02/22/24 0957    oxyCODONE (Roxicodone) immediate release tablet 5 mg, 5 mg, oral, q4h PRN, Abelardo Szymanski MD, 5 mg at 02/21/24 2033    polyethylene glycol (Glycolax, Miralax) packet 17 g, 17 g, oral, Daily PRN, Abelardo Szymanski MD    polyethylene glycol (Glycolax, Miralax) packet 17 g, 17 g, oral, Daily, Abelardo Szymanski MD, 17 g at 02/21/24 0800    Laboratory Findings:  Lab Results   Component Value Date    WBC 13.7 (H) 02/22/2024    HGB 7.6 (L) 02/22/2024    HCT 23.9 (L) 02/22/2024    MCV 92 02/22/2024     (H) 02/22/2024     Lab Results   Component Value Date    INR 1.3 (H) 02/17/2024    INR 1.1 04/13/2018    INR 1.2 (H) 02/09/2018    PROTIME 14.5 (H) 02/17/2024    PROTIME 12.7 04/13/2018    PROTIME 13.6 (H) 02/09/2018     Lab Results   Component Value Date    GLUCOSE 92 02/22/2024    CALCIUM 7.9 (L) 02/22/2024     (L) 02/22/2024    K 4.3 02/22/2024    CO2 29 02/22/2024    CL 99 02/22/2024    BUN 9 02/22/2024    CREATININE 0.73 02/22/2024       Assessment and Plan:     Acute pulmonary embolism -continue with anticoagulation, transition to oral agent soon  Right shoulder pain -CT scan ordered.  Orthopedic consult requested.  Recent surgery -continue with analgesics.  No active bleeding.  Anemia -secondary to acute blood loss  related to recent surgery.  Monitor with oral iron.  Leucocytosis -cultures pending, no antibiotics at this time.       Abelardo Szymanski MD  02/22/24  10:10 AM

## 2024-02-23 VITALS
DIASTOLIC BLOOD PRESSURE: 79 MMHG | BODY MASS INDEX: 42.65 KG/M2 | HEART RATE: 82 BPM | SYSTOLIC BLOOD PRESSURE: 135 MMHG | RESPIRATION RATE: 20 BRPM | HEIGHT: 65 IN | OXYGEN SATURATION: 94 % | WEIGHT: 256 LBS | TEMPERATURE: 97.7 F

## 2024-02-23 LAB
ANION GAP SERPL CALC-SCNC: 10 MMOL/L (ref 10–20)
BASOPHILS # BLD MANUAL: 0 X10*3/UL (ref 0–0.1)
BASOPHILS NFR BLD MANUAL: 0 %
BUN SERPL-MCNC: 10 MG/DL (ref 6–23)
CALCIUM SERPL-MCNC: 7.9 MG/DL (ref 8.6–10.3)
CHLORIDE SERPL-SCNC: 102 MMOL/L (ref 98–107)
CO2 SERPL-SCNC: 28 MMOL/L (ref 21–32)
CREAT SERPL-MCNC: 0.82 MG/DL (ref 0.5–1.05)
EGFRCR SERPLBLD CKD-EPI 2021: >90 ML/MIN/1.73M*2
EOSINOPHIL # BLD MANUAL: 0 X10*3/UL (ref 0–0.7)
EOSINOPHIL NFR BLD MANUAL: 0 %
ERYTHROCYTE [DISTWIDTH] IN BLOOD BY AUTOMATED COUNT: 14.6 % (ref 11.5–14.5)
GLUCOSE SERPL-MCNC: 96 MG/DL (ref 74–99)
HCT VFR BLD AUTO: 24.5 % (ref 36–46)
HGB BLD-MCNC: 7.6 G/DL (ref 12–16)
IMM GRANULOCYTES # BLD AUTO: 0.95 X10*3/UL (ref 0–0.7)
IMM GRANULOCYTES NFR BLD AUTO: 7.3 % (ref 0–0.9)
LABORATORY COMMENT REPORT: NORMAL
LYMPHOCYTES # BLD MANUAL: 1.97 X10*3/UL (ref 1.2–4.8)
LYMPHOCYTES NFR BLD MANUAL: 15 %
MCH RBC QN AUTO: 28.3 PG (ref 26–34)
MCHC RBC AUTO-ENTMCNC: 31 G/DL (ref 32–36)
MCV RBC AUTO: 91 FL (ref 80–100)
MONOCYTES # BLD MANUAL: 0.79 X10*3/UL (ref 0.1–1)
MONOCYTES NFR BLD MANUAL: 6 %
MYELOCYTES # BLD MANUAL: 0.52 X10*3/UL
MYELOCYTES NFR BLD MANUAL: 4 %
NEUTROPHILS # BLD MANUAL: 9.31 X10*3/UL (ref 1.2–7.7)
NEUTS BAND # BLD MANUAL: 0.66 X10*3/UL (ref 0–0.7)
NEUTS BAND NFR BLD MANUAL: 5 %
NEUTS SEG # BLD MANUAL: 8.65 X10*3/UL (ref 1.2–7)
NEUTS SEG NFR BLD MANUAL: 66 %
NRBC BLD-RTO: 0.5 /100 WBCS (ref 0–0)
PATH REPORT.COMMENTS IMP SPEC: NORMAL
PATH REPORT.FINAL DX SPEC: NORMAL
PATH REPORT.GROSS SPEC: NORMAL
PATH REPORT.RELEVANT HX SPEC: NORMAL
PATH REPORT.TOTAL CANCER: NORMAL
PLATELET # BLD AUTO: 609 X10*3/UL (ref 150–450)
POTASSIUM SERPL-SCNC: 3.9 MMOL/L (ref 3.5–5.3)
RBC # BLD AUTO: 2.69 X10*6/UL (ref 4–5.2)
RBC MORPH BLD: ABNORMAL
SODIUM SERPL-SCNC: 136 MMOL/L (ref 136–145)
TOTAL CELLS COUNTED BLD: 100
VARIANT LYMPHS # BLD MANUAL: 0.52 X10*3/UL (ref 0–0.5)
VARIANT LYMPHS NFR BLD: 4 %
WBC # BLD AUTO: 13.1 X10*3/UL (ref 4.4–11.3)

## 2024-02-23 PROCEDURE — 80048 BASIC METABOLIC PNL TOTAL CA: CPT | Performed by: INTERNAL MEDICINE

## 2024-02-23 PROCEDURE — 85027 COMPLETE CBC AUTOMATED: CPT | Performed by: INTERNAL MEDICINE

## 2024-02-23 PROCEDURE — 36415 COLL VENOUS BLD VENIPUNCTURE: CPT | Performed by: INTERNAL MEDICINE

## 2024-02-23 PROCEDURE — 2500000001 HC RX 250 WO HCPCS SELF ADMINISTERED DRUGS (ALT 637 FOR MEDICARE OP): Performed by: INTERNAL MEDICINE

## 2024-02-23 PROCEDURE — 2500000005 HC RX 250 GENERAL PHARMACY W/O HCPCS: Performed by: INTERNAL MEDICINE

## 2024-02-23 PROCEDURE — 2500000004 HC RX 250 GENERAL PHARMACY W/ HCPCS (ALT 636 FOR OP/ED): Performed by: INTERNAL MEDICINE

## 2024-02-23 PROCEDURE — 99223 1ST HOSP IP/OBS HIGH 75: CPT | Performed by: PSYCHIATRY & NEUROLOGY

## 2024-02-23 PROCEDURE — 85007 BL SMEAR W/DIFF WBC COUNT: CPT | Performed by: INTERNAL MEDICINE

## 2024-02-23 RX ORDER — GABAPENTIN 100 MG/1
100 CAPSULE ORAL EVERY 8 HOURS SCHEDULED
Qty: 90 CAPSULE | Refills: 0 | Status: SHIPPED | OUTPATIENT
Start: 2024-02-23 | End: 2024-03-24 | Stop reason: SDUPTHER

## 2024-02-23 RX ORDER — METHOCARBAMOL 500 MG/1
500 TABLET, FILM COATED ORAL 4 TIMES DAILY PRN
Qty: 80 TABLET | Refills: 2 | Status: SHIPPED | OUTPATIENT
Start: 2024-02-23 | End: 2024-04-04 | Stop reason: SDUPTHER

## 2024-02-23 RX ORDER — FERROUS SULFATE 325(65) MG
65 TABLET ORAL 2 TIMES DAILY
Qty: 60 TABLET | Refills: 0 | Status: SHIPPED | OUTPATIENT
Start: 2024-02-23 | End: 2024-03-24

## 2024-02-23 RX ORDER — OXYCODONE HYDROCHLORIDE 5 MG/1
5 TABLET ORAL EVERY 4 HOURS PRN
Qty: 12 TABLET | Refills: 0 | Status: SHIPPED | OUTPATIENT
Start: 2024-02-23 | End: 2024-03-14 | Stop reason: SDUPTHER

## 2024-02-23 RX ORDER — LIDOCAINE 560 MG/1
1 PATCH PERCUTANEOUS; TOPICAL; TRANSDERMAL DAILY
Qty: 30 PATCH | Refills: 0 | Status: SHIPPED | OUTPATIENT
Start: 2024-02-24 | End: 2024-03-25

## 2024-02-23 RX ADMIN — GABAPENTIN 100 MG: 100 CAPSULE ORAL at 05:05

## 2024-02-23 RX ADMIN — APIXABAN 10 MG: 5 TABLET, FILM COATED ORAL at 19:25

## 2024-02-23 RX ADMIN — OXCARBAZEPINE 1200 MG: 300 TABLET, FILM COATED ORAL at 09:45

## 2024-02-23 RX ADMIN — ENOXAPARIN SODIUM 120 MG: 120 INJECTION SUBCUTANEOUS at 09:45

## 2024-02-23 RX ADMIN — FERROUS SULFATE TAB 325 MG (65 MG ELEMENTAL FE) 1 TABLET: 325 (65 FE) TAB at 09:45

## 2024-02-23 RX ADMIN — LIDOCAINE 1 PATCH: 4 PATCH TOPICAL at 09:45

## 2024-02-23 RX ADMIN — DIVALPROEX SODIUM 500 MG: 250 TABLET, DELAYED RELEASE ORAL at 09:45

## 2024-02-23 RX ADMIN — GABAPENTIN 100 MG: 100 CAPSULE ORAL at 15:00

## 2024-02-23 ASSESSMENT — COGNITIVE AND FUNCTIONAL STATUS - GENERAL
DAILY ACTIVITIY SCORE: 24
MOBILITY SCORE: 23
CLIMB 3 TO 5 STEPS WITH RAILING: A LITTLE

## 2024-02-23 ASSESSMENT — PAIN SCALES - GENERAL: PAINLEVEL_OUTOF10: 0 - NO PAIN

## 2024-02-23 ASSESSMENT — PAIN - FUNCTIONAL ASSESSMENT: PAIN_FUNCTIONAL_ASSESSMENT: 0-10

## 2024-02-23 NOTE — PROGRESS NOTES
INTERNAL MEDICINE PROGRESS NOTE      HPI:    Patient has had fair pain control.  Has had low-grade fevers still.  Still has weakness of the right arm.    Vital signs in last 24 hours:  Temp:  [36.3 °C (97.3 °F)-37.9 °C (100.2 °F)] 36.4 °C (97.5 °F)  Heart Rate:  [] 93  Resp:  [18] 18  BP: (120-154)/(74-83) 154/83    Physical Examination:  Physical Exam    Constitutional:       Appearance: Young, overweight, in no distress.  HENT:      Head: Normocephalic and atraumatic.   Eyes:      Extraocular Movements: Extraocular movements intact.      Pupils: Pupils are equal, round, and reactive to light.   Cardiovascular:      Rate and Rhythm: Normal rate and regular rhythm.      Pulses: Normal pulses.      Heart sounds: Normal heart sounds.   Pulmonary:      Effort: Pulmonary effort is normal.      Breath sounds: Normal breath sounds.   Abdominal:      General: Abdomen is flat. Bowel sounds are normal.      Palpations: Abdomen is soft.   Musculoskeletal:         General: Normal range of motion.      Cervical back: Normal range of motion and neck supple. Tender anterior aspect of right shoulder.  Limited flexion.  Skin:     General: Skin is warm and dry.   Neurological:      General: No focal deficit present.      Mental Status: Alert and oriented x 3 with no focal motor deficit.       Medications:    Current Facility-Administered Medications:     acetaminophen (Tylenol) tablet 650 mg, 650 mg, oral, q4h PRN, 650 mg at 02/22/24 2234 **OR** acetaminophen (Tylenol) oral liquid 650 mg, 650 mg, oral, q4h PRN, 650 mg at 02/21/24 2024 **OR** acetaminophen (Tylenol) suppository 650 mg, 650 mg, rectal, q4h PRN, Abelardo Szymanski MD    acetaminophen (Tylenol) tablet 975 mg, 975 mg, oral, q6h PRN, Abelardo Szymanski MD    divalproex (Depakote) EC tablet 1,000 mg, 1,000 mg, oral, q PM, Abelardo Szymanski MD, 1,000 mg at 02/22/24 2039    divalproex (Depakote) EC tablet 500 mg, 500 mg, oral, q AM, Abelardo Szymanski MD, 500 mg  at 02/23/24 0945    enoxaparin (Lovenox) syringe 120 mg, 1 mg/kg, subcutaneous, BID, Abelardo Szymanski MD, 120 mg at 02/23/24 0945    ferrous sulfate (325 mg ferrous sulfate) tablet 1 tablet, 65 mg of iron, oral, BID, Abelardo Szymasnki MD, 1 tablet at 02/23/24 0945    gabapentin (Neurontin) capsule 100 mg, 100 mg, oral, q8h FANY, Abelardo Szymanski MD, 100 mg at 02/23/24 0505    lidocaine 4 % patch 1 patch, 1 patch, transdermal, Daily, Abelardo Szymanski MD, 1 patch at 02/23/24 0945    methocarbamol (Robaxin) tablet 500 mg, 500 mg, oral, 4x daily PRN, Abelardo Szymanski MD, 500 mg at 02/22/24 1637    OXcarbazepine (Trileptal) tablet 1,200 mg, 1,200 mg, oral, BID, Abelardo Szymanski MD, 1,200 mg at 02/23/24 0945    oxyCODONE (Roxicodone) immediate release tablet 5 mg, 5 mg, oral, q4h PRN, Abelardo Szymanski MD, 5 mg at 02/22/24 2040    polyethylene glycol (Glycolax, Miralax) packet 17 g, 17 g, oral, Daily PRN, Abelardo Szymanski MD    polyethylene glycol (Glycolax, Miralax) packet 17 g, 17 g, oral, Daily, Abelardo Szymanski MD, 17 g at 02/21/24 0800    Laboratory Findings:  Lab Results   Component Value Date    WBC 13.1 (H) 02/23/2024    HGB 7.6 (L) 02/23/2024    HCT 24.5 (L) 02/23/2024    MCV 91 02/23/2024     (H) 02/23/2024     Lab Results   Component Value Date    INR 1.3 (H) 02/17/2024    INR 1.1 04/13/2018    INR 1.2 (H) 02/09/2018    PROTIME 14.5 (H) 02/17/2024    PROTIME 12.7 04/13/2018    PROTIME 13.6 (H) 02/09/2018     Lab Results   Component Value Date    GLUCOSE 96 02/23/2024    CALCIUM 7.9 (L) 02/23/2024     02/23/2024    K 3.9 02/23/2024    CO2 28 02/23/2024     02/23/2024    BUN 10 02/23/2024    CREATININE 0.82 02/23/2024       Assessment and Plan:     Acute pulmonary embolism -continue with anticoagulation.  Right shoulder pain -CT results noted.  Will ask neurology to see for weakness.  Recent surgery -continue with analgesics.  No active bleeding.  Anemia -secondary to acute blood loss  related to recent surgery.  Monitor with oral iron.  Leucocytosis -ID following, no antibiotics at this time.       Abelardo Szymanski MD  02/23/24  10:10 AM

## 2024-02-23 NOTE — CARE PLAN
Problem: Pain  Goal: My pain/discomfort is manageable  2/23/2024 1855 by Alison Mondragon RN  Outcome: Adequate for Discharge  2/23/2024 1854 by Alison Mondragon RN  Outcome: Progressing     Problem: Safety  Goal: Patient will be injury free during hospitalization  Outcome: Adequate for Discharge  Goal: I will remain free of falls  2/23/2024 1855 by Alison Mondragon RN  Outcome: Adequate for Discharge  2/23/2024 1854 by Alison Mondragon RN  Outcome: Progressing     Problem: Daily Care  Goal: Daily care needs are met  2/23/2024 1855 by Alison Mondragon RN  Outcome: Adequate for Discharge  2/23/2024 1854 by Alison Mondragon RN  Outcome: Progressing     Problem: Psychosocial Needs  Goal: Demonstrates ability to cope with hospitalization/illness  Outcome: Adequate for Discharge  Goal: Collaborate with me, my family, and caregiver to identify my specific goals  Outcome: Adequate for Discharge     Problem: Discharge Barriers  Goal: My discharge needs are met  Outcome: Adequate for Discharge     Problem: Pain - Adult  Goal: Verbalizes/displays adequate comfort level or baseline comfort level  Outcome: Adequate for Discharge     Problem: Safety - Adult  Goal: Free from fall injury  Outcome: Adequate for Discharge     Problem: Discharge Planning  Goal: Discharge to home or other facility with appropriate resources  Outcome: Adequate for Discharge     Problem: Chronic Conditions and Co-morbidities  Goal: Patient's chronic conditions and co-morbidity symptoms are monitored and maintained or improved  Outcome: Adequate for Discharge     Problem: Pain  Goal: Takes deep breaths with improved pain control throughout the shift  Outcome: Adequate for Discharge  Goal: Turns in bed with improved pain control throughout the shift  Outcome: Adequate for Discharge  Goal: Walks with improved pain control throughout the shift  Outcome: Adequate for Discharge  Goal: Performs ADL's with improved pain control throughout shift  2/23/2024 1855 by  Alison Mondragon RN  Outcome: Adequate for Discharge  2/23/2024 1854 by Alison Mondragon RN  Outcome: Progressing  Goal: Participates in PT with improved pain control throughout the shift  Outcome: Adequate for Discharge  Goal: Free from opioid side effects throughout the shift  Outcome: Adequate for Discharge  Goal: Free from acute confusion related to pain meds throughout the shift  Outcome: Adequate for Discharge   The patient's goals for the shift include Pt will utilize call light during shifft    The clinical goals for the shift include remain afebrile    Over the shift, the patient did make progress toward the following goals.

## 2024-02-23 NOTE — PROGRESS NOTES
"Danette Buenrostro is a 33 y.o. female on day 6 of admission presenting with Tachycardia.    Subjective   Interval History: no new events. Has some hand tremors. CT shoulder negative       Objective   Physical Exam    Range of Vitals (last 24 hours)  /74 (BP Location: Left arm, Patient Position: Lying)   Pulse 93   Temp 37.2 °C (99 °F) (Temporal)   Resp 18   Ht 1.651 m (5' 5\")   Wt 116 kg (256 lb)   SpO2 100%   BMI 42.60 kg/m²   Daily Weight  02/17/24 : 116 kg (256 lb)    Body mass index is 42.6 kg/m².    General: AAOx3, NAD, nontoxic appearing  Respiratory: normal respiratory effort      Current Facility-Administered Medications:     acetaminophen (Tylenol) tablet 650 mg, 650 mg, oral, q4h PRN, 650 mg at 02/22/24 2234 **OR** acetaminophen (Tylenol) oral liquid 650 mg, 650 mg, oral, q4h PRN, 650 mg at 02/21/24 2024 **OR** acetaminophen (Tylenol) suppository 650 mg, 650 mg, rectal, q4h PRN, Abelardo Szymanski MD    acetaminophen (Tylenol) tablet 975 mg, 975 mg, oral, q6h PRN, Abelardo Szymanski MD    divalproex (Depakote) EC tablet 1,000 mg, 1,000 mg, oral, q PM, Abelardo Szymanski MD, 1,000 mg at 02/22/24 2039    divalproex (Depakote) EC tablet 500 mg, 500 mg, oral, q AM, Abelardo Szymanski MD, 500 mg at 02/23/24 0945    enoxaparin (Lovenox) syringe 120 mg, 1 mg/kg, subcutaneous, BID, Abelardo Szymanski MD, 120 mg at 02/23/24 0945    ferrous sulfate (325 mg ferrous sulfate) tablet 1 tablet, 65 mg of iron, oral, BID, Abelardo Szymanski MD, 1 tablet at 02/23/24 0945    gabapentin (Neurontin) capsule 100 mg, 100 mg, oral, q8h FANY, Abelardo Szymanski MD, 100 mg at 02/23/24 0505    lidocaine 4 % patch 1 patch, 1 patch, transdermal, Daily, Abelardo Szymanski MD, 1 patch at 02/23/24 0905    methocarbamol (Robaxin) tablet 500 mg, 500 mg, oral, 4x daily PRN, Abelardo Szymanski MD, 500 mg at 02/22/24 1637    OXcarbazepine (Trileptal) tablet 1,200 mg, 1,200 mg, oral, BID, Abelardo Szymanski MD, 1,200 mg at 02/23/24 0945    " oxyCODONE (Roxicodone) immediate release tablet 5 mg, 5 mg, oral, q4h PRN, Abelardo Szymanski MD, 5 mg at 02/22/24 2040    polyethylene glycol (Glycolax, Miralax) packet 17 g, 17 g, oral, Daily PRN, Abelardo Szymanski MD    polyethylene glycol (Glycolax, Miralax) packet 17 g, 17 g, oral, Daily, Abelardo Szymanski MD, 17 g at 02/21/24 0800    Relevant Results  Labs:  Lab Results   Component Value Date    WBC 13.1 (H) 02/23/2024    HGB 7.6 (L) 02/23/2024    HCT 24.5 (L) 02/23/2024    MCV 91 02/23/2024     (H) 02/23/2024     Lab Results   Component Value Date    GLUCOSE 96 02/23/2024    CALCIUM 7.9 (L) 02/23/2024     02/23/2024    K 3.9 02/23/2024    CO2 28 02/23/2024     02/23/2024    BUN 10 02/23/2024    CREATININE 0.82 02/23/2024         Estimated Creatinine Clearance: 121.8 mL/min (by C-G formula based on SCr of 0.82 mg/dL).  CRP   Date/Time Value Ref Range Status   07/11/2020 05:36 AM 3.92 (A) mg/dL Final     Comment:     REF VALUE  < 1.00         Micro:  No results found for the last 7 days.  2/20 blood cx: NGTD    Imaging:  CT shoulder right wo IV contrast    Result Date: 2/22/2024  Interpreted By:  Sherif Menjivar, STUDY: CT SHOULDER RIGHT WO IV CONTRAST;  2/22/2024 3:27 pm   INDICATION: Signs/Symptoms:shoulder pain.   COMPARISON: Radiographs 02/17/2024.   ACCESSION NUMBER(S): [id number]   ORDERING CLINICIAN: ASIF KC   TECHNIQUE: CT imaging of the  right shoulder was obtained  without administration of intravenous contrast medium. Coronal and sagittal reformatted images were performed.   FINDINGS: OSSEOUS STRUCTURES: No acute fracture or dislocation. Glenohumeral joint space is maintained. Mild acromioclavicular joint space narrowing with subchondral cysts.   SOFT TISSUES: Muscle bulk is maintained. No fluid collection. Imaged right lung is clear.       No acute osseous abnormalities.   Mild glenohumeral osteoarthritis.   MACRO: None   Signed by: Sherif Menjivar 2/22/2024 3:54 PM Dictation  workstation:   [id number]    ECG 12 Lead    Result Date: 2/21/2024  Sinus tachycardia Otherwise normal ECG When compared with ECG of 20-APR-2021 10:38, Non-specific change in ST segment in Inferior leads See ED provider note for full interpretation and clinical correlation Confirmed by Nicole Gao ([id number]) on 2/21/2024 10:36:35 AM    Vascular US lower extremity venous duplex bilateral    Result Date: 2/20/2024             [place] [address]   Tel [phone] and Fax [phone]  Vascular Lab Report VASC US LOWER EXTREMITY VENOUS DUPLEX BILATERAL  Patient Name:     NESSA ALMODOVAR     Reading           [id number] Randell Ferris                                       Physician:        MD Study Date:       2/19/2024           Ordering          [id number] JARAD WEAVER                                       Physician: MRN/PID:          [id number]            Technologist:     Ilda Park RVT Accession#:       [id number]        Technologist 2: Date of           1990 / 33      Encounter#:       [id number] Birth/Age:        years Gender:           F Admission Status: Inpatient           Location          [facility]                                       Performed:  Diagnosis/ICD: Other pulmonary embolism without acute cor pulmonale-I26.99 CPT Codes:     85292 Peripheral venous duplex scan for DVT complete  CONCLUSIONS: Right Lower Venous: No evidence of acute deep vein thrombus visualized in the right lower extremity. Left Lower Venous: No evidence of acute deep vein thrombus visualized in the left lower extremity.  Imaging & Doppler Findings:  Right                 Compressible Thrombus        Flow Distal External Iliac     Yes        None   Spontaneous/Phasic CFV                       Yes        None   Spontaneous/Phasic PFV                       Yes        None FV Proximal               Yes        None   Spontaneous/Phasic FV Mid                    Yes        None  FV Distal                 Yes        None Popliteal                 Yes        None   Spontaneous/Phasic Peroneal                  Yes        None PTV                       Yes        None  Left                  Compress Thrombus        Flow Distal External Iliac   Yes      None   Spontaneous/Phasic CFV                     Yes      None   Spontaneous/Phasic PFV                     Yes      None FV Proximal             Yes      None   Spontaneous/Phasic FV Mid                  Yes      None FV Distal               Yes      None Popliteal               Yes      None   Spontaneous/Phasic Peroneal                Yes      None PTV                     Yes      None  [id number] Randell Ferris MD Electronically signed by [id number] Randell Ferris MD on 2/20/2024 at 6:02:57 PM  ** Final **     Upper extremity venous duplex bilateral    Result Date: 2/20/2024             [place] [address]   Tel [phone] and Fax [phone]  Vascular Lab Report VASC US UPPER EXTREMITY VENOUS DUPLEX BILATERAL  Patient Name:     NESSA ALMODOVAR     Reading           [id number] Randell Ferris                                       Physician:        MD Study Date:       2/19/2024           Ordering          [id number] JARAD WEAVER                                       Physician: MRN/PID:          [id number]            Technologist:     Ilda Park T Accession#:       [id number]        Technologist 2: Date of           1990 / 33      Encounter#:       [id number] Birth/Age:        years Gender:           F Admission Status: Inpatient           Location          [facility]                                       Performed:  Diagnosis/ICD: Other pulmonary embolism without acute cor pulmonale-I26.99 CPT Codes:     81992 Peripheral venous duplex scan for DVT complete  CONCLUSIONS: Right Upper Venous: No evidence of acute deep vein thrombus visualized in the right upper extremity. Left Upper  Venous: No evidence of acute deep vein thrombus visualized in the left upper extremity.  Imaging & Doppler Findings:  Right               Compressible Thrombus   Flow Internal Jugular        Yes        None   Pulsatile Subclavian              Yes        None   Pulsatile Subclavian Proximal     Yes        None Subclavian Mid          Yes        None Subclavian Distal       Yes        None Axillary                Yes        None   Pulsatile Brachial                Yes        None Cephalic                Yes        None Basilic                 Yes        None  Left                Compress Thrombus   Flow Internal Jugular      Yes      None   Pulsatile Subclavian            Yes      None   Pulsatile Subclavian Proximal   Yes      None Subclavian Mid        Yes      None Subclavian Distal     Yes      None Axillary              Yes      None   Pulsatile Brachial              Yes      None Cephalic              Yes      None Basilic               Yes      None  [id number] Randell Ferris MD Electronically signed by [id number] Randell Ferris MD on 2/20/2024 at 5:53:32 PM  ** Final **     NM Lung perfusion with spect/ct    Result Date: 2/17/2024  Interpreted By:  Charbel Bro, STUDY: NM LUNG PERFUSION WITH SPECT/CT;  2/17/2024 12:22 pm   INDICATION: Signs/Symptoms:concern for PE, CT PE non-diagnostic; tachy, POD 5 after multiple surgery.   COMPARISON: Chest x-ray from 02/17/2024   ACCESSION NUMBER(S): [id number]   ORDERING CLINICIAN: JARAD WEVAER   TECHNIQUE: DIVISION OF NUCLEAR MEDICINE PERFUSION LUNG SCANS   Multiple perfusion images of the lungs were acquired after the intravenous administration of 4.4 mCi of Tc-99m macroaggregated albumin (MAA). In addition, SPECT/CT of the chest was performed.   FINDINGS: Planar perfusion images of both lungs demonstrate mild heterogeneity throughout the lung fields bilaterally. Wedge-shaped segmental perfusion defect in the left upper lobe seen on SPECT/CT suggestive of acute pulmonary  embolism..       1. Wedge-shaped segmental perfusion defect in the left upper lobe seen on SPECT/CT suggestive of acute pulmonary embolism..   Critical Finding:  See findings. Notification was initiated on 2/17/2024 at 3:26 pm by Dr. Charbel Bro to Dr. Abelardo Szymanski. (**-YCF-**)   The interpretation above is based on modified PIOPED II and PISAPED criteria.   This study was analyzed and interpreted at [place], [place].   Signed by: Charbel Bro 2/17/2024 3:33 PM Dictation workstation:   [id number]    CT angio chest for pulmonary embolism    Result Date: 2/17/2024  Interpreted By:  Abelardo Mane, STUDY: CT ANGIO CHEST FOR PULMONARY EMBOLISM;  2/17/2024 9:43 am   INDICATION: 34 y/o   F with  Signs/Symptoms:recent surgery, R shoulder pain, tachycardia.   LIMITATIONS: The exam was limited by body habitus and also by timing of image acquisition relative to contrast enhancement..   ACCESSION NUMBER(S): [id number]   ORDERING CLINICIAN: WILLIAM KHAN   TECHNIQUE: After the administration of intravenous nonionic contrast, thin-section arterial phase images were obtained  from the thoracic inlet down through the iliac crest. Sagittal and coronal reconstruction images were generated. Axial and coronal MIP vascular reconstruction images were also generated.   Mediastinal, lung, bone, and liver windows were reviewed. IV contrast agent was Omnipaque 350, 75 mL.     COMPARISON: Previous exam from 07/10/2020.   FINDINGS: CHEST WALL/BASE OF THE NECK: Stable 15 mm diameter nodule posterior to the nipple of the left breast, anteriorly in the left breast parenchyma on axial image 146. There are multiple nonspecific but mildly asymmetrically enlarged left axillary lymph nodes measuring up to 12 x 18 mm. No thyromegaly or thyroid mass.   MEDIASTINUM/CELSO: No mediastinal or hilar adenopathy in this exam. Mild cardiomegaly. No pericardial effusion. No thoracic aortic aneurysm or dissection. Imaging occurred late in  the levo phase of enhancement. Therefore, the exam was nondiagnostic for pulmonary embolism.   LUNGS/ PLEURA/ AND TRACHEA: Hypoventilatory lungs. There is bandlike atelectasis versus scarring laterally and posteriorly in the left lower lobe. No mass or pneumonia in either lung. No  pleural effusion or pneumothorax. The trachea was grossly intact.   BONES: No destructive lytic or blastic bone lesion.   UPPER ABDOMEN: The imaged upper abdomen was grossly intact.       Imaging occurred late in the levo phase of enhancement. The exam is nondiagnostic for pulmonary embolism.   Cardiomegaly. No indication of ongoing CHF.   Band like atelectasis versus scarring at the posterior and lateral left lung base.   Stable 15 mm retroareolar nodule on the left. This could be stable or cyst. However, there is also mild asymmetric left axillary adenopathy. Clinical correlation needed. Suggest further evaluation with mammography and ultrasound, and breast MRI if clinically needed.   MACRO: None   Signed by: Abelardo Mane 2/17/2024 10:06 AM Dictation workstation:   [id number]    XR shoulder right 2+ views    Result Date: 2/17/2024  Interpreted By:  Valerio Real, STUDY: XR SHOULDER RIGHT 2+ VIEWS;  2/17/2024 8:29 am   INDICATION: Signs/Symptoms:anterior R shoulder pain w/decreased ROM.   COMPARISON: None.   ACCESSION NUMBER(S): [id number]   ORDERING CLINICIAN: WILLIAM KHAN   FINDINGS: Bony structures:  Intact   Joint spaces:  Maintained   Soft tissues:  Unremarkable without significant edema or radiodense foreign body   Other:  None significant       No acute findings.   MACRO: None   Signed by: Valerio Real 2/17/2024 9:06 AM Dictation workstation:   [id number]    BI mammo bilateral screening tomosynthesis    Result Date: 2/14/2024  Interpreted By:  Theresa Ponce, STUDY: BI MAMMO BILATERAL SCREENING TOMOSYNTHESIS;  2/9/2024 4:43 pm   ACCESSION NUMBER(S): [id number]   ORDERING CLINICIAN: ARNOLD HAIRSTON   INDICATION: Baseline  screening. Family history of breast cancer   COMPARISON: None.   FINDINGS: 2D and tomosynthesis images were reviewed at 1 mm slice thickness.   Density:  The breast tissue is heterogeneously dense, which may obscure small masses.   There is a focal asymmetry in the medial inferior left breast at anterior depth. No suspicious masses or calcifications are identified in the right breast.       Left breast focal asymmetry. Sonographic evaluation recommended. Additional mammographic views may be necessary.   No evidence of malignancy right breast.   BI-RADS CATEGORY:   BI-RADS Category:  0 Incomplete; Need Additional Imaging Evaluation and/or Prior Mammograms for Comparison. Recommendation:  Additional Imaging. Recommended Date:  Immediate. Laterality:  Left.   For any future breast imaging appointments, please call [phone] ([phone]).     MACRO: None   Signed by: Theresa Ponce 2/14/2024 10:06 AM Dictation workstation:   [id number]    No results found for this or any previous visit from the past 1095 days.    ASSESSMENT/PLAN:     Leukocytosis - unclear etiology. Stress related from other medical issues seems most likely. Nearly WNL now without abx  Right Shoulder Pain/Radiculopathy - seems like a brachial plexus related issue. I doubt this would be infected. CT without effusion or collection  Recent MARLEY and abd surgery - no abd pain and incisions are healing. Doubtful there would be infection here.      Will follow peripherally over the weekend    Erendira Mcmahan DO  [facility]  #[phone]

## 2024-02-23 NOTE — CONSULTS
INITIAL NEUROLOGY CONSULT NOTE    IMPRESSION:  Isolated right anterior deltoid weakness, vs. structural problem of the shoulder.  No signs on exam for radiculopathy, sensory neuropathy on exam.  I cannot absolutely rule out a brachial plexopathy, but less likely given isolated weakness.    RECOMMENDATIONS:  Outpatient EMG/NCS of the right arm in 10-14 days; nerve injury doesn't show well on EMG/NCS until day 21 of a nerve injury.  Will need PT/OT for proximal arm movement.  Can consider discharge planning from the neurological standpoint.    Kip Sigala Jr., M.D., FAAN       History Of Present Illness  Danette Buenrostro is a 33 y.o. female presenting with right arm weakness.  She is my outpatient as I manage her seizure disorder.    She awoke from a total robotic hysterectomy with right salpingectomy, ovarian cystectomy, appendectomy, and excision of endometriosis on 2/12 with severe right shoulder pain.  This was associated with tingling paresthesias of both shoulders with radiation to the first three fingers of the right hand.  She presented to the [facility] ED on 2/17 for these issues.  The pain and tingling have resolved, but she has more recently noted proximal weakness of the right arm, prompting this consult.    She denies other focal neurological symptoms including dysarthria, dysphagia, diplopia, other focal weakness, other focal sensory change, ataxia, vertigo, or bowel/bladder incontinence, among others.      Past Medical History  Past Medical History:   Diagnosis Date    Benign neoplasm of breast     Endometriosis     Epilepsy with partial complex seizures (CMS/HCC)     Migraine     Taking Depakote    Obesity     Ovarian cyst     measuring 5cm x 3cm x 3cm- Scheduled for surgery with Dr Theodore on 02/12/24    Pelvic floor dysfunction in female     Pelvic pain     Seizure disorder (CMS/HCC) At 7y    F/W Dr. Sigala (Neuro) Taking Trileptal, Last seizure 2 days ago during sleep    Type A blood, Rh  positive     Blood type A+    Vitamin B1 deficiency      Surgical History  Past Surgical History:   Procedure Laterality Date     SECTION, LOW TRANSVERSE      x 2 w/ BTL at second    COLPOSCOPY      OOPHORECTOMY Left     Robotic LSO w/ R cystectomy/salpingectomy    OTHER SURGICAL HISTORY      Surgical Treatment Of Spontaneous     TUBAL LIGATION       Social History  Social History     Tobacco Use    Smoking status: Never     Passive exposure: Never    Smokeless tobacco: Never   Vaping Use    Vaping Use: Never used   Substance Use Topics    Alcohol use: Yes     Comment: Occasionally    Drug use: Never       Scheduled medications  divalproex, 1,000 mg, oral, q PM  divalproex, 500 mg, oral, q AM  enoxaparin, 1 mg/kg, subcutaneous, BID  ferrous sulfate (325 mg ferrous sulfate), 65 mg of iron, oral, BID  gabapentin, 100 mg, oral, q8h FANY  lidocaine, 1 patch, transdermal, Daily  OXcarbazepine, 1,200 mg, oral, BID  polyethylene glycol, 17 g, oral, Daily      Continuous medications     PRN medications  PRN medications: acetaminophen **OR** acetaminophen **OR** acetaminophen, acetaminophen, methocarbamol, oxyCODONE, polyethylene glycol      Family History   Problem Relation Name Age of Onset    Hypertension Mother Mohini     Stroke Mother Mohini     Heart attack Mother Mohini     Breast cancer Maternal Grandmother Alexia     Other (cerebral infarction) Maternal Grandfather      Breast cancer Mother's Sister Karol     Cancer Mother's Brother      Seizures Father's Brother      Seizures Other Cousin      Allergies  Patient has no known allergies.  Medications Prior to Admission   Medication Sig Dispense Refill Last Dose    [] acetaminophen (Tylenol) 500 mg tablet Take 2 tablets (1,000 mg) by mouth every 6 hours if needed for mild pain (1 - 3) for up to 7 days. 60 tablet 0     divalproex (Depakote) 500 mg EC tablet 1 by mouth in the morning, 2 in the evening (Patient taking differently: Take 1 tablet  "(500 mg) by mouth once daily in the morning.) 90 tablet 3 2/16/2024    divalproex (Depakote) 500 mg EC tablet Take 2 tablets (1,000 mg) by mouth once daily in the evening. Do not crush, chew, or split.   2/16/2024    ibuprofen 600 mg tablet Take 1 tablet (600 mg) by mouth if needed for mild pain (1 - 3). 90 tablet 0     methocarbamol (Robaxin) 500 mg tablet Take 1 tablet (500 mg) by mouth 4 times a day as needed for muscle spasms. May take 2 tablets at a time if needed. 80 tablet 2 2/16/2024    OXcarbazepine (Trileptal) 600 mg tablet Take 2 tablets (1,200 mg) by mouth 2 times a day.   2/16/2024    oxyCODONE (Roxicodone) 5 mg immediate release tablet Take 1 tablet (5 mg) by mouth every 4 hours if needed for severe pain (7 - 10). 12 tablet 0     polyethylene glycol (Glycolax, Miralax) 17 gram/dose powder Take 17 g by mouth once daily. 510 g 0        Scheduled medications  divalproex, 1,000 mg, oral, q PM  divalproex, 500 mg, oral, q AM  enoxaparin, 1 mg/kg, subcutaneous, BID  ferrous sulfate (325 mg ferrous sulfate), 65 mg of iron, oral, BID  gabapentin, 100 mg, oral, q8h FANY  lidocaine, 1 patch, transdermal, Daily  OXcarbazepine, 1,200 mg, oral, BID  polyethylene glycol, 17 g, oral, Daily      Continuous medications     PRN medications  PRN medications: acetaminophen **OR** acetaminophen **OR** acetaminophen, acetaminophen, methocarbamol, oxyCODONE, polyethylene glycol    Review of Systems   All other systems reviewed and are negative.      Last Recorded Vitals  Blood pressure 134/79, pulse 96, temperature 36.7 °C (98.1 °F), temperature source Temporal, resp. rate 18, height 1.651 m (5' 5\"), weight 116 kg (256 lb), SpO2 100 %, not currently breastfeeding.    CONSTITUTIONAL:  No acute distress    CARDIOVASCULAR:  Normal pulses in the distal legs, no edema of either arm or either leg.  No carotid bruits.    MENTAL STATUS:  Awake, alert, fully oriented to self, place, and time, with present short-term memory, good " awareness of recent events, normal attention span, concentration, and fund of knowledge.    SPEECH AND LANGUAGE:  Can name and repeat, follows all commands, has no dysarthria    FUNDOSCOPIC:  No papilledema    CRANIAL NERVES:  II-Vision present, visual fields full to confrontational testing    III/IV/VI--EOMs are present in all directions.  Pupils are symmetrically reactive in dim light.  No ptosis.    V--Normal facial sensation.    VII--No facial asymmetry.    VIII--Hearing present to voice bilaterally.    IX/X--Symmetric soft palate rise.    XI--Normal trapezius power bilaterally.    XII--Tongue protrudes without deviation.    MOTOR:  1/5 right anterior deltoid (shoulder flexion) power.  4/5 right lateral deltoid power.  Other right arm muscles are normal.  Normal power, tone, and bulk in the left arm and both legs.    SENSORY:  Normal pin sensation in both arms and both legs without distal-proximal gradient, asymmetry, or spinal sensory level.    COORDINATION:  Normal finger-to-nose and heel-to-shin testing in both arms and both legs.    REFLEXES are normal and symmetric at the biceps, triceps, brachioradialis, patella, and ankle.  The plantar responses are flexor.    GAIT deferred    Relevant Results  Results for orders placed or performed during the hospital encounter of 02/17/24 (from the past 24 hour(s))   CBC and Auto Differential   Result Value Ref Range    WBC 13.1 (H) 4.4 - 11.3 x10*3/uL    nRBC 0.5 (H) 0.0 - 0.0 /100 WBCs    RBC 2.69 (L) 4.00 - 5.20 x10*6/uL    Hemoglobin 7.6 (L) 12.0 - 16.0 g/dL    Hematocrit 24.5 (L) 36.0 - 46.0 %    MCV 91 80 - 100 fL    MCH 28.3 26.0 - 34.0 pg    MCHC 31.0 (L) 32.0 - 36.0 g/dL    RDW 14.6 (H) 11.5 - 14.5 %    Platelets 609 (H) 150 - 450 x10*3/uL    Immature Granulocytes %, Automated 7.3 (H) 0.0 - 0.9 %    Immature Granulocytes Absolute, Automated 0.95 (H) 0.00 - 0.70 x10*3/uL   Basic Metabolic Panel   Result Value Ref Range    Glucose 96 74 - 99 mg/dL    Sodium 136  136 - 145 mmol/L    Potassium 3.9 3.5 - 5.3 mmol/L    Chloride 102 98 - 107 mmol/L    Bicarbonate 28 21 - 32 mmol/L    Anion Gap 10 10 - 20 mmol/L    Urea Nitrogen 10 6 - 23 mg/dL    Creatinine 0.82 0.50 - 1.05 mg/dL    eGFR >90 >60 mL/min/1.73m*2    Calcium 7.9 (L) 8.6 - 10.3 mg/dL   Manual Differential   Result Value Ref Range    Neutrophils %, Manual 66.0 40.0 - 80.0 %    Bands %, Manual 5.0 0.0 - 5.0 %    Lymphocytes %, Manual 15.0 13.0 - 44.0 %    Monocytes %, Manual 6.0 2.0 - 10.0 %    Eosinophils %, Manual 0.0 0.0 - 6.0 %    Basophils %, Manual 0.0 0.0 - 2.0 %    Atypical Lymphocytes %, Manual 4.0 0.0 - 2.0 %    Myelocytes %, Manual 4.0 0.0 - 0.0 %    Seg Neutrophils Absolute, Manual 8.65 (H) 1.20 - 7.00 x10*3/uL    Bands Absolute, Manual 0.66 0.00 - 0.70 x10*3/uL    Lymphocytes Absolute, Manual 1.97 1.20 - 4.80 x10*3/uL    Monocytes Absolute, Manual 0.79 0.10 - 1.00 x10*3/uL    Eosinophils Absolute, Manual 0.00 0.00 - 0.70 x10*3/uL    Basophils Absolute, Manual 0.00 0.00 - 0.10 x10*3/uL    Atypical Lymphs Absolute, Manual 0.52 (H) 0.00 - 0.50 x10*3/uL    Myelocytes Absolute, Manual 0.52 0.00 - 0.00 x10*3/uL    Total Cells Counted 100     Neutrophils Absolute, Manual 9.31 (H) 1.20 - 7.70 x10*3/uL    RBC Morphology No significant RBC morphology present          I have personally reviewed the following imaging results:       Assessment/Plan   Principal Problem:    Tachycardia          Kip Sigala Jr., M.D., FAAN

## 2024-02-25 LAB
BACTERIA BLD CULT: NORMAL
BACTERIA BLD CULT: NORMAL

## 2024-02-27 NOTE — DISCHARGE SUMMARY
Discharge Diagnosis  Tachycardia    Issues Requiring Follow-Up      Discharge Meds     Your medication list        START taking these medications        Instructions Last Dose Given Next Dose Due   apixaban 5 mg tablet  Commonly known as: Eliquis      Take 1 tablet (5 mg) by mouth 2 times a day.       ferrous sulfate (325 mg ferrous sulfate) tablet      Take 1 tablet by mouth 2 times a day.       gabapentin 100 mg capsule  Commonly known as: Neurontin      Take 1 capsule (100 mg) by mouth every 8 hours.       lidocaine 4 % patch  Start taking on: February 24, 2024      Place 1 patch over 12 hours on the skin once daily. Remove & discard patch within 12 hours or as directed by MD. Do not start before February 24, 2024.              CHANGE how you take these medications        Instructions Last Dose Given Next Dose Due   divalproex 500 mg EC tablet  Commonly known as: Depakote  What changed: Another medication with the same name was changed. Make sure you understand how and when to take each.           divalproex 500 mg EC tablet  Commonly known as: Depakote  What changed:   how much to take  how to take this  when to take this  additional instructions      1 by mouth in the morning, 2 in the evening              CONTINUE taking these medications        Instructions Last Dose Given Next Dose Due   ibuprofen 600 mg tablet      Take 1 tablet (600 mg) by mouth if needed for mild pain (1 - 3).       methocarbamol 500 mg tablet  Commonly known as: Robaxin      Take 1 tablet (500 mg) by mouth 4 times a day as needed for muscle spasms. May take 2 tablets at a time if needed.       OXcarbazepine 600 mg tablet  Commonly known as: Trileptal           oxyCODONE 5 mg immediate release tablet  Commonly known as: Roxicodone      Take 1 tablet (5 mg) by mouth every 4 hours if needed for severe pain (7 - 10).       polyethylene glycol 17 gram/dose powder  Commonly known as: Glycolax, Miralax      Take 17 g by mouth once daily.               STOP taking these medications      acetaminophen 500 mg tablet  Commonly known as: Tylenol                  Where to Get Your Medications        These medications were sent to [id number] - [place], [place] - [address] AT [facility] [address] [place] [address] [facility] [address], [facility] [address]      Phone: [phone]   apixaban 5 mg tablet  ferrous sulfate (325 mg ferrous sulfate) tablet  gabapentin 100 mg capsule  lidocaine 4 % patch  methocarbamol 500 mg tablet  oxyCODONE 5 mg immediate release tablet         Test Results Pending At Discharge  Pending Labs       No current pending labs.            Hospital Course   Danette Buenrostro is a 33 y.o. female presenting with right shoulder pain.  Patient recently underwent robotic hysterectomy with right salpingectomy and excision of endometriosis as well as a right ovarian cystectomy and appendectomy with oversewing of the bowel and bladder.  She was initially well at home.  Over the past 2 days she has had increasing pain in her right shoulder.  She has had some numbness in her right hand.  There was no neck pain.  She denies calf tenderness.  She had mild shortness of breath with exertion.  She had a VQ scan which showed a PE.  She was started on Lovenox.  She was given analgesics and admitted for further treatment.     The patient was given anticoagulation, she was seen by  neurology. Her CBC was closely monitored. She was seen by ID for leukocytosis, monitored off of atbs. She was stable for dc with outpatient follow up.     Pertinent Physical Exam At Time of Discharge      Outpatient Follow-Up  Future Appointments   Date Time Provider Department Center   3/14/2024  2:00 PM Saritha Theodore MD [id number]   3/18/2024 10:45 AM Kip Sigala MD [id number] Psychiatric   12/16/2024  2:30 PM Jorge Mckee MD [id number] Psychiatric     Time and care for discharge management > 30 minutes.       Abelardo Szymanski MD

## 2024-03-05 ENCOUNTER — OFFICE VISIT (OUTPATIENT)
Dept: OBSTETRICS AND GYNECOLOGY | Facility: CLINIC | Age: 34
End: 2024-03-05
Payer: MEDICARE

## 2024-03-05 VITALS
SYSTOLIC BLOOD PRESSURE: 119 MMHG | DIASTOLIC BLOOD PRESSURE: 78 MMHG | BODY MASS INDEX: 43.99 KG/M2 | WEIGHT: 264 LBS | HEIGHT: 65 IN | HEART RATE: 76 BPM

## 2024-03-05 DIAGNOSIS — G89.18 POST-OP PAIN: Primary | ICD-10-CM

## 2024-03-05 PROCEDURE — 99024 POSTOP FOLLOW-UP VISIT: CPT | Performed by: STUDENT IN AN ORGANIZED HEALTH CARE EDUCATION/TRAINING PROGRAM

## 2024-03-05 PROCEDURE — 1036F TOBACCO NON-USER: CPT | Performed by: STUDENT IN AN ORGANIZED HEALTH CARE EDUCATION/TRAINING PROGRAM

## 2024-03-05 PROCEDURE — 3008F BODY MASS INDEX DOCD: CPT | Performed by: STUDENT IN AN ORGANIZED HEALTH CARE EDUCATION/TRAINING PROGRAM

## 2024-03-05 RX ORDER — OXYCODONE HYDROCHLORIDE 5 MG/1
5 TABLET ORAL EVERY 6 HOURS PRN
Qty: 15 TABLET | Refills: 0 | Status: SHIPPED | OUTPATIENT
Start: 2024-03-05 | End: 2024-03-12

## 2024-03-05 ASSESSMENT — PAIN SCALES - GENERAL: PAINLEVEL: 6

## 2024-03-05 NOTE — PROGRESS NOTES
"Division of Minimally Invasive Gynecologic Surgery  [facility]    03/04/24 Gynecology Visit    Danette Buenrostro is a 33 y.o. status post TRH-BS, excision of endometriosis, right ovarian cystectomy, appendectomy, and oversewing of bowel and bladder on 2/12/2024 presents for post op check. Post operative course has been complicated by PE and nerve pain/injury to right arm/shoulder.     Overall recovering well, meeting all milestones. Currently on Eliquis for PE. Shoulder pain persistent, but improved on gabapentin. Range of motion improving w/ PT.     PMHx, PSHx, SHx, Allergies, and Medications updated in Epic.    ROS: reviewed and negative    PE:/78   Pulse 76   Ht 1.651 m (5' 5\")   Wt 120 kg (264 lb)   BMI 43.93 kg/m²    Constitutional:  No acute distress  HEENT: EOM grossly intact, MMM, neck supple and with full ROM  Pulm:  Effort normal. No accessory muscle usage.  No respiratory distress.  Neurological:  AAO x 3, appropriate to interview  Skin: Warm, no pallor.  Psychiatric:  normal mood and affect.    Assessment/Plan:     Danette Buenrostro is a 33 y.o. status post TRH-BS, excision of endometriosis, right ovarian cystectomy, appendectomy, and oversewing of bowel and bladder on 2/12/2024 presents for post op check. Post operative course has been complicated by PE and nerve pain/injury to right arm/shoulder.   - Recovering well, no concerns  - Path reviewed, benign, notable for endometriosis   - No indication for further pap smears  - Continue post op restrictions until 6 weeks after surgery, reviewed these today  - RTC at 6 weeks post op for cuff check   - Will reach out to medicine team to arrange follow up for PE/Eliquis management       Saritha Theodore MD  Division of Minimally Invasive Gynecologic Surgery  [facility]    "

## 2024-03-11 ENCOUNTER — HOSPITAL ENCOUNTER (OUTPATIENT)
Dept: RADIOLOGY | Facility: CLINIC | Age: 34
Discharge: HOME | End: 2024-03-11
Payer: MEDICARE

## 2024-03-11 DIAGNOSIS — R92.8 ABNORMAL MAMMOGRAM OF LEFT BREAST: ICD-10-CM

## 2024-03-11 PROCEDURE — 76642 ULTRASOUND BREAST LIMITED: CPT | Mod: LEFT SIDE | Performed by: STUDENT IN AN ORGANIZED HEALTH CARE EDUCATION/TRAINING PROGRAM

## 2024-03-11 PROCEDURE — 76642 ULTRASOUND BREAST LIMITED: CPT | Mod: LT

## 2024-03-12 ENCOUNTER — TELEPHONE (OUTPATIENT)
Dept: PRIMARY CARE | Facility: CLINIC | Age: 34
End: 2024-03-12
Payer: COMMERCIAL

## 2024-03-12 NOTE — TELEPHONE ENCOUNTER
Result Communication    Resulted Orders   BI US breast limited left    Narrative    Interpreted By:  Garima Solares,   STUDY:  BI US BREAST LIMITED LEFT;  3/11/2024 2:36 pm      ACCESSION NUMBER(S):  [id number]      ORDERING CLINICIAN:  ARNOLD HAIRSTON      INDICATION:  The patient was recalled from screening mammogram dated 02/09/2024  for left breast focal asymmetry.      COMPARISON:  02/09/2024.      FINDINGS:  Targeted ultrasound with elastography of the left breast subareolar  region was performed. Three oval circumscribed hypoechoic masses are  seen in the 9 o'clock subareolar region measuring 0.7 x 0.4 x 0.8 cm,  1.3 x 0.8 x 1.1 cm and 1.6 x 2.0 x 0.7 cm. The demonstrate minimal  associated vascularity and are soft on elastography. The masses  demonstrate probably benign features suggestive of fibroadenomas.        Impression    Probably benign masses in the left breast. Short-term ultrasound  follow-up in 6 months is recommended to ensure stability.      BI-RADS CATEGORY:      BI-RADS Category:  3 Probably Benign.  Recommendation:  Short Interval Follow-up.  Recommended Date:  6 Months.  Laterality:  Left.      For any future breast imaging appointments, please call [phone]).          MACRO:  None      Signed by: Garima Solares 3/11/2024 3:25 PM  Dictation workstation:   [id number]       12:18 PM      Results were successfully communicated with the patient and they did not acknowledge their understanding.  I left a voicemail message. Follow up mammogram in 6 months.

## 2024-03-14 ENCOUNTER — APPOINTMENT (OUTPATIENT)
Dept: OBSTETRICS AND GYNECOLOGY | Facility: CLINIC | Age: 34
End: 2024-03-14
Payer: MEDICARE

## 2024-03-14 DIAGNOSIS — M79.2 NERVE PAIN: Primary | ICD-10-CM

## 2024-03-14 DIAGNOSIS — Z90.710 S/P HYSTERECTOMY: ICD-10-CM

## 2024-03-14 DIAGNOSIS — Z09 HOSPITAL DISCHARGE FOLLOW-UP: ICD-10-CM

## 2024-03-14 RX ORDER — OXYCODONE HYDROCHLORIDE 5 MG/1
5 TABLET ORAL EVERY 4 HOURS PRN
Qty: 15 TABLET | Refills: 0 | OUTPATIENT
Start: 2024-03-14 | End: 2024-04-04

## 2024-03-18 ENCOUNTER — APPOINTMENT (OUTPATIENT)
Dept: NEUROLOGY | Facility: CLINIC | Age: 34
End: 2024-03-18
Payer: MEDICARE

## 2024-03-20 ENCOUNTER — OFFICE VISIT (OUTPATIENT)
Dept: OBSTETRICS AND GYNECOLOGY | Facility: CLINIC | Age: 34
End: 2024-03-20
Payer: MEDICARE

## 2024-03-20 VITALS — BODY MASS INDEX: 43.99 KG/M2 | HEIGHT: 65 IN | WEIGHT: 264 LBS

## 2024-03-20 DIAGNOSIS — M25.511 ACUTE PAIN OF RIGHT SHOULDER: ICD-10-CM

## 2024-03-20 PROCEDURE — 99024 POSTOP FOLLOW-UP VISIT: CPT | Performed by: STUDENT IN AN ORGANIZED HEALTH CARE EDUCATION/TRAINING PROGRAM

## 2024-03-20 PROCEDURE — 3008F BODY MASS INDEX DOCD: CPT | Performed by: STUDENT IN AN ORGANIZED HEALTH CARE EDUCATION/TRAINING PROGRAM

## 2024-03-20 PROCEDURE — 1036F TOBACCO NON-USER: CPT | Performed by: STUDENT IN AN ORGANIZED HEALTH CARE EDUCATION/TRAINING PROGRAM

## 2024-03-20 NOTE — PROGRESS NOTES
"Division of Minimally Invasive Gynecologic Surgery  [facility]    03/20/24 Gynecology Visit    Danette Buenrostro is a 33 y.o. status post TRH-BS, excision of endometriosis, right ovarian cystectomy, appendectomy, and oversewing of bowel and bladder on 2/12/2024 presents for post op check. Post operative course has been complicated by PE and nerve pain/injury to right arm/shoulder.     Overall recovering well, meeting all milestones. Currently on Eliquis for PE. Shoulder pain persistent, but improved on gabapentin. Range of motion improving w/ PT.     Still having intermittent RUE pain but overall improving. Lidocaine patches are only mildly helpful. She is still working on establishing follow up for Eliquis.     PMHx, PSHx, SHx, Allergies, and Medications updated in Epic.    ROS: reviewed and negative    PE:Ht 1.651 m (5' 5\")   Wt 120 kg (264 lb)   BMI 43.93 kg/m²    Constitutional:  No acute distress, well-nourished and well-developed  HEENT: EOM grossly intact, MMM, neck supple and with full ROM  Pulm:  Effort normal. No accessory muscle usage.  No respiratory distress.  :  - EGBUS: grossly WNL  - Speculum: vaginal mucosa grossly WNL, no trauma or lesions, cuff intact and w/o laxity   Neurological:  She is alert and oriented to person place and time.  Skin: Warm, no pallor.  Psychiatric:  She has normal mood and affect.      Assessment/Plan:     Danette Buenrostro is a 33 y.o. status post TRH-BS, excision of endometriosis, right ovarian cystectomy, appendectomy, and oversewing of bowel and bladder on 2/12/2024 presents for post op check. Post operative course has been complicated by PE and nerve pain/injury to right arm/shoulder.   - Recovering well   - Path reviewed, benign, notable for endometriosis   - No indication for further pap smears  - Continue post op restrictions until 6 weeks after surgery, reviewed these today  - Cuff intact  - Discussed options of " acupuncture/massage therapy for for right arm nerve pain. She is hesitant about acupuncture, but open to massage therapy. Referral placed.   - She continues to reach out to Dr. Szymanski for follow up of Eliquis, as have I. Eliquis refilled today.     Saritha Theodore MD  Division of Minimally Invasive Gynecologic Surgery  [facility]

## 2024-03-24 ENCOUNTER — APPOINTMENT (OUTPATIENT)
Dept: CARDIOLOGY | Facility: HOSPITAL | Age: 34
End: 2024-03-24
Payer: MEDICARE

## 2024-03-24 ENCOUNTER — HOSPITAL ENCOUNTER (EMERGENCY)
Facility: HOSPITAL | Age: 34
Discharge: HOME | End: 2024-03-25
Attending: EMERGENCY MEDICINE
Payer: MEDICARE

## 2024-03-24 DIAGNOSIS — M25.511 ACUTE PAIN OF RIGHT SHOULDER: ICD-10-CM

## 2024-03-24 DIAGNOSIS — G62.9 NEUROPATHY: Primary | ICD-10-CM

## 2024-03-24 LAB
ALBUMIN SERPL BCP-MCNC: 3.9 G/DL (ref 3.4–5)
ALP SERPL-CCNC: 42 U/L (ref 33–110)
ALT SERPL W P-5'-P-CCNC: 23 U/L (ref 7–45)
ANION GAP SERPL CALC-SCNC: 12 MMOL/L (ref 10–20)
AST SERPL W P-5'-P-CCNC: 21 U/L (ref 9–39)
BASOPHILS # BLD AUTO: 0.02 X10*3/UL (ref 0–0.1)
BASOPHILS NFR BLD AUTO: 0.3 %
BILIRUB SERPL-MCNC: 0.2 MG/DL (ref 0–1.2)
BNP SERPL-MCNC: 10 PG/ML (ref 0–99)
BUN SERPL-MCNC: 9 MG/DL (ref 6–23)
CALCIUM SERPL-MCNC: 8.5 MG/DL (ref 8.6–10.3)
CARDIAC TROPONIN I PNL SERPL HS: <3 NG/L (ref 0–13)
CHLORIDE SERPL-SCNC: 101 MMOL/L (ref 98–107)
CO2 SERPL-SCNC: 26 MMOL/L (ref 21–32)
CREAT SERPL-MCNC: 0.75 MG/DL (ref 0.5–1.05)
EGFRCR SERPLBLD CKD-EPI 2021: >90 ML/MIN/1.73M*2
EOSINOPHIL # BLD AUTO: 0.09 X10*3/UL (ref 0–0.7)
EOSINOPHIL NFR BLD AUTO: 1.3 %
ERYTHROCYTE [DISTWIDTH] IN BLOOD BY AUTOMATED COUNT: 14.7 % (ref 11.5–14.5)
GLUCOSE SERPL-MCNC: 92 MG/DL (ref 74–99)
HCT VFR BLD AUTO: 32.3 % (ref 36–46)
HGB BLD-MCNC: 10.7 G/DL (ref 12–16)
IMM GRANULOCYTES # BLD AUTO: 0.01 X10*3/UL (ref 0–0.7)
IMM GRANULOCYTES NFR BLD AUTO: 0.1 % (ref 0–0.9)
INR PPP: 1.3 (ref 0.9–1.1)
LYMPHOCYTES # BLD AUTO: 3.21 X10*3/UL (ref 1.2–4.8)
LYMPHOCYTES NFR BLD AUTO: 46.7 %
MCH RBC QN AUTO: 29.9 PG (ref 26–34)
MCHC RBC AUTO-ENTMCNC: 33.1 G/DL (ref 32–36)
MCV RBC AUTO: 90 FL (ref 80–100)
MONOCYTES # BLD AUTO: 0.61 X10*3/UL (ref 0.1–1)
MONOCYTES NFR BLD AUTO: 8.9 %
NEUTROPHILS # BLD AUTO: 2.93 X10*3/UL (ref 1.2–7.7)
NEUTROPHILS NFR BLD AUTO: 42.7 %
NRBC BLD-RTO: 0 /100 WBCS (ref 0–0)
PLATELET # BLD AUTO: 422 X10*3/UL (ref 150–450)
POTASSIUM SERPL-SCNC: 3.7 MMOL/L (ref 3.5–5.3)
PROT SERPL-MCNC: 7.6 G/DL (ref 6.4–8.2)
PROTHROMBIN TIME: 14.5 SECONDS (ref 9.8–12.8)
RBC # BLD AUTO: 3.58 X10*6/UL (ref 4–5.2)
SODIUM SERPL-SCNC: 135 MMOL/L (ref 136–145)
WBC # BLD AUTO: 6.9 X10*3/UL (ref 4.4–11.3)

## 2024-03-24 PROCEDURE — 99284 EMERGENCY DEPT VISIT MOD MDM: CPT | Mod: 25

## 2024-03-24 PROCEDURE — 2500000001 HC RX 250 WO HCPCS SELF ADMINISTERED DRUGS (ALT 637 FOR MEDICARE OP): Performed by: EMERGENCY MEDICINE

## 2024-03-24 PROCEDURE — 2500000004 HC RX 250 GENERAL PHARMACY W/ HCPCS (ALT 636 FOR OP/ED): Performed by: EMERGENCY MEDICINE

## 2024-03-24 PROCEDURE — 85610 PROTHROMBIN TIME: CPT | Performed by: EMERGENCY MEDICINE

## 2024-03-24 PROCEDURE — 36415 COLL VENOUS BLD VENIPUNCTURE: CPT | Performed by: EMERGENCY MEDICINE

## 2024-03-24 PROCEDURE — 80053 COMPREHEN METABOLIC PANEL: CPT | Performed by: EMERGENCY MEDICINE

## 2024-03-24 PROCEDURE — 83880 ASSAY OF NATRIURETIC PEPTIDE: CPT | Performed by: EMERGENCY MEDICINE

## 2024-03-24 PROCEDURE — 93005 ELECTROCARDIOGRAM TRACING: CPT

## 2024-03-24 PROCEDURE — 84484 ASSAY OF TROPONIN QUANT: CPT | Performed by: EMERGENCY MEDICINE

## 2024-03-24 PROCEDURE — 96374 THER/PROPH/DIAG INJ IV PUSH: CPT

## 2024-03-24 PROCEDURE — 85025 COMPLETE CBC W/AUTO DIFF WBC: CPT | Performed by: EMERGENCY MEDICINE

## 2024-03-24 RX ORDER — METHOCARBAMOL 100 MG/ML
1000 INJECTION, SOLUTION INTRAMUSCULAR; INTRAVENOUS ONCE
Status: COMPLETED | OUTPATIENT
Start: 2024-03-24 | End: 2024-03-25

## 2024-03-24 RX ORDER — GABAPENTIN 300 MG/1
300 CAPSULE ORAL EVERY 8 HOURS SCHEDULED
Qty: 90 CAPSULE | Refills: 0 | Status: SHIPPED | OUTPATIENT
Start: 2024-03-24 | End: 2024-04-23

## 2024-03-24 RX ORDER — OXYCODONE AND ACETAMINOPHEN 5; 325 MG/1; MG/1
1 TABLET ORAL ONCE
Status: COMPLETED | OUTPATIENT
Start: 2024-03-24 | End: 2024-03-24

## 2024-03-24 RX ORDER — CYCLOBENZAPRINE HCL 10 MG
10 TABLET ORAL 3 TIMES DAILY PRN
Qty: 21 TABLET | Refills: 0 | Status: SHIPPED | OUTPATIENT
Start: 2024-03-24 | End: 2024-03-31

## 2024-03-24 RX ADMIN — OXYCODONE HYDROCHLORIDE AND ACETAMINOPHEN 1 TABLET: 5; 325 TABLET ORAL at 23:58

## 2024-03-24 RX ADMIN — METHOCARBAMOL 1000 MG: 100 INJECTION INTRAMUSCULAR; INTRAVENOUS at 23:58

## 2024-03-24 ASSESSMENT — COLUMBIA-SUICIDE SEVERITY RATING SCALE - C-SSRS
1. IN THE PAST MONTH, HAVE YOU WISHED YOU WERE DEAD OR WISHED YOU COULD GO TO SLEEP AND NOT WAKE UP?: NO
2. HAVE YOU ACTUALLY HAD ANY THOUGHTS OF KILLING YOURSELF?: NO
6. HAVE YOU EVER DONE ANYTHING, STARTED TO DO ANYTHING, OR PREPARED TO DO ANYTHING TO END YOUR LIFE?: NO

## 2024-03-24 ASSESSMENT — PAIN DESCRIPTION - LOCATION: LOCATION: ARM

## 2024-03-24 ASSESSMENT — PAIN SCALES - GENERAL
PAINLEVEL_OUTOF10: 6
PAINLEVEL_OUTOF10: 7

## 2024-03-24 ASSESSMENT — PAIN DESCRIPTION - ORIENTATION: ORIENTATION: RIGHT

## 2024-03-24 ASSESSMENT — PAIN - FUNCTIONAL ASSESSMENT: PAIN_FUNCTIONAL_ASSESSMENT: 0-10

## 2024-03-25 VITALS
OXYGEN SATURATION: 100 % | DIASTOLIC BLOOD PRESSURE: 81 MMHG | HEART RATE: 78 BPM | TEMPERATURE: 97.9 F | RESPIRATION RATE: 14 BRPM | SYSTOLIC BLOOD PRESSURE: 129 MMHG

## 2024-03-25 LAB
ATRIAL RATE: 71 BPM
P AXIS: 57 DEGREES
P OFFSET: 159 MS
P ONSET: 112 MS
PR INTERVAL: 210 MS
Q ONSET: 217 MS
QRS COUNT: 12 BEATS
QRS DURATION: 94 MS
QT INTERVAL: 404 MS
QTC CALCULATION(BAZETT): 439 MS
QTC FREDERICIA: 427 MS
R AXIS: 62 DEGREES
T AXIS: 43 DEGREES
T OFFSET: 419 MS
VENTRICULAR RATE: 71 BPM

## 2024-03-25 NOTE — ED PROVIDER NOTES
HPI   Chief Complaint   Patient presents with    Arm Pain       33-year-old woman history of endometriosis, epilepsy, migraine, here complaining of continued right shoulder pain and weakness in her hand.  Patient reports she developed symptoms following total robotic hysterectomy on .  Patient was hospitalized at [facility] and saw Dr. Sigala who recommended outpatient EMG/NCS of the right arm as well as PT OT.  Patient has not had this test and has not had PT or OT but does report that she has been having improved symptoms with less pain and weakness in her left arm but continued pain and weakness in her right arm and hand.  Her Neurontin was just increased from 100 mg 3 times a day to 200 mg 3 times a day.  She reports continued pain to her shoulder to her hand with episodes of shooting pain.  This pain is not new.                          No data recorded                   Patient History   Past Medical History:   Diagnosis Date    Benign neoplasm of breast     Endometriosis     Epilepsy with partial complex seizures (Multi)     Migraine     Taking Depakote    Obesity     Ovarian cyst     measuring 5cm x 3cm x 3cm- Scheduled for surgery with Dr Theodore on 24    Pelvic floor dysfunction in female     Pelvic pain     Seizure disorder (Multi) At 7y    F/W Dr. Sigala (Neuro) Taking Trileptal, Last seizure 2 days ago during sleep    Type A blood, Rh positive     Blood type A+    Vitamin B1 deficiency      Past Surgical History:   Procedure Laterality Date     SECTION, LOW TRANSVERSE      x 2 w/ BTL at second    COLPOSCOPY      OOPHORECTOMY Left     Robotic LSO w/ R cystectomy/salpingectomy    OTHER SURGICAL HISTORY      Surgical Treatment Of Spontaneous     TUBAL LIGATION       Family History   Problem Relation Name Age of Onset    Hypertension Mother Mohini     Stroke Mother Mohini     Heart attack Mother Mohini     Breast cancer Maternal Grandmother Alexia     Other (cerebral  infarction) Maternal Grandfather      Breast cancer Mother's Sister Karol     Cancer Mother's Brother      Seizures Father's Brother      Seizures Other Cousin      Social History     Tobacco Use    Smoking status: Never     Passive exposure: Never    Smokeless tobacco: Never   Vaping Use    Vaping status: Never Used   Substance Use Topics    Alcohol use: Yes     Comment: Occasionally    Drug use: Never       Physical Exam   ED Triage Vitals [03/24/24 2208]   Temperature Heart Rate Respirations BP   36.6 °C (97.9 °F) 74 18 144/86      Pulse Ox Temp src Heart Rate Source Patient Position   100 % -- -- --      BP Location FiO2 (%)     -- --       Physical Exam  Constitutional:       Appearance: Normal appearance.   Pulmonary:      Effort: Pulmonary effort is normal.      Breath sounds: Normal breath sounds.   Musculoskeletal:         General: Normal range of motion.      Cervical back: Normal range of motion.      Comments: Patient has tenderness to palpation throughout the right upper extremity to touch.   Neurological:      Mental Status: She is alert.      Comments:  strength diminished in right hand compared to left hand.         ED Course & MDM   Diagnoses as of 04/19/24 1206   Neuropathy       Medical Decision Making  Patient with continued pain in the right upper extremity.  Differential includes a peripheral neuropathy versus brachial plexus injury.  She needs to follow-up as an outpatient.  I recommended she continue with the Neurontin and can up the dose to 300 mg 3 times a day.  I will give her Robaxin and Percocet here and send her home on the increased dose of Neurontin and she can try Flexeril as well.    Amount and/or Complexity of Data Reviewed  ECG/medicine tests: independent interpretation performed.     Details: Normal sinus rhythm, heart rate 71, normal axis, T wave inversions V1 V2        Procedure  Procedures     Maria Isabel Savage MD  03/24/24 [id number]       Maria Isabel Savage MD  04/19/24  [id number]

## 2024-03-25 NOTE — ED TRIAGE NOTES
TO ED FROM HOME WITH CONCERN FOR ARM PAIN. STATES FEELS LIKE HER PE. DENIES SOB OR CP, ON BLOOD THINNERS.

## 2024-03-25 NOTE — DISCHARGE INSTRUCTIONS
Increase neurontin to 300 mg every 8 hours.  Try taking flexeril to see if this helps your pain  You need physical therapy and occupational therapy to get your strength back in your right arm.  You are supposed to get an EMG test to look for nerve function-- get this scheduled through Dr. Sigala's office.

## 2024-03-28 ENCOUNTER — APPOINTMENT (OUTPATIENT)
Dept: PHYSICAL THERAPY | Facility: CLINIC | Age: 34
End: 2024-03-28
Payer: MEDICARE

## 2024-03-28 DIAGNOSIS — G89.18 POST-OP PAIN: Primary | ICD-10-CM

## 2024-03-28 RX ORDER — OXYCODONE HYDROCHLORIDE 5 MG/1
5 TABLET ORAL EVERY 6 HOURS PRN
Qty: 10 TABLET | Refills: 0 | Status: SHIPPED | OUTPATIENT
Start: 2024-03-28 | End: 2024-04-04 | Stop reason: SDUPTHER

## 2024-04-04 ENCOUNTER — OFFICE VISIT (OUTPATIENT)
Dept: NEUROLOGY | Facility: CLINIC | Age: 34
End: 2024-04-04
Payer: MEDICARE

## 2024-04-04 ENCOUNTER — HOSPITAL ENCOUNTER (EMERGENCY)
Facility: HOSPITAL | Age: 34
Discharge: HOME | End: 2024-04-04
Payer: MEDICARE

## 2024-04-04 VITALS
BODY MASS INDEX: 43.32 KG/M2 | SYSTOLIC BLOOD PRESSURE: 156 MMHG | DIASTOLIC BLOOD PRESSURE: 81 MMHG | TEMPERATURE: 98.6 F | HEART RATE: 83 BPM | OXYGEN SATURATION: 99 % | RESPIRATION RATE: 18 BRPM | WEIGHT: 260 LBS | HEIGHT: 65 IN

## 2024-04-04 VITALS
TEMPERATURE: 97.1 F | HEART RATE: 86 BPM | HEIGHT: 67 IN | SYSTOLIC BLOOD PRESSURE: 138 MMHG | DIASTOLIC BLOOD PRESSURE: 68 MMHG | BODY MASS INDEX: 42.38 KG/M2 | WEIGHT: 270 LBS

## 2024-04-04 DIAGNOSIS — G43.709 CHRONIC MIGRAINE WITHOUT AURA WITHOUT STATUS MIGRAINOSUS, NOT INTRACTABLE: ICD-10-CM

## 2024-04-04 DIAGNOSIS — M25.511 ACUTE PAIN OF RIGHT SHOULDER: ICD-10-CM

## 2024-04-04 DIAGNOSIS — G40.219 LOCALIZATION-RELATED (FOCAL) (PARTIAL) SYMPTOMATIC EPILEPSY AND EPILEPTIC SYNDROMES WITH COMPLEX PARTIAL SEIZURES, INTRACTABLE, WITHOUT STATUS EPILEPTICUS (MULTI): Primary | ICD-10-CM

## 2024-04-04 DIAGNOSIS — G89.18 POST-OP PAIN: ICD-10-CM

## 2024-04-04 DIAGNOSIS — M25.511 ACUTE PAIN OF RIGHT SHOULDER: Primary | ICD-10-CM

## 2024-04-04 DIAGNOSIS — N80.9 ENDOMETRIOSIS: ICD-10-CM

## 2024-04-04 PROCEDURE — 2500000004 HC RX 250 GENERAL PHARMACY W/ HCPCS (ALT 636 FOR OP/ED): Performed by: NURSE PRACTITIONER

## 2024-04-04 PROCEDURE — 1036F TOBACCO NON-USER: CPT | Performed by: PSYCHIATRY & NEUROLOGY

## 2024-04-04 PROCEDURE — 96372 THER/PROPH/DIAG INJ SC/IM: CPT | Performed by: NURSE PRACTITIONER

## 2024-04-04 PROCEDURE — 3008F BODY MASS INDEX DOCD: CPT | Performed by: PSYCHIATRY & NEUROLOGY

## 2024-04-04 PROCEDURE — 99214 OFFICE O/P EST MOD 30 MIN: CPT | Performed by: PSYCHIATRY & NEUROLOGY

## 2024-04-04 PROCEDURE — 2500000001 HC RX 250 WO HCPCS SELF ADMINISTERED DRUGS (ALT 637 FOR MEDICARE OP): Performed by: NURSE PRACTITIONER

## 2024-04-04 PROCEDURE — 99283 EMERGENCY DEPT VISIT LOW MDM: CPT

## 2024-04-04 RX ORDER — ORPHENADRINE CITRATE 30 MG/ML
60 INJECTION INTRAMUSCULAR; INTRAVENOUS ONCE
Status: COMPLETED | OUTPATIENT
Start: 2024-04-04 | End: 2024-04-04

## 2024-04-04 RX ORDER — OXYCODONE HYDROCHLORIDE 5 MG/1
5 TABLET ORAL EVERY 6 HOURS PRN
Qty: 8 TABLET | Refills: 0 | Status: SHIPPED | OUTPATIENT
Start: 2024-04-04 | End: 2024-04-09 | Stop reason: SDUPTHER

## 2024-04-04 RX ORDER — MELOXICAM 7.5 MG/1
7.5 TABLET ORAL 2 TIMES DAILY PRN
Qty: 30 TABLET | Refills: 3 | Status: SHIPPED | OUTPATIENT
Start: 2024-04-04 | End: 2024-08-02

## 2024-04-04 RX ORDER — OXYCODONE AND ACETAMINOPHEN 5; 325 MG/1; MG/1
1 TABLET ORAL ONCE
Status: COMPLETED | OUTPATIENT
Start: 2024-04-04 | End: 2024-04-04

## 2024-04-04 RX ORDER — METHOCARBAMOL 500 MG/1
500 TABLET, FILM COATED ORAL 4 TIMES DAILY PRN
Qty: 40 TABLET | Refills: 0 | Status: SHIPPED | OUTPATIENT
Start: 2024-04-04 | End: 2024-04-09 | Stop reason: SDUPTHER

## 2024-04-04 RX ADMIN — ORPHENADRINE CITRATE 60 MG: 60 INJECTION INTRAMUSCULAR; INTRAVENOUS at 21:26

## 2024-04-04 RX ADMIN — OXYCODONE HYDROCHLORIDE AND ACETAMINOPHEN 1 TABLET: 5; 325 TABLET ORAL at 21:19

## 2024-04-04 ASSESSMENT — PAIN - FUNCTIONAL ASSESSMENT
PAIN_FUNCTIONAL_ASSESSMENT: 0-10
PAIN_FUNCTIONAL_ASSESSMENT: 0-10

## 2024-04-04 ASSESSMENT — PAIN DESCRIPTION - FREQUENCY: FREQUENCY: CONSTANT/CONTINUOUS

## 2024-04-04 ASSESSMENT — PAIN DESCRIPTION - PAIN TYPE: TYPE: ACUTE PAIN

## 2024-04-04 ASSESSMENT — PAIN SCALES - GENERAL
PAINLEVEL_OUTOF10: 10 - WORST POSSIBLE PAIN
PAINLEVEL_OUTOF10: 10 - WORST POSSIBLE PAIN

## 2024-04-04 NOTE — Clinical Note
Danette Buenrostro was seen and treated in our emergency department on 4/4/2024.  She may return to work on 04/06/2024.       If you have any questions or concerns, please don't hesitate to call.      Toro Souza, RICARDA-CNP

## 2024-04-04 NOTE — PROGRESS NOTES
NEUROLOGY OUTPATIENT FOLLOW-UP NOTE    Assessment/Plan   Diagnoses and all orders for this visit:  Localization-related (focal) (partial) symptomatic epilepsy and epileptic syndromes with complex partial seizures, intractable, without status epilepticus (CMS/HCC)  Chronic migraine without aura without status migrainosus, not intractable  Acute pain of right shoulder  -     Referral to Orthopaedic Surgery; Future  -     meloxicam (Mobic) 7.5 mg tablet; Take 1 tablet (7.5 mg) by mouth 2 times a day as needed (right shoulder pain).      IMPRESSION:  Right shoulder pain suggestive of rotator cuff pathology, for which evaluation was limited in the hospital because of jewelry implanted in the soft tissue of her temple.  I doubt a neurological process at this time given nonfocal neurological exam.  She has stable complex partial seizures and migraines.    PLAN:  I advised her that her implanted jewelry needs to be removed as it is keeping us from a good evaluation of her shoulder pathology; she needs a right shoulder MRI.  I referred her to Ortho-shoulder for further evaluation.  In the meantime, I prescribed meloxicam for the shoulder pain while awaiting an Ortho opinion.  I plan EMG/NCS of the right arm to rule out the small likelihood of focal neurological pathology such as brachial plexopathy.  She was ordered PT at discharge from the hospital and I advised her to continue.  She will continue her usual antiepileptic regimen.  I will see her again at the time of the EMG/NCS or prn.      Kip Sigala Jr., M.D., FAAN   ----------    Subjective     Danette Buenrostro is a 33 y.o. year old female here for follow-up.    No seizures since the last visit.  She is taking divalproex EC 1 g bid (2 x 500 mg), not having reduced the dose, and oxcarbazepine 1200 mg bid.  Migraine headaches are stably controlled as well.    I saw her as an inpatient at [facility] [place] in 2/2024.  She awoke from a total robotic hysterectomy with right  salpingectomy, ovarian cystectomy, appendectomy, and excision of endometriosis on  with severe right shoulder pain. This was associated with tingling paresthesias of both shoulders with radiation to the first three fingers of the right hand. She presented to the [facility] [place] ED on  for these issues. The pain and tingling had resolved by the time she arrived in the ED, but she reported proximal weakness of the right arm, prompting a neurology consult.   When I saw her, I felt she had focal shoulder pathology and not neurological pathology, though I couldn't totally rule it out.  Even so, I recommended outpatient EMG/NCS.  In the ED, she had a shoulder CT and plain films without acute pathology.  MRI was considered, but she has implanted jewelry in her right temple soft tissue, and so MRI was not done.  She was apparently referred back to me after the hospitalization for continuing shoulder pain.    After discharge, the movement of the right arm improved, but she still has aching, pulsating pain in the right anterolateral shoulder, radiating to lateral arm and at times, to the wrist.  She has no sensory symptoms in the hand or arm, only the pain.  The pain occurs several times a week, but not daily.  It is worse with weather changes.    Past Medical History:   Diagnosis Date    Benign neoplasm of breast     Endometriosis     Epilepsy with partial complex seizures (CMS/HCC)     Migraine     Taking Depakote    Obesity     Ovarian cyst     measuring 5cm x 3cm x 3cm- Scheduled for surgery with Dr Theodore on 24    Pelvic floor dysfunction in female     Pelvic pain     Seizure disorder (CMS/HCC) At 7y    F/W Dr. Sigala (Neuro) Taking Trileptal, Last seizure 2 days ago during sleep    Type A blood, Rh positive     Blood type A+    Vitamin B1 deficiency      Past Surgical History:   Procedure Laterality Date     SECTION, LOW TRANSVERSE      x 2 w/ BTL at second    COLPOSCOPY      OOPHORECTOMY Left     Robotic  LSO w/ R cystectomy/salpingectomy    OTHER SURGICAL HISTORY      Surgical Treatment Of Spontaneous     TUBAL LIGATION       Social History     Tobacco Use    Smoking status: Never     Passive exposure: Never    Smokeless tobacco: Never   Substance Use Topics    Alcohol use: Yes     Comment: Occasionally     family history includes Breast cancer in her maternal grandmother and mother's sister; Cancer in her mother's brother; Heart attack in her mother; Hypertension in her mother; Seizures in her father's brother and another family member; Stroke in her mother; cerebral infarction in her maternal grandfather.    Current Outpatient Medications:     apixaban (Eliquis) 5 mg tablet, Take 1 tablet (5 mg) by mouth 2 times a day., Disp: 60 tablet, Rfl: 0    cyclobenzaprine (Flexeril) 10 mg tablet, Take 1 tablet (10 mg) by mouth 3 times a day as needed for muscle spasms for up to 7 days., Disp: 21 tablet, Rfl: 0    divalproex (Depakote) 500 mg EC tablet, 1 by mouth in the morning, 2 in the evening (Patient taking differently: Take 1 tablet (500 mg) by mouth once daily in the morning.), Disp: 90 tablet, Rfl: 3    divalproex (Depakote) 500 mg EC tablet, Take 2 tablets (1,000 mg) by mouth once daily in the evening. Do not crush, chew, or split., Disp: , Rfl:     gabapentin (Neurontin) 300 mg capsule, Take 1 capsule (300 mg) by mouth every 8 hours., Disp: 90 capsule, Rfl: 0    methocarbamol (Robaxin) 500 mg tablet, Take 1 tablet (500 mg) by mouth 4 times a day as needed for muscle spasms. May take 2 tablets at a time if needed., Disp: 80 tablet, Rfl: 2    OXcarbazepine (Trileptal) 600 mg tablet, Take 2 tablets (1,200 mg) by mouth 2 times a day., Disp: , Rfl:     oxyCODONE (Roxicodone) 5 mg immediate release tablet, Take 1 tablet (5 mg) by mouth every 4 hours if needed for severe pain (7 - 10)., Disp: 15 tablet, Rfl: 0    oxyCODONE (Roxicodone) 5 mg immediate release tablet, Take 1 tablet (5 mg) by mouth every 6 hours if  "needed for moderate pain (4 - 6) or severe pain (7 - 10)., Disp: 10 tablet, Rfl: 0  No Known Allergies    Objective     /68   Pulse 86   Temp 36.2 °C (97.1 °F)   Ht 1.689 m (5' 6.5\")   Wt 122 kg (270 lb)   BMI 42.93 kg/m²     CONSTITUTIONAL:  No acute distress    EXTREMITIES:  Severe focal tenderness of the right anterior shoulder and somewhat of the right lateral shoulder to touch.  Limited passive range of shoulder motion to 110 degrees of flexion and 90 degrees of abduction.  Shoulder extension is normal.    MENTAL STATUS:  Awake, alert, fully oriented to self, place, and time, with present short-term memory, good awareness of recent events, normal attention span, concentration, and fund of knowledge.    SPEECH AND LANGUAGE:  Can name and repeat, follows all commands, has no dysarthria    CRANIAL NERVES:  II-Vision present, visual fields full to confrontational testing    III/IV/VI--EOMs are present in all directions.  Pupils are symmetrically reactive in dim light.  No ptosis.    V--Normal facial sensation.    VII--No facial asymmetry.    VIII--Hearing present to voice bilaterally.    IX/X--Symmetric soft palate rise.    XI--Normal trapezius power bilaterally.    XII--Tongue protrudes without deviation.    MOTOR:  Normal power, tone, and bulk in both arms and both legs.    SENSORY:  Normal pin sensation in both arms and both legs without distal-proximal gradient, asymmetry, or spinal sensory level.    COORDINATION:  Normal finger-to-nose and heel-to-shin testing in both arms and both legs.    REFLEXES are normal and symmetric at the biceps, triceps, brachioradialis, patella, and ankle.  The plantar responses are flexor.    GAIT is normal, without steppage, ataxia, shuffling, or spasticity.      Kip Sigala Jr., M.D., FAAN     "

## 2024-04-04 NOTE — LETTER
April 4, 2024     Jorge Mckee MD  [id number]  [address]  [place] [facility] [address]    Patient: Danette Buenrostro   YOB: 1990   Date of Visit: 4/4/2024       Dear Dr. Jorge Mckee MD:    Thank you for allowing me to continue in the neurological care of your patient, Danette Buenrostro. Below are my notes for this visit.  If you have questions, please do not hesitate to call me.       Sincerely,     Kip Sigala Jr., M.D., FAAN       ______________________________________________________________________________________    NEUROLOGY OUTPATIENT FOLLOW-UP NOTE    Assessment/Plan  Diagnoses and all orders for this visit:  Localization-related (focal) (partial) symptomatic epilepsy and epileptic syndromes with complex partial seizures, intractable, without status epilepticus (CMS/HCC)  Chronic migraine without aura without status migrainosus, not intractable  Acute pain of right shoulder  -     Referral to Orthopaedic Surgery; Future  -     meloxicam (Mobic) 7.5 mg tablet; Take 1 tablet (7.5 mg) by mouth 2 times a day as needed (right shoulder pain).      IMPRESSION:  Right shoulder pain suggestive of rotator cuff pathology, for which evaluation was limited in the hospital because of jewelry implanted in the soft tissue of her temple.  I doubt a neurological process at this time given nonfocal neurological exam.  She has stable complex partial seizures and migraines.    PLAN:  I advised her that her implanted jewelry needs to be removed as it is keeping us from a good evaluation of her shoulder pathology; she needs a right shoulder MRI.  I referred her to Ortho-shoulder for further evaluation.  In the meantime, I prescribed meloxicam for the shoulder pain while awaiting an Ortho opinion.  I plan EMG/NCS of the right arm to rule out the small likelihood of focal neurological pathology such as brachial plexopathy.  She was ordered PT at discharge from the hospital and I advised her to continue.   She will continue her usual antiepileptic regimen.  I will see her again at the time of the EMG/NCS or prn.      Kip Sigala Jr., M.D., FAAN   ----------    Subjective    Dantete Buenrostro is a 33 y.o. year old female here for follow-up.    No seizures since the last visit.  She is taking divalproex EC 1 g bid (2 x 500 mg), not having reduced the dose, and oxcarbazepine 1200 mg bid.  Migraine headaches are stably controlled as well.    [place] an [place] [facility] [place]  She awoke from a total robotic hysterectomy with right salpingectomy, ovarian cystectomy, appendectomy, and excision of endometriosis on 2/12 with severe right shoulder pain. This was associated with tingling paresthesias of both shoulders with radiation to the first three fingers of the right hand. She [place] [facility] [place] for these issues. The pain and tingling had resolved by the time she arrived in the ED, but she reported proximal weakness of the right arm, prompting a neurology consult.   When I saw her, I felt she had focal shoulder pathology and not neurological pathology, though I couldn't totally rule it out.  Even so, I recommended outpatient EMG/NCS.  In the ED, she had a shoulder CT and plain films without acute pathology.  MRI was considered, but she has implanted jewelry in her right temple soft tissue, and so MRI was not done.  She was apparently referred back to me after the hospitalization for continuing shoulder pain.    After discharge, the movement of the right arm improved, but she still has aching, pulsating pain in the right anterolateral shoulder, radiating to lateral arm and at times, to the wrist.  She has no sensory symptoms in the hand or arm, only the pain.  The pain occurs several times a week, but not daily.  It is worse with weather changes.    Past Medical History:   Diagnosis Date   • Benign neoplasm of breast    • Endometriosis    • Epilepsy with partial complex seizures (CMS/HCC)    •  Migraine     Taking Depakote   • Obesity    • Ovarian cyst     measuring 5cm x 3cm x 3cm- Scheduled for surgery with Dr Theodore on 24   • Pelvic floor dysfunction in female    • Pelvic pain    • Seizure disorder (CMS/HCC) At 7y    F/W Dr. Sigala (Neuro) Taking Trileptal, Last seizure 2 days ago during sleep   • Type A blood, Rh positive     Blood type A+   • Vitamin B1 deficiency      Past Surgical History:   Procedure Laterality Date   •  SECTION, LOW TRANSVERSE      x 2 w/ BTL at second   • COLPOSCOPY     • OOPHORECTOMY Left     Robotic LSO w/ R cystectomy/salpingectomy   • OTHER SURGICAL HISTORY      Surgical Treatment Of Spontaneous    • TUBAL LIGATION       Social History     Tobacco Use   • Smoking status: Never     Passive exposure: Never   • Smokeless tobacco: Never   Substance Use Topics   • Alcohol use: Yes     Comment: Occasionally     family history includes Breast cancer in her maternal grandmother and mother's sister; Cancer in her mother's brother; Heart attack in her mother; Hypertension in her mother; Seizures in her father's brother and another family member; Stroke in her mother; cerebral infarction in her maternal grandfather.    Current Outpatient Medications:   •  apixaban (Eliquis) 5 mg tablet, Take 1 tablet (5 mg) by mouth 2 times a day., Disp: 60 tablet, Rfl: 0  •  cyclobenzaprine (Flexeril) 10 mg tablet, Take 1 tablet (10 mg) by mouth 3 times a day as needed for muscle spasms for up to 7 days., Disp: 21 tablet, Rfl: 0  •  divalproex (Depakote) 500 mg EC tablet, 1 by mouth in the morning, 2 in the evening (Patient taking differently: Take 1 tablet (500 mg) by mouth once daily in the morning.), Disp: 90 tablet, Rfl: 3  •  divalproex (Depakote) 500 mg EC tablet, Take 2 tablets (1,000 mg) by mouth once daily in the evening. Do not crush, chew, or split., Disp: , Rfl:   •  gabapentin (Neurontin) 300 mg capsule, Take 1 capsule (300 mg) by mouth every 8 hours., Disp: 90  "capsule, Rfl: 0  •  methocarbamol (Robaxin) 500 mg tablet, Take 1 tablet (500 mg) by mouth 4 times a day as needed for muscle spasms. May take 2 tablets at a time if needed., Disp: 80 tablet, Rfl: 2  •  OXcarbazepine (Trileptal) 600 mg tablet, Take 2 tablets (1,200 mg) by mouth 2 times a day., Disp: , Rfl:   •  oxyCODONE (Roxicodone) 5 mg immediate release tablet, Take 1 tablet (5 mg) by mouth every 4 hours if needed for severe pain (7 - 10)., Disp: 15 tablet, Rfl: 0  •  oxyCODONE (Roxicodone) 5 mg immediate release tablet, Take 1 tablet (5 mg) by mouth every 6 hours if needed for moderate pain (4 - 6) or severe pain (7 - 10)., Disp: 10 tablet, Rfl: 0  No Known Allergies    Objective    /68   Pulse 86   Temp 36.2 °C (97.1 °F)   Ht 1.689 m (5' 6.5\")   Wt 122 kg (270 lb)   BMI 42.93 kg/m²     CONSTITUTIONAL:  No acute distress    EXTREMITIES:  Severe focal tenderness of the right anterior shoulder and somewhat of the right lateral shoulder to touch.  Limited passive range of shoulder motion to 110 degrees of flexion and 90 degrees of abduction.  Shoulder extension is normal.    MENTAL STATUS:  Awake, alert, fully oriented to self, place, and time, with present short-term memory, good awareness of recent events, normal attention span, concentration, and fund of knowledge.    SPEECH AND LANGUAGE:  Can name and repeat, follows all commands, has no dysarthria    CRANIAL NERVES:  II-Vision present, visual fields full to confrontational testing    III/IV/VI--EOMs are present in all directions.  Pupils are symmetrically reactive in dim light.  No ptosis.    V--Normal facial sensation.    VII--No facial asymmetry.    VIII--Hearing present to voice bilaterally.    IX/X--Symmetric soft palate rise.    XI--Normal trapezius power bilaterally.    XII--Tongue protrudes without deviation.    MOTOR:  Normal power, tone, and bulk in both arms and both legs.    SENSORY:  Normal pin sensation in both arms and both legs " without distal-proximal gradient, asymmetry, or spinal sensory level.    COORDINATION:  Normal finger-to-nose and heel-to-shin testing in both arms and both legs.    REFLEXES are normal and symmetric at the biceps, triceps, brachioradialis, patella, and ankle.  The plantar responses are flexor.    GAIT is normal, without steppage, ataxia, shuffling, or spasticity.      Kip Sigala Jr., M.D., FAAN

## 2024-04-05 NOTE — ED TRIAGE NOTES
Pt to ED for right shoulder and right arm pain that started after her hysterectomy procedure in February. Pt has been working with providers to help with it and had an appt with neuro today where he was moving her arm in different positions. Her pain has increased to a point that she cannot tolerate it.

## 2024-04-05 NOTE — ED PROVIDER NOTES
Emergency Department Encounter  [facility] EMERGENCY MEDICINE    Patient: Danette Buenrostro  MRN: [id number]  : 1990  Date of Evaluation: 2024  ED Provider: BROOKE Arreguin      Chief Complaint       Chief Complaint   Patient presents with    Arm Injury     Pt to ED for right shoulder and right arm pain that started after her hysterectomy procedure in February. Pt has been working with providers to help with it and had an appt with neuro today where he was moving her arm in different positions. Her pain has increased to a point that she cannot tolerate it.      [place]    (Location/Symptom, Timing/Onset, Context/Setting, Quality, Duration, Modifying Factors, Severity) Note limiting factors.   Limitations to History: none  Historian: self  Records reviewed: EMR inpatient and outpatient notes, Care Everywhere      Danette Buenrostro is a 33 y.o. female who presents to the emergency department complaining of acute exacerbation of chronic right shoulder pain, right shoulder has been bothering patient status post hysterectomy in February, was told that it was nerve damage, has a history of epilepsy and saw her neurologist today who was doing some exercises to her right shoulder, set her up for an EMG and states that he believes it may be her rotator cuff, patient has an appointment with orthopedics.  Was taking methocarbamol and oxycodone postsurgery and it was helping with her shoulder pain but has no more medication.  Denies any other exacerbating injury.  Denies any paresthesias.  Is on anticoagulation for blood clot.  Denies any abdominal pain, nausea, vomiting, is right-hand dominant.    ROS:     Review of Systems  14 systems reviewed and otherwise acutely negative except as in the [place].          Past History     Past Medical History:   Diagnosis Date    Benign neoplasm of breast     Endometriosis     Epilepsy with partial complex seizures (CMS/HCC)     Migraine     Taking Depakote     Obesity     Ovarian cyst     measuring 5cm x 3cm x 3cm- Scheduled for surgery with Dr Theodore on 24    Pelvic floor dysfunction in female     Pelvic pain     Seizure disorder (CMS/HCC) At 7y    F/W Dr. Sigala (Neuro) Taking Trileptal, Last seizure 2 days ago during sleep    Type A blood, Rh positive     Blood type A+    Vitamin B1 deficiency      Past Surgical History:   Procedure Laterality Date     SECTION, LOW TRANSVERSE      x 2 w/ BTL at second    COLPOSCOPY      OOPHORECTOMY Left     Robotic LSO w/ R cystectomy/salpingectomy    OTHER SURGICAL HISTORY      Surgical Treatment Of Spontaneous     TUBAL LIGATION       Social History     Socioeconomic History    Marital status: Single     Spouse name: Not on file    Number of children: Not on file    Years of education: Not on file    Highest education level: Not on file   Occupational History    Not on file   Tobacco Use    Smoking status: Never     Passive exposure: Never    Smokeless tobacco: Never   Vaping Use    Vaping Use: Never used   Substance and Sexual Activity    Alcohol use: Yes     Comment: Occasionally    Drug use: Never    Sexual activity: Yes     Partners: Male     Birth control/protection: Other     Comment: Pill   Other Topics Concern    Not on file   Social History Narrative    Not on file     Social Determinants of Health     Financial Resource Strain: Low Risk  (2024)    Overall Financial Resource Strain (CARDIA)     Difficulty of Paying Living Expenses: Not hard at all   Food Insecurity: No Food Insecurity (2023)    Hunger Vital Sign     Worried About Running Out of Food in the Last Year: Never true     Ran Out of Food in the Last Year: Never true   Transportation Needs: No Transportation Needs (2024)    PRAPARE - Transportation     Lack of Transportation (Medical): No     Lack of Transportation (Non-Medical): No   Physical Activity: Sufficiently Active (2023)    Exercise Vital Sign     Days of  Exercise per Week: 5 days     Minutes of Exercise per Session: 50 min   Stress: No Stress Concern Present (12/11/2023)    [place] [employer] - Occupational Stress Questionnaire     Feeling of Stress : Not at all   Social Connections: Unknown (12/11/2023)    Social Connection and Isolation Panel [NHANES]     Frequency of Communication with Friends and Family: More than three times a week     Frequency of Social Gatherings with Friends and Family: More than three times a week     Attends Ep[employer] Services: More than 4 times per year     Active Member of Clubs or Organizations: Patient declined     Attends Club or Organization Meetings: Patient declined     Marital Status: Patient declined   Intimate Partner Violence: Not At Risk (12/11/2023)    Humiliation, Afraid, Rape, and Kick questionnaire     Fear of Current or Ex-Partner: No     Emotionally Abused: No     Physically Abused: No     Sexually Abused: No   Housing Stability: Low Risk  (2/17/2024)    Housing Stability Vital Sign     Unable to Pay for Housing in the Last Year: No     Number of Places Lived in the Last Year: 1     Unstable Housing in the Last Year: No       Medications/Allergies     Current Discharge Medication List        CONTINUE these medications which have NOT CHANGED    Details   apixaban (Eliquis) 5 mg tablet Take 1 tablet (5 mg) by mouth 2 times a day.  Qty: 60 tablet, Refills: 0    Associated Diagnoses: Acute pain of right shoulder      cyclobenzaprine (Flexeril) 10 mg tablet Take 1 tablet (10 mg) by mouth 3 times a day as needed for muscle spasms for up to 7 days.  Qty: 21 tablet, Refills: 0    Associated Diagnoses: Neuropathy      divalproex (Depakote) 500 mg EC tablet 1 by mouth in the morning, 2 in the evening  Qty: 90 tablet, Refills: 3    Comments: Change in dose  Associated Diagnoses: Localization-related (focal) (partial) symptomatic epilepsy and epileptic syndromes with complex partial seizures, intractable,  without status epilepticus (CMS/HCC); Chronic migraine without aura without status migrainosus, not intractable      gabapentin (Neurontin) 300 mg capsule Take 1 capsule (300 mg) by mouth every 8 hours.  Qty: 90 capsule, Refills: 0    Associated Diagnoses: Acute pain of right shoulder      meloxicam (Mobic) 7.5 mg tablet Take 1 tablet (7.5 mg) by mouth 2 times a day as needed (right shoulder pain).  Qty: 30 tablet, Refills: 3    Associated Diagnoses: Acute pain of right shoulder      OXcarbazepine (Trileptal) 600 mg tablet Take 2 tablets (1,200 mg) by mouth 2 times a day.           No Known Allergies     Physical Exam       ED Triage Vitals [04/04/24 2100]   Temperature Heart Rate Respirations BP   37 °C (98.6 °F) 83 18 156/81      Pulse Ox Temp Source Heart Rate Source Patient Position   99 % Oral -- Sitting      BP Location FiO2 (%)     Left arm --         Physical Exam    GENERAL:  The patient appears nourished and normally developed. Vital signs as documented.     HEENT:  Head normocephalic, atraumatic, EOMs intact, PERRLA, Mucous membranes moist. Nares patent without copious rhinorrhea.  No lymphadenopathy.    PULMONARY:  Lungs are clear to auscultation, without any respiratory distress. Able to speak full sentences, no accessory muscle use    CARDIAC:   Normal rate. No murmurs, rubs or gallops    ABDOMEN:  Soft, non distended, non tender, BS positive x 4 quadrants, No rebound or guarding, no peritoneal signs, no CVA tenderness, no masses or organomegaly    MUSCULOSKELETAL:   Able to ambulate, Non edematous, with no obvious deformities. Pulses intact distal, tenderness to palpation to anterior aspect right shoulder    SKIN:   Good color, with no significant rashes.  No pallor.    NEURO:  No obvious neurological deficits, normal sensation and strength bilaterally.  Able to follow commands, NIH 0, CN 2-12 intact.        Diagnostics   Labs:  Labs Reviewed - No data to display  Radiographs:  No orders to display  "            Assessment   In brief, Danette Buenrostro is a 33 y.o. female who presented to the emergency department for acute on chronic right shoulder pain    Plan   Analgesics     Differentials   neuralgia  Rotator cuff injury  Sprain    ED Course     Diagnoses as of 04/04/24 2112   Acute pain of right shoulder       Visit Vitals  /81 (BP Location: Left arm, Patient Position: Sitting)   Pulse 83   Temp 37 °C (98.6 °F) (Oral)   Resp 18   Ht 1.651 m (5' 5\")   Wt 118 kg (260 lb)   SpO2 99%   BMI 43.27 kg/m²   OB Status Having periods   Smoking Status Never   BSA 2.33 m²       Medications   orphenadrine (Norflex) injection 60 mg (has no administration in time range)   oxyCODONE-acetaminophen (Percocet) 5-325 mg per tablet 1 tablet (has no administration in time range)       Plan of care discussed, patient is stable appearing, no acute distress, given Norflex 60 mg IM and Percocet for her pain, prescriptions were refilled for short amount of time and sent to her pharmacy, patient will follow-up with orthopedics, will be discharged home in stable condition, educated on any worsening signs and symptoms to return to the emergency department      Final Impression      1. Acute pain of right shoulder    2. Endometriosis    3. Post-op pain          DISPOSITION  Disposition: Discharge  Patient condition is: Stable    Comment: Please note this report has been produced using speech recognition software and may contain errors related to that system including errors in grammar, punctuation, and spelling, as well as words and phrases that may be inappropriate.  If there are any questions or concerns please feel free to contact the dictating provider for clarification.    BROOKE Arreguin APRN-CNP  04/04/24 2112    "

## 2024-04-09 RX ORDER — OXYCODONE HYDROCHLORIDE 5 MG/1
5 TABLET ORAL EVERY 6 HOURS PRN
Qty: 8 TABLET | Refills: 0 | Status: SHIPPED | OUTPATIENT
Start: 2024-04-09 | End: 2024-04-11

## 2024-04-09 RX ORDER — METHOCARBAMOL 500 MG/1
500 TABLET, FILM COATED ORAL 4 TIMES DAILY PRN
Qty: 40 TABLET | Refills: 0 | OUTPATIENT
Start: 2024-04-09 | End: 2024-04-25

## 2024-04-10 ENCOUNTER — OFFICE VISIT (OUTPATIENT)
Dept: ORTHOPEDIC SURGERY | Facility: HOSPITAL | Age: 34
End: 2024-04-10
Payer: MEDICARE

## 2024-04-10 DIAGNOSIS — M25.511 ACUTE PAIN OF RIGHT SHOULDER: ICD-10-CM

## 2024-04-10 PROCEDURE — 2500000004 HC RX 250 GENERAL PHARMACY W/ HCPCS (ALT 636 FOR OP/ED): Performed by: ORTHOPAEDIC SURGERY

## 2024-04-10 PROCEDURE — 20610 DRAIN/INJ JOINT/BURSA W/O US: CPT | Performed by: ORTHOPAEDIC SURGERY

## 2024-04-10 PROCEDURE — 2500000005 HC RX 250 GENERAL PHARMACY W/O HCPCS: Performed by: ORTHOPAEDIC SURGERY

## 2024-04-10 PROCEDURE — 3008F BODY MASS INDEX DOCD: CPT | Performed by: ORTHOPAEDIC SURGERY

## 2024-04-10 PROCEDURE — 99204 OFFICE O/P NEW MOD 45 MIN: CPT | Performed by: ORTHOPAEDIC SURGERY

## 2024-04-10 PROCEDURE — 99214 OFFICE O/P EST MOD 30 MIN: CPT | Performed by: ORTHOPAEDIC SURGERY

## 2024-04-10 RX ORDER — LIDOCAINE HYDROCHLORIDE 10 MG/ML
1 INJECTION INFILTRATION; PERINEURAL
Status: COMPLETED | OUTPATIENT
Start: 2024-04-10 | End: 2024-04-10

## 2024-04-10 RX ORDER — TRIAMCINOLONE ACETONIDE 40 MG/ML
10 INJECTION, SUSPENSION INTRA-ARTICULAR; INTRAMUSCULAR
Status: COMPLETED | OUTPATIENT
Start: 2024-04-10 | End: 2024-04-10

## 2024-04-10 RX ORDER — LIDOCAINE HYDROCHLORIDE 10 MG/ML
4 INJECTION INFILTRATION; PERINEURAL
Status: COMPLETED | OUTPATIENT
Start: 2024-04-10 | End: 2024-04-10

## 2024-04-10 RX ORDER — TRIAMCINOLONE ACETONIDE 40 MG/ML
40 INJECTION, SUSPENSION INTRA-ARTICULAR; INTRAMUSCULAR
Status: COMPLETED | OUTPATIENT
Start: 2024-04-10 | End: 2024-04-10

## 2024-04-10 RX ADMIN — TRIAMCINOLONE ACETONIDE 40 MG: 400 INJECTION, SUSPENSION INTRA-ARTICULAR; INTRAMUSCULAR at 14:14

## 2024-04-10 RX ADMIN — LIDOCAINE HYDROCHLORIDE 4 ML: 10 INJECTION, SOLUTION INFILTRATION; PERINEURAL at 14:14

## 2024-04-10 RX ADMIN — TRIAMCINOLONE ACETONIDE 10 MG: 400 INJECTION, SUSPENSION INTRA-ARTICULAR; INTRAMUSCULAR at 14:18

## 2024-04-10 RX ADMIN — LIDOCAINE HYDROCHLORIDE 1 ML: 10 INJECTION, SOLUTION INFILTRATION; PERINEURAL at 14:18

## 2024-04-10 NOTE — PROGRESS NOTES
Patient has right shoulder pain and pain down the right arm.  This started after he had a hysterectomy.  She has been evaluated previously and an EMG is scheduled.The patient also has in her medical history history of seizure no history of diabetes.  She is on Eliquis.    The patient is pleasant and cooperative.  The patient is alert and oriented ×3.  Auditory function is intact.  The patient is a good historian.  The patient is not in acute distress.  Eye exam significant for nonicteric sclera, intact ocular muscle movement.  Breathing is rhythmic symmetric and nonlabored.  Patient has intact integument of the right upper extremity radial pulse easily palpable light touch sensation of the shoulder arm forearm and hand is intact slight discomfort on rotation of her cervical spine to the right side.  There is pain at the extremes of range of motion of the shoulder although she has full range of motion external rotation abduction and internal rotation and abduction is painful.  No apprehension.     X-rays appear normal right shoulder with no fracture dislocation subluxation arthritic degenerative changes glenohumeral and subacromial spaces are well-preserved.    Right shoulder pain    Patient is right shoulder pain differential diagnosis includes radiculitis and bursitis.  Physical exam today favors bursitis.  I have recommended subacromial injection.  Injection was performed with consent and the patient has been asked to call in 1 week to report results.  If she does not get relief I would recommend further evaluation of her shoulder by MRI scan and assessment of the EMG.      L Inj/Asp: R subacromial bursa on 4/10/2024 2:18 PM  Indications: pain  Details: 22 G needle, posterior approach  Medications: 10 mg triamcinolone acetonide 40 mg/mL; 1 mL lidocaine 10 mg/mL (1 %)

## 2024-04-10 NOTE — PROGRESS NOTES
L Inj/Asp: R subacromial bursa on 4/10/2024 2:14 PM  Indications: pain  Details: 22 G needle, posterior approach  Medications: 40 mg triamcinolone acetonide 40 mg/mL; 4 mL lidocaine 10 mg/mL (1 %)  Outcome: tolerated well, no immediate complications  Procedure, treatment alternatives, risks and benefits explained, specific risks discussed. Consent was given by the patient. Immediately prior to procedure a time out was called to verify the correct patient, procedure, equipment, support staff and site/side marked as required.

## 2024-04-18 DIAGNOSIS — M25.511 ACUTE PAIN OF RIGHT SHOULDER: ICD-10-CM

## 2024-04-19 RX ORDER — APIXABAN 5 MG/1
5 TABLET, FILM COATED ORAL 2 TIMES DAILY
Qty: 60 TABLET | Refills: 0 | Status: SHIPPED | OUTPATIENT
Start: 2024-04-19 | End: 2024-04-23

## 2024-04-21 DIAGNOSIS — M25.511 ACUTE PAIN OF RIGHT SHOULDER: ICD-10-CM

## 2024-04-23 RX ORDER — APIXABAN 5 MG/1
5 TABLET, FILM COATED ORAL 2 TIMES DAILY
Qty: 60 TABLET | Refills: 0 | Status: SHIPPED | OUTPATIENT
Start: 2024-04-23

## 2024-04-24 ENCOUNTER — PROCEDURE VISIT (OUTPATIENT)
Dept: NEUROLOGY | Facility: CLINIC | Age: 34
End: 2024-04-24
Payer: MEDICARE

## 2024-04-24 VITALS
HEART RATE: 86 BPM | WEIGHT: 275 LBS | HEIGHT: 65 IN | TEMPERATURE: 96.9 F | DIASTOLIC BLOOD PRESSURE: 60 MMHG | BODY MASS INDEX: 45.82 KG/M2 | SYSTOLIC BLOOD PRESSURE: 124 MMHG

## 2024-04-24 DIAGNOSIS — M25.511 ACUTE PAIN OF RIGHT SHOULDER: Primary | ICD-10-CM

## 2024-04-24 DIAGNOSIS — G40.219 LOCALIZATION-RELATED (FOCAL) (PARTIAL) SYMPTOMATIC EPILEPSY AND EPILEPTIC SYNDROMES WITH COMPLEX PARTIAL SEIZURES, INTRACTABLE, WITHOUT STATUS EPILEPTICUS (MULTI): ICD-10-CM

## 2024-04-24 DIAGNOSIS — G43.709 CHRONIC MIGRAINE WITHOUT AURA WITHOUT STATUS MIGRAINOSUS, NOT INTRACTABLE: ICD-10-CM

## 2024-04-24 PROCEDURE — 95886 MUSC TEST DONE W/N TEST COMP: CPT | Performed by: PSYCHIATRY & NEUROLOGY

## 2024-04-24 PROCEDURE — 95909 NRV CNDJ TST 5-6 STUDIES: CPT | Performed by: PSYCHIATRY & NEUROLOGY

## 2024-04-24 RX ORDER — OXYCODONE HYDROCHLORIDE 5 MG/1
TABLET ORAL
COMMUNITY
Start: 2024-04-11

## 2024-04-24 NOTE — LETTER
April 24, 2024     Jorge Mckee MD  [id number]  [place] [address]    Patient: Danette Buenrostro   YOB: 1990   Date of Visit: 4/24/2024       Dear Dr. Jorge Mckee MD:    Thank you for referring Danette Buenrostro to me for EMG/NCS. Below are my notes for this visit.  If you have questions, please do not hesitate to call me.       Sincerely,     Kip Sigala Jr., M.D., [facility]       CC: Mike Rodrigues MD  ______________________________________________________________________________________    PRELIMINARY EMG REPORT    This study is normal.  There is no electrophysiological evidence for a large-fiber neuropathy including suprascapular neuropathy, cervical radiculopathy, or brachial plexopathy in the right arm.    Kip Sigala Jr., M.D., [place]

## 2024-04-24 NOTE — PROGRESS NOTES
PRELIMINARY EMG REPORT    This study is normal.  There is no electrophysiological evidence for a large-fiber neuropathy including suprascapular neuropathy, cervical radiculopathy, or brachial plexopathy in the right arm.    Kip Sigala Jr., M.D., FAAN, FAANEM

## 2024-04-25 ENCOUNTER — APPOINTMENT (OUTPATIENT)
Dept: RADIOLOGY | Facility: HOSPITAL | Age: 34
End: 2024-04-25
Payer: MEDICARE

## 2024-04-25 ENCOUNTER — HOSPITAL ENCOUNTER (EMERGENCY)
Facility: HOSPITAL | Age: 34
Discharge: HOME | End: 2024-04-25
Attending: STUDENT IN AN ORGANIZED HEALTH CARE EDUCATION/TRAINING PROGRAM
Payer: MEDICARE

## 2024-04-25 ENCOUNTER — APPOINTMENT (OUTPATIENT)
Dept: CARDIOLOGY | Facility: HOSPITAL | Age: 34
End: 2024-04-25
Payer: MEDICARE

## 2024-04-25 VITALS
HEART RATE: 86 BPM | DIASTOLIC BLOOD PRESSURE: 64 MMHG | SYSTOLIC BLOOD PRESSURE: 115 MMHG | RESPIRATION RATE: 17 BRPM | OXYGEN SATURATION: 100 % | TEMPERATURE: 98.3 F

## 2024-04-25 DIAGNOSIS — G40.219 LOCALIZATION-RELATED (FOCAL) (PARTIAL) SYMPTOMATIC EPILEPSY AND EPILEPTIC SYNDROMES WITH COMPLEX PARTIAL SEIZURES, INTRACTABLE, WITHOUT STATUS EPILEPTICUS (MULTI): Primary | ICD-10-CM

## 2024-04-25 DIAGNOSIS — S29.019A THORACIC MYOFASCIAL STRAIN, INITIAL ENCOUNTER: Primary | ICD-10-CM

## 2024-04-25 LAB
ALBUMIN SERPL BCP-MCNC: 3.8 G/DL (ref 3.4–5)
ALP SERPL-CCNC: 50 U/L (ref 33–110)
ALT SERPL W P-5'-P-CCNC: 17 U/L (ref 7–45)
ANION GAP SERPL CALC-SCNC: 11 MMOL/L (ref 10–20)
APPEARANCE UR: CLEAR
AST SERPL W P-5'-P-CCNC: 18 U/L (ref 9–39)
BASOPHILS # BLD AUTO: 0.02 X10*3/UL (ref 0–0.1)
BASOPHILS NFR BLD AUTO: 0.2 %
BILIRUB SERPL-MCNC: 0.1 MG/DL (ref 0–1.2)
BILIRUB UR STRIP.AUTO-MCNC: NEGATIVE MG/DL
BUN SERPL-MCNC: 14 MG/DL (ref 6–23)
CALCIUM SERPL-MCNC: 8.7 MG/DL (ref 8.6–10.3)
CHLORIDE SERPL-SCNC: 101 MMOL/L (ref 98–107)
CO2 SERPL-SCNC: 31 MMOL/L (ref 21–32)
COLOR UR: ABNORMAL
CREAT SERPL-MCNC: 0.98 MG/DL (ref 0.5–1.05)
EGFRCR SERPLBLD CKD-EPI 2021: 77 ML/MIN/1.73M*2
EOSINOPHIL # BLD AUTO: 0.07 X10*3/UL (ref 0–0.7)
EOSINOPHIL NFR BLD AUTO: 0.8 %
ERYTHROCYTE [DISTWIDTH] IN BLOOD BY AUTOMATED COUNT: 14.5 % (ref 11.5–14.5)
GLUCOSE SERPL-MCNC: 132 MG/DL (ref 74–99)
GLUCOSE UR STRIP.AUTO-MCNC: NORMAL MG/DL
HCT VFR BLD AUTO: 36 % (ref 36–46)
HGB BLD-MCNC: 12 G/DL (ref 12–16)
IMM GRANULOCYTES # BLD AUTO: 0.02 X10*3/UL (ref 0–0.7)
IMM GRANULOCYTES NFR BLD AUTO: 0.2 % (ref 0–0.9)
KETONES UR STRIP.AUTO-MCNC: ABNORMAL MG/DL
LEUKOCYTE ESTERASE UR QL STRIP.AUTO: ABNORMAL
LYMPHOCYTES # BLD AUTO: 3.23 X10*3/UL (ref 1.2–4.8)
LYMPHOCYTES NFR BLD AUTO: 36.8 %
MCH RBC QN AUTO: 29.5 PG (ref 26–34)
MCHC RBC AUTO-ENTMCNC: 33.3 G/DL (ref 32–36)
MCV RBC AUTO: 89 FL (ref 80–100)
MONOCYTES # BLD AUTO: 0.68 X10*3/UL (ref 0.1–1)
MONOCYTES NFR BLD AUTO: 7.8 %
NEUTROPHILS # BLD AUTO: 4.75 X10*3/UL (ref 1.2–7.7)
NEUTROPHILS NFR BLD AUTO: 54.2 %
NITRITE UR QL STRIP.AUTO: NEGATIVE
NRBC BLD-RTO: 0 /100 WBCS (ref 0–0)
PH UR STRIP.AUTO: 8 [PH]
PLATELET # BLD AUTO: 485 X10*3/UL (ref 150–450)
POTASSIUM SERPL-SCNC: 4.2 MMOL/L (ref 3.5–5.3)
PROT SERPL-MCNC: 7.7 G/DL (ref 6.4–8.2)
PROT UR STRIP.AUTO-MCNC: ABNORMAL MG/DL
RBC # BLD AUTO: 4.07 X10*6/UL (ref 4–5.2)
RBC # UR STRIP.AUTO: NEGATIVE /UL
RBC #/AREA URNS AUTO: NORMAL /HPF
SODIUM SERPL-SCNC: 139 MMOL/L (ref 136–145)
SP GR UR STRIP.AUTO: 1.03
SQUAMOUS #/AREA URNS AUTO: NORMAL /HPF
UROBILINOGEN UR STRIP.AUTO-MCNC: ABNORMAL MG/DL
WBC # BLD AUTO: 8.8 X10*3/UL (ref 4.4–11.3)
WBC #/AREA URNS AUTO: NORMAL /HPF

## 2024-04-25 PROCEDURE — 2500000005 HC RX 250 GENERAL PHARMACY W/O HCPCS: Performed by: STUDENT IN AN ORGANIZED HEALTH CARE EDUCATION/TRAINING PROGRAM

## 2024-04-25 PROCEDURE — 85025 COMPLETE CBC W/AUTO DIFF WBC: CPT | Performed by: PHYSICIAN ASSISTANT

## 2024-04-25 PROCEDURE — 2500000004 HC RX 250 GENERAL PHARMACY W/ HCPCS (ALT 636 FOR OP/ED): Performed by: STUDENT IN AN ORGANIZED HEALTH CARE EDUCATION/TRAINING PROGRAM

## 2024-04-25 PROCEDURE — 99284 EMERGENCY DEPT VISIT MOD MDM: CPT | Mod: 25

## 2024-04-25 PROCEDURE — 93005 ELECTROCARDIOGRAM TRACING: CPT

## 2024-04-25 PROCEDURE — 80053 COMPREHEN METABOLIC PANEL: CPT | Performed by: PHYSICIAN ASSISTANT

## 2024-04-25 PROCEDURE — 71046 X-RAY EXAM CHEST 2 VIEWS: CPT

## 2024-04-25 PROCEDURE — 81003 URINALYSIS AUTO W/O SCOPE: CPT | Performed by: PHYSICIAN ASSISTANT

## 2024-04-25 PROCEDURE — 96374 THER/PROPH/DIAG INJ IV PUSH: CPT

## 2024-04-25 PROCEDURE — 71046 X-RAY EXAM CHEST 2 VIEWS: CPT | Performed by: RADIOLOGY

## 2024-04-25 PROCEDURE — 87086 URINE CULTURE/COLONY COUNT: CPT | Mod: AHULAB | Performed by: PHYSICIAN ASSISTANT

## 2024-04-25 PROCEDURE — 36415 COLL VENOUS BLD VENIPUNCTURE: CPT | Performed by: PHYSICIAN ASSISTANT

## 2024-04-25 RX ORDER — LIDOCAINE 50 MG/G
1 PATCH TOPICAL DAILY
Qty: 10 PATCH | Refills: 0 | Status: SHIPPED | OUTPATIENT
Start: 2024-04-25 | End: 2024-05-05

## 2024-04-25 RX ORDER — ORPHENADRINE CITRATE 30 MG/ML
60 INJECTION INTRAMUSCULAR; INTRAVENOUS ONCE
Status: COMPLETED | OUTPATIENT
Start: 2024-04-25 | End: 2024-04-25

## 2024-04-25 RX ORDER — IBUPROFEN 600 MG/1
600 TABLET ORAL EVERY 6 HOURS PRN
Qty: 27 TABLET | Refills: 0 | Status: SHIPPED | OUTPATIENT
Start: 2024-04-25 | End: 2024-05-02

## 2024-04-25 RX ORDER — KETOROLAC TROMETHAMINE 30 MG/ML
15 INJECTION, SOLUTION INTRAMUSCULAR; INTRAVENOUS ONCE
Status: DISCONTINUED | OUTPATIENT
Start: 2024-04-25 | End: 2024-04-26 | Stop reason: HOSPADM

## 2024-04-25 RX ORDER — METHOCARBAMOL 500 MG/1
500 TABLET, FILM COATED ORAL 2 TIMES DAILY
Qty: 20 TABLET | Refills: 0 | Status: SHIPPED | OUTPATIENT
Start: 2024-04-25 | End: 2024-05-05

## 2024-04-25 RX ORDER — LIDOCAINE 560 MG/1
1 PATCH PERCUTANEOUS; TOPICAL; TRANSDERMAL ONCE
Status: DISCONTINUED | OUTPATIENT
Start: 2024-04-25 | End: 2024-04-26 | Stop reason: HOSPADM

## 2024-04-25 RX ADMIN — LIDOCAINE 4% 1 PATCH: 40 PATCH TOPICAL at 21:51

## 2024-04-25 RX ADMIN — ORPHENADRINE CITRATE 60 MG: 60 INJECTION INTRAMUSCULAR; INTRAVENOUS at 21:51

## 2024-04-25 ASSESSMENT — PAIN SCALES - GENERAL
PAINLEVEL_OUTOF10: 8
PAINLEVEL_OUTOF10: 7

## 2024-04-25 ASSESSMENT — PAIN DESCRIPTION - PAIN TYPE: TYPE: ACUTE PAIN

## 2024-04-25 ASSESSMENT — PAIN DESCRIPTION - FREQUENCY: FREQUENCY: CONSTANT/CONTINUOUS

## 2024-04-25 ASSESSMENT — PAIN DESCRIPTION - ORIENTATION: ORIENTATION: LEFT

## 2024-04-25 ASSESSMENT — PAIN DESCRIPTION - PROGRESSION: CLINICAL_PROGRESSION: NOT CHANGED

## 2024-04-25 ASSESSMENT — PAIN - FUNCTIONAL ASSESSMENT: PAIN_FUNCTIONAL_ASSESSMENT: 0-10

## 2024-04-25 ASSESSMENT — PAIN DESCRIPTION - DESCRIPTORS: DESCRIPTORS: ACHING

## 2024-04-26 LAB
ATRIAL RATE: 89 BPM
P AXIS: 68 DEGREES
P OFFSET: 179 MS
P ONSET: 129 MS
PR INTERVAL: 174 MS
Q ONSET: 216 MS
QRS COUNT: 14 BEATS
QRS DURATION: 92 MS
QT INTERVAL: 374 MS
QTC CALCULATION(BAZETT): 455 MS
QTC FREDERICIA: 426 MS
R AXIS: 67 DEGREES
T AXIS: 47 DEGREES
T OFFSET: 403 MS
VENTRICULAR RATE: 89 BPM

## 2024-04-26 RX ORDER — OXCARBAZEPINE 600 MG/1
1200 TABLET, FILM COATED ORAL 2 TIMES DAILY
Qty: 360 TABLET | Refills: 1 | Status: SHIPPED | OUTPATIENT
Start: 2024-04-26

## 2024-04-26 RX ORDER — DIVALPROEX SODIUM 500 MG/1
TABLET, DELAYED RELEASE ORAL
Qty: 90 TABLET | Refills: 1 | Status: SHIPPED | OUTPATIENT
Start: 2024-04-26

## 2024-04-26 NOTE — DISCHARGE INSTRUCTIONS
You have been seen at a [facility].  Please follow-up with your primary care provider in the next 1 to 2 days for further evaluation and routine follow-up.  Please return to the emergency room if having any worsening symptoms.  Please follow-up with any specialists if discussed during your emergency room stay.

## 2024-04-26 NOTE — ED TRIAGE NOTES
TRIAGE NOTE   I saw the patient as the Clinician in Triage and performed a brief history and physical exam, established acuity, and ordered appropriate tests to develop basic plan of care. Patient will be seen by an SARAH, resident and/or physician who will independently evaluate the patient. Please see subsequent provider notes for further details and disposition.     Brief HPI: In brief, Danette Buenrostro is a 33 y.o. female that presents for left infrascapular pain with history of PE currently on Eliquis.  Patient thought she may have pulled a muscle however the pain persist.  There is reproducible pain with palpation but also pain with deep inspiration.  States she is compliant with her Eliquis.  No chest pain.  No urinary symptoms.  No GI symptoms..     Focused Physical exam:   Left periscapular tenderness and pain.  She is afebrile.  No tachycardia tachypnea.  Pulse ox 98%.  Cardiovascular RRR.  Lungs CTA.  No CVA tenderness.  Plan/MDM:   Workup initiated.  Patient stable pending bed availability and further evaluation.  Please see subsequent provider note for further details and disposition

## 2024-04-26 NOTE — ED TRIAGE NOTES
Patient ambulated to triage with steady gait; c/o L sided flank pain x1 week; worse with moving, some SOB (has not worsened after diagnosed with PE). Denies any dysuria. PMHx seizure, left lung PE, on eliquis; existing R arm pain-sees neurologist, ortho surgeon. Takes oxycodone for pain prn

## 2024-04-26 NOTE — ED PROVIDER NOTES
EMERGENCY MEDICINE EVALUATION NOTE    History of Present Illness     Chief Complaint:   Chief Complaint   Patient presents with    Flank Pain       HPI: Danette Buenrostro is a 33 y.o. female with past medical history of chronic migraines, pulmonary embolism on Eliquis, obesity, and endometriosis status post total hysterectomy who presents with complaint of back pain.  Patient is here with 2 weeks that she start develop some left upper back pain.  She denies any specific provoking factors such as heavy lifting or trauma to the area.  She states over the past several days it has started to worsen with severe pain during movement of her thorax as well as deep inspiration.  She states she has been compliant with her Eliquis.  She denies any shortness of breath, fever, chills, chest pain, abdominal pain, nausea, vomiting, dysuria, hematuria.  Denies any history of nephrolithiasis or current flank pain.  She states she has been using some Tylenol as well as heating and cooling packs with minimal relief.    Previous History     Past Medical History:   Diagnosis Date    Benign neoplasm of breast     Endometriosis     Epilepsy with partial complex seizures (Multi)     Migraine     Taking Depakote    Obesity     Ovarian cyst     measuring 5cm x 3cm x 3cm- Scheduled for surgery with Dr Theodore on 24    Pelvic floor dysfunction in female     Pelvic pain     Pulmonary embolism (Multi) 2024    Left lung    Seizure disorder (Multi) At 7y    F/W Dr. Sigala (Neuro) Taking Trileptal, Last seizure 2 days ago during sleep    Type A blood, Rh positive     Blood type A+    Vitamin B1 deficiency      Past Surgical History:   Procedure Laterality Date     SECTION, LOW TRANSVERSE      x 2 w/ BTL at second    COLPOSCOPY      HYSTERECTOMY      OOPHORECTOMY Left     Robotic LSO w/ R cystectomy/salpingectomy    OTHER SURGICAL HISTORY      Surgical Treatment Of Spontaneous     TUBAL LIGATION       Social History      Tobacco Use    Smoking status: Never     Passive exposure: Never    Smokeless tobacco: Never   Vaping Use    Vaping status: Never Used   Substance Use Topics    Alcohol use: Yes     Comment: Occasionally    Drug use: Never     Family History   Problem Relation Name Age of Onset    Hypertension Mother Mohini     Stroke Mother Mohini     Heart attack Mother Mohini     Breast cancer Maternal Grandmother Alexia     Other (cerebral infarction) Maternal Grandfather      Breast cancer Mother's Sister Karol     Cancer Mother's Brother      Seizures Father's Brother      Seizures Other Cousin      No Known Allergies  Current Outpatient Medications   Medication Instructions    cyclobenzaprine (FLEXERIL) 10 mg, oral, 3 times daily PRN    divalproex (Depakote) 500 mg EC tablet 1 by mouth in the morning, 2 in the evening    Eliquis 5 mg, oral, 2 times daily    gabapentin (NEURONTIN) 300 mg, oral, Every 8 hours scheduled    ibuprofen 600 mg, oral, Every 6 hours PRN    lidocaine (Lidoderm) 5 % patch 1 patch, transdermal, Daily, Remove & discard patch within 12 hours or as directed by MD.    meloxicam (MOBIC) 7.5 mg, oral, 2 times daily PRN    methocarbamol (ROBAXIN) 500 mg, oral, 2 times daily    OXcarbazepine (Trileptal) 600 mg tablet 2 tablets, oral, 2 times daily    oxyCODONE (Roxicodone) 5 mg immediate release tablet TAKE 1 TABLET BY MOUTH EVERY 6 HOURS AS NEEDED FOR PAIN FOR UP TO 2 DAYS       Physical Exam     Appearance: Alert, oriented , cooperative,  in acute distress. Well nourished & well hydrated.     Skin: Intact,  dry skin, no lesions, rash, petechiae or purpura.      Eyes: PERRLA, EOMs intact,  Conjunctiva pink with no redness or exudates. Cornea & anterior chamber are clear, Eyelids without lesions. No scleral icterus.      ENT: Hearing grossly intact. External auditory canals patent, tympanic membranes intact with visible landmarks. Nares patent, mucus membranes moist. Dentition without lesions. Pharynx  clear, uvula midline.      Neck: Supple, without meningismus. Thyroid not palpable. Trachea at midline. No lymphadenopathy.     Pulmonary: Clear bilaterally with good chest wall excursion. No rales, rhonchi or wheezing. No accessory muscle use or stridor.     Cardiac: Normal S1, S2 without murmur, rub, gallop or extrasystole. No JVD, Carotids without bruits.     Abdomen: Soft, nontender, active bowel sounds.  No palpable organomegaly.  No rebound or guarding.  No CVA tenderness.     Genitourinary: Exam deferred.     Musculoskeletal: Full range of motion. no edema, or deformity. Pulses full and equal. No cyanosis or clubbing.  Moderate tenderness and hypertonicity noted throughout the left mid thoracic paraspinal area on light palpation.     Neurological:  Cranial nerves II through XII are grossly intact, finger-nose touch is normal, normal sensation, no weakness, no focal findings identified.     Psychiatric: Appropriate mood and affect.      Results     Labs Reviewed   CBC WITH AUTO DIFFERENTIAL - Abnormal       Result Value    WBC 8.8      nRBC 0.0      RBC 4.07      Hemoglobin 12.0      Hematocrit 36.0      MCV 89      MCH 29.5      MCHC 33.3      RDW 14.5      Platelets 485 (*)     Neutrophils % 54.2      Immature Granulocytes %, Automated 0.2      Lymphocytes % 36.8      Monocytes % 7.8      Eosinophils % 0.8      Basophils % 0.2      Neutrophils Absolute 4.75      Immature Granulocytes Absolute, Automated 0.02      Lymphocytes Absolute 3.23      Monocytes Absolute 0.68      Eosinophils Absolute 0.07      Basophils Absolute 0.02     COMPREHENSIVE METABOLIC PANEL - Abnormal    Glucose 132 (*)     Sodium 139      Potassium 4.2      Chloride 101      Bicarbonate 31      Anion Gap 11      Urea Nitrogen 14      Creatinine 0.98      eGFR 77      Calcium 8.7      Albumin 3.8      Alkaline Phosphatase 50      Total Protein 7.7      AST 18      Bilirubin, Total 0.1      ALT 17     URINALYSIS WITH REFLEX CULTURE AND  MICROSCOPIC - Abnormal    Color, Urine Light-Yellow      Appearance, Urine Clear      Specific Gravity, Urine 1.028      pH, Urine 8.0      Protein, Urine 10 (TRACE)      Glucose, Urine Normal      Blood, Urine NEGATIVE      Ketones, Urine TRACE (*)     Bilirubin, Urine NEGATIVE      Urobilinogen, Urine 3 (1+) (*)     Nitrite, Urine NEGATIVE      Leukocyte Esterase, Urine 25 Pema/µL (*)    URINE CULTURE   MICROSCOPIC ONLY, URINE    WBC, Urine NONE      RBC, Urine 1-2      Squamous Epithelial Cells, Urine 1-9 (SPARSE)     URINALYSIS WITH REFLEX CULTURE AND MICROSCOPIC    Narrative:     The following orders were created for panel order Urinalysis with Reflex Culture and Microscopic.  Procedure                               Abnormality         Status                     ---------                               -----------         ------                     Urinalysis with Reflex C...[[id number]]  Abnormal            Final result               Extra Urine Gray Tube[[id number]]                                                         Please view results for these tests on the individual orders.   EXTRA URINE GRAY TUBE     XR chest 2 views   Final Result   No acute cardiopulmonary process.        MACRO:   None        Signed by: Melvin Gomez 4/25/2024 9:02 PM   Dictation workstation:   [id number]            ED Course & Medical Decision Making     Medications   ketorolac (Toradol) injection 15 mg (15 mg intravenous Not Given 4/25/24 2120)   lidocaine 4 % patch 1 patch (1 patch transdermal Medication Applied 4/25/24 2151)   orphenadrine (Norflex) injection 60 mg (60 mg intravenous Given 4/25/24 2151)     Diagnoses as of 04/25/24 2241   Thoracic myofascial strain, initial encounter     Heart Rate:  [92-98]   Temperature:  [36.8 °C (98.3 °F)]   Respirations:  [18]   BP: (152)/(92)   Pulse Ox:  [98 %]      Danette Buenrostro is a 33 y.o. female with past medical history of chronic migraines, pulmonary embolism on Eliquis, obesity,  and endometriosis status post total hysterectomy who presents with complaint of back pain.  Patient hemodynamically stable and afebrile.  On examination mostly this is consistent with a thoracic fascial strain involving the left paraspinal thoracic musculature.  Also considered worsening pulmonary embolism, aortic dissection, ACS, nephrolithiasis.  Patient's lab work otherwise reassuring.  Normal LFTs.  EKG completed with a normal sinus rhythm at a rate of 89 bpm no signs of acute ischemic change or arrhythmia noted.  No significant leukocytosis or anemia.  Urinalysis without evidence of infection or significant hematuria.  Chest x-ray was nonacute.  Patient treated with Toradol, lidocaine patches, Norflex and did have significant relief in reexamination.  Informed this is most likely musculoskeletal related issue and she does have plan for physical therapy this likely Monday.  She will be given additional Motrin, Robaxin, lidocaine patches for home use.    Procedures   Procedures    Diagnosis     1. Thoracic myofascial strain, initial encounter        Disposition     DISCHARGE.  The patient is discharged back to their place of residence.  Discharge diagnosis, instructions and plan were discussed and understood. At the time of discharge the patient was comfortable and was in no apparent distress. Patient is aware of diagnostic uncertainty and was notified though testing is negative here, there is a very small chance that pathology may be missed.  The patient understands these risks and the patient /family understood to return immediately to the emergency department if the symptoms worsen or if they have any additional concerns.    FOLLOW UP  Primary care provider in 1-2 days.        ED Prescriptions       Medication Sig Dispense Start Date End Date Auth. Provider    ibuprofen 600 mg tablet Take 1 tablet (600 mg) by mouth every 6 hours if needed for moderate pain (4 - 6) for up to 7 days. 27 tablet 4/25/2024 5/2/2024  Yuan Beckham,     methocarbamol (Robaxin) 500 mg tablet Take 1 tablet (500 mg) by mouth 2 times a day for 10 days. 20 tablet 4/25/2024 5/5/2024 Yuan Beckham DO    lidocaine (Lidoderm) 5 % patch Place 1 patch over 12 hours on the skin once daily for 10 days. Remove & discard patch within 12 hours or as directed by MD. 10 patch 4/25/2024 5/5/2024 DO Yuan Dickerson DO  04/25/24 2240

## 2024-04-27 LAB — BACTERIA UR CULT: NORMAL

## 2024-04-29 ENCOUNTER — EVALUATION (OUTPATIENT)
Dept: PHYSICAL THERAPY | Facility: CLINIC | Age: 34
End: 2024-04-29
Payer: MEDICARE

## 2024-04-29 DIAGNOSIS — M79.2 NERVE PAIN: ICD-10-CM

## 2024-04-29 DIAGNOSIS — M79.601 RIGHT ARM PAIN: Primary | ICD-10-CM

## 2024-04-29 PROCEDURE — 97162 PT EVAL MOD COMPLEX 30 MIN: CPT | Mod: GP

## 2024-04-29 PROCEDURE — 97110 THERAPEUTIC EXERCISES: CPT | Mod: GP

## 2024-04-29 ASSESSMENT — ENCOUNTER SYMPTOMS
LOSS OF SENSATION IN FEET: 0
OCCASIONAL FEELINGS OF UNSTEADINESS: 0
DEPRESSION: 0

## 2024-04-29 ASSESSMENT — PAIN SCALES - GENERAL: PAINLEVEL_OUTOF10: 7

## 2024-04-29 ASSESSMENT — PAIN - FUNCTIONAL ASSESSMENT: PAIN_FUNCTIONAL_ASSESSMENT: 0-10

## 2024-04-29 NOTE — PROGRESS NOTES
Physical Therapy    Physical Therapy Evaluation    Patient Name: Danette Buenrostro  MRN: [id number]  Today's Date: 4/29/2024  Time Calculation  Start Time: 1345  Stop Time: 1430  Time Calculation (min): 45 min  PT Evaluation Time Entry  PT Evaluation (Moderate) Time Entry: 30  PT Therapeutic Procedures Time Entry  Therapeutic Exercise Time Entry: 10                  Visit #1  Insurance; Medicare  Cert; 4/26/2024 - 7/26/2024  Assessment; Patient presents with onset of bilateral arm pain upon waking up from hysterectomy in early February. Left arm pain resolved, while right arm mildly improved and remained sore, with more intense throbbing pain present with motions. Patient is right handed, and struggling with many house hold duties, because they cause pain exacerbation. Patient lacking knowledge of arm exercises. Moving arm will be priority in order to recover function and hopefully reduce the arm pain. Patient reports agreement with discussed plan as well as early motion exercises.      Plan  Treatment/Interventions: Education/ Instruction, Manual therapy, Neuromuscular re-education, Therapeutic exercises  PT Plan: Skilled PT  Certification Period Start Date: 04/29/24  Certification Period End Date: 07/26/24  Rehab Potential: Good  Plan of Care Agreement: Patient    Current Problem  1. Right arm pain  Follow Up In Physical Therapy      2. Nerve pain  Referral to Physical Therapy    Follow Up In Physical Therapy          Subjective   General:  General  Reason for Referral: PT eval and laura; right arm nerve pain  Referred By: Saritha Elam  Past Medical History Relevant to Rehab: Right arm nerve pain (Pain started on 2-. Had full histerectomy. Both of my arms were hurting extremely. They kept me in hospital for a few days. They told that gas is what gave me pain, and once I start passing gas, it will improve. Left arm improve in few days.)  General Comment: Gradual right pain improved some (I went to hospital  "ED 3 days after DC home. I could not take pain anymore. They said that I suffered a nerve pain, and they discovoered blod clot in my lung. I am on blood thinner twice a week)  Precautions: NA  Precautions  STEADI Fall Risk Score (The score of 4 or more indicates an increased risk of falling): 0  Vital Signs: NA     Pain:  Pain Assessment: 0-10  Pain Score: 7  Pain Type: Acute pain  Pain Location:  (Top of right shoulder to elbow. Sometimes throbbing feeling extends all the way into my wrist)  Pain Orientation: Right  Pain Radiating Towards: yes  Pain Descriptors:  (throbbing and shooting pain)  Pain Frequency: Intermittent  Pain Onset:  (Pain was there after I woke up from surgery)  Clinical Progression: Gradually improving  Effect of Pain on Daily Activities: Using arm for house work causes arm to hurt more  Patient's Stated Pain Goal: No pain  Pain Interventions:  (Currently just on over the counter stuff)  Home Living: with /family      Prior Function Per Pt/Caregiver Report: normal until abdominal surgery      Objective   Posture: slouched upper back/shoulders in sitting      Range of Motion: bilateral arm flexion 0-135, abduction 0-135  ER with elbow down 0-50  Behind back reach with right limited to side of hip  All ROM produced passively or actively with same pain intensity (7-7.5/10)  C-spine ROM;  Flexion WNL  Extension with min loss  Lateral flexion WNL  Left/Right rotation WNL  All c-spine motions with same aforementioned arm \"soreness\"  Strength: NT due to arm pain     Flexibility: WNL     Palpation: tenderness of right scapular palpation, AC joints, midway through right arm - bellow Deltoid insertions      Special Tests: NA     Gait: WNL     Outcome Measures:  Quick Dash = 40.1 %     OP EDUCATION:  Outpatient Education  Individual(s) Educated: Patient  Education Provided: Body Mechanics, Home Exercise Program, POC  Risk and Benefits Discussed with Patient/Caregiver/Other: yes  Patient/Caregiver " Demonstrated Understanding: yes  Plan of Care Discussed and Agreed Upon: yes  Patient Response to Education: Patient/Caregiver Verbalized Understanding of Information    Intervention;  Table slides, wall slides  Repeated shoulder extensions with pulleys and with cane  Scapular retractions  Postural correction in sitting    Goals:  Active       PT Problem       PT Goal 1       Start:  04/29/24    Expected End:  07/26/24       Use of right shoulder and quality of arm application with common daily tasks will improve, as evident by QuickDash score reduction by 50%          PT Goal 2       Start:  04/29/24    Expected End:  07/26/24       Patient will report reduced/abolished pain in right arm, indicated by grade of 0-3 on 0-10 pain scale          PT Goal 3       Start:  04/29/24    Expected End:  07/26/24       Patient will demonstrated improved ROM of right arm, indicated by goniometric measure of 0-160 flexion/abduction, ER 0-60, and full behind back reach to mid thoracic spine          PT Goal 4       Start:  04/29/24    Expected End:  07/26/24       Patient will demonstrated improved strength of right arm/shoulder, evident by MMT of 5/5 in all planes         PT Goal 5       Start:  04/29/24    Expected End:  07/26/24       Patient will demonstrated knowledge of HEP, its maintenance frequency, and associated benefits

## 2024-05-03 PROBLEM — T14.8XXA: Status: ACTIVE | Noted: 2024-05-03

## 2024-05-03 PROBLEM — M54.40 LOW BACK PAIN WITH SCIATICA: Status: ACTIVE | Noted: 2024-05-03

## 2024-05-03 PROBLEM — Z86.69 HISTORY OF BRAIN DISORDER: Status: ACTIVE | Noted: 2024-05-03

## 2024-05-03 PROBLEM — N76.0 BACTERIAL VAGINOSIS: Status: ACTIVE | Noted: 2024-05-03

## 2024-05-03 PROBLEM — G89.18 POSTOPERATIVE PAIN: Status: ACTIVE | Noted: 2024-03-05

## 2024-05-03 PROBLEM — E55.9 VITAMIN D DEFICIENCY: Status: ACTIVE | Noted: 2023-12-11

## 2024-05-03 PROBLEM — N93.9 ABNORMAL UTERINE BLEEDING: Status: ACTIVE | Noted: 2024-05-03

## 2024-05-03 PROBLEM — N80.109 OVARIAN ENDOMETRIOSIS: Status: ACTIVE | Noted: 2023-11-21

## 2024-05-03 PROBLEM — S29.019A STRAIN OF MUSCLE AT THORAX LEVEL: Status: ACTIVE | Noted: 2024-05-03

## 2024-05-03 PROBLEM — G62.9 NEUROPATHY: Status: ACTIVE | Noted: 2024-05-03

## 2024-05-03 PROBLEM — Z86.69 HISTORY OF MIGRAINE: Status: ACTIVE | Noted: 2024-05-03

## 2024-05-03 PROBLEM — B96.89 BACTERIAL VAGINOSIS: Status: ACTIVE | Noted: 2024-05-03

## 2024-05-03 PROBLEM — R10.2 PAIN IN PELVIS: Status: ACTIVE | Noted: 2024-05-03

## 2024-05-03 PROBLEM — I26.99 PULMONARY THROMBOEMBOLISM (MULTI): Status: ACTIVE | Noted: 2024-05-03

## 2024-05-03 PROBLEM — M62.89 PELVIC FLOOR DYSFUNCTION: Status: ACTIVE | Noted: 2024-05-03

## 2024-05-06 ENCOUNTER — APPOINTMENT (OUTPATIENT)
Dept: PRIMARY CARE | Facility: HOSPITAL | Age: 34
End: 2024-05-06
Payer: MEDICARE

## 2024-05-28 DIAGNOSIS — M25.511 ACUTE PAIN OF RIGHT SHOULDER: ICD-10-CM

## 2024-05-29 RX ORDER — MELOXICAM 7.5 MG/1
TABLET ORAL
Qty: 30 TABLET | Refills: 3 | OUTPATIENT
Start: 2024-05-29

## 2024-06-24 DIAGNOSIS — G40.219 LOCALIZATION-RELATED (FOCAL) (PARTIAL) SYMPTOMATIC EPILEPSY AND EPILEPTIC SYNDROMES WITH COMPLEX PARTIAL SEIZURES, INTRACTABLE, WITHOUT STATUS EPILEPTICUS (MULTI): ICD-10-CM

## 2024-06-24 DIAGNOSIS — G43.709 CHRONIC MIGRAINE WITHOUT AURA WITHOUT STATUS MIGRAINOSUS, NOT INTRACTABLE: ICD-10-CM

## 2024-06-24 DIAGNOSIS — M25.511 ACUTE PAIN OF RIGHT SHOULDER: ICD-10-CM

## 2024-06-24 RX ORDER — DIVALPROEX SODIUM 500 MG/1
TABLET, DELAYED RELEASE ORAL
Qty: 90 TABLET | Refills: 4 | Status: SHIPPED | OUTPATIENT
Start: 2024-06-24

## 2024-07-05 RX ORDER — APIXABAN 5 MG/1
5 TABLET, FILM COATED ORAL 2 TIMES DAILY
Qty: 60 TABLET | Refills: 0 | Status: SHIPPED | OUTPATIENT
Start: 2024-07-05

## 2024-07-05 NOTE — PROGRESS NOTES
Division of Minimally Invasive Gynecologic Surgery  [facility]  CC: vaginal spotting    Subjective     History of Present Illness:   Ms. Buenrostro is a 34 y.o.  who presents for evaluation of spotting after hysterectomy    Date: 24  Surgery: TRH-BS, EOE, right ovarian cystectomy, appy, oversew bowel and bladder  Findings: positive for endometriosis    Postop course c/b PE (on eliquis) and nerve pain of right arm/shoulder.    Patient reports vaginal spotting and presents today for evaluation. States 1 month ago she started to have vaginal spotting a couple times a week. Bleeding is always light and is not associated with any factors. Sexually active without concerns and spotting is not always related to sex. Denies associated pelvic pain, difficulty urinating or with BM.    Also has run out of eliquis and would like a refill since she can't get into her pcp until august        Past Medical History:   Diagnosis Date    Benign neoplasm of breast     Endometriosis     Epilepsy with partial complex seizures (Multi)     Migraine     Taking Depakote    Neuropathy 5/3/2024    Obesity     Ovarian cyst     measuring 5cm x 3cm x 3cm- Scheduled for surgery with Dr Theodore on 24    Pelvic floor dysfunction in female     Pelvic pain     Pulmonary embolism (Multi) 2024    Left lung    Seizure disorder (Multi) At 7y    F/W Dr. Sigala (Neuro) Taking Trileptal, Last seizure 2 days ago during sleep    Type A blood, Rh positive     Blood type A+    Vitamin B1 deficiency        Past Surgical History:   Procedure Laterality Date     SECTION, LOW TRANSVERSE      x 2 w/ BTL at second    COLPOSCOPY      HYSTERECTOMY      OOPHORECTOMY Left     Robotic LSO w/ R cystectomy/salpingectomy    OTHER SURGICAL HISTORY      Surgical Treatment Of Spontaneous     TUBAL LIGATION         Social History     Socioeconomic History    Marital status: Single     Spouse name: Not on file     Number of children: Not on file    Years of education: Not on file    Highest education level: Not on file   Occupational History    Not on file   Tobacco Use    Smoking status: Never     Passive exposure: Never    Smokeless tobacco: Never   Vaping Use    Vaping status: Never Used   Substance and Sexual Activity    Alcohol use: Yes     Comment: Occasionally    Drug use: Never    Sexual activity: Yes     Partners: Male     Birth control/protection: Other     Comment: Pill   Other Topics Concern    Not on file   Social History Narrative    Not on file     Social Determinants of Health     Financial Resource Strain: Low Risk  (2/17/2024)    Overall Financial Resource Strain (CARDIA)     Difficulty of Paying Living Expenses: Not hard at all   Food Insecurity: No Food Insecurity (12/11/2023)    Hunger Vital Sign     Worried About Running Out of Food in the Last Year: Never true     Ran Out of Food in the Last Year: Never true   Transportation Needs: No Transportation Needs (2/17/2024)    PRAPARE - Transportation     Lack of Transportation (Medical): No     Lack of Transportation (Non-Medical): No   Physical Activity: Sufficiently Active (12/11/2023)    Exercise Vital Sign     Days of Exercise per Week: 5 days     Minutes of Exercise per Session: 50 min   Stress: No Stress Concern Present (12/11/2023)    [employer] [place] [employer] - Occupational Stress Questionnaire     Feeling of Stress : Not at all   Social Connections: Unknown (12/11/2023)    Social Connection and Isolation Panel [NHANES]     Frequency of Communication with Friends and Family: More than three times a week     Frequency of Social Gatherings with Friends and Family: More than three times a week     Attends Evangelical Services: More than 4 times per year     Active Member of Clubs or Organizations: Patient declined     Attends Club or Organization Meetings: Patient declined     Marital Status: Patient declined   Intimate Partner Violence: Not  At Risk (12/11/2023)    Humiliation, Afraid, Rape, and Kick questionnaire     Fear of Current or Ex-Partner: No     Emotionally Abused: No     Physically Abused: No     Sexually Abused: No   Housing Stability: Low Risk  (2/17/2024)    Housing Stability Vital Sign     Unable to Pay for Housing in the Last Year: No     Number of Places Lived in the Last Year: 1     Unstable Housing in the Last Year: No         Current Outpatient Medications:     cyclobenzaprine (Flexeril) 10 mg tablet, Take 1 tablet (10 mg) by mouth 3 times a day as needed for muscle spasms for up to 7 days. (Patient not taking: Reported on 4/24/2024), Disp: 21 tablet, Rfl: 0    divalproex (Depakote) 500 mg EC tablet, TAKE 1 TABLET BY MOUTH IN THE MORNING THEN TAKE 2 TABLETS BY MOUTH IN THE EVENING, Disp: 90 tablet, Rfl: 4    Eliquis 5 mg tablet, TAKE 1 TABLET(5 MG) BY MOUTH TWICE DAILY, Disp: 60 tablet, Rfl: 0    gabapentin (Neurontin) 300 mg capsule, Take 1 capsule (300 mg) by mouth every 8 hours., Disp: 90 capsule, Rfl: 0    meloxicam (Mobic) 7.5 mg tablet, Take 1 tablet (7.5 mg) by mouth 2 times a day as needed (right shoulder pain)., Disp: 30 tablet, Rfl: 3    methocarbamol (Robaxin) 500 mg tablet, Take 1 tablet (500 mg) by mouth 2 times a day for 10 days., Disp: 20 tablet, Rfl: 0    OXcarbazepine (Trileptal) 600 mg tablet, TAKE 2 TABLETS BY MOUTH TWICE DAILY, Disp: 360 tablet, Rfl: 1    oxyCODONE (Roxicodone) 5 mg immediate release tablet, TAKE 1 TABLET BY MOUTH EVERY 6 HOURS AS NEEDED FOR PAIN FOR UP TO 2 DAYS, Disp: , Rfl:     No Known Allergies    Review of Systems    Objective     There were no vitals filed for this visit.    Physical Exam:   General: Well-developed, well-nourished, alert and cooperative female who is oriented ×4 with intact memory.  She appears to be in no acute distress.  Lungs: Normal work of breathing.  Chest symmetrical with good excursion and no accessory muscles in use.  Abdomen soft and nontender.  Incisions healing  well.   Extremities:  Regular full range of motion.  No clubbing cyanosis or edema  Skin: Warm and dry.  Intact without rashes, lesions or ulcers.  Gynecologic: External genitalia with normal appearance and hair distribution. No lesions or ulcers. Vaginal cuff intact. Small 0.5 cm area of granulation tissue in the mid cuff.      Assessment/Plan   Danette Buenrostro is a 34 y.o. female s/p TRH-BS, EOE, appy on 2/12/24 with vaginal spotting and granulation tissue.    -Discussed options for treatment including expectant management, and topical therapy with silver nitrate. Patient would like to think about it and will call if she wants silver nitrate application  -refill of eliquis provided as her appt with pcp is not until 8/26, encourage PCP followup      Norma Glass MD  Division of Minimally Invasive Gynecologic Surgery

## 2024-07-06 ENCOUNTER — APPOINTMENT (OUTPATIENT)
Dept: RADIOLOGY | Facility: HOSPITAL | Age: 34
End: 2024-07-06
Payer: MEDICARE

## 2024-07-06 ENCOUNTER — HOSPITAL ENCOUNTER (EMERGENCY)
Facility: HOSPITAL | Age: 34
Discharge: HOME | End: 2024-07-06
Attending: EMERGENCY MEDICINE
Payer: MEDICARE

## 2024-07-06 VITALS
HEIGHT: 65 IN | BODY MASS INDEX: 42.65 KG/M2 | OXYGEN SATURATION: 100 % | DIASTOLIC BLOOD PRESSURE: 70 MMHG | RESPIRATION RATE: 15 BRPM | SYSTOLIC BLOOD PRESSURE: 124 MMHG | WEIGHT: 256 LBS | TEMPERATURE: 96.8 F | HEART RATE: 73 BPM

## 2024-07-06 DIAGNOSIS — G44.89 OTHER HEADACHE SYNDROME: Primary | ICD-10-CM

## 2024-07-06 LAB
ALBUMIN SERPL BCP-MCNC: 3.8 G/DL (ref 3.4–5)
ALP SERPL-CCNC: 54 U/L (ref 33–110)
ALT SERPL W P-5'-P-CCNC: 33 U/L (ref 7–45)
ANION GAP SERPL CALC-SCNC: 12 MMOL/L (ref 10–20)
AST SERPL W P-5'-P-CCNC: 29 U/L (ref 9–39)
BASOPHILS # BLD AUTO: 0.04 X10*3/UL (ref 0–0.1)
BASOPHILS NFR BLD AUTO: 0.6 %
BILIRUB SERPL-MCNC: 0.3 MG/DL (ref 0–1.2)
BUN SERPL-MCNC: 14 MG/DL (ref 6–23)
CALCIUM SERPL-MCNC: 8.4 MG/DL (ref 8.6–10.3)
CHLORIDE SERPL-SCNC: 103 MMOL/L (ref 98–107)
CO2 SERPL-SCNC: 26 MMOL/L (ref 21–32)
CREAT SERPL-MCNC: 0.81 MG/DL (ref 0.5–1.05)
EGFRCR SERPLBLD CKD-EPI 2021: >90 ML/MIN/1.73M*2
EOSINOPHIL # BLD AUTO: 0.09 X10*3/UL (ref 0–0.7)
EOSINOPHIL NFR BLD AUTO: 1.2 %
ERYTHROCYTE [DISTWIDTH] IN BLOOD BY AUTOMATED COUNT: 15 % (ref 11.5–14.5)
GLUCOSE SERPL-MCNC: 95 MG/DL (ref 74–99)
HCT VFR BLD AUTO: 36.2 % (ref 36–46)
HGB BLD-MCNC: 12.4 G/DL (ref 12–16)
IMM GRANULOCYTES # BLD AUTO: 0.02 X10*3/UL (ref 0–0.7)
IMM GRANULOCYTES NFR BLD AUTO: 0.3 % (ref 0–0.9)
INR PPP: 1.2 (ref 0.9–1.1)
LYMPHOCYTES # BLD AUTO: 2.51 X10*3/UL (ref 1.2–4.8)
LYMPHOCYTES NFR BLD AUTO: 34.5 %
MCH RBC QN AUTO: 29.2 PG (ref 26–34)
MCHC RBC AUTO-ENTMCNC: 34.3 G/DL (ref 32–36)
MCV RBC AUTO: 85 FL (ref 80–100)
MONOCYTES # BLD AUTO: 0.66 X10*3/UL (ref 0.1–1)
MONOCYTES NFR BLD AUTO: 9.1 %
NEUTROPHILS # BLD AUTO: 3.95 X10*3/UL (ref 1.2–7.7)
NEUTROPHILS NFR BLD AUTO: 54.3 %
NRBC BLD-RTO: 0 /100 WBCS (ref 0–0)
PLATELET # BLD AUTO: 360 X10*3/UL (ref 150–450)
POTASSIUM SERPL-SCNC: 3.6 MMOL/L (ref 3.5–5.3)
PROT SERPL-MCNC: 7.5 G/DL (ref 6.4–8.2)
PROTHROMBIN TIME: 13.1 SECONDS (ref 9.8–12.8)
RBC # BLD AUTO: 4.24 X10*6/UL (ref 4–5.2)
SARS-COV-2 RNA RESP QL NAA+PROBE: NOT DETECTED
SODIUM SERPL-SCNC: 137 MMOL/L (ref 136–145)
WBC # BLD AUTO: 7.3 X10*3/UL (ref 4.4–11.3)

## 2024-07-06 PROCEDURE — 2500000004 HC RX 250 GENERAL PHARMACY W/ HCPCS (ALT 636 FOR OP/ED): Performed by: NURSE PRACTITIONER

## 2024-07-06 PROCEDURE — 2500000004 HC RX 250 GENERAL PHARMACY W/ HCPCS (ALT 636 FOR OP/ED)

## 2024-07-06 PROCEDURE — 84075 ASSAY ALKALINE PHOSPHATASE: CPT | Performed by: NURSE PRACTITIONER

## 2024-07-06 PROCEDURE — 96375 TX/PRO/DX INJ NEW DRUG ADDON: CPT

## 2024-07-06 PROCEDURE — 70544 MR ANGIOGRAPHY HEAD W/O DYE: CPT

## 2024-07-06 PROCEDURE — 70544 MR ANGIOGRAPHY HEAD W/O DYE: CPT | Performed by: STUDENT IN AN ORGANIZED HEALTH CARE EDUCATION/TRAINING PROGRAM

## 2024-07-06 PROCEDURE — 96365 THER/PROPH/DIAG IV INF INIT: CPT

## 2024-07-06 PROCEDURE — 96376 TX/PRO/DX INJ SAME DRUG ADON: CPT

## 2024-07-06 PROCEDURE — 36415 COLL VENOUS BLD VENIPUNCTURE: CPT | Performed by: NURSE PRACTITIONER

## 2024-07-06 PROCEDURE — 70450 CT HEAD/BRAIN W/O DYE: CPT

## 2024-07-06 PROCEDURE — 85025 COMPLETE CBC W/AUTO DIFF WBC: CPT | Performed by: NURSE PRACTITIONER

## 2024-07-06 PROCEDURE — 70450 CT HEAD/BRAIN W/O DYE: CPT | Performed by: RADIOLOGY

## 2024-07-06 PROCEDURE — 99285 EMERGENCY DEPT VISIT HI MDM: CPT | Mod: 25

## 2024-07-06 PROCEDURE — 87635 SARS-COV-2 COVID-19 AMP PRB: CPT | Performed by: NURSE PRACTITIONER

## 2024-07-06 PROCEDURE — 85610 PROTHROMBIN TIME: CPT | Performed by: NURSE PRACTITIONER

## 2024-07-06 PROCEDURE — 2500000001 HC RX 250 WO HCPCS SELF ADMINISTERED DRUGS (ALT 637 FOR MEDICARE OP): Performed by: NURSE PRACTITIONER

## 2024-07-06 RX ORDER — DEXAMETHASONE SODIUM PHOSPHATE 10 MG/ML
6 INJECTION INTRAMUSCULAR; INTRAVENOUS ONCE
Status: COMPLETED | OUTPATIENT
Start: 2024-07-06 | End: 2024-07-06

## 2024-07-06 RX ORDER — DIPHENHYDRAMINE HCL 25 MG
25 CAPSULE ORAL ONCE
Status: COMPLETED | OUTPATIENT
Start: 2024-07-06 | End: 2024-07-06

## 2024-07-06 RX ORDER — ACETAMINOPHEN 325 MG/1
975 TABLET ORAL ONCE
Status: COMPLETED | OUTPATIENT
Start: 2024-07-06 | End: 2024-07-06

## 2024-07-06 RX ORDER — NAPROXEN 500 MG/1
500 TABLET ORAL
Qty: 30 TABLET | Refills: 0 | Status: SHIPPED | OUTPATIENT
Start: 2024-07-06 | End: 2024-07-21

## 2024-07-06 RX ORDER — METOCLOPRAMIDE HYDROCHLORIDE 5 MG/ML
10 INJECTION INTRAMUSCULAR; INTRAVENOUS ONCE
Status: COMPLETED | OUTPATIENT
Start: 2024-07-06 | End: 2024-07-06

## 2024-07-06 RX ORDER — MORPHINE SULFATE 2 MG/ML
2 INJECTION, SOLUTION INTRAMUSCULAR; INTRAVENOUS ONCE
Status: COMPLETED | OUTPATIENT
Start: 2024-07-06 | End: 2024-07-06

## 2024-07-06 RX ORDER — MAGNESIUM SULFATE 1 G/100ML
1 INJECTION INTRAVENOUS ONCE
Status: COMPLETED | OUTPATIENT
Start: 2024-07-06 | End: 2024-07-06

## 2024-07-06 RX ORDER — KETOROLAC TROMETHAMINE 30 MG/ML
30 INJECTION, SOLUTION INTRAMUSCULAR; INTRAVENOUS ONCE
Status: COMPLETED | OUTPATIENT
Start: 2024-07-06 | End: 2024-07-06

## 2024-07-06 RX ORDER — ACETAMINOPHEN 500 MG
1000 TABLET ORAL EVERY 6 HOURS PRN
Qty: 30 TABLET | Refills: 0 | Status: SHIPPED | OUTPATIENT
Start: 2024-07-06 | End: 2024-07-16

## 2024-07-06 RX ORDER — METOCLOPRAMIDE HYDROCHLORIDE 5 MG/ML
10 INJECTION INTRAMUSCULAR; INTRAVENOUS ONCE
Status: DISCONTINUED | OUTPATIENT
Start: 2024-07-06 | End: 2024-07-06

## 2024-07-06 RX ADMIN — DIPHENHYDRAMINE HYDROCHLORIDE 25 MG: 25 CAPSULE ORAL at 14:18

## 2024-07-06 RX ADMIN — DEXAMETHASONE SODIUM PHOSPHATE 4 MG: 4 INJECTION, SOLUTION INTRAMUSCULAR; INTRAVENOUS at 15:12

## 2024-07-06 RX ADMIN — SODIUM CHLORIDE 1000 ML: 9 INJECTION, SOLUTION INTRAVENOUS at 15:11

## 2024-07-06 RX ADMIN — ACETAMINOPHEN 975 MG: 325 TABLET ORAL at 14:18

## 2024-07-06 RX ADMIN — MAGNESIUM SULFATE HEPTAHYDRATE 1 G: 1 INJECTION, SOLUTION INTRAVENOUS at 15:29

## 2024-07-06 RX ADMIN — METOCLOPRAMIDE 10 MG: 5 INJECTION, SOLUTION INTRAMUSCULAR; INTRAVENOUS at 19:34

## 2024-07-06 RX ADMIN — DEXAMETHASONE SODIUM PHOSPHATE 6 MG: 10 INJECTION, SOLUTION INTRAMUSCULAR; INTRAVENOUS at 19:34

## 2024-07-06 RX ADMIN — KETOROLAC TROMETHAMINE 30 MG: 30 INJECTION, SOLUTION INTRAMUSCULAR at 17:28

## 2024-07-06 RX ADMIN — ACETAMINOPHEN 975 MG: 325 TABLET ORAL at 19:33

## 2024-07-06 RX ADMIN — METOCLOPRAMIDE 10 MG: 5 INJECTION, SOLUTION INTRAMUSCULAR; INTRAVENOUS at 14:17

## 2024-07-06 RX ADMIN — MORPHINE SULFATE 2 MG: 2 INJECTION, SOLUTION INTRAMUSCULAR; INTRAVENOUS at 17:28

## 2024-07-06 ASSESSMENT — PAIN DESCRIPTION - LOCATION
LOCATION: HEAD

## 2024-07-06 ASSESSMENT — PAIN SCALES - GENERAL
PAINLEVEL_OUTOF10: 8
PAINLEVEL_OUTOF10: 9
PAINLEVEL_OUTOF10: 8
PAINLEVEL_OUTOF10: 10 - WORST POSSIBLE PAIN
PAINLEVEL_OUTOF10: 10 - WORST POSSIBLE PAIN
PAINLEVEL_OUTOF10: 0 - NO PAIN

## 2024-07-06 ASSESSMENT — PAIN - FUNCTIONAL ASSESSMENT
PAIN_FUNCTIONAL_ASSESSMENT: 0-10

## 2024-07-06 NOTE — ED PROVIDER NOTES
HPI:      Limitations to History: None    Additional History Obtained from: N/A  External records reviewed: prior EMR notes    Chief Complaint   Patient presents with    Headache    Dizziness    Blurred Vision          Danette Buenrostro is a 34 y.o. female with past medical history of epilepsy, pulmonary embolism, presenting to the ED with headache, dizziness, and blurry vision that started yesterday.  Patient states it is unalleviated by home medications, notes compliant with her seizure medications.  She declines any prior headaches that are similar to this, states that it is located in the frontal area, primarily on the left side with associated changes in her left eye vision.  Per triage note patient states that she had a few seizures last night.  However declined any seizures on my evaluation.      Past Medical History:   Diagnosis Date    Benign neoplasm of breast     Endometriosis     Epilepsy with partial complex seizures (Multi)     Migraine     Taking Depakote    Neuropathy 5/3/2024    Obesity     Ovarian cyst     measuring 5cm x 3cm x 3cm- Scheduled for surgery with Dr Theodore on 24    Pelvic floor dysfunction in female     Pelvic pain     Pulmonary embolism (Multi) 2024    Left lung    Seizure disorder (Multi) At 7y    F/W Dr. Sigala (Neuro) Taking Trileptal, Last seizure 2 days ago during sleep    Type A blood, Rh positive     Blood type A+    Vitamin B1 deficiency      Past Surgical History:   Procedure Laterality Date     SECTION, LOW TRANSVERSE      x 2 w/ BTL at second    COLPOSCOPY      HYSTERECTOMY      OOPHORECTOMY Left     Robotic LSO w/ R cystectomy/salpingectomy    OTHER SURGICAL HISTORY      Surgical Treatment Of Spontaneous     TUBAL LIGATION       Social History     Tobacco Use    Smoking status: Never     Passive exposure: Never    Smokeless tobacco: Never   Vaping Use    Vaping status: Never Used   Substance Use Topics    Alcohol use: Yes     Comment:  Occasionally    Drug use: Never     No Known Allergies  No current facility-administered medications on file prior to encounter.     Current Outpatient Medications on File Prior to Encounter   Medication Sig    cyclobenzaprine (Flexeril) 10 mg tablet Take 1 tablet (10 mg) by mouth 3 times a day as needed for muscle spasms for up to 7 days. (Patient not taking: Reported on 4/24/2024)    divalproex (Depakote) 500 mg EC tablet TAKE 1 TABLET BY MOUTH IN THE MORNING THEN TAKE 2 TABLETS BY MOUTH IN THE EVENING    Eliquis 5 mg tablet TAKE 1 TABLET(5 MG) BY MOUTH TWICE DAILY    gabapentin (Neurontin) 300 mg capsule Take 1 capsule (300 mg) by mouth every 8 hours.    meloxicam (Mobic) 7.5 mg tablet Take 1 tablet (7.5 mg) by mouth 2 times a day as needed (right shoulder pain).    methocarbamol (Robaxin) 500 mg tablet Take 1 tablet (500 mg) by mouth 2 times a day for 10 days.    OXcarbazepine (Trileptal) 600 mg tablet TAKE 2 TABLETS BY MOUTH TWICE DAILY    oxyCODONE (Roxicodone) 5 mg immediate release tablet TAKE 1 TABLET BY MOUTH EVERY 6 HOURS AS NEEDED FOR PAIN FOR UP TO 2 DAYS    [DISCONTINUED] Eliquis 5 mg tablet TAKE 1 TABLET(5 MG) BY MOUTH TWICE DAILY      --------------------------------------------------------------------------------------------------------------------------------------    VS: As documented in the triage note and EMR flowsheet from this visit were reviewed.  Temp 36 °C (96.8 °F) HR 85 /84 RR 15 Sat 100 % on      Physical Exam:  GEN:  no acute distress, appears comfortable. Conversational and appropriate.    HEENT: Normocephalic, atraumatic. Conjunctiva pink with no redness or exudates. Hearing grossly intact. Moist mucous membranes.  Visual acuity 20/30 in the bilateral eyes, EOMI without pain, PERRLA. Mild  tenderness to palpation over the left temporal.  Eye pressures grossly normal on palpation.   CARDIO: Normal rate and regular rhythm. Normal S1, S2  without murmurs, rubs, or gallops.   PULM:  Clear to auscultation bilaterally. No rales, rhonchi, or wheezes. No accessory muscle use or stridor. Speaking in full sentences.  GI: Soft, non-tender, non-distended. No rebound tenderness or guarding.   SKIN: Warm and dry, no rashes, lesions, petechiae, or purpura.  MSK: ROM intact in all 4 extremities without contractures or pain. No peripheral edema, contusions, or wounds.    NEURO: A&Ox3, No focal findings identified. No confusion or gross mental status changes.  CN II-XII intact. Normal strength and sensation in the bilateral upper and lower extremities. No facial asymmetry. Normal finger-to-nose/heel-to-shin. No pronator drift. Steady gait. No nystagmus.   PSYCH: Appropriate mood and behavior, converses and responds appropriately during exam.   ---------------------------------------------------------------------------------------------------------------------------------------  Given in the ED:   ED Medication Administration from 07/06/2024 1328 to 07/06/2024 1917         Date/Time Order Dose Route Action Action by     07/06/2024 1417 EDT metoclopramide (Reglan) injection 10 mg 10 mg intravenous Given TYRELL Hahn     07/06/2024 1418 EDT acetaminophen (Tylenol) tablet 975 mg 975 mg oral Given TYRELL Hahn     07/06/2024 1418 EDT diphenhydrAMINE (BENADryl) capsule 25 mg 25 mg oral Given TYRELL Hahn     07/06/2024 1455 EDT metoclopramide (Reglan) injection 10 mg -- intravenous Canceled Entry TYRELL Hahn     07/06/2024 1511 EDT sodium chloride 0.9 % bolus 1,000 mL 1,000 mL intravenous [place], N     07/06/2024 1512 EDT dexAMETHasone (Decadron) injection 4 mg 4 mg intravenous Given [place], TYRELL     07/06/2024 1529 EDT magnesium sulfate in D5W IV 1 g 1 g intravenous [place], N     07/06/2024 1611 EDT sodium chloride 0.9 % bolus 1,000 mL 0 mL intravenous Stopped TYRELL Hahn     07/06/2024 1629 EDT magnesium sulfate in D5W IV 1 g 0 g intravenous Stopped [place], N      07/06/2024 1728 EDT ketorolac (Toradol) injection 30 mg 30 mg intravenous Given TYRELL Hahn     07/06/2024 1728 EDT morphine injection 2 mg 2 mg intravenous Given TYRELL Hahn              Work up: All labs and imaging were independently reviewed by me.    Labs Reviewed   CBC WITH AUTO DIFFERENTIAL - Abnormal       Result Value    WBC 7.3      nRBC 0.0      RBC 4.24      Hemoglobin 12.4      Hematocrit 36.2      MCV 85      MCH 29.2      MCHC 34.3      RDW 15.0 (*)     Platelets 360      Neutrophils % 54.3      Immature Granulocytes %, Automated 0.3      Lymphocytes % 34.5      Monocytes % 9.1      Eosinophils % 1.2      Basophils % 0.6      Neutrophils Absolute 3.95      Immature Granulocytes Absolute, Automated 0.02      Lymphocytes Absolute 2.51      Monocytes Absolute 0.66      Eosinophils Absolute 0.09      Basophils Absolute 0.04     COMPREHENSIVE METABOLIC PANEL - Abnormal    Glucose 95      Sodium 137      Potassium 3.6      Chloride 103      Bicarbonate 26      Anion Gap 12      Urea Nitrogen 14      Creatinine 0.81      eGFR >90      Calcium 8.4 (*)     Albumin 3.8      Alkaline Phosphatase 54      Total Protein 7.5      AST 29      Bilirubin, Total 0.3      ALT 33     PROTIME-INR - Abnormal    Protime 13.1 (*)     INR 1.2 (*)    SARS-COV-2 PCR - Normal    Coronavirus 2019, PCR Not Detected      Narrative:     This assay has received FDA Emergency Use Authorization (EUA) and is only authorized for the duration of time that circumstances exist to justify the authorization of the emergency use of in vitro diagnostic tests for the detection of SARS-CoV-2 virus and/or diagnosis of COVID-19 infection under section 564(b)(1) of the Act, 21 U.S.C. 360bbb-3(b)(1). This assay is an in vitro diagnostic nucleic acid amplification test for the qualitative detection of SARS-CoV-2 from nasopharyngeal specimens and has been validated for use at [facility]. Negative results do not preclude  COVID-19 infections and should not be used as the sole basis for diagnosis, treatment, or other management decisions.                         MR venography intracranial wo IV contrast   Final Result   1. Focal area of stenosis in the posterior aspect of the superior   sagittal sinus. Etiology is unclear and could be secondary to chronic   post thrombotic change from prior dural sinus thrombosis. Otherwise,   the dural venous sinuses are widely patent and there is no evidence   of deep cerebral vein thrombosis.             MACRO:   None.        Signed by: Abelardo Harris 7/6/2024 6:54 PM   Dictation workstation:   [id number]      CT head wo IV contrast   Final Result   No acute intracranial hemorrhage or large territory infarction is   evident.        Signed by: Edmond Darnell 7/6/2024 3:17 PM   Dictation workstation:   [id number]            MDM:  Briefly, this is a 34 y.o. female who was seen here for headache, which is different from her priors.  She compliant with her medications for epilepsy, however given increased seizures per triage note, additional concern for intracranial pathology.  Initial workup was overall unremarkable.  Lab work without any acute findings.  Negative COVID-19.  Initial CT head without any obvious intracranial abnormalities.  She was given magnesium, Toradol, Reglan, Decadron, and IV fluids with minimal improvement.  Given her persistent headache a MR venogram was performed to rule out dural sinus thrombosis and was unremarkable.  Discussed findings of focal area of stenosis within the sagittal sinus with patient, we will likely related to her headache given location and symptoms.  Advised to follow-up with neurology for further management.  She was agreeable to this.  Headache did improve while in the ED, following repeat doses of Reglan, Toradol, and IV fluids.      Diagnoses as of 07/06/24 1917   Other headache syndrome     Social Determinants of Health: None identified.    Plan  and Disposition:   Discharge home, advised to return if worse. They understand return precautions and discharge instructions. Patient was in agreement with this plan. Was given prescriptions for tylenol and naproxen prior to discharge.    Kalli Harrison DO  Emergency Medicine, PGY-2    Patient was seen and evaluated by the attending physician. The attending ED physician agrees with the plan. Patient and/or patient´s representative was counseled regarding labs, imaging, likely diagnosis, and plan. All questions were answered.  Disclaimer: This note was dictated by speech recognition.  Attempt at proofreading was made to minimize errors.  Errors in transcription may be present.  Please call if questions.     Kalli Harrison DO  Resident  07/10/24 [id number]

## 2024-07-06 NOTE — ED TRIAGE NOTES
Pt arrives to triage with sharp headaches since yesterday. Pt states the headaches have been frequent and have started causing some dizziness and blurry vision in her left eye when she woke up this morning. Pt states she has a hx of epilepsy and states that she had a few seizures last night. Provider present in triage.

## 2024-07-06 NOTE — DISCHARGE INSTRUCTIONS
Please return to the emergency department, should you have any worsening symptoms, are unable to get an appointment with your primary care physician within the discussed time frame, or have any further concerns.     Please follow up with: Primary care physician as needed.  Please follow-up with neurology for further management of headaches.  You may take ibuprofen or tylenol for pain. You may alternate ibuprofen and tylenol every 6 hours for better pain control.    Do not exceed 2400mg of ibuprofen or 4000mg of tylenol in a 24 hour period.

## 2024-07-09 ENCOUNTER — APPOINTMENT (OUTPATIENT)
Dept: NEUROLOGY | Facility: CLINIC | Age: 34
End: 2024-07-09
Payer: COMMERCIAL

## 2024-07-11 ENCOUNTER — OFFICE VISIT (OUTPATIENT)
Dept: OBSTETRICS AND GYNECOLOGY | Facility: CLINIC | Age: 34
End: 2024-07-11
Payer: MEDICARE

## 2024-07-11 VITALS
HEIGHT: 65 IN | WEIGHT: 284 LBS | BODY MASS INDEX: 47.32 KG/M2 | SYSTOLIC BLOOD PRESSURE: 152 MMHG | HEART RATE: 82 BPM | DIASTOLIC BLOOD PRESSURE: 80 MMHG

## 2024-07-11 DIAGNOSIS — M25.511 ACUTE PAIN OF RIGHT SHOULDER: ICD-10-CM

## 2024-07-11 DIAGNOSIS — L92.9 GRANULATION TISSUE: Primary | ICD-10-CM

## 2024-07-11 DIAGNOSIS — N93.9 VAGINAL BLEEDING: ICD-10-CM

## 2024-07-11 PROCEDURE — 99213 OFFICE O/P EST LOW 20 MIN: CPT | Performed by: STUDENT IN AN ORGANIZED HEALTH CARE EDUCATION/TRAINING PROGRAM

## 2024-07-11 PROCEDURE — 1036F TOBACCO NON-USER: CPT | Performed by: STUDENT IN AN ORGANIZED HEALTH CARE EDUCATION/TRAINING PROGRAM

## 2024-07-11 ASSESSMENT — ENCOUNTER SYMPTOMS
MUSCULOSKELETAL NEGATIVE: 0
ENDOCRINE NEGATIVE: 0
CARDIOVASCULAR NEGATIVE: 0
EYES NEGATIVE: 0
CONSTITUTIONAL NEGATIVE: 0
GASTROINTESTINAL NEGATIVE: 0
ALLERGIC/IMMUNOLOGIC NEGATIVE: 0
NEUROLOGICAL NEGATIVE: 1
HEMATOLOGIC/LYMPHATIC NEGATIVE: 0
PSYCHIATRIC NEGATIVE: 0
RESPIRATORY NEGATIVE: 0

## 2024-07-11 ASSESSMENT — PAIN SCALES - GENERAL: PAINLEVEL: 4

## 2024-07-30 ENCOUNTER — HOSPITAL ENCOUNTER (EMERGENCY)
Facility: HOSPITAL | Age: 34
Discharge: HOME | End: 2024-07-30
Payer: MEDICARE

## 2024-07-30 VITALS
SYSTOLIC BLOOD PRESSURE: 138 MMHG | WEIGHT: 260 LBS | OXYGEN SATURATION: 100 % | BODY MASS INDEX: 43.32 KG/M2 | HEART RATE: 80 BPM | RESPIRATION RATE: 18 BRPM | HEIGHT: 65 IN | DIASTOLIC BLOOD PRESSURE: 92 MMHG | TEMPERATURE: 98.1 F

## 2024-07-30 DIAGNOSIS — U07.1 COVID-19: Primary | ICD-10-CM

## 2024-07-30 LAB
S PYO DNA THROAT QL NAA+PROBE: NOT DETECTED
SARS-COV-2 RNA RESP QL NAA+PROBE: DETECTED

## 2024-07-30 PROCEDURE — 87635 SARS-COV-2 COVID-19 AMP PRB: CPT | Performed by: EMERGENCY MEDICINE

## 2024-07-30 PROCEDURE — 99283 EMERGENCY DEPT VISIT LOW MDM: CPT

## 2024-07-30 PROCEDURE — 87651 STREP A DNA AMP PROBE: CPT | Performed by: EMERGENCY MEDICINE

## 2024-07-30 RX ORDER — GUAIFENESIN 600 MG/1
600 TABLET, EXTENDED RELEASE ORAL 2 TIMES DAILY
Qty: 14 TABLET | Refills: 0 | Status: SHIPPED | OUTPATIENT
Start: 2024-07-30 | End: 2024-08-06

## 2024-07-30 ASSESSMENT — COLUMBIA-SUICIDE SEVERITY RATING SCALE - C-SSRS
2. HAVE YOU ACTUALLY HAD ANY THOUGHTS OF KILLING YOURSELF?: NO
1. IN THE PAST MONTH, HAVE YOU WISHED YOU WERE DEAD OR WISHED YOU COULD GO TO SLEEP AND NOT WAKE UP?: NO
6. HAVE YOU EVER DONE ANYTHING, STARTED TO DO ANYTHING, OR PREPARED TO DO ANYTHING TO END YOUR LIFE?: NO

## 2024-07-30 ASSESSMENT — PAIN SCALES - GENERAL: PAINLEVEL_OUTOF10: 9

## 2024-07-30 ASSESSMENT — PAIN - FUNCTIONAL ASSESSMENT: PAIN_FUNCTIONAL_ASSESSMENT: 0-10

## 2024-07-30 ASSESSMENT — PAIN DESCRIPTION - LOCATION: LOCATION: THROAT

## 2024-07-30 ASSESSMENT — PAIN DESCRIPTION - PAIN TYPE: TYPE: ACUTE PAIN

## 2024-07-30 NOTE — ED PROVIDER NOTES
HPI   Chief Complaint   Patient presents with    Sore Throat    Headache       History of present illness: 34-year-old female complains of cough and congestion for the past 2 days.  Says that she has a frontal headache and nasal congestion and pressure.  She today she developed a sore throat.  She has been taking Tylenol with some relief.  Has associated fever with myalgias.  Has been around some other sick people recently.  Cough is unproductive.  Denies inability to swallow.  Denies chest pain shortness of breath abdominal pain diarrhea constipation dysuria polyuria.    Review of systems: Constitutional, eye, ENT, cardiovascular, respiratory, gastrointestinal, genitourinary, neurologic, musculoskeletal, dermatologic, hematologic, endocrine systems were evaluated and were negative unless otherwise specified in history of present illness.    Medications: Reviewed and per nursing note.    Family history: Denies relevant medical conditions.    Social history: Denies tobacco, alcohol, drug use.      Physical exam:    Appearance: Well-developed, well-nourished, nontoxic-appearing, alert and oriented x3. Talking in complete sentences.    HEENT: Inflamed nasal turbinates.  Head normocephalic atraumatic, extraocular movements intact, pupils equal round reactive to light, mucous membranes are moist and pink, normal facial symmetry. No pharyngeal erythema, edema, exudates. Uvula is midline with no stridor, drooling, trismus. No induration the floor of the mouth.  Normal tympanic membranes.    NECK:  Nml Inspection, no meningismus, no thyromegaly, no lymphadenopathy, trachea is midline, no JVD.    Respiratory: Clear to auscultation bilaterally with normal bilateral excursion. No wheezes, rhonchi, rales.    Cardiovascular: Regular rate and rhythm, no murmurs rubs or gallops. Pulses 2+ symmetrically in the dorsalis pedis and radial pulses.    Abdomen/GI:  Soft, nontender.    :  No CVA tenderness    Neuro:  Oriented x 3,  Speech Clear, cranial nerves grossly intact.    Musculoskeletal: Patient spontaneously moves all 4 extremities.    Skin:  No rashes.              Patient History   Past Medical History:   Diagnosis Date    Benign neoplasm of breast     Endometriosis     Epilepsy with partial complex seizures (Multi)     Migraine     Taking Depakote    Neuropathy 5/3/2024    Obesity     Ovarian cyst     measuring 5cm x 3cm x 3cm- Scheduled for surgery with Dr Theodore on 24    Pelvic floor dysfunction in female     Pelvic pain     Pulmonary embolism (Multi) 2024    Left lung    Seizure disorder (Multi) At 7y    F/W Dr. Sigala (Neuro) Taking Trileptal, Last seizure 2 days ago during sleep    Type A blood, Rh positive     Blood type A+    Vitamin B1 deficiency      Past Surgical History:   Procedure Laterality Date     SECTION, LOW TRANSVERSE      x 2 w/ BTL at second    COLPOSCOPY      HYSTERECTOMY      OOPHORECTOMY Left     Robotic LSO w/ R cystectomy/salpingectomy    OTHER SURGICAL HISTORY      Surgical Treatment Of Spontaneous     TUBAL LIGATION       Family History   Problem Relation Name Age of Onset    Hypertension Mother Mohini     Stroke Mother Mohini     Heart attack Mother Mohini     Breast cancer Maternal Grandmother Alexia     Other (cerebral infarction) Maternal Grandfather      Breast cancer Mother's Sister Karol     Cancer Mother's Brother      Seizures Father's Brother      Seizures Other Cousin      Social History     Tobacco Use    Smoking status: Never     Passive exposure: Never    Smokeless tobacco: Never   Vaping Use    Vaping status: Never Used   Substance Use Topics    Alcohol use: Yes     Comment: Occasionally    Drug use: Never       Physical Exam   ED Triage Vitals [24 0951]   Temperature Heart Rate Respirations BP   36.7 °C (98.1 °F) 84 15 (!) 149/91      Pulse Ox Temp Source Heart Rate Source Patient Position   99 % Oral -- --      BP Location FiO2 (%)     -- --        Physical Exam      ED Course & MDM   Diagnoses as of 07/30/24 1212   COVID-19                       Dina Coma Scale Score: 15                        Medical Decision Making  Labs Reviewed  SARS-COV-2 PCR - Abnormal     Coronavirus 2019, PCR         Detected (*)                   Narrative: This assay has received FDA Emergency Use Authorization (EUA) and is only authorized for the duration of time that circumstances exist to justify the authorization of the emergency use of in vitro diagnostic tests for the detection of SARS-CoV-2 virus and/or diagnosis of COVID-19 infection under section 564(b)(1) of the Act, 21 U.S.C. 360bbb-3(b)(1). This assay is an in vitro diagnostic nucleic acid amplification test for the qualitative detection of SARS-CoV-2 from nasopharyngeal specimens and has been validated for use at [facility]. Negative results do not preclude COVID-19 infections and should not be used as the sole basis for diagnosis, treatment, or other management decisions.                    GROUP A STREPTOCOCCUS, PCR - Normal      Patient complains of cough and congestion.  Differential diagnosis of COVID-19, influenza, pneumonia, otitis media, tonsillitis, meningitis, sinusitis, subarachnoid hemorrhage.  Examination shows lungs clear to auscultation, normal tympanic membranes, no meningeal signs, no sinus tenderness making pneumonia, otitis media, meningitis, sinusitis unlikely.  Patient's headache is frontal with congestion and not sudden onset.  There is no global headache.  Normal neurologic exam.  Intracranial hemorrhage unlikely.    Strep, COVID-19 swabs ordered.  Strep negative.  COVID-19 positive.  Presentation consistent with COVID-19 upper respiratory tract infection.  Given prescription for Mucinex and educated supportive care.    Patient will be discharged to home with prescription.  Patient is educated in signs and symptoms of worsening symptoms and reasons to come back to  the emergency department.  Will need to follow up with primary care provider.  Patient does not report social determinants of health impacting ability to obtain care that is needed.  Patient agrees with plan.    This is a transcription.  Text was reviewed for errors, but some transcription errors may remain.  Please call for any questions.          Procedure  Procedures     Álvaro Taylor PA-C  07/30/24 7710

## 2024-07-30 NOTE — Clinical Note
Danette Buenrostro was seen and treated in our emergency department on 7/30/2024.  She may return to work on 08/01/2024.       If you have any questions or concerns, please don't hesitate to call.      Álvaro Taylor PA-C

## 2024-07-30 NOTE — ED TRIAGE NOTES
Pt to ED from home for sore throat, body aches and headache. Pt endorses she began to feel ill 2 days ago and thought she had a head cold. Pt states she has a hx of Migraines.

## 2024-07-31 DIAGNOSIS — G40.219 LOCALIZATION-RELATED (FOCAL) (PARTIAL) SYMPTOMATIC EPILEPSY AND EPILEPTIC SYNDROMES WITH COMPLEX PARTIAL SEIZURES, INTRACTABLE, WITHOUT STATUS EPILEPTICUS (MULTI): ICD-10-CM

## 2024-08-02 RX ORDER — OXCARBAZEPINE 600 MG/1
1200 TABLET, FILM COATED ORAL 2 TIMES DAILY
Qty: 360 TABLET | Refills: 0 | Status: SHIPPED | OUTPATIENT
Start: 2024-08-02

## 2024-08-06 ENCOUNTER — DOCUMENTATION (OUTPATIENT)
Dept: PHYSICAL THERAPY | Facility: CLINIC | Age: 34
End: 2024-08-06
Payer: MEDICARE

## 2024-08-06 NOTE — PROGRESS NOTES
"Physical Therapy    Discharge Summary    Name: Danette Buenrostro  MRN: [id number]  : 1990  Date: 24    Discharge Summary: PT    Discharge Information: Date of discharge 2024, Date of last visit 2024, Date of evaluation 2024, Number of attended visits 1, Referred by Dr Theodore, and Referred for \"nerve pain\"    Therapy Summary: Patient evaluated for skilled PT, and agreed with discussed plan of care/visits. No follow up sessions have been scheduled    Discharge Status: NA     Rehab Discharge Reason: chart is closed due to inactivity   "

## 2024-08-09 DIAGNOSIS — M25.511 ACUTE PAIN OF RIGHT SHOULDER: ICD-10-CM

## 2024-08-09 RX ORDER — APIXABAN 5 MG/1
5 TABLET, FILM COATED ORAL 2 TIMES DAILY
Qty: 60 TABLET | Refills: 0 | Status: SHIPPED | OUTPATIENT
Start: 2024-08-09

## 2024-08-26 ENCOUNTER — OFFICE VISIT (OUTPATIENT)
Dept: PRIMARY CARE | Facility: HOSPITAL | Age: 34
End: 2024-08-26
Payer: MEDICARE

## 2024-08-26 VITALS
SYSTOLIC BLOOD PRESSURE: 133 MMHG | HEIGHT: 65 IN | HEART RATE: 97 BPM | BODY MASS INDEX: 43.32 KG/M2 | OXYGEN SATURATION: 97 % | WEIGHT: 260 LBS | RESPIRATION RATE: 17 BRPM | DIASTOLIC BLOOD PRESSURE: 86 MMHG

## 2024-08-26 DIAGNOSIS — G89.29 CHRONIC MIDLINE LOW BACK PAIN WITH BILATERAL SCIATICA: Primary | ICD-10-CM

## 2024-08-26 DIAGNOSIS — I26.99 PULMONARY THROMBOEMBOLISM (MULTI): ICD-10-CM

## 2024-08-26 DIAGNOSIS — M54.41 CHRONIC MIDLINE LOW BACK PAIN WITH BILATERAL SCIATICA: Primary | ICD-10-CM

## 2024-08-26 DIAGNOSIS — M54.42 CHRONIC MIDLINE LOW BACK PAIN WITH BILATERAL SCIATICA: Primary | ICD-10-CM

## 2024-08-26 PROCEDURE — 99214 OFFICE O/P EST MOD 30 MIN: CPT | Performed by: INTERNAL MEDICINE

## 2024-08-26 PROCEDURE — 1036F TOBACCO NON-USER: CPT | Performed by: INTERNAL MEDICINE

## 2024-08-26 PROCEDURE — 3008F BODY MASS INDEX DOCD: CPT | Performed by: INTERNAL MEDICINE

## 2024-08-26 RX ORDER — METHYLPREDNISOLONE 4 MG/1
TABLET ORAL
Qty: 21 TABLET | Refills: 0 | Status: SHIPPED | OUTPATIENT
Start: 2024-08-26

## 2024-08-26 ASSESSMENT — ENCOUNTER SYMPTOMS
DIARRHEA: 0
CHILLS: 0
ACTIVITY CHANGE: 0
MYALGIAS: 0
NAUSEA: 0
PALPITATIONS: 0
WEAKNESS: 0
AGITATION: 0
HEADACHES: 1
LEG PAIN: 1
SHORTNESS OF BREATH: 0
NUMBNESS: 1
BACK PAIN: 1
CHEST TIGHTNESS: 0

## 2024-08-26 NOTE — PROGRESS NOTES
"Subjective   Patient ID: Danette Buenrostro is a 34 y.o. female who presents for Follow-up (Follow up/Testing if needed ).  She had a pulmonary embolism in February and is curious how long she will need to be on the Eliquis for its treatment and if the clot has resolved since.  She is continue to have back pain as indicated below.  She has never had physical therapy.    Back Pain  This is a recurrent problem. The current episode started more than 1 year ago. The problem occurs daily. The problem has been rapidly worsening since onset. The pain is present in the lumbar spine. The quality of the pain is described as aching, burning, cramping, shooting and stabbing. The pain radiates to the left knee and right knee. The pain is at a severity of 10/10. The pain is The same all the time. The symptoms are aggravated by bending, position, lying down, sitting and standing. Stiffness is present All day. Associated symptoms include headaches, leg pain and numbness. Pertinent negatives include no chest pain or weakness.        Review of Systems   Constitutional:  Negative for activity change and chills.   HENT:  Negative for congestion.    Respiratory:  Negative for chest tightness and shortness of breath.    Cardiovascular:  Negative for chest pain and palpitations.   Gastrointestinal:  Negative for diarrhea and nausea.   Musculoskeletal:  Positive for back pain. Negative for myalgias.   Neurological:  Positive for numbness and headaches. Negative for weakness.   Psychiatric/Behavioral:  Negative for agitation and behavioral problems.        Objective   /86 (BP Location: Left arm, Patient Position: Sitting)   Pulse 97   Resp 17   Ht 1.651 m (5' 5\")   Wt 118 kg (260 lb)   LMP 11/01/2023 (Exact Date)   SpO2 97%   BMI 43.27 kg/m²     Physical Exam  Constitutional:       Appearance: Normal appearance.   HENT:      Head: Normocephalic and atraumatic.      Nose: Nose normal.      Mouth/Throat:      Mouth: Mucous membranes " are moist.   Eyes:      Extraocular Movements: Extraocular movements intact.      Pupils: Pupils are equal, round, and reactive to light.   Cardiovascular:      Rate and Rhythm: Normal rate and regular rhythm.   Pulmonary:      Effort: No respiratory distress.      Breath sounds: No wheezing.   Abdominal:      General: Bowel sounds are normal.      Palpations: Abdomen is soft.   Neurological:      General: No focal deficit present.         Assessment/Plan   Assessment & Plan  Chronic midline low back pain with bilateral sciatica    Orders:    methylPREDNISolone (Medrol Dospak) 4 mg tablets; Take as directed on package.    Referral to Physical Therapy; Future  I will refer her to physical therapy for evaluation and treatment.  Pulmonary thromboembolism (Multi)    Orders:    Referral to Vascular Medicine; Future    We will send to vascular medicine so that they can assess why she had a pulmonary embolism in the first place and give an idea of how long she needs to be treated.

## 2024-08-26 NOTE — ASSESSMENT & PLAN NOTE
Orders:    methylPREDNISolone (Medrol Dospak) 4 mg tablets; Take as directed on package.    Referral to Physical Therapy; Future  I will refer her to physical therapy for evaluation and treatment.

## 2024-09-10 ENCOUNTER — OFFICE VISIT (OUTPATIENT)
Dept: PRIMARY CARE | Facility: CLINIC | Age: 34
End: 2024-09-10
Payer: MEDICARE

## 2024-09-10 VITALS
DIASTOLIC BLOOD PRESSURE: 86 MMHG | HEART RATE: 91 BPM | BODY MASS INDEX: 48.97 KG/M2 | WEIGHT: 293 LBS | OXYGEN SATURATION: 98 % | SYSTOLIC BLOOD PRESSURE: 138 MMHG

## 2024-09-10 DIAGNOSIS — G40.909 SEIZURE DISORDER (MULTI): Primary | ICD-10-CM

## 2024-09-10 DIAGNOSIS — R60.0 LOCALIZED EDEMA: ICD-10-CM

## 2024-09-10 PROCEDURE — 99214 OFFICE O/P EST MOD 30 MIN: CPT | Performed by: INTERNAL MEDICINE

## 2024-09-10 ASSESSMENT — ENCOUNTER SYMPTOMS
DIARRHEA: 0
CHEST TIGHTNESS: 0
SHORTNESS OF BREATH: 0
PALPITATIONS: 0
CHILLS: 0
MYALGIAS: 0
WEAKNESS: 0
SORE THROAT: 0
SINUS PRESSURE: 0
ACTIVITY CHANGE: 0
NAUSEA: 0
AGITATION: 0

## 2024-09-10 NOTE — ASSESSMENT & PLAN NOTE
She will check in with her neurologist regarding her drug levels as well as this seizure activity.

## 2024-09-10 NOTE — PROGRESS NOTES
Subjective   Patient ID: Danette Buenrostro is a 34 y.o. female who presents for Foot Swelling (both) and Joint Swelling. She is stressed out. Left foot and ankle swelling more often.   with CHF. Still taking Eliquis twice daily.  She reports having seizures last night and that kept her up.  She has been following with neurology and said her drug levels were reportedly fine.  I reviewed her drug levels and showed her they were generally very low, and therefore not surprising that she would have seizures.  She said she had some drugs levels drawn more recently that are normal.    HPI     Review of Systems   Constitutional:  Negative for activity change and chills.   HENT:  Negative for congestion, sinus pressure and sore throat.    Respiratory:  Negative for chest tightness and shortness of breath.    Cardiovascular:  Positive for leg swelling. Negative for chest pain and palpitations.   Gastrointestinal:  Negative for diarrhea and nausea.   Musculoskeletal:  Negative for myalgias.   Neurological:  Positive for seizures. Negative for weakness.   Psychiatric/Behavioral:  Positive for dysphoric mood and sleep disturbance. Negative for agitation and behavioral problems.        Objective   /86 (BP Location: Left arm, Patient Position: Sitting)   Pulse 91   Wt 133 kg (294 lb 4.8 oz)   LMP 11/01/2023 (Exact Date)   SpO2 98%   BMI 48.97 kg/m²     Physical Exam  Constitutional:       Appearance: Normal appearance.   HENT:      Head: Normocephalic and atraumatic.      Nose: Nose normal.      Mouth/Throat:      Mouth: Mucous membranes are moist.   Eyes:      Extraocular Movements: Extraocular movements intact.   Cardiovascular:      Rate and Rhythm: Normal rate and regular rhythm.   Pulmonary:      Effort: No respiratory distress.      Breath sounds: No wheezing.   Abdominal:      General: Bowel sounds are normal.      Palpations: Abdomen is soft.   Musculoskeletal:      Right lower leg: Edema present.      Left  lower leg: Edema present.      Comments: The edema is mild in characterization bilaterally.   Neurological:      General: No focal deficit present.         Assessment/Plan   Assessment & Plan  Seizure disorder (Multi)  She will check in with her neurologist regarding her drug levels as well as this seizure activity.       Localized edema  She wants to make sure that the swelling in her legs is not something more serious so we will refer her to cardiology to evaluate this.  Orders:    Referral to Cardiology; Future

## 2024-09-10 NOTE — ASSESSMENT & PLAN NOTE
She wants to make sure that the swelling in her legs is not something more serious so we will refer her to cardiology to evaluate this.  Orders:    Referral to Cardiology; Future

## 2024-09-11 ASSESSMENT — ENCOUNTER SYMPTOMS
SLEEP DISTURBANCE: 1
SEIZURES: 1
DYSPHORIC MOOD: 1

## 2024-09-12 ENCOUNTER — HOSPITAL ENCOUNTER (OUTPATIENT)
Dept: RADIOLOGY | Facility: CLINIC | Age: 34
Discharge: HOME | End: 2024-09-12
Payer: MEDICARE

## 2024-09-12 DIAGNOSIS — R92.8 OTHER ABNORMAL AND INCONCLUSIVE FINDINGS ON DIAGNOSTIC IMAGING OF BREAST: ICD-10-CM

## 2024-09-12 PROCEDURE — 76983 USE EA ADDL TARGET LESION: CPT | Mod: LT

## 2024-09-12 PROCEDURE — 76642 ULTRASOUND BREAST LIMITED: CPT | Mod: LT

## 2024-09-14 DIAGNOSIS — N63.41 SUBAREOLAR MASS OF RIGHT BREAST: ICD-10-CM

## 2024-09-14 DIAGNOSIS — N63.42 SUBAREOLAR MASS OF LEFT BREAST: Primary | ICD-10-CM

## 2024-09-14 NOTE — PROGRESS NOTES
Mammogram and follow-up ultrasound showed a change in size of 3 masses within her left breast.  We will refer her to the breast surgeon for biopsy.

## 2024-09-17 PROBLEM — R92.8 ABNORMAL FINDING ON BREAST IMAGING: Status: ACTIVE | Noted: 2024-09-17

## 2024-09-17 NOTE — PROGRESS NOTES
[facility], [place], operated by [facility]  Danette Buenrostro female   1990 34 y.o.  [id number]      Chief Complaint  New patient, biopsy consultation.    History Of Present Illness  Danette Buenrostro is a 34 y.o. female seen in the breast center for biopsy consultation. She denies breast surgery or biopsy. She has no family history of breast cancer.     BREAST IMAGIN2024 Left breast ultrasound, indicates BI-RADS Category 4. Interval increase in size of the three left breast masses. Due to the increase in size, a biopsy is recommended. Further evaluation with surgical consultation and ultrasound-guided biopsy is recommended.    REPRODUCTIVE HISTORY: menarche age, GP, first birth age, , OCP's, premenopausal, LMP, heterogeneously dense                                  FAMILY CANCER HISTORY:     Review of Systems  Constitutional:  Negative for appetite change, fatigue, fever and unexpected weight change.   HENT:  Negative for ear pain, hearing loss, nosebleeds, sore throat and trouble swallowing.    Eyes:  Negative for discharge, itching and visual disturbance.   Breast: As stated in HPI.  Respiratory:  Negative for cough, chest tightness and shortness of breath.    Cardiovascular:  Negative for chest pain, palpitations and leg swelling.   Gastrointestinal:  Negative for abdominal pain, constipation, diarrhea and nausea.   Endocrine: Negative for cold intolerance and heat intolerance.   Genitourinary:  Negative for dysuria, frequency, hematuria, pelvic pain and vaginal bleeding.   Musculoskeletal:  Negative for arthralgias, back pain, gait problem, joint swelling and myalgias.   Skin:  Negative for color change and rash.   Allergic/Immunologic: Negative for environmental allergies and food allergies.   Neurological:  Negative for dizziness, tremors, speech difficulty, weakness, numbness and headaches.   Hematological:  Does not bruise/bleed easily.   Psychiatric/Behavioral:  Negative for agitation, dysphoric mood  and sleep disturbance. The patient is not nervous/anxious.       Past Medical History  She has a past medical history of Benign neoplasm of breast, Endometriosis, Epilepsy with partial complex seizures (Multi), Migraine, Neuropathy (5/3/2024), Obesity, Ovarian cyst, Pelvic floor dysfunction in female, Pelvic pain, Pulmonary embolism (Multi) (2024), Seizure disorder (Multi) (At 7y), Type A blood, Rh positive, and Vitamin B1 deficiency.    She has no past medical history of KEN (acute kidney injury) (CMS-HCC), Anxiety, Arthritis, Autoimmune disorder (Multi), Bipolar disorder (Multi), BPH (benign prostatic hyperplasia), Cerebral aneurysm ([facility]), Cervical cancer (Multi), Cervical disc disease, Chronic kidney disease, CKD (chronic kidney disease), Cognitive decline, Crohn's disease (Multi), Dementia (Multi), Depression, Dizziness, Dysphagia, Endometrial cancer (Multi), Esophageal cancer (Multi), Esophageal disease, ESRD (end stage renal disease) (Multi), Fibromyalgia, primary, Fractures, Gastric cancer (Multi), Gender dysphoria, GERD (gastroesophageal reflux disease), GI (gastrointestinal bleed), Hemodialysis status (CMS-HCC), Hernia, internal, History of peritoneal dialysis, HIV disease (Multi), Immunocompromised (Multi), Irritable bowel syndrome, Liver disease, Lumbar disc disease, Mastocytosis, MS (multiple sclerosis) (Multi), Muscular dystrophy (Multi), Myasthenia gravis (Multi), Ovarian cancer (Multi), Pancreatitis ([facility]), Peptic ulcer disease, Prematurity ([facility]), PTSD (post-traumatic stress disorder), Schizophrenia (Multi), Spinal stenosis, Substance addiction (Multi), Syncope, TIA (transient ischemic attack), Ulcerative colitis (Multi), Urinary tract infection, Uterine cancer (Multi), or Vertigo.    Surgical History  She has a past surgical history that includes  section, low transverse; Colposcopy; Other surgical history; Oophorectomy (Left); Tubal ligation; and Hysterectomy.    Family  History  Cancer-related family history includes Breast cancer in her maternal grandmother and mother's sister; Cancer in her mother's brother.     Social History  She reports that she has never smoked. She has never been exposed to tobacco smoke. She has never used smokeless tobacco. She reports current alcohol use. She reports that she does not use drugs.    Allergies  Patient has no known allergies.    Medications  Current Outpatient Medications   Medication Instructions    divalproex (Depakote) 500 mg EC tablet TAKE 1 TABLET BY MOUTH IN THE MORNING THEN TAKE 2 TABLETS BY MOUTH IN THE EVENING    Eliquis 5 mg, oral, 2 times daily    methocarbamol (ROBAXIN) 500 mg, oral, 2 times daily    methylPREDNISolone (Medrol Dospak) 4 mg tablets Take as directed on package.    OXcarbazepine (TRILEPTAL) 1,200 mg, oral, 2 times daily       Last Recorded Vitals  There were no vitals filed for this visit.    Physical Exam  Physical Exam  Patient is alert and oriented x3 and in a relaxed and appropriate mood. Her gait is steady and hand grasps are equal. Sclera is clear. The breasts are nearly symmetrical. The tissue is soft without palpable abnormalities, discrete nodules or masses. The skin and nipples appear normal. There is no cervical, supraclavicular or axillary lymphadenopathy. Heart rate and rhythm normal, S1 and S2 appreciated. The lungs are clear to auscultation bilaterally. Abdomen is soft and non-tender.      Relevant Results and Imaging    Study Result    Narrative & Impression   Interpreted By:  Murtaza Tidwell and Jiang Sirui   STUDY:  BI US BREAST LIMITED LEFT;  9/12/2024 1:53 pm      ACCESSION NUMBER(S):  [id number]      ORDERING CLINICIAN:  ARNOLD HAIRSTON      INDICATION:  Six-month follow-up ultrasound of left breast masses.      ,R92.8 Other abnormal and inconclusive findings on diagnostic imaging  of breast      COMPARISON:  Ultrasound 03/11/2024      FINDINGS:  Targeted ultrasound was performed of the left  breast by a registered  sonographer with elastography.      Three circumscribed hypoechoic masses are again seen at the 9 o'clock  subareolar region. Mass 1 measures 1.8 x 0.9 x 1.3 cm, previously  measuring 1.3 x 0.8 x 1.1 cm. Mass 2 measures 2.0 x 0.9 x 2.5 cm,  previously measuring 1.6 x 2.0 x 0.7 cm. Mass 3 measures 0.9 x 0.6 x  1.1 cm, previously measuring 0.8 x 0.7 x 0.4 cm. These masses  demonstrate minimal vascularity and are soft on elastography.      IMPRESSION:  Interval increase in size of the three left breast masses. Due to the  increase in size, a biopsy is recommended. Further evaluation with  surgical consultation and ultrasound-guided biopsy is recommended.  Dr. Ynes Murphy explained the findings and recommendations to the  patient at the time of exam. A message was sent to the referring  practitioner at the time of this dictation regarding these findings  using the epic critical findings reporting system. A pre-procedure  form was completed.      BI-RADS CATEGORY:  BI-RADS Category:  4 Suspicious.  Recommendation:  Surgical Consultation and Biopsy.  Recommended Date:  Immediate.  Laterality:  Left.      For any future breast imaging appointments, please call [phone]).      I personally reviewed the image(s) / study and I agree with the  findings as stated by Ynes Murphy MD. This study was interpreted at  [facility], [place], [place].      MACRO:  Critical Finding:  See findings. Notification was initiated on  9/12/2024 at 2:23 pm by  Ynes Murphy.  (**-YCF-**) Instructions:  See  Impression for specific recommendations.      Signed by: Murtaza Tidwell 9/12/2024 2:42 PM     I explained the results in depth, along with suggested explanation for follow up recommendations based on the testing results. BI-RADS Category 4    Visit Diagnosis  1. Abnormal finding on breast imaging            Assessment/Plan  Abnormal breast imaging, left breast mass, no breast surgery or  biopsy, no family history of breast cancer, heterogeneously dense    Plan:  Left breast ultrasound guided core biopsy.    Patient Discussion/Summary  Proceed to biopsy. A breast radiology physician will perform the biopsy. Results are usually available in about 7 business days. I will call patient with results and instruct on next steps and plan.     IMPORTANT INFORMATION REGARDING YOUR RESULTS    If you receive medical information from My [facility] Personal Health Record (online chart) your results will be released into your chart. This means you may view or see results of your biopsy or procedure before I contact you directly. If this occurs, please call the office and we will discuss your results over the phone.    You can see your health information, review clinical summaries from office visits & test results online when you follow your health with MY  Chart, a personal health record. To sign up go to [email or web address]. If you need assistance with signing up or trouble getting into your account call [facility] Patient Line 24/7 at [phone].    My office phone number is [phone]  if you need to get in touch with me or have additional questions or concerns. Thank you for choosing [facility] and trusting me as your healthcare provider. I look forward to seeing you again at your next office visit. I am honored to be a provider on your health care team and I remain dedicated to helping you achieve your health goals.       RICARDA Brewer-CNP

## 2024-09-18 ENCOUNTER — PROCEDURE VISIT (OUTPATIENT)
Dept: SURGICAL ONCOLOGY | Facility: CLINIC | Age: 34
End: 2024-09-18
Payer: MEDICARE

## 2024-09-18 VITALS
WEIGHT: 292 LBS | BODY MASS INDEX: 48.59 KG/M2 | DIASTOLIC BLOOD PRESSURE: 82 MMHG | SYSTOLIC BLOOD PRESSURE: 137 MMHG | HEART RATE: 81 BPM

## 2024-09-18 DIAGNOSIS — R92.8 ABNORMAL FINDING ON BREAST IMAGING: Primary | ICD-10-CM

## 2024-09-18 DIAGNOSIS — N63.42 SUBAREOLAR MASS OF LEFT BREAST: ICD-10-CM

## 2024-09-18 PROCEDURE — 99214 OFFICE O/P EST MOD 30 MIN: CPT

## 2024-09-18 PROCEDURE — 99204 OFFICE O/P NEW MOD 45 MIN: CPT

## 2024-09-18 NOTE — PATIENT INSTRUCTIONS
I recommend a left breast ultrasound guided biopsy. A breast radiology physician will perform the biopsy. Possible diagnoses include benign, atypical, or cancer as we discussed.  Bruising and mild discomfort after the biopsy is normal and will improve. I normally have results in 7-10 business days. I will call you with results, please have your phone handy to take my call.    IMPORTANT INFORMATION REGARDING YOUR RESULTS    If you receive medical information from My [facility] Personal Health Record (online chart) your results will be released into your chart. This means you may view or see results of your biopsy or procedure before I contact you directly. If this occurs, please call the office and we will discuss your results over the phone.    You can see your health information, review clinical summaries from office visits & test results online when you follow your health with MY  Chart, a personal health record. To sign up go to [email or web address]. If you need assistance with signing up or trouble getting into your account call [facility] Patient Line 24/7 at [phone].    My office phone number is [phone] if you need to get in touch with me or have additional questions or concerns. Thank you for choosing [facility] and trusting me as your healthcare provider. I look forward to seeing you again at your next office visit. I am honored to be a provider on your health care team and I remain dedicated to helping you achieve your health goals.Proceed to biopsy. A breast radiology physician will perform the biopsy. Results are usually available in about 7-10 business days. I will call patient with results and instruct on next steps and plan.

## 2024-09-18 NOTE — PROGRESS NOTES
[facility]  Danette Buenrostro female   1990 34 y.o.  [id number]      Chief Complaint  New patient, biopsy consultation.    History Of Present Illness  Danette Buenrostro is a 34 y.o. AA female seen in the breast center for biopsy consultation. Left breast fibroadenoma surgically excised. She has family history of breast cancer in maternal grandmother and maternal aunt.    BREAST IMAGIN2024  Bilateral screening mammogram, BI-RADS Category 0. Left breast focal asymmetry. 3/11/2024 LEFT breast ultrasound BI-RADS Category 3, Probably benign masses in the left breast 2024 Left breast ultrasound, indicates BI-RADS Category 4. Interval increase in size of the three left breast masses. Due to the increase in size, a biopsy is recommended. Further evaluation with surgical consultation and ultrasound-guided biopsy is recommended.       REPRODUCTIVE HISTORY: menarche age 7, , first birth age 23,  2 month, no OCP's, menopausal status unknown s/p MARLEY LSO endometriosis age 33, heterogeneously dense,     FAMILY CANCER HISTORY:   Maternal grandmother: Breast cancer, age 40s  Maternal aunt: Breast cancer age unknown  Maternal uncle: Bone cancer    Review of Systems  Constitutional:  Negative for appetite change, fatigue, fever and unexpected weight change.   HENT:  Negative for ear pain, hearing loss, nosebleeds, sore throat and trouble swallowing.    Eyes:  Negative for discharge, itching and visual disturbance.   Breast: As stated in HPI.  Respiratory:  Negative for cough, chest tightness and shortness of breath.    Cardiovascular:  Negative for chest pain, palpitations and leg swelling.   Gastrointestinal:  Negative for abdominal pain, constipation, diarrhea and nausea.   Endocrine: Negative for cold intolerance and heat intolerance.   Genitourinary:  Negative for dysuria, frequency, hematuria, pelvic pain and vaginal bleeding.   Musculoskeletal:  Negative for arthralgias, back pain,  gait problem, joint swelling and myalgias.   Skin:  Negative for color change and rash.   Allergic/Immunologic: Negative for environmental allergies and food allergies.   Neurological:  Negative for dizziness, tremors, speech difficulty, weakness, numbness and headaches.   Hematological:  Does not bruise/bleed easily.   Psychiatric/Behavioral:  Negative for agitation, dysphoric mood and sleep disturbance. The patient is not nervous/anxious.       Past Medical History  She  Past Medical History:   Diagnosis Date    Benign neoplasm of breast     Endometriosis     Epilepsy with partial complex seizures (Multi)     Migraine     Taking Depakote    Neuropathy 5/3/2024    Obesity     Ovarian cyst     measuring 5cm x 3cm x 3cm- Scheduled for surgery with Dr Theodore on 24    Pelvic floor dysfunction in female     Pelvic pain     Pulmonary embolism (Multi) 2024    Left lung    Seizure disorder (Multi) At 7y    F/W Dr. Sigala (Neuro) Taking Trileptal, Last seizure 2 days ago during sleep    Type A blood, Rh positive     Blood type A+    Vitamin B1 deficiency        Surgical History  She has a past surgical history that includes  section, low transverse; Colposcopy; Other surgical history; Oophorectomy (Left); Tubal ligation; and Hysterectomy.    Family History  Cancer-related family history includes Breast cancer in her maternal grandmother and mother's sister; Cancer in her mother's brother.     Social History  She reports that she has never smoked. She has never been exposed to tobacco smoke. She has never used smokeless tobacco. She reports current alcohol use. She reports that she does not use drugs.    Allergies  Patient has no known allergies.    Medications  Current Outpatient Medications   Medication Instructions    divalproex (Depakote) 500 mg EC tablet TAKE 1 TABLET BY MOUTH IN THE MORNING THEN TAKE 2 TABLETS BY MOUTH IN THE EVENING    Eliquis 5 mg, oral, 2 times daily    methocarbamol (ROBAXIN)  500 mg, oral, 2 times daily    methylPREDNISolone (Medrol Dospak) 4 mg tablets Take as directed on package.    OXcarbazepine (TRILEPTAL) 1,200 mg, oral, 2 times daily       Last Recorded Vitals  Vitals:    09/18/24 1353   BP: 137/82   Pulse: 81       Physical Exam  Physical Exam  Chest:           Patient is alert and oriented x3 and in a relaxed and appropriate mood. Her gait is steady and hand grasps are equal. Sclera is clear. The breasts are nearly symmetrical. Left breast well healed surgical incision. The tissue is soft without palpable abnormalities, discrete nodules or masses. The skin and nipples appear normal. There is no cervical, supraclavicular or axillary lymphadenopathy. Heart rate and rhythm normal, S1 and S2 appreciated. The lungs are clear to auscultation bilaterally. Abdomen is soft and non-tender.      Relevant Results and Imaging    Interpreted By:  Murtaza Tidwell and Jiang Sirui   STUDY:  BI US BREAST LIMITED LEFT;  9/12/2024 1:53 pm      ACCESSION NUMBER(S):  [id number]      ORDERING CLINICIAN:  ARNOLD HAIRSTON      INDICATION:  Six-month follow-up ultrasound of left breast masses.      ,R92.8 Other abnormal and inconclusive findings on diagnostic imaging  of breast      COMPARISON:  Ultrasound 03/11/2024      FINDINGS:  Targeted ultrasound was performed of the left breast by a registered  sonographer with elastography.      Three circumscribed hypoechoic masses are again seen at the 9 o'clock  subareolar region. Mass 1 measures 1.8 x 0.9 x 1.3 cm, previously  measuring 1.3 x 0.8 x 1.1 cm. Mass 2 measures 2.0 x 0.9 x 2.5 cm,  previously measuring 1.6 x 2.0 x 0.7 cm. Mass 3 measures 0.9 x 0.6 x  1.1 cm, previously measuring 0.8 x 0.7 x 0.4 cm. These masses  demonstrate minimal vascularity and are soft on elastography.      IMPRESSION:  Interval increase in size of the three left breast masses. Due to the  increase in size, a biopsy is recommended. Further evaluation with  surgical  consultation and ultrasound-guided biopsy is recommended.  Dr. Ynes Murphy explained the findings and recommendations to the  patient at the time of exam. A message was sent to the referring  practitioner at the time of this dictation regarding these findings  using the epic critical findings reporting system. A pre-procedure  form was completed.      BI-RADS CATEGORY:  BI-RADS Category:  4 Suspicious.  Recommendation:  Surgical Consultation and Biopsy.  Recommended Date:  Immediate.  Laterality:  Left.        I explained the results in depth, along with suggested explanation for follow up recommendations based on the testing results. BI-RADS Category 4    Visit Diagnosis  1. Abnormal finding on breast imaging        2. Subareolar mass of left breast  Referral to Breast Surgery            Assessment/Plan      Plan:  Proceed to biopsy. A breast radiology physician will perform the biopsy. Results are usually available in about 7-10 business days. I will call patient with results and instruct on next steps and plan.        Patient Discussion/Summary   I recommend a left breast ultrasound guided biopsy. A breast radiology physician will perform the biopsy. Possible diagnoses include benign, atypical, or cancer as we discussed.  Bruising and mild discomfort after the biopsy is normal and will improve. I normally have results in 7-10 business days. I will call you with results, please have your phone handy to take my call.    IMPORTANT INFORMATION REGARDING YOUR RESULTS    If you receive medical information from My [facility] Personal Health Record (online chart) your results will be released into your chart. This means you may view or see results of your biopsy or procedure before I contact you directly. If this occurs, please call the office and we will discuss your results over the phone.    You can see your health information, review clinical summaries from office visits & test results online when you follow your health  with MY  Chart, a personal health record. To sign up go to [email or web address]. If you need assistance with signing up or trouble getting into your account call [facility] Patient Line 24/7 at [phone].    My office phone number is [phone] if you need to get in touch with me or have additional questions or concerns. Thank you for choosing [facility] and trusting me as your healthcare provider. I look forward to seeing you again at your next office visit. I am honored to be a provider on your health care team and I remain dedicated to helping you achieve your health goals.Proceed to biopsy. A breast radiology physician will perform the biopsy. Results are usually available in about 7-10 business days. I will call patient with results and instruct on next steps and plan.       Glenis Anthony, APRN-CNP

## 2024-09-19 ENCOUNTER — HOSPITAL ENCOUNTER (OUTPATIENT)
Dept: RADIOLOGY | Facility: CLINIC | Age: 34
Discharge: HOME | End: 2024-09-19
Payer: MEDICARE

## 2024-09-19 DIAGNOSIS — R92.8 OTHER ABNORMAL AND INCONCLUSIVE FINDINGS ON DIAGNOSTIC IMAGING OF BREAST: ICD-10-CM

## 2024-09-19 DIAGNOSIS — R92.8 ABNORMAL MAMMOGRAM: ICD-10-CM

## 2024-09-19 DIAGNOSIS — R92.8 ABNORMAL FINDING ON BREAST IMAGING: Primary | ICD-10-CM

## 2024-09-19 PROCEDURE — C1819 TISSUE LOCALIZATION-EXCISION: HCPCS

## 2024-09-19 PROCEDURE — 77065 DX MAMMO INCL CAD UNI: CPT

## 2024-09-19 PROCEDURE — A4648 IMPLANTABLE TISSUE MARKER: HCPCS

## 2024-09-19 PROCEDURE — 2720000007 HC OR 272 NO HCPCS

## 2024-09-19 PROCEDURE — 2500000005 HC RX 250 GENERAL PHARMACY W/O HCPCS: Performed by: RADIOLOGY

## 2024-09-19 PROCEDURE — 19083 BX BREAST 1ST LESION US IMAG: CPT | Mod: LT

## 2024-09-19 ASSESSMENT — PAIN SCALES - GENERAL
PAINLEVEL_OUTOF10: 0 - NO PAIN
PAINLEVEL_OUTOF10: 4
PAINLEVEL_OUTOF10: 0 - NO PAIN

## 2024-09-19 ASSESSMENT — PAIN - FUNCTIONAL ASSESSMENT
PAIN_FUNCTIONAL_ASSESSMENT: 0-10

## 2024-09-19 NOTE — DISCHARGE INSTRUCTIONS
1145 AFTER THE TEST  A steri-strip and bandage will be placed over the incision. You may shower after 24 hours. Remove bandage after 24 hours. Remove bandage after the shower. Leave the steri-strips in place to fall off on their own. If after 1 week the steri-strips are still on, you may remove them. Avoid swimming or soaking in tub for 3 days.     You may have mild discomfort at the test site. If needed, you may take Tylenol (Acetaminophen) for pain. Please avoid taking NSAIDs, Motrin, Advil, Aleve, or ibuprofen for 24 hours following the biopsy. After 24 hours you may resume NSAIDSs.     If you take aspirin, Plavix, Coumadin, Xarelto or Eliquis please tell us. If these medications were stopped by your provider, please ask them when to resume.     You may have some tenderness, bruising or slight bleeding at the site. Please apply ice packs to the site for 15 minutes on and 15 minutes off for a 2 hour minimum.     Most people can return to their usual routine after the procedure. Avoid Strenuous activity for 24 hours.     Sleep in a bra the night after your biopsy. Continue to do so for comfort.     Call your provider if you have any of the following symptoms :  Fever  Increased pain  Increased bleeding  Redness  Increased swelling  Yellowish drainage  Your provider will get the biopsy results within 5 - 7 days. Call your provider with any questions.     Patient education brochure and pain/comfort measures have been reviewed.   Phone number provided to contact [facility] if problems arise.     Patient verbalized understanding of home going instructions.   DC home in care of friend

## 2024-09-24 ENCOUNTER — TELEPHONE (OUTPATIENT)
Dept: SURGICAL ONCOLOGY | Facility: HOSPITAL | Age: 34
End: 2024-09-24
Payer: MEDICARE

## 2024-09-24 DIAGNOSIS — R92.8 ABNORMAL FINDING ON BREAST IMAGING: Primary | ICD-10-CM

## 2024-09-24 LAB
LABORATORY COMMENT REPORT: NORMAL
PATH REPORT.FINAL DX SPEC: NORMAL
PATH REPORT.GROSS SPEC: NORMAL
PATH REPORT.TOTAL CANCER: NORMAL

## 2024-09-24 NOTE — TELEPHONE ENCOUNTER
Result Communication    Spoke with Danette Buenrostro regarding breast biopsy results, benign and concordant. Office to call and schedule 6 month follow up left breast ultrasound and office visit.       Resulted Orders   BI US guided breast localization and biopsy left    Addendum: 9/24/2024    Interpreted By:  Su Ravi,   ADDENDUM:  This is an addendum. The histopathologic interpretation of the  conglomeration of left breast masses in the left 9:00 o'clock  subareolar location is fragments of dilated ducts and blood. This is  concordant. Recommendation is for a six-month follow-up ultrasound.      Signed by: Su Ravi 9/24/2024 2:47 PM      -------- ORIGINAL REPORT --------  Dictation workstation:   [id number]      Narrative    Interpreted By:  Su Ravi,   STUDY:  BI US GUIDED BREAST LOCALIZATION AND BIOPSY LEFT; BI BREAST BIOPSY  CLIP IMAGING;  9/19/2024 11:22 am; 9/19/2024 11:39 am      ACCESSION NUMBER(S):  [id number]; [id number]      ORDERING CLINICIAN:  REESE TAVERAS      INDICATION:  Conglomeration of left breast masses.      ,R92.8 Other abnormal and inconclusive findings on diagnostic imaging  of breast      FINDINGS:  PREPROCEDURAL CONSULTATION: The history and physical exam pertinent  to the procedure were reviewed and no updates were made.      The procedure was explained to the patient including the risks,  benefits, and alternatives. Medications were discussed, including any  prior use of blood thinning medications by the patient. Patient  allergies were reviewed. The risks, including but not limited to  infection and bleeding, were reviewed by the performing physician and  the patient agreed to undergo the procedure.      Prior to the procedure, an audible timeout was done to verify patient  identification, site and type of procedure. Dr. Su Ravi, a  radiology nurse and an ultrasound technologist were present.      PROCEDURE: Scanning of the left breast redemonstrated  the  conglomeration of masses of concern in the 9 o'clock position,  subareolar location. The left breast was marked under ultrasound  guidance and cleansed.  20 mL of buffered 1% lidocaine was injected  subcutaneously and then into the deeper tissues surrounding the  masses. A small incision was made.  4 tissue core specimens were then  obtained with a 12-gauge spring-loaded biopsy needle with minimal  bleeding (estimated blood loss less than 10 mL). A HydroMARK open  coil tissue marker was then placed into the biopsy site.  Hemostasis  was achieved with manual compression. The patient tolerated the  procedure without difficulty.      The postbiopsy mammogram demonstrates satisfactory placement of the  tissue marker.      The patient experienced no complications during the procedure.  Home-going/follow-up instructions were reviewed with the patient  before she left the department.        Impression    Status post ultrasound guided core needle biopsy of a left breast  conglomeration of masses followed by tissue marker placement.  Pathology is pending.      POST PROCEDURE MAMMOGRAM FOR MARKER PLACEMENT.      MACRO:  None      Signed by: Su Ravi 9/19/2024 11:47 AM  Dictation workstation:   [id number]       4:36 PM

## 2024-10-07 DIAGNOSIS — M25.511 ACUTE PAIN OF RIGHT SHOULDER: ICD-10-CM

## 2024-10-07 NOTE — TELEPHONE ENCOUNTER
Hi. I ran out of my Eliquis and I messaged the last doctor that refilled it and told her I needed a refill she told me to message my pcp for a refill.

## 2024-10-13 ENCOUNTER — APPOINTMENT (OUTPATIENT)
Dept: RADIOLOGY | Facility: HOSPITAL | Age: 34
End: 2024-10-13
Payer: MEDICARE

## 2024-10-13 ENCOUNTER — HOSPITAL ENCOUNTER (EMERGENCY)
Facility: HOSPITAL | Age: 34
Discharge: HOME | End: 2024-10-14
Attending: GENERAL PRACTICE
Payer: MEDICARE

## 2024-10-13 ENCOUNTER — APPOINTMENT (OUTPATIENT)
Dept: CARDIOLOGY | Facility: HOSPITAL | Age: 34
End: 2024-10-13
Payer: MEDICARE

## 2024-10-13 DIAGNOSIS — R56.9 SEIZURE (MULTI): Primary | ICD-10-CM

## 2024-10-13 LAB
ALBUMIN SERPL BCP-MCNC: 3.7 G/DL (ref 3.4–5)
ALP SERPL-CCNC: 48 U/L (ref 33–110)
ALT SERPL W P-5'-P-CCNC: 70 U/L (ref 7–45)
ANION GAP SERPL CALC-SCNC: 12 MMOL/L (ref 10–20)
AST SERPL W P-5'-P-CCNC: 84 U/L (ref 9–39)
B-HCG SERPL-ACNC: <2 MIU/ML
BASOPHILS # BLD AUTO: 0.05 X10*3/UL (ref 0–0.1)
BASOPHILS NFR BLD AUTO: 0.7 %
BILIRUB SERPL-MCNC: 0.2 MG/DL (ref 0–1.2)
BUN SERPL-MCNC: 12 MG/DL (ref 6–23)
CALCIUM SERPL-MCNC: 8.7 MG/DL (ref 8.6–10.3)
CARDIAC TROPONIN I PNL SERPL HS: <3 NG/L (ref 0–13)
CHLORIDE SERPL-SCNC: 98 MMOL/L (ref 98–107)
CO2 SERPL-SCNC: 28 MMOL/L (ref 21–32)
CREAT SERPL-MCNC: 1.14 MG/DL (ref 0.5–1.05)
EGFRCR SERPLBLD CKD-EPI 2021: 64 ML/MIN/1.73M*2
EOSINOPHIL # BLD AUTO: 0.12 X10*3/UL (ref 0–0.7)
EOSINOPHIL NFR BLD AUTO: 1.6 %
ERYTHROCYTE [DISTWIDTH] IN BLOOD BY AUTOMATED COUNT: 13.8 % (ref 11.5–14.5)
GLUCOSE SERPL-MCNC: 108 MG/DL (ref 74–99)
HCT VFR BLD AUTO: 36.8 % (ref 36–46)
HGB BLD-MCNC: 12.5 G/DL (ref 12–16)
HOLD SPECIMEN: NORMAL
IMM GRANULOCYTES # BLD AUTO: 0.04 X10*3/UL (ref 0–0.7)
IMM GRANULOCYTES NFR BLD AUTO: 0.5 % (ref 0–0.9)
LYMPHOCYTES # BLD AUTO: 2.8 X10*3/UL (ref 1.2–4.8)
LYMPHOCYTES NFR BLD AUTO: 37.8 %
MCH RBC QN AUTO: 30.5 PG (ref 26–34)
MCHC RBC AUTO-ENTMCNC: 34 G/DL (ref 32–36)
MCV RBC AUTO: 90 FL (ref 80–100)
MONOCYTES # BLD AUTO: 0.69 X10*3/UL (ref 0.1–1)
MONOCYTES NFR BLD AUTO: 9.3 %
NEUTROPHILS # BLD AUTO: 3.7 X10*3/UL (ref 1.2–7.7)
NEUTROPHILS NFR BLD AUTO: 50.1 %
NRBC BLD-RTO: 0 /100 WBCS (ref 0–0)
PLATELET # BLD AUTO: 360 X10*3/UL (ref 150–450)
POTASSIUM SERPL-SCNC: 4 MMOL/L (ref 3.5–5.3)
PROT SERPL-MCNC: 7.3 G/DL (ref 6.4–8.2)
RBC # BLD AUTO: 4.1 X10*6/UL (ref 4–5.2)
SODIUM SERPL-SCNC: 134 MMOL/L (ref 136–145)
VALPROATE SERPL-MCNC: 41 UG/ML (ref 50–100)
WBC # BLD AUTO: 7.4 X10*3/UL (ref 4.4–11.3)

## 2024-10-13 PROCEDURE — 84484 ASSAY OF TROPONIN QUANT: CPT | Performed by: GENERAL PRACTICE

## 2024-10-13 PROCEDURE — 2550000001 HC RX 255 CONTRASTS: Performed by: GENERAL PRACTICE

## 2024-10-13 PROCEDURE — 80164 ASSAY DIPROPYLACETIC ACD TOT: CPT | Performed by: GENERAL PRACTICE

## 2024-10-13 PROCEDURE — 85025 COMPLETE CBC W/AUTO DIFF WBC: CPT | Performed by: GENERAL PRACTICE

## 2024-10-13 PROCEDURE — 70450 CT HEAD/BRAIN W/O DYE: CPT | Performed by: STUDENT IN AN ORGANIZED HEALTH CARE EDUCATION/TRAINING PROGRAM

## 2024-10-13 PROCEDURE — 36415 COLL VENOUS BLD VENIPUNCTURE: CPT | Performed by: GENERAL PRACTICE

## 2024-10-13 PROCEDURE — 96375 TX/PRO/DX INJ NEW DRUG ADDON: CPT

## 2024-10-13 PROCEDURE — 71275 CT ANGIOGRAPHY CHEST: CPT | Performed by: STUDENT IN AN ORGANIZED HEALTH CARE EDUCATION/TRAINING PROGRAM

## 2024-10-13 PROCEDURE — 93005 ELECTROCARDIOGRAM TRACING: CPT

## 2024-10-13 PROCEDURE — 80183 DRUG SCRN QUANT OXCARBAZEPIN: CPT | Performed by: GENERAL PRACTICE

## 2024-10-13 PROCEDURE — 70450 CT HEAD/BRAIN W/O DYE: CPT

## 2024-10-13 PROCEDURE — 99285 EMERGENCY DEPT VISIT HI MDM: CPT | Mod: 25

## 2024-10-13 PROCEDURE — 84702 CHORIONIC GONADOTROPIN TEST: CPT | Performed by: GENERAL PRACTICE

## 2024-10-13 PROCEDURE — 96365 THER/PROPH/DIAG IV INF INIT: CPT

## 2024-10-13 PROCEDURE — 80053 COMPREHEN METABOLIC PANEL: CPT | Performed by: GENERAL PRACTICE

## 2024-10-13 PROCEDURE — 2500000004 HC RX 250 GENERAL PHARMACY W/ HCPCS (ALT 636 FOR OP/ED): Performed by: GENERAL PRACTICE

## 2024-10-13 PROCEDURE — 71275 CT ANGIOGRAPHY CHEST: CPT

## 2024-10-13 RX ORDER — LEVETIRACETAM 10 MG/ML
1000 INJECTION INTRAVASCULAR ONCE
Status: COMPLETED | OUTPATIENT
Start: 2024-10-13 | End: 2024-10-13

## 2024-10-13 RX ORDER — KETOROLAC TROMETHAMINE 30 MG/ML
30 INJECTION, SOLUTION INTRAMUSCULAR; INTRAVENOUS ONCE
Status: COMPLETED | OUTPATIENT
Start: 2024-10-13 | End: 2024-10-13

## 2024-10-13 ASSESSMENT — PAIN DESCRIPTION - PAIN TYPE: TYPE: ACUTE PAIN

## 2024-10-13 ASSESSMENT — PAIN - FUNCTIONAL ASSESSMENT: PAIN_FUNCTIONAL_ASSESSMENT: 0-10

## 2024-10-13 ASSESSMENT — PAIN DESCRIPTION - DESCRIPTORS: DESCRIPTORS: PRESSURE

## 2024-10-13 ASSESSMENT — PAIN DESCRIPTION - LOCATION: LOCATION: HEAD

## 2024-10-13 ASSESSMENT — PAIN SCALES - GENERAL: PAINLEVEL_OUTOF10: 10 - WORST POSSIBLE PAIN

## 2024-10-13 NOTE — ED TRIAGE NOTES
"Pt arrived to ED via EMS from home with c/o seizure around 1930 tonight that was witnessed by pt's 5 year old daughter. Pt has hx of seizures and is compliant with medications. Pt reports having a \"small seizure a few days ago, a focal one.\" Pt states she had a headache prior to the seizure and headache pain is still 10/10 now.  Pt also reports heaviness in chest \"every so often the past week\" and has hx of PE in left lung found 2/2024 after pt's hysterectomy.   "

## 2024-10-14 VITALS
TEMPERATURE: 98.3 F | SYSTOLIC BLOOD PRESSURE: 146 MMHG | DIASTOLIC BLOOD PRESSURE: 75 MMHG | WEIGHT: 265 LBS | HEART RATE: 88 BPM | OXYGEN SATURATION: 97 % | BODY MASS INDEX: 39.25 KG/M2 | RESPIRATION RATE: 20 BRPM | HEIGHT: 69 IN

## 2024-10-14 LAB
ATRIAL RATE: 90 BPM
P AXIS: 65 DEGREES
P OFFSET: 179 MS
P ONSET: 129 MS
PR INTERVAL: 174 MS
Q ONSET: 216 MS
QRS COUNT: 15 BEATS
QRS DURATION: 90 MS
QT INTERVAL: 378 MS
QTC CALCULATION(BAZETT): 462 MS
QTC FREDERICIA: 432 MS
R AXIS: 84 DEGREES
T AXIS: 43 DEGREES
T OFFSET: 405 MS
VENTRICULAR RATE: 90 BPM

## 2024-10-14 ASSESSMENT — PAIN - FUNCTIONAL ASSESSMENT: PAIN_FUNCTIONAL_ASSESSMENT: 0-10

## 2024-10-14 ASSESSMENT — PAIN SCALES - GENERAL: PAINLEVEL_OUTOF10: 3

## 2024-10-14 NOTE — ED PROVIDER NOTES
HPI   Chief Complaint   Patient presents with    Seizures     Pt has hx of seizures; compliant with medication; seizure was witnessed by pt's 5 year old daughter tonight     Headache    Chest Pain     Pt has hx of PE in left lung, on Eliquis        HPI: 34-year-old female with a history of epilepsy and pulmonary embolism presents for chest pressure and seizure.  She reports that she was out of her Eliquis for about a week and recently restarted it.  She admits to going out drinking with friends last evening.  She took her antiepileptics late this morning but did take them today.  Shortly prior to presentation she states that she was folding close in her bedroom when she began to feel chest pressure.  She sat down on the bed and states that next thing she knew EMS was standing around her.  Her  reports that she had a generalized tonic-clonic seizure.  Currently she is complaining of a frontal headache.  She currently has no chest pain or shortness of breath.      Limitations to history: None  Independent Historians: Patient  External Records Reviewed: HIE, outpatient notes, inpatient notes  ------------------------------------------------------------------------------------------------------------------------------------------  ROS: a ten point review of systems was performed and was negative except as per HPI.  ------------------------------------------------------------------------------------------------------------------------------------------  PMH / PSH: as per HPI, otherwise reviewed in EMR  MEDS: as per HPI, otherwise reviewed in EMR  ALLERGIES: as per HPI, otherwise reviewed in EMR  SocH:  as per HPI, otherwise reviewed in EMR  FH:  as per HPI, otherwise reviewed in EMR  ------------------------------------------------------------------------------------------------------------------------------------------  Physical Exam:  VS: As documented in the triage note and EMR flowsheet from this visit was  reviewed  General: Fatigued appearing. No acute distress.   Eyes:  Extraocular movements grossly intact. No scleral icterus. No discharge  HEENT:  Normocephalic.  Atraumatic  Neck: Moves neck freely. No gross masses  CV: Regular rhythm. No murmurs, rubs or gallops   Resp: Clear to auscultation bilaterally. No respiratory distress.    GI: Soft, no masses, nontender. No rebound tenderness or guarding  MSK: Symmetric muscle bulk. No deformities. No lower extremity edema.    Skin: Warm, dry, intact.   Neuro: No focal deficits.  A&O x3.   Psych: Appropriate for situation  ------------------------------------------------------------------------------------------------------------------------------------------  Hospital Course / Medical Decision Making:  Independent Interpretations: CT head, CTA chest  EKG as interpreted by me: Normal sinus rhythm at 90 bpm with a normal axis, no bundle branch block no signs of acute ischemia    MDM: 34-year-old female with a history of epilepsy and pulmonary embolism presents for chest pressure and seizure.  She is not requiring supplemental O2 and is not tachycardic.  EKG is nonischemic.  No major electrolyte abnormalities.  Troponin nonelevated.  Valproic acid level is mildly subtherapeutic.  She was loaded with 1 g of Keppra in the ED.  She states that she has been compliant with her medications.  She was observed in the ED for over 5 hours with no seizure activity.  CT of the head shows no acute intracranial process.  CT of the chest is limited but does not show any central pulmonary embolism.  She reports that she has been having mild intermittent chest pressure since before she ran out of her Eliquis.  I feel that acute pulmonary embolism is unlikely due to the fact that she is not short of breath, not hypoxic and not tachycardic.  I conducted shared decision making with the patient and her  and they want to go home and follow-up with her neurologist, Dr. Sigala in the  morning.  I stressed the importance of refraining from drinking alcohol and the importance of being compliant with her medications.  I did instruct her to not drive until she is seen by neurology.  She will follow-up with neurology as soon as possible and will return to the ED for any concerns.    Discussion of Management with Other Providers:   I discussed the patient/results with: Emergency medicine team    Final diagnosis and disposition as below.    Labs Reviewed  COMPREHENSIVE METABOLIC PANEL - Abnormal     Glucose                       108 (*)                Sodium                        134 (*)                Potassium                     4.0                    Chloride                      98                     Bicarbonate                   28                     Anion Gap                     12                     Urea Nitrogen                 12                     Creatinine                    1.14 (*)               eGFR                          64                     Calcium                       8.7                    Albumin                       3.7                    Alkaline Phosphatase          48                     Total Protein                 7.3                    AST                           84 (*)                 Bilirubin, Total              0.2                    ALT                           70 (*)              VALPROIC ACID - Abnormal     Valproic Acid                 41 (*)              TROPONIN I, HIGH SENSITIVITY - Normal     Troponin I, High Sensitivity   <3                         Narrative: Less than 99th percentile of normal range cutoff-                  Female and children under 18 years old <14 ng/L; Male <21 ng/L: Negative                  Repeat testing should be performed if clinically indicated.                                     Female and children under 18 years old 14-50 ng/L; Male 21-50 ng/L:                  Consistent with possible cardiac damage and possible  increased clinical                   risk. Serial measurements may help to assess extent of myocardial damage.                                     >50 ng/L: Consistent with cardiac damage, increased clinical risk and                  myocardial infarction. Serial measurements may help assess extent of                   myocardial damage.                                      NOTE: Children less than 1 year old may have higher baseline troponin                   levels and results should be interpreted in conjunction with the overall                   clinical context.                                     NOTE: Troponin I testing is performed using a different                   testing methodology at [facility] than at other                   [facility]. Direct result comparisons should only                   be made within the same method.  HUMAN CHORIONIC GONADOTROPIN, SERUM QUANTITATIVE - Normal     HCG, Beta-Quantitative        <2                         Narrative:  Total HCG measurement is performed using the [employer] Access                   Immunoassay which detects intact HCG and free beta HCG subunit.                    This test is not indicated for use as a tumor marker.                   HCG testing is performed using a different test methodology at [facility] than other [facility]. Direct result comparison                   should only be made within the same method.                      CBC WITH AUTO DIFFERENTIAL     WBC                           7.4                    nRBC                          0.0                    RBC                           4.10                   Hemoglobin                    12.5                   Hematocrit                    36.8                   MCV                           90                     MCH                           30.5                   MCHC                          34.0                   RDW                            13.8                   Platelets                     360                    Neutrophils %                 50.1                   Immature Granulocytes %, Automated   0.5                    Lymphocytes %                 37.8                   Monocytes %                   9.3                    Eosinophils %                 1.6                    Basophils %                   0.7                    Neutrophils Absolute          3.70                   Immature Granulocytes Absolute, Au*   0.04                   Lymphocytes Absolute          2.80                   Monocytes Absolute            0.69                   Eosinophils Absolute          0.12                   Basophils Absolute            0.05                OXCARBAZEPINE OR ESLICARBAZEPINE METABOLITE ([facility])    CT head wo IV contrast   Final Result    No acute intracranial abnormality.                MACRO:    None.          Signed by: Abelardo Harris 10/13/2024 10:18 PM    Dictation workstation:   [id number]     CT angio chest for pulmonary embolism   Final Result    Extremely limited exam due to the arms being down resulting in    extensive beam hardening artifact as well as suboptimal contrast    bolus, large body habitus and low-dose dual energy technique. No    acute pulmonary embolism in the right, left, or main pulmonary    arteries, beyond which evaluation is unreliable.          No other acute pulmonary process.          MACRO:    None.          Signed by: Abelardo Harris 10/13/2024 10:20 PM    Dictation workstation:   [id number]                   Patient History   Past Medical History:   Diagnosis Date    Benign neoplasm of breast     Endometriosis     Epilepsy with partial complex seizures (Multi)     Migraine     Taking Depakote    Neuropathy 5/3/2024    Obesity     Ovarian cyst     measuring 5cm x 3cm x 3cm- Scheduled for surgery with Dr Theodore on 02/12/24    Pelvic floor dysfunction in female     Pelvic pain      Pulmonary embolism 2024    Left lung    Seizure disorder (Multi) At 7y    F/W Dr. Sigala (Neuro) Taking Trileptal, Last seizure 2 days ago during sleep    Type A blood, Rh positive     Blood type A+    Vitamin B1 deficiency      Past Surgical History:   Procedure Laterality Date     SECTION, LOW TRANSVERSE      x 2 w/ BTL at second    COLPOSCOPY      HYSTERECTOMY  2024    OOPHORECTOMY Left      Robotic LSO w/ R cystectomy/salpingectomy    OTHER SURGICAL HISTORY      Surgical Treatment Of Spontaneous     TUBAL LIGATION       Family History   Problem Relation Name Age of Onset    Hypertension Mother Mohini     Stroke Mother Mohini     Heart attack Mother Mohini     Breast cancer Mother's Sister Karol     Bone cancer Mother's Brother      Seizures Father's Brother      Breast cancer Maternal Grandmother Alexia     Other (cerebral infarction) Maternal Grandfather      Seizures Other Cousin      Social History     Tobacco Use    Smoking status: Never     Passive exposure: Never    Smokeless tobacco: Never   Vaping Use    Vaping status: Never Used   Substance Use Topics    Alcohol use: Yes     Comment: Occasionally    Drug use: Never       Physical Exam   ED Triage Vitals [10/13/24 1959]   Temperature Heart Rate Respirations BP   36.8 °C (98.3 °F) 92 15 155/76      Pulse Ox Temp Source Heart Rate Source Patient Position   98 % Oral Monitor Sitting      BP Location FiO2 (%)     Right arm --       Physical Exam      ED Course & MDM   Diagnoses as of 10/14/24 035   Seizure (Multi)                 No data recorded     Dina Coma Scale Score: 15 (10/13/24 2005 : Prema Hendricks RN)                           Medical Decision Making      Procedure  Procedures     Ag Maldonado,   10/15/24 0312

## 2024-10-15 LAB — 10OH-CARBAZEPINE SERPL-MCNC: 7 UG/ML (ref 3–35)

## 2024-10-28 ENCOUNTER — APPOINTMENT (OUTPATIENT)
Dept: NEUROLOGY | Facility: CLINIC | Age: 34
End: 2024-10-28
Payer: MEDICARE

## 2024-11-10 ENCOUNTER — HOSPITAL ENCOUNTER (EMERGENCY)
Facility: HOSPITAL | Age: 34
Discharge: HOME | End: 2024-11-10
Attending: STUDENT IN AN ORGANIZED HEALTH CARE EDUCATION/TRAINING PROGRAM
Payer: MEDICARE

## 2024-11-10 VITALS
HEIGHT: 65 IN | DIASTOLIC BLOOD PRESSURE: 89 MMHG | BODY MASS INDEX: 44.15 KG/M2 | TEMPERATURE: 97.9 F | WEIGHT: 265 LBS | RESPIRATION RATE: 16 BRPM | HEART RATE: 90 BPM | SYSTOLIC BLOOD PRESSURE: 140 MMHG | OXYGEN SATURATION: 97 %

## 2024-11-10 DIAGNOSIS — M54.42 LEFT-SIDED LOW BACK PAIN WITH LEFT-SIDED SCIATICA, UNSPECIFIED CHRONICITY: Primary | ICD-10-CM

## 2024-11-10 PROCEDURE — 99283 EMERGENCY DEPT VISIT LOW MDM: CPT

## 2024-11-10 PROCEDURE — 2500000001 HC RX 250 WO HCPCS SELF ADMINISTERED DRUGS (ALT 637 FOR MEDICARE OP): Performed by: STUDENT IN AN ORGANIZED HEALTH CARE EDUCATION/TRAINING PROGRAM

## 2024-11-10 RX ORDER — METHOCARBAMOL 500 MG/1
1000 TABLET, FILM COATED ORAL ONCE
Status: COMPLETED | OUTPATIENT
Start: 2024-11-10 | End: 2024-11-10

## 2024-11-10 RX ORDER — METHOCARBAMOL 500 MG/1
500 TABLET, FILM COATED ORAL 2 TIMES DAILY
Qty: 20 TABLET | Refills: 0 | Status: SHIPPED | OUTPATIENT
Start: 2024-11-10 | End: 2024-11-20

## 2024-11-10 RX ORDER — ACETAMINOPHEN 325 MG/1
975 TABLET ORAL ONCE
Status: COMPLETED | OUTPATIENT
Start: 2024-11-10 | End: 2024-11-10

## 2024-11-10 RX ORDER — LIDOCAINE 50 MG/G
1 PATCH TOPICAL DAILY
Qty: 20 PATCH | Refills: 0 | Status: SHIPPED | OUTPATIENT
Start: 2024-11-10

## 2024-11-10 RX ORDER — ACETAMINOPHEN 500 MG
1000 TABLET ORAL EVERY 6 HOURS PRN
Qty: 30 TABLET | Refills: 0 | Status: SHIPPED | OUTPATIENT
Start: 2024-11-10 | End: 2024-11-20

## 2024-11-10 RX ORDER — OXYCODONE HYDROCHLORIDE 5 MG/1
5 TABLET ORAL ONCE
Status: DISCONTINUED | OUTPATIENT
Start: 2024-11-10 | End: 2024-11-10

## 2024-11-10 RX ORDER — LIDOCAINE 560 MG/1
1 PATCH PERCUTANEOUS; TOPICAL; TRANSDERMAL DAILY
Status: DISCONTINUED | OUTPATIENT
Start: 2024-11-10 | End: 2024-11-10 | Stop reason: HOSPADM

## 2024-11-10 ASSESSMENT — PAIN DESCRIPTION - PROGRESSION: CLINICAL_PROGRESSION: NOT CHANGED

## 2024-11-10 ASSESSMENT — PAIN DESCRIPTION - PAIN TYPE: TYPE: ACUTE PAIN

## 2024-11-10 ASSESSMENT — PAIN DESCRIPTION - DESCRIPTORS: DESCRIPTORS: ACHING

## 2024-11-10 ASSESSMENT — PAIN - FUNCTIONAL ASSESSMENT: PAIN_FUNCTIONAL_ASSESSMENT: 0-10

## 2024-11-10 ASSESSMENT — PAIN DESCRIPTION - FREQUENCY: FREQUENCY: CONSTANT/CONTINUOUS

## 2024-11-10 ASSESSMENT — PAIN DESCRIPTION - LOCATION: LOCATION: BACK

## 2024-11-10 ASSESSMENT — PAIN DESCRIPTION - ONSET: ONSET: SUDDEN

## 2024-11-10 ASSESSMENT — PAIN SCALES - GENERAL: PAINLEVEL_OUTOF10: 10 - WORST POSSIBLE PAIN

## 2024-11-10 ASSESSMENT — PAIN DESCRIPTION - ORIENTATION: ORIENTATION: LOWER

## 2024-11-10 NOTE — ED TRIAGE NOTES
Patient presents to ED from home for lower back pain. History of sciatic pain but she did fall and hit her back on the stairs further injuring it on Monday. Patient is on Eliquis because of a blood clot in her left lung on February 12 th, 2024.   No

## 2024-11-10 NOTE — ED PROVIDER NOTES
Emergency Department Provider Note        History of Present Illness     Chief Complaint: Fall and Back Pain   History provided by: Patient  Limitations to History: None  External Chart Review: See ED Course    HPI:  Danette Buenrostro is a 34 y.o. female presenting to the ED for low back pain.  Patient reports she has had issues with left-sided low back pain and has been diagnosed with sciatica.  She reports that roughly 1 week ago she fell at the bottom of a set of stairs and landed onto her buttocks and back.  Reports that this fall was approximately 2 stairs.  She reports that since that time she has had worsened aching left-sided low back pain that radiates down her left leg.  She has been trying Tylenol and Motrin at home without much relief. Patient denied urinary retention, bowel or bladder incontinence, IV drug use, fever, saddle anesthesia, new onset weakness, recent spinal surgery.      Physical Exam   Triage vitals:  T 36.6 °C (97.9 °F)  HR (!) 118  /89  RR 18  O2 98 % None (Room air)    Constitutional: Awake, alert, not in acute distress  HENT: Head atraumatic, mucous membranes moist  Eyes:  Conjunctivae normal  Neck:  Supple  Lungs: Clear to auscultation, breath sounds equal and symmetric, no wheezes, rales, or rhonchi  Heart: Regular rate and rhythm, no murmur, rub or gallop  Abdomen: Soft, nontender, nondistended, no rebound or guarding  Back: No ecchymosis, no wounds, no scars. No midline tenderness. Tender to palpation over the L lumbar paraspinal musculature. Sensation is intact to light touch. Strength 5/5 in hip flexion, knee extension, dorsiflexion and plantar flexion bilaterally. Negative Babinski bilaterally. Gait is normal.   Extremities: No lower extremity edema  Neuro: Alert, no focal deficit  Skin: Warm, dry  Psych: Calm, cooperative       Medical Decision Making & ED Course   Medical Decision Making:  Danette Buenrostro is a 34 y.o. female who presented to the ED for left-sided low  back pain. Patient was afebrile, hemodynamically stable, and satting on room air on arrival.     Exam as above.  No red flag features by history or exam to necessitate imaging today.  She was treated symptomatically with Tylenol and Robaxin.  Was offered a lidocaine patch which she declined.  She is on Eliquis for prior PE therefore will avoid NSAIDs.    On re-examination, they are well-appearing and have remained hemodynamically stable. They are  ambulating without difficulty and tolerating PO. They are appropriate for discharge at this time. They will follow up with PCP and Pain Medicine.  New prescriptions for discharge below. Return precautions were reviewed. Plan was discussed with  patient and they are agreeable.  ------------------------------------------------    ED Course:  ED Course as of 11/10/24 1623   Sun Nov 10, 2024   1200 External chart review:  Aug 2024 office visit PCP  Notes hx chronic back pain, referred to PT     [SS]      ED Course User Index  [SS] Steffen Simerlink, MD         Diagnoses as of 11/10/24 1623   Left-sided low back pain with left-sided sciatica, unspecified chronicity     Disposition   As a result of the work-up, the patient was discharged home.  she was informed of her diagnosis and instructed to come back with any concerns or worsening of condition.  she and was agreeable to the plan as discussed above.  she was given the opportunity to ask questions.  All of the patient's questions were answered.    ED Prescriptions       Medication Sig Dispense Start Date End Date Auth. Provider    acetaminophen (Tylenol) 500 mg tablet Take 2 tablets (1,000 mg) by mouth every 6 hours if needed for mild pain (1 - 3) for up to 10 days. 30 tablet 11/10/2024 11/20/2024 Steffen Simerlink, MD    lidocaine (Lidoderm) 5 % patch Place 1 patch over 12 hours on the skin once daily. Remove & discard patch within 12 hours or as directed by MD. 20 patch 11/10/2024 -- Steffen Simerlink, MD    methocarbamol  (Robaxin) 500 mg tablet Take 1 tablet (500 mg) by mouth 2 times a day for 10 days. 20 tablet 11/10/2024 11/20/2024 Steffen Simerlink, MD            Procedures   Procedures    Steffen Simerlink, MD Steffen Simerlink, MD  11/10/24 5000

## 2024-12-16 ENCOUNTER — OFFICE VISIT (OUTPATIENT)
Dept: PRIMARY CARE | Facility: HOSPITAL | Age: 34
End: 2024-12-16
Payer: MEDICARE

## 2024-12-16 VITALS
HEART RATE: 87 BPM | SYSTOLIC BLOOD PRESSURE: 126 MMHG | DIASTOLIC BLOOD PRESSURE: 81 MMHG | OXYGEN SATURATION: 98 % | BODY MASS INDEX: 48.82 KG/M2 | WEIGHT: 293 LBS | HEIGHT: 65 IN | TEMPERATURE: 98 F

## 2024-12-16 DIAGNOSIS — G43.709 CHRONIC MIGRAINE WITHOUT AURA WITHOUT STATUS MIGRAINOSUS, NOT INTRACTABLE: ICD-10-CM

## 2024-12-16 DIAGNOSIS — G89.29 CHRONIC MIDLINE LOW BACK PAIN WITH BILATERAL SCIATICA: ICD-10-CM

## 2024-12-16 DIAGNOSIS — Z00.00 HEALTHCARE MAINTENANCE: Primary | ICD-10-CM

## 2024-12-16 DIAGNOSIS — M54.41 CHRONIC MIDLINE LOW BACK PAIN WITH BILATERAL SCIATICA: ICD-10-CM

## 2024-12-16 DIAGNOSIS — M54.42 CHRONIC MIDLINE LOW BACK PAIN WITH BILATERAL SCIATICA: ICD-10-CM

## 2024-12-16 DIAGNOSIS — G40.219 LOCALIZATION-RELATED (FOCAL) (PARTIAL) SYMPTOMATIC EPILEPSY AND EPILEPTIC SYNDROMES WITH COMPLEX PARTIAL SEIZURES, INTRACTABLE, WITHOUT STATUS EPILEPTICUS: ICD-10-CM

## 2024-12-16 DIAGNOSIS — S24.109A: ICD-10-CM

## 2024-12-16 PROCEDURE — 99215 OFFICE O/P EST HI 40 MIN: CPT | Performed by: INTERNAL MEDICINE

## 2024-12-16 PROCEDURE — 99214 OFFICE O/P EST MOD 30 MIN: CPT | Mod: 25 | Performed by: INTERNAL MEDICINE

## 2024-12-16 PROCEDURE — 3008F BODY MASS INDEX DOCD: CPT | Performed by: INTERNAL MEDICINE

## 2024-12-16 PROCEDURE — 1036F TOBACCO NON-USER: CPT | Performed by: INTERNAL MEDICINE

## 2024-12-16 PROCEDURE — 99214 OFFICE O/P EST MOD 30 MIN: CPT | Performed by: INTERNAL MEDICINE

## 2024-12-16 PROCEDURE — G0439 PPPS, SUBSEQ VISIT: HCPCS | Performed by: INTERNAL MEDICINE

## 2024-12-16 RX ORDER — DIVALPROEX SODIUM 500 MG/1
TABLET, DELAYED RELEASE ORAL
Qty: 360 TABLET | Refills: 4 | Status: SHIPPED | OUTPATIENT
Start: 2024-12-16

## 2024-12-16 ASSESSMENT — ENCOUNTER SYMPTOMS
DEPRESSION: 0
CHILLS: 0
SHORTNESS OF BREATH: 0
PALPITATIONS: 0
MYALGIAS: 0
AGITATION: 0
WEAKNESS: 0
NAUSEA: 0
BACK PAIN: 1
DIARRHEA: 0
ACTIVITY CHANGE: 0
CHEST TIGHTNESS: 0

## 2024-12-16 ASSESSMENT — PAIN SCALES - GENERAL: PAINLEVEL_OUTOF10: 5

## 2024-12-16 NOTE — ASSESSMENT & PLAN NOTE
She was already taking the Depakote two 500 mg tablets twice a day and her last valproic acid level was a little low, so we will increase the documented dose to what she was actually taking.  Orders:    divalproex (Depakote) 500 mg EC tablet; TAKE 2 TABLETS BY MOUTH IN THE MORNING THEN TAKE 2 TABLETS BY MOUTH IN THE EVENING

## 2024-12-16 NOTE — PROGRESS NOTES
"Subjective   Patient ID: Danette Buenrostro is a 34 y.o. female who presents for Medicare Annual Wellness Visit Subsequent and Back Pain (LEVEL 5).  Still with back pain that is not helped with meloxicam or muscle relaxants.  She had surgery earlier this year and is not interested in having any further surgery anytime soon.  Her  and son had heart surgery and so she is traumatized by that event as well.  They report recovering well.    Back Pain  Pertinent negatives include no chest pain or weakness.        Review of Systems   Constitutional:  Negative for activity change and chills.   HENT:  Negative for congestion.    Respiratory:  Negative for chest tightness and shortness of breath.    Cardiovascular:  Negative for chest pain and palpitations.   Gastrointestinal:  Negative for diarrhea and nausea.   Musculoskeletal:  Positive for back pain. Negative for myalgias.   Neurological:  Negative for weakness.   Psychiatric/Behavioral:  Negative for agitation and behavioral problems.        Objective   /81 (BP Location: Left arm, Patient Position: Sitting, BP Cuff Size: Large adult)   Pulse 87   Temp 36.7 °C (98 °F) (Temporal)   Ht 1.651 m (5' 5\")   Wt 135 kg (297 lb)   LMP 11/01/2023 (Exact Date)   SpO2 98%   BMI 49.42 kg/m²     Physical Exam  Constitutional:       Appearance: Normal appearance.   HENT:      Head: Normocephalic and atraumatic.      Nose: Nose normal.      Mouth/Throat:      Mouth: Mucous membranes are moist.   Eyes:      Extraocular Movements: Extraocular movements intact.      Pupils: Pupils are equal, round, and reactive to light.   Cardiovascular:      Rate and Rhythm: Normal rate and regular rhythm.   Pulmonary:      Effort: No respiratory distress.      Breath sounds: No wheezing.   Abdominal:      General: Bowel sounds are normal.      Palpations: Abdomen is soft.   Neurological:      General: No focal deficit present.       Assessment/Plan   Assessment & Plan  Healthcare " maintenance  She has been getting labs regularly.  All issues resolved       Localization-related (focal) (partial) symptomatic epilepsy and epileptic syndromes with complex partial seizures, intractable, without status epilepticus  She was already taking the Depakote two 500 mg tablets twice a day and her last valproic acid level was a little low, so we will increase the documented dose to what she was actually taking.  Orders:    divalproex (Depakote) 500 mg EC tablet; TAKE 2 TABLETS BY MOUTH IN THE MORNING THEN TAKE 2 TABLETS BY MOUTH IN THE EVENING    Chronic migraine without aura without status migrainosus, not intractable    Orders:    divalproex (Depakote) 500 mg EC tablet; TAKE 2 TABLETS BY MOUTH IN THE MORNING THEN TAKE 2 TABLETS BY MOUTH IN THE EVENING    Unspecified injury at unspecified level of thoracic spinal cord, initial encounter (Multi)  This is better overall.       Chronic midline low back pain with bilateral sciatica  After much discussion we decided to send her to physical therapy.  Orders:    Referral to Physical Therapy; Future

## 2024-12-16 NOTE — ASSESSMENT & PLAN NOTE
Orders:    divalproex (Depakote) 500 mg EC tablet; TAKE 2 TABLETS BY MOUTH IN THE MORNING THEN TAKE 2 TABLETS BY MOUTH IN THE EVENING

## 2024-12-16 NOTE — ASSESSMENT & PLAN NOTE
After much discussion we decided to send her to physical therapy.  Orders:    Referral to Physical Therapy; Future     Pt to ED with mother c/o lower abdominal and pelvic pain with vaginal bleeding since last night. Pt reports heavy yellow discharge with itchiness x1week, since last night pt reports heavy vaginal bleeding with clots. LMP 2 weeks ago.

## 2024-12-20 ENCOUNTER — OFFICE VISIT (OUTPATIENT)
Dept: CARDIOLOGY | Facility: CLINIC | Age: 34
End: 2024-12-20
Payer: MEDICARE

## 2024-12-20 VITALS
SYSTOLIC BLOOD PRESSURE: 121 MMHG | HEART RATE: 87 BPM | OXYGEN SATURATION: 96 % | HEIGHT: 65 IN | BODY MASS INDEX: 48.82 KG/M2 | WEIGHT: 293 LBS | DIASTOLIC BLOOD PRESSURE: 79 MMHG

## 2024-12-20 DIAGNOSIS — R60.0 LOCALIZED EDEMA: ICD-10-CM

## 2024-12-20 DIAGNOSIS — I26.99 PULMONARY THROMBOEMBOLISM (MULTI): Primary | ICD-10-CM

## 2024-12-20 PROCEDURE — 3008F BODY MASS INDEX DOCD: CPT | Performed by: HOSPITALIST

## 2024-12-20 PROCEDURE — 99215 OFFICE O/P EST HI 40 MIN: CPT | Performed by: HOSPITALIST

## 2024-12-20 PROCEDURE — 99205 OFFICE O/P NEW HI 60 MIN: CPT | Performed by: HOSPITALIST

## 2024-12-20 PROCEDURE — 1036F TOBACCO NON-USER: CPT | Performed by: HOSPITALIST

## 2024-12-20 ASSESSMENT — PATIENT HEALTH QUESTIONNAIRE - PHQ9
2. FEELING DOWN, DEPRESSED OR HOPELESS: NOT AT ALL
1. LITTLE INTEREST OR PLEASURE IN DOING THINGS: NOT AT ALL
SUM OF ALL RESPONSES TO PHQ9 QUESTIONS 1 AND 2: 0

## 2024-12-20 ASSESSMENT — ENCOUNTER SYMPTOMS
DEPRESSION: 0
OCCASIONAL FEELINGS OF UNSTEADINESS: 0
LOSS OF SENSATION IN FEET: 0

## 2024-12-20 NOTE — PROGRESS NOTES
Subjective   Danette Buenrostro is a 34 y.o. female with PMH of PE, neuropathy, migraine, endometriosis, seizure, obesity [BMI 50], and other comorbidities, who is here for evaluation of PE.  Patient was supposed to see vascular medicine specialist, Dr. Briggs, but she no showed.  Patient stated that after having her right oophorectomy and hysterectomy on 2/12/2024, she went home and 2/14/2024 but she had to come back to the hospital in 2/17/2024 because she was having right arm pain and SOB.  At that time, patient was diagnosed with left upper lobe PE based on VQ scan [CTA was extremely limited] and started on Eliquis.  Patient reported resolution of her FIGUEROA.  She currently denies any cardiovascular symptoms.  Patient denies any prior personal or family history of DVT.  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    Patient had over 10  ER visits/admission in the last 1 year    Patient is on Eliquis 5 mg twice daily.    Blood pressure today is 121/79, heart rate 87.    Most recent blood work from 10/13/2024 with creatinine of 1.14, negative troponin, and unremarkable CBC.  The only D-dimer I found in  lab was from 7/10/2020 and was normal at 247.    CTA chest on 10/13/2024:   Extremely limited exam due to the arms being down resulting in extensive beam hardening artifact as well as suboptimal contrast bolus, large body habitus and low-dose dual energy technique. No acute pulmonary embolism in the right, left, or main pulmonary arteries, beyond which evaluation is unreliable.    MR venography intracranial on 7/6/2024:  Focal area of stenosis in the posterior aspect of the superior sagittal sinus. Etiology is unclear and could be secondary to  chronic post thrombotic change from prior dural sinus thrombosis. Otherwise, the dural venous sinuses are widely patent and there is no evidence  of deep cerebral vein thrombosis.    Venous ultrasound from 2/19/2024 with no DVT bilaterally    Nuclear medicine lung perfusion on 2/17/2024:  Wedge-shaped segmental perfusion defect in the left upper lobe seen on SPECT/CT suggestive of acute pulmonary embolism..    CTA chest on 2/17/2024:   Imaging occurred late in the levo phase of enhancement. The exam is nondiagnostic for pulmonary embolism.        Review of Systems  ROS is negative other than in HPI.      Objective   Physical Exam  General: NAD  HEENT: IEOM, PERRL   Neck: No JVD or carotid bruit  Lungs: CTAB  Heart: RRR, normal S1 and S2, no loud murmurs  Abdomen: Soft, nontender, positive bowel sounds  Extremities: No edema  Neurologic: No FND  Psychiatric: Normal mood and affect    Assessment/Plan   1-VTE:  -Patient had left upper lobe PE on 2/17/2024, 5 days post her right nephrectomy and hysterectomy and she has been on Eliquis since.  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  -With the above and her age, will refer to my vascular medicine colleague for further workup and evaluation.  Final duration of Eliquis as per vascular medicine.  Continue Eliquis 5 mg twice daily for now.    RTC as needed.    Long Jones MD

## 2024-12-20 NOTE — PATIENT INSTRUCTIONS
We are going to refer you to a blood clot specialist, my vascular medicine colleague Dr. Tatiana Echavarria.  She will let you know about the final duration of Eliquis, please continue Eliquis for now without interruption.    Please feel free to call our office at [phone] if you have any questions.

## 2024-12-23 ENCOUNTER — APPOINTMENT (OUTPATIENT)
Dept: CARDIOLOGY | Facility: HOSPITAL | Age: 34
End: 2024-12-23
Payer: MEDICARE

## 2024-12-28 ENCOUNTER — HOSPITAL ENCOUNTER (EMERGENCY)
Facility: HOSPITAL | Age: 34
Discharge: HOME | End: 2024-12-29
Attending: EMERGENCY MEDICINE
Payer: MEDICARE

## 2024-12-28 ENCOUNTER — APPOINTMENT (OUTPATIENT)
Dept: RADIOLOGY | Facility: HOSPITAL | Age: 34
End: 2024-12-28
Payer: MEDICARE

## 2024-12-28 ENCOUNTER — APPOINTMENT (OUTPATIENT)
Dept: CARDIOLOGY | Facility: HOSPITAL | Age: 34
End: 2024-12-28
Payer: MEDICARE

## 2024-12-28 DIAGNOSIS — N83.201 CYST OF RIGHT OVARY: ICD-10-CM

## 2024-12-28 DIAGNOSIS — M79.10 MYALGIA: ICD-10-CM

## 2024-12-28 DIAGNOSIS — G40.919 BREAKTHROUGH SEIZURE (MULTI): Primary | ICD-10-CM

## 2024-12-28 LAB
ALBUMIN SERPL BCP-MCNC: 3.7 G/DL (ref 3.4–5)
ALP SERPL-CCNC: 60 U/L (ref 33–110)
ALT SERPL W P-5'-P-CCNC: 31 U/L (ref 7–45)
AMPHETAMINES UR QL SCN: NORMAL
ANION GAP SERPL CALC-SCNC: 9 MMOL/L (ref 10–20)
AST SERPL W P-5'-P-CCNC: 33 U/L (ref 9–39)
B-HCG SERPL-ACNC: <2 MIU/ML
BARBITURATES UR QL SCN: NORMAL
BASOPHILS # BLD AUTO: 0.03 X10*3/UL (ref 0–0.1)
BASOPHILS NFR BLD AUTO: 0.4 %
BENZODIAZ UR QL SCN: NORMAL
BILIRUB SERPL-MCNC: 0.2 MG/DL (ref 0–1.2)
BUN SERPL-MCNC: 10 MG/DL (ref 6–23)
BZE UR QL SCN: NORMAL
CALCIUM SERPL-MCNC: 9.1 MG/DL (ref 8.6–10.3)
CANNABINOIDS UR QL SCN: NORMAL
CHLORIDE SERPL-SCNC: 101 MMOL/L (ref 98–107)
CO2 SERPL-SCNC: 31 MMOL/L (ref 21–32)
CREAT SERPL-MCNC: 0.64 MG/DL (ref 0.5–1.05)
EGFRCR SERPLBLD CKD-EPI 2021: >90 ML/MIN/1.73M*2
EOSINOPHIL # BLD AUTO: 0.07 X10*3/UL (ref 0–0.7)
EOSINOPHIL NFR BLD AUTO: 0.9 %
ERYTHROCYTE [DISTWIDTH] IN BLOOD BY AUTOMATED COUNT: 14.3 % (ref 11.5–14.5)
FENTANYL+NORFENTANYL UR QL SCN: NORMAL
GLUCOSE SERPL-MCNC: 102 MG/DL (ref 74–99)
HCT VFR BLD AUTO: 36.9 % (ref 36–46)
HGB BLD-MCNC: 12.6 G/DL (ref 12–16)
IMM GRANULOCYTES # BLD AUTO: 0.04 X10*3/UL (ref 0–0.7)
IMM GRANULOCYTES NFR BLD AUTO: 0.5 % (ref 0–0.9)
LYMPHOCYTES # BLD AUTO: 2.82 X10*3/UL (ref 1.2–4.8)
LYMPHOCYTES NFR BLD AUTO: 37 %
MCH RBC QN AUTO: 29.9 PG (ref 26–34)
MCHC RBC AUTO-ENTMCNC: 34.1 G/DL (ref 32–36)
MCV RBC AUTO: 87 FL (ref 80–100)
METHADONE UR QL SCN: NORMAL
MONOCYTES # BLD AUTO: 0.82 X10*3/UL (ref 0.1–1)
MONOCYTES NFR BLD AUTO: 10.8 %
NEUTROPHILS # BLD AUTO: 3.84 X10*3/UL (ref 1.2–7.7)
NEUTROPHILS NFR BLD AUTO: 50.4 %
NRBC BLD-RTO: 0 /100 WBCS (ref 0–0)
OPIATES UR QL SCN: NORMAL
OXYCODONE+OXYMORPHONE UR QL SCN: NORMAL
PCP UR QL SCN: NORMAL
PLATELET # BLD AUTO: 330 X10*3/UL (ref 150–450)
POTASSIUM SERPL-SCNC: 4.1 MMOL/L (ref 3.5–5.3)
PROT SERPL-MCNC: 7.8 G/DL (ref 6.4–8.2)
RBC # BLD AUTO: 4.22 X10*6/UL (ref 4–5.2)
SODIUM SERPL-SCNC: 137 MMOL/L (ref 136–145)
VALPROATE SERPL-MCNC: 48 UG/ML (ref 50–100)
WBC # BLD AUTO: 7.6 X10*3/UL (ref 4.4–11.3)

## 2024-12-28 PROCEDURE — 84075 ASSAY ALKALINE PHOSPHATASE: CPT | Performed by: GENERAL PRACTICE

## 2024-12-28 PROCEDURE — 72131 CT LUMBAR SPINE W/O DYE: CPT | Performed by: RADIOLOGY

## 2024-12-28 PROCEDURE — 72128 CT CHEST SPINE W/O DYE: CPT | Performed by: RADIOLOGY

## 2024-12-28 PROCEDURE — 36415 COLL VENOUS BLD VENIPUNCTURE: CPT | Performed by: EMERGENCY MEDICINE

## 2024-12-28 PROCEDURE — 71260 CT THORAX DX C+: CPT | Mod: 59

## 2024-12-28 PROCEDURE — 71275 CT ANGIOGRAPHY CHEST: CPT

## 2024-12-28 PROCEDURE — 36415 COLL VENOUS BLD VENIPUNCTURE: CPT | Performed by: GENERAL PRACTICE

## 2024-12-28 PROCEDURE — 72125 CT NECK SPINE W/O DYE: CPT | Performed by: RADIOLOGY

## 2024-12-28 PROCEDURE — 80307 DRUG TEST PRSMV CHEM ANLYZR: CPT | Performed by: EMERGENCY MEDICINE

## 2024-12-28 PROCEDURE — 76856 US EXAM PELVIC COMPLETE: CPT

## 2024-12-28 PROCEDURE — 70450 CT HEAD/BRAIN W/O DYE: CPT | Performed by: RADIOLOGY

## 2024-12-28 PROCEDURE — 72125 CT NECK SPINE W/O DYE: CPT

## 2024-12-28 PROCEDURE — 70450 CT HEAD/BRAIN W/O DYE: CPT

## 2024-12-28 PROCEDURE — 84702 CHORIONIC GONADOTROPIN TEST: CPT | Performed by: EMERGENCY MEDICINE

## 2024-12-28 PROCEDURE — 80053 COMPREHEN METABOLIC PANEL: CPT | Performed by: EMERGENCY MEDICINE

## 2024-12-28 PROCEDURE — 96374 THER/PROPH/DIAG INJ IV PUSH: CPT

## 2024-12-28 PROCEDURE — 72131 CT LUMBAR SPINE W/O DYE: CPT

## 2024-12-28 PROCEDURE — 99285 EMERGENCY DEPT VISIT HI MDM: CPT | Mod: 25 | Performed by: EMERGENCY MEDICINE

## 2024-12-28 PROCEDURE — 2500000004 HC RX 250 GENERAL PHARMACY W/ HCPCS (ALT 636 FOR OP/ED): Performed by: EMERGENCY MEDICINE

## 2024-12-28 PROCEDURE — 85025 COMPLETE CBC W/AUTO DIFF WBC: CPT | Performed by: EMERGENCY MEDICINE

## 2024-12-28 PROCEDURE — 76856 US EXAM PELVIC COMPLETE: CPT | Performed by: RADIOLOGY

## 2024-12-28 PROCEDURE — 2500000001 HC RX 250 WO HCPCS SELF ADMINISTERED DRUGS (ALT 637 FOR MEDICARE OP): Performed by: EMERGENCY MEDICINE

## 2024-12-28 PROCEDURE — 2550000001 HC RX 255 CONTRASTS: Performed by: EMERGENCY MEDICINE

## 2024-12-28 PROCEDURE — 93005 ELECTROCARDIOGRAM TRACING: CPT

## 2024-12-28 PROCEDURE — 80164 ASSAY DIPROPYLACETIC ACD TOT: CPT | Performed by: PHYSICIAN ASSISTANT

## 2024-12-28 PROCEDURE — 74177 CT ABD & PELVIS W/CONTRAST: CPT | Performed by: RADIOLOGY

## 2024-12-28 PROCEDURE — 81003 URINALYSIS AUTO W/O SCOPE: CPT | Mod: 59 | Performed by: EMERGENCY MEDICINE

## 2024-12-28 PROCEDURE — 72128 CT CHEST SPINE W/O DYE: CPT

## 2024-12-28 PROCEDURE — 71260 CT THORAX DX C+: CPT | Performed by: RADIOLOGY

## 2024-12-28 PROCEDURE — 71275 CT ANGIOGRAPHY CHEST: CPT | Performed by: RADIOLOGY

## 2024-12-28 PROCEDURE — 85025 COMPLETE CBC W/AUTO DIFF WBC: CPT | Performed by: GENERAL PRACTICE

## 2024-12-28 RX ORDER — DIVALPROEX SODIUM 500 MG/1
1000 TABLET, DELAYED RELEASE ORAL ONCE
Status: COMPLETED | OUTPATIENT
Start: 2024-12-28 | End: 2024-12-28

## 2024-12-28 RX ORDER — HYDROCODONE BITARTRATE AND ACETAMINOPHEN 5; 325 MG/1; MG/1
1 TABLET ORAL ONCE
Status: COMPLETED | OUTPATIENT
Start: 2024-12-28 | End: 2024-12-28

## 2024-12-28 RX ORDER — OXCARBAZEPINE 300 MG/1
1200 TABLET, FILM COATED ORAL ONCE
Status: COMPLETED | OUTPATIENT
Start: 2024-12-28 | End: 2024-12-28

## 2024-12-28 RX ORDER — CYCLOBENZAPRINE HCL 10 MG
10 TABLET ORAL ONCE
Status: COMPLETED | OUTPATIENT
Start: 2024-12-28 | End: 2024-12-28

## 2024-12-28 RX ORDER — DIVALPROEX SODIUM 500 MG/1
1500 TABLET, DELAYED RELEASE ORAL ONCE
Status: DISCONTINUED | OUTPATIENT
Start: 2024-12-28 | End: 2024-12-28

## 2024-12-28 RX ADMIN — IOHEXOL 100 ML: 350 INJECTION, SOLUTION INTRAVENOUS at 19:12

## 2024-12-28 RX ADMIN — HYDROMORPHONE HYDROCHLORIDE 0.4 MG: 1 INJECTION, SOLUTION INTRAMUSCULAR; INTRAVENOUS; SUBCUTANEOUS at 22:54

## 2024-12-28 RX ADMIN — OXCARBAZEPINE 1200 MG: 300 TABLET, FILM COATED ORAL at 23:21

## 2024-12-28 RX ADMIN — CYCLOBENZAPRINE 10 MG: 10 TABLET, FILM COATED ORAL at 18:03

## 2024-12-28 RX ADMIN — DIVALPROEX SODIUM 1000 MG: 500 TABLET, DELAYED RELEASE ORAL at 23:21

## 2024-12-28 RX ADMIN — HYDROCODONE BITARTRATE AND ACETAMINOPHEN 1 TABLET: 5; 325 TABLET ORAL at 18:03

## 2024-12-28 RX ADMIN — IOHEXOL 80 ML: 350 INJECTION, SOLUTION INTRAVENOUS at 22:15

## 2024-12-28 ASSESSMENT — PAIN SCALES - GENERAL
PAINLEVEL_OUTOF10: 10 - WORST POSSIBLE PAIN

## 2024-12-28 ASSESSMENT — PAIN DESCRIPTION - PAIN TYPE: TYPE: ACUTE PAIN

## 2024-12-28 ASSESSMENT — PAIN - FUNCTIONAL ASSESSMENT: PAIN_FUNCTIONAL_ASSESSMENT: 0-10

## 2024-12-28 ASSESSMENT — PAIN DESCRIPTION - LOCATION: LOCATION: BACK

## 2024-12-28 NOTE — ED TRIAGE NOTES
Pt states she had a seizure yesterday and now has back pain. Pt has history of seizures but states this seizure was worse than her usual ones. Pt states yesterday she had a syncopal episode. Pt is unsure if she hit her head. Pt is on blood thinners(Eliquis).

## 2024-12-28 NOTE — ED PROVIDER NOTES
History/Exam limitations: none.   Additional history was obtained from patient, spouse/SO, and past medical records.          HPI:    Danette Buenrostro is a 34 y.o. female PMH seizures, PE on Eliquis presenting for evaluation of back pain, recent concern for seizure.  Yesterday she was in the shower reportedly and she states that she began having aura as if she was going to have a seizure.  She states that her seizures are typically abstinence and staring off into space.  She states she does not usually lose conscious.  She states that she has been seen having a generalized seizure only in her sleep.  Denies any preceding feeling of palpitations, feeling hot.  No vision changes.  States that she found herself on the floor in the restroom and had pain in her back that worsened today.  Currently has pain across her mid and lower back midline.  No focal weakness or numbness.  Denies any current vision changes.  States that she was groggy afterwards and it felt similar to when she has a seizure.  Reports compliance with all of her medications including her Eliquis and oxcarbazepine/valproic acid.  She reports that she takes 1500 mg of Depakote twice a day (is prescribed 1000 mg however began taking 3 tabs twice daily and was advised by her primary care provider reportedly to continue this dose.  States that she takes 3 tabs of oxcarbazepine twice a day as well but is unsure of the dose.          Physical Exam:  ED Triage Vitals [12/28/24 1617]   Temperature Heart Rate Respirations BP   36.7 °C (98 °F) 95 18 (!) 142/93      Pulse Ox Temp Source Heart Rate Source Patient Position   98 % Temporal Monitor --      BP Location FiO2 (%)     Right arm --        GEN:      Alert, NAD  Eyes:       PERRL, EOMI  HENT:      NC/AT, OP clear, airway patent, MM  CV:      RRR, no MRG, no LE pitting edema, 2+ radial and pedal pulses  PULM:     CTAB, no w/r/r, easy WOB, symmetric chest rise  ABD:      Soft, NT, ND, no masses, BS +  :        No CVA TTP  NEURO:   A/Ox4, CN II-XII intact, strength 5/5 in all 4 ext, SILT, FNF normal b/l, no pronator drift, no nystagmus  MSK:      FROM, no joint deformities or swelling, midline and paraspinal CTL spine tenderness, no palpable step-offs  SKIN:       Warm and dry  PSYCH:    Appropriate mood and affect         MDM/ED Course:   Danette Buenrostro is a 34 y.o. female PMH seizures, PE on Eliquis presenting for evaluation of back pain, recent concern for seizure.  Vitals reassuring.  Exam as documented above.  Differential for seizure includes medical noncompliance, induced by infectious etiology, electrolyte abnormality, other metabolic processes, ICH, mass, abscess, subtherapeutic medical course. Unlikely ICH, mass, abscess as no focal neurologic deficit, no headache, vision changes, weakness, or preceding trauma.  Differential includes syncope including vasovagal syncope as patient was in shower however she reports having her typical seizure aura prior to the onset increasing concern for generalized seizure.  Given fall with anticoagulation use, suspected head strike back pain, differential occludes acute intrathoracic abdominal spinal pathology, intracranial hemorrhage or skull fracture.  IV placed and labs drawn.  Patient was given p.o. Norco, p.o. Flexeril with some improvement in symptoms.  CT head, CT L-spine, chest abdomen pelvis trauma protocol obtained, no acute traumatic finding, irregularity of the aortic root potentially the pulsatility artifact noted.  Patient does have some back discomfort.  Additionally has marked amount of megaly, cholelithiasis, right adnexal mass.  Discussed findings including incidental findings with patient and decision was made to obtain gated CT imaging of the chest and displayed no evidence of dissection or other acute findings.  Ultrasound pelvis was also obtained and displayed no evidence of ovarian torsion, 3.7 cm right ovarian cyst noted with normal Doppler flow.   Valproic acid level obtained in 48.  CBC and CMP reassuring.  hCG negative.  Urinalysis reassuring.  No chest pain, pleuritic pain, shortness of breath, low suspicion for PE.  Patient was given her evening home dose of Depakote and oxcarbazepine.  Also given 0.4 mg IV Dilaudid for pain control with improvement.     ED Course as of 12/30/24 1736   Sat Dec 28, 2024   2252 Per pharmacy, pt's max dose depakote 1800mg PO daily.  [JM]   Sun Dec 29, 2024   0059 Spoke with transfer center again, they are still unable to reach neurology, they are escalating to the head of neurology. [JM]   0123 Spoke to Dr. Manuel Donato, recommends she continues her typical medications and adds Keppra 500 mg twice daily with no load.  Recommends following up with Jacquelin Grant from neurology in 1 to 2 weeks given concerns for medication refractory epilepsy.  Discussed this at length with patient and she verbalized understanding and agrees with plan. [JM]   0202 Unfortunately oxcarbazepine drawl was collected but is not pending.  Spoke to the lab and unable to add reportedly and has to be redrawn.  Patient did receive an oral dose.  On this medication dose she did have most recent value of 7. [JM]      ED Course User Index  [JM] Tristin Green MD         Diagnoses as of 12/30/24 1736   Breakthrough seizure (Multi)   Myalgia   Cyst of right ovary     Explained findings, exam/study results, the importance of follow up and the plan moving forward; patient and/or caregiver verbalized understanding and agreement. All questions were answered and no new questions at this time. Patient will be discharged with strict return precautions, follow up plan with neurology/PCP.  Seizure precautions provided.    EKG reviewed by me: 4:20 PM normal sinus rhythm, rate 90, normal axis, normal intervals, no STEMI, no evidence of Brugada/WPW/epsilon wave/HOCM/prolonged QTc.    Chronic medical conditions affecting care listed in MDM. I independently  reviewed imaging studies and agreed with radiology reads. I reviewed recent and relevant outside records including PCP notes, Prior discharge summaries, and prior radiology reports.    Procedure  Procedures    Diagnosis:   1.  Breakthrough seizure  2.  Traumatic myalgia  3.  Ovarian cyst    Dispo: Discharged in stable condition      Disclaimer: Portions of this note were dictated by speech recognition. An attempt at proof reading was made to minimize errors. Minor errors in transcription may be present.  Please call if questions.     Tristin Green MD  12/30/24 2404

## 2024-12-29 VITALS
WEIGHT: 266 LBS | HEIGHT: 65 IN | BODY MASS INDEX: 44.32 KG/M2 | OXYGEN SATURATION: 98 % | DIASTOLIC BLOOD PRESSURE: 77 MMHG | SYSTOLIC BLOOD PRESSURE: 137 MMHG | HEART RATE: 94 BPM | TEMPERATURE: 98 F | RESPIRATION RATE: 18 BRPM

## 2024-12-29 LAB
APPEARANCE UR: CLEAR
BILIRUB UR STRIP.AUTO-MCNC: NEGATIVE MG/DL
COLOR UR: NORMAL
GLUCOSE UR STRIP.AUTO-MCNC: NORMAL MG/DL
HOLD SPECIMEN: NORMAL
KETONES UR STRIP.AUTO-MCNC: NEGATIVE MG/DL
LEUKOCYTE ESTERASE UR QL STRIP.AUTO: NEGATIVE
NITRITE UR QL STRIP.AUTO: NEGATIVE
PH UR STRIP.AUTO: 6 [PH]
PROT UR STRIP.AUTO-MCNC: NEGATIVE MG/DL
RBC # UR STRIP.AUTO: NEGATIVE /UL
SP GR UR STRIP.AUTO: 1.02
UROBILINOGEN UR STRIP.AUTO-MCNC: NORMAL MG/DL

## 2024-12-29 PROCEDURE — 80183 DRUG SCRN QUANT OXCARBAZEPIN: CPT | Performed by: EMERGENCY MEDICINE

## 2024-12-29 PROCEDURE — 2500000001 HC RX 250 WO HCPCS SELF ADMINISTERED DRUGS (ALT 637 FOR MEDICARE OP): Performed by: EMERGENCY MEDICINE

## 2024-12-29 PROCEDURE — 36415 COLL VENOUS BLD VENIPUNCTURE: CPT | Performed by: EMERGENCY MEDICINE

## 2024-12-29 RX ORDER — LEVETIRACETAM 500 MG/1
500 TABLET ORAL 2 TIMES DAILY
Qty: 60 TABLET | Refills: 0 | Status: SHIPPED | OUTPATIENT
Start: 2024-12-29 | End: 2025-01-28

## 2024-12-29 RX ORDER — NAPROXEN 500 MG/1
500 TABLET ORAL 2 TIMES DAILY PRN
Qty: 14 TABLET | Refills: 0 | Status: SHIPPED | OUTPATIENT
Start: 2024-12-29 | End: 2025-01-05

## 2024-12-29 RX ORDER — LEVETIRACETAM 500 MG/1
500 TABLET ORAL ONCE
Status: COMPLETED | OUTPATIENT
Start: 2024-12-29 | End: 2024-12-29

## 2024-12-29 RX ADMIN — LEVETIRACETAM 500 MG: 500 TABLET, FILM COATED ORAL at 01:46

## 2024-12-29 NOTE — DISCHARGE INSTRUCTIONS
You were seen in the emergency room today for evaluation of breakthrough seizure with loss of consciousness.  Your imaging did not show any new traumatic findings.  Your imaging did show concerns at the aortic root which was reimaged and reassuring.  Your imaging showed stones in your gallbladder, enlarged fatty infiltration of your liver, right ovarian cyst which has normal blood flow and no signs of ovarian torsion, the cyst is 3.7 cm.  The left breast mass that you were previously aware about is unchanged from prior.  Please follow-up with your primary care provider regarding this incidental findings.  Please follow-up with neurology in the next 1 to 2 weeks.  Please begin taking Keppra 500 mg twice daily per neurology recommendations and continue your home medications as prescribed.  Please return if you have worsening symptoms including without limited to breakthrough seizure, any loss of consciousness, chest pain, headache, vision changes, or if you have any other concerns.  It is very important that you do not drive, operate heavy machinery, take a bath, use the stove, swim, supervise young children alone, or perform any other potentially dangerous activities until you have received clearance from your primary care doctor or a neurologist. Our  would be happy to assist you with safe transportation home from the emergency department today.

## 2024-12-31 LAB
10OH-CARBAZEPINE SERPL-MCNC: 24.4 UG/ML (ref 10–35)
ATRIAL RATE: 90 BPM
P AXIS: 61 DEGREES
P OFFSET: 184 MS
P ONSET: 129 MS
PR INTERVAL: 176 MS
Q ONSET: 217 MS
QRS COUNT: 14 BEATS
QRS DURATION: 96 MS
QT INTERVAL: 370 MS
QTC CALCULATION(BAZETT): 452 MS
QTC FREDERICIA: 423 MS
R AXIS: 58 DEGREES
T AXIS: 30 DEGREES
T OFFSET: 402 MS
VENTRICULAR RATE: 90 BPM

## 2025-01-07 ENCOUNTER — APPOINTMENT (OUTPATIENT)
Dept: NEUROLOGY | Facility: CLINIC | Age: 35
End: 2025-01-07
Payer: MEDICARE

## 2025-01-13 ENCOUNTER — APPOINTMENT (OUTPATIENT)
Dept: RADIOLOGY | Facility: HOSPITAL | Age: 35
End: 2025-01-13
Payer: MEDICARE

## 2025-01-13 ENCOUNTER — HOSPITAL ENCOUNTER (EMERGENCY)
Facility: HOSPITAL | Age: 35
Discharge: HOME | End: 2025-01-13
Attending: EMERGENCY MEDICINE
Payer: MEDICARE

## 2025-01-13 VITALS
OXYGEN SATURATION: 97 % | DIASTOLIC BLOOD PRESSURE: 71 MMHG | RESPIRATION RATE: 18 BRPM | TEMPERATURE: 97.7 F | BODY MASS INDEX: 43.32 KG/M2 | HEART RATE: 89 BPM | HEIGHT: 65 IN | WEIGHT: 260 LBS | SYSTOLIC BLOOD PRESSURE: 139 MMHG

## 2025-01-13 DIAGNOSIS — R10.2 PELVIC PAIN IN FEMALE: Primary | ICD-10-CM

## 2025-01-13 LAB
ALBUMIN SERPL BCP-MCNC: 3.6 G/DL (ref 3.4–5)
ALP SERPL-CCNC: 49 U/L (ref 33–110)
ALT SERPL W P-5'-P-CCNC: 33 U/L (ref 7–45)
ANION GAP SERPL CALC-SCNC: 10 MMOL/L (ref 10–20)
APPEARANCE UR: CLEAR
APTT PPP: 35 SECONDS (ref 27–38)
AST SERPL W P-5'-P-CCNC: 32 U/L (ref 9–39)
BASOPHILS # BLD AUTO: 0.03 X10*3/UL (ref 0–0.1)
BASOPHILS NFR BLD AUTO: 0.4 %
BILIRUB SERPL-MCNC: 0.3 MG/DL (ref 0–1.2)
BILIRUB UR STRIP.AUTO-MCNC: NEGATIVE MG/DL
BUN SERPL-MCNC: 11 MG/DL (ref 6–23)
CALCIUM SERPL-MCNC: 8.6 MG/DL (ref 8.6–10.3)
CHLORIDE SERPL-SCNC: 105 MMOL/L (ref 98–107)
CO2 SERPL-SCNC: 28 MMOL/L (ref 21–32)
COLOR UR: YELLOW
CREAT SERPL-MCNC: 0.59 MG/DL (ref 0.5–1.05)
EGFRCR SERPLBLD CKD-EPI 2021: >90 ML/MIN/1.73M*2
EOSINOPHIL # BLD AUTO: 0.1 X10*3/UL (ref 0–0.7)
EOSINOPHIL NFR BLD AUTO: 1.4 %
ERYTHROCYTE [DISTWIDTH] IN BLOOD BY AUTOMATED COUNT: 14.7 % (ref 11.5–14.5)
GLUCOSE SERPL-MCNC: 121 MG/DL (ref 74–99)
GLUCOSE UR STRIP.AUTO-MCNC: NORMAL MG/DL
HCT VFR BLD AUTO: 35.2 % (ref 36–46)
HGB BLD-MCNC: 11.7 G/DL (ref 12–16)
IMM GRANULOCYTES # BLD AUTO: 0.03 X10*3/UL (ref 0–0.7)
IMM GRANULOCYTES NFR BLD AUTO: 0.4 % (ref 0–0.9)
INR PPP: 1.2 (ref 0.9–1.1)
KETONES UR STRIP.AUTO-MCNC: NEGATIVE MG/DL
LEUKOCYTE ESTERASE UR QL STRIP.AUTO: NEGATIVE
LYMPHOCYTES # BLD AUTO: 2.61 X10*3/UL (ref 1.2–4.8)
LYMPHOCYTES NFR BLD AUTO: 37.6 %
MCH RBC QN AUTO: 30.1 PG (ref 26–34)
MCHC RBC AUTO-ENTMCNC: 33.2 G/DL (ref 32–36)
MCV RBC AUTO: 91 FL (ref 80–100)
MONOCYTES # BLD AUTO: 0.67 X10*3/UL (ref 0.1–1)
MONOCYTES NFR BLD AUTO: 9.6 %
NEUTROPHILS # BLD AUTO: 3.51 X10*3/UL (ref 1.2–7.7)
NEUTROPHILS NFR BLD AUTO: 50.6 %
NITRITE UR QL STRIP.AUTO: NEGATIVE
NRBC BLD-RTO: 0 /100 WBCS (ref 0–0)
PH UR STRIP.AUTO: 5.5 [PH]
PLATELET # BLD AUTO: 320 X10*3/UL (ref 150–450)
POTASSIUM SERPL-SCNC: 3.8 MMOL/L (ref 3.5–5.3)
PROT SERPL-MCNC: 7.1 G/DL (ref 6.4–8.2)
PROT UR STRIP.AUTO-MCNC: NEGATIVE MG/DL
PROTHROMBIN TIME: 14 SECONDS (ref 9.8–12.8)
RBC # BLD AUTO: 3.89 X10*6/UL (ref 4–5.2)
RBC # UR STRIP.AUTO: NEGATIVE /UL
SODIUM SERPL-SCNC: 139 MMOL/L (ref 136–145)
SP GR UR STRIP.AUTO: 1.02
UROBILINOGEN UR STRIP.AUTO-MCNC: NORMAL MG/DL
WBC # BLD AUTO: 7 X10*3/UL (ref 4.4–11.3)

## 2025-01-13 PROCEDURE — 85025 COMPLETE CBC W/AUTO DIFF WBC: CPT

## 2025-01-13 PROCEDURE — 85610 PROTHROMBIN TIME: CPT

## 2025-01-13 PROCEDURE — 96374 THER/PROPH/DIAG INJ IV PUSH: CPT | Mod: 59

## 2025-01-13 PROCEDURE — 84075 ASSAY ALKALINE PHOSPHATASE: CPT

## 2025-01-13 PROCEDURE — 76856 US EXAM PELVIC COMPLETE: CPT | Mod: FOREIGN READ | Performed by: RADIOLOGY

## 2025-01-13 PROCEDURE — 76830 TRANSVAGINAL US NON-OB: CPT | Mod: FOREIGN READ | Performed by: RADIOLOGY

## 2025-01-13 PROCEDURE — 74177 CT ABD & PELVIS W/CONTRAST: CPT

## 2025-01-13 PROCEDURE — 2550000001 HC RX 255 CONTRASTS: Performed by: EMERGENCY MEDICINE

## 2025-01-13 PROCEDURE — 81003 URINALYSIS AUTO W/O SCOPE: CPT

## 2025-01-13 PROCEDURE — 2500000004 HC RX 250 GENERAL PHARMACY W/ HCPCS (ALT 636 FOR OP/ED)

## 2025-01-13 PROCEDURE — 36415 COLL VENOUS BLD VENIPUNCTURE: CPT

## 2025-01-13 PROCEDURE — 74177 CT ABD & PELVIS W/CONTRAST: CPT | Mod: FOREIGN READ | Performed by: RADIOLOGY

## 2025-01-13 PROCEDURE — 99285 EMERGENCY DEPT VISIT HI MDM: CPT | Mod: 25 | Performed by: EMERGENCY MEDICINE

## 2025-01-13 PROCEDURE — 96375 TX/PRO/DX INJ NEW DRUG ADDON: CPT

## 2025-01-13 PROCEDURE — 76856 US EXAM PELVIC COMPLETE: CPT

## 2025-01-13 RX ORDER — OXYCODONE HYDROCHLORIDE 5 MG/1
5 TABLET ORAL EVERY 8 HOURS PRN
Qty: 9 TABLET | Refills: 0 | Status: SHIPPED | OUTPATIENT
Start: 2025-01-13 | End: 2025-01-16

## 2025-01-13 RX ORDER — KETOROLAC TROMETHAMINE 30 MG/ML
15 INJECTION, SOLUTION INTRAMUSCULAR; INTRAVENOUS ONCE
Status: DISCONTINUED | OUTPATIENT
Start: 2025-01-13 | End: 2025-01-13 | Stop reason: HOSPADM

## 2025-01-13 RX ORDER — KETOROLAC TROMETHAMINE 30 MG/ML
15 INJECTION, SOLUTION INTRAMUSCULAR; INTRAVENOUS ONCE
Status: COMPLETED | OUTPATIENT
Start: 2025-01-13 | End: 2025-01-13

## 2025-01-13 RX ADMIN — KETOROLAC TROMETHAMINE 15 MG: 30 INJECTION, SOLUTION INTRAMUSCULAR at 10:22

## 2025-01-13 RX ADMIN — HYDROMORPHONE HYDROCHLORIDE 0.5 MG: 1 INJECTION, SOLUTION INTRAMUSCULAR; INTRAVENOUS; SUBCUTANEOUS at 12:49

## 2025-01-13 RX ADMIN — IOHEXOL 90 ML: 350 INJECTION, SOLUTION INTRAVENOUS at 11:47

## 2025-01-13 ASSESSMENT — PAIN SCALES - GENERAL
PAINLEVEL_OUTOF10: 10 - WORST POSSIBLE PAIN
PAINLEVEL_OUTOF10: 10 - WORST POSSIBLE PAIN
PAINLEVEL_OUTOF10: 9
PAINLEVEL_OUTOF10: 0 - NO PAIN

## 2025-01-13 ASSESSMENT — PAIN DESCRIPTION - LOCATION
LOCATION: ABDOMEN
LOCATION: ABDOMEN

## 2025-01-13 ASSESSMENT — PAIN - FUNCTIONAL ASSESSMENT: PAIN_FUNCTIONAL_ASSESSMENT: 0-10

## 2025-01-13 NOTE — ED PROVIDER NOTES
HPI   Chief Complaint   Patient presents with    Abdominal Pain    pelvic pain        HPI  Patient is a 34-year-old past medical history seizure, pulmonary embolism on Eliquis presenting with abdominal pain.  Patient said yesterday, she was sitting down and the pain started suddenly.  The pain is sharp and is located at her right lower quadrant.  The pain does not radiate anywhere.  She has not noticed anything that makes it better or worse.  She took ibuprofen and Tylenol but that did not subside her pain.  Patient pain is associated with bloody vaginal discharge.  Patient has not experienced this before.  Patient denies any fever, nausea and vomiting.  Patient does not have any history of previous sexually transmitted disease.  Patient has a history of a hysterectomy.      Patient History   Past Medical History:   Diagnosis Date    Benign neoplasm of breast     Endometriosis     Epilepsy with partial complex seizures (Multi)     Migraine     Taking Depakote    Neuropathy 5/3/2024    Obesity     Ovarian cyst     measuring 5cm x 3cm x 3cm- Scheduled for surgery with Dr Theodore on 24    Pelvic floor dysfunction in female     Pelvic pain     Pulmonary embolism 2024    Left lung    Seizure disorder (Multi) At 7y    F/W Dr. Sigala (Neuro) Taking Trileptal, Last seizure 2 days ago during sleep    Type A blood, Rh positive     Blood type A+    Vitamin B1 deficiency      Past Surgical History:   Procedure Laterality Date     SECTION, LOW TRANSVERSE      x 2 w/ BTL at second    COLPOSCOPY      HYSTERECTOMY  2024    OOPHORECTOMY Left      Robotic LSO w/ R cystectomy/salpingectomy    OTHER SURGICAL HISTORY      Surgical Treatment Of Spontaneous     TUBAL LIGATION       Family History   Problem Relation Name Age of Onset    Hypertension Mother Mohini     Stroke Mother Mohini     Heart attack Mother Mohini     Breast cancer Mother's Sister Karol     Bone cancer Mother's Brother       Seizures Father's Brother      Breast cancer Maternal Grandmother Alexia     Other (cerebral infarction) Maternal Grandfather      Seizures Other Cousin      Social History     Tobacco Use    Smoking status: Never     Passive exposure: Never    Smokeless tobacco: Never   Vaping Use    Vaping status: Never Used   Substance Use Topics    Alcohol use: Yes     Comment: Occasionally    Drug use: Never       Physical Exam   ED Triage Vitals [01/13/25 0857]   Temperature Heart Rate Respirations BP   36.5 °C (97.7 °F) 85 16 (!) 121/7      Pulse Ox Temp src Heart Rate Source Patient Position   100 % -- -- --      BP Location FiO2 (%)     -- --       Physical Exam  Constitutional:       Appearance: She is well-developed.   HENT:      Head: Normocephalic and atraumatic.   Cardiovascular:      Rate and Rhythm: Normal rate and regular rhythm.   Pulmonary:      Effort: Pulmonary effort is normal.      Breath sounds: Normal breath sounds.   Abdominal:      General: Abdomen is flat. Bowel sounds are normal.      Palpations: Abdomen is soft.      Tenderness: There is abdominal tenderness in the right lower quadrant and suprapubic area.      Comments: Right lower quadrant tenderness.  No rebound tenderness.  Guarding.  No erythema or rashes   Neurological:      Mental Status: She is alert.           ED Course & MDM   Diagnoses as of 01/13/25 1359   Pelvic pain in female                 No data recorded     Dina Coma Scale Score: 15 (01/13/25 0937 : Tayler Lr RN)                           Medical Decision Making  Patient is a 34-year-old past medical history seizure, pulmonary embolism on Eliquis presenting with abdominal pain.  There are differential diagnoses considered for this patient were PID, appendicitis, ovarian cyst, and ovarian torsion.  At bedside patient was hemodynamically stable and was in mild pain.  Patient was given Toradol.  On physical exam, patient had right lower quadrant pain and suprapubic tenderness.   Patient urinalysis was negative for any urinary tract infection.  Patient CT abdomen pelvis was negative for appendicitis.  Patient did have a cyst on her right ovary.  Patient transvaginal pelvic ultrasound did show left ovary with cystic lesions.  There was normal, and blood flow within the right ovary.  After reassessment patient was still in pain so patient was given 1 mg of Dilaudid.  About 40 minutes later, patient pain subsided.  Patient was then referred to OB/GYN.  Patient also prescribed oxycodone outpatient and discharged.  Precautions were discussed.    Procedure  Procedures     Louie Youngblood MD  Resident  01/13/25 4158

## 2025-01-17 ENCOUNTER — OFFICE VISIT (OUTPATIENT)
Dept: OBSTETRICS AND GYNECOLOGY | Facility: CLINIC | Age: 35
End: 2025-01-17
Payer: MEDICARE

## 2025-01-17 VITALS — HEIGHT: 65 IN | WEIGHT: 260 LBS | BODY MASS INDEX: 43.32 KG/M2

## 2025-01-17 DIAGNOSIS — R10.2 PELVIC PAIN IN FEMALE: ICD-10-CM

## 2025-01-17 DIAGNOSIS — N80.9 ENDOMETRIOSIS: Primary | ICD-10-CM

## 2025-01-17 DIAGNOSIS — N83.201 CYST OF RIGHT OVARY: ICD-10-CM

## 2025-01-17 PROCEDURE — 3008F BODY MASS INDEX DOCD: CPT | Performed by: OBSTETRICS & GYNECOLOGY

## 2025-01-17 PROCEDURE — 1036F TOBACCO NON-USER: CPT | Performed by: OBSTETRICS & GYNECOLOGY

## 2025-01-17 PROCEDURE — 99214 OFFICE O/P EST MOD 30 MIN: CPT | Performed by: OBSTETRICS & GYNECOLOGY

## 2025-01-17 RX ORDER — OXYCODONE HYDROCHLORIDE 5 MG/1
5 TABLET ORAL EVERY 6 HOURS PRN
Qty: 15 TABLET | Refills: 0 | Status: SHIPPED | OUTPATIENT
Start: 2025-01-17 | End: 2025-01-24

## 2025-01-17 NOTE — PROGRESS NOTES
Patient here for follow-up from ER  Was seen for right lower quadrant pain on January 13  Pain started on January 12  CT scan and pelvic ultrasound showed complex right ovarian cyst measuring 1.7 cm.  There is also a simple follicle measuring 1.7 cm on the right ovary  Uterus and left ovary are surgically absent    Patient states pain is a dull ache today  She states that the pain that brought her into the ED was similar to her endometriosis pain  Patient had surgical management of her endometriosis  She had a postoperative pulmonary embolus    She is not on anything currently to manage her endometriosis    She had some vaginal spotting after intercourse 2 days ago    Physical exam  General No apparent distress  EGBUS normal  Vagina no lesions, no bleeding, vaginal cuff intact  Patient declines bimanual exam as she is concerned that it will exacerbate her endometriosis    A/P: Complex right ovarian cyst, questionable endometrioma versus hemorrhagic cyst.  Discomfort nonacute  -Repeat pelvic ultrasound in 4 weeks  -Limited refill of Percocet given  -If patient requires further surgical management will refer back to Dr. Saritha Theodore

## 2025-01-27 ENCOUNTER — APPOINTMENT (OUTPATIENT)
Dept: OBSTETRICS AND GYNECOLOGY | Facility: CLINIC | Age: 35
End: 2025-01-27
Payer: MEDICARE

## 2025-01-27 ENCOUNTER — OFFICE VISIT (OUTPATIENT)
Dept: URGENT CARE | Age: 35
End: 2025-01-27
Payer: MEDICARE

## 2025-01-27 VITALS
RESPIRATION RATE: 18 BRPM | OXYGEN SATURATION: 95 % | SYSTOLIC BLOOD PRESSURE: 131 MMHG | DIASTOLIC BLOOD PRESSURE: 74 MMHG | TEMPERATURE: 98.2 F | HEART RATE: 80 BPM

## 2025-01-27 DIAGNOSIS — R10.31 RIGHT LOWER QUADRANT ABDOMINAL PAIN: Primary | ICD-10-CM

## 2025-01-27 DIAGNOSIS — R10.2 PELVIC PAIN: ICD-10-CM

## 2025-01-27 LAB
POC BILIRUBIN, URINE: NEGATIVE
POC BLOOD, URINE: NEGATIVE
POC GLUCOSE, URINE: NEGATIVE MG/DL
POC KETONES, URINE: NEGATIVE MG/DL
POC LEUKOCYTES, URINE: NEGATIVE
POC NITRITE,URINE: NEGATIVE
POC PH, URINE: 5.5 PH
POC PROTEIN, URINE: NEGATIVE MG/DL
POC SPECIFIC GRAVITY, URINE: >=1.03
POC UROBILINOGEN, URINE: 0.2 EU/DL
PREGNANCY TEST URINE, POC: NEGATIVE

## 2025-01-27 PROCEDURE — 99204 OFFICE O/P NEW MOD 45 MIN: CPT | Performed by: NURSE PRACTITIONER

## 2025-01-27 PROCEDURE — 81003 URINALYSIS AUTO W/O SCOPE: CPT | Performed by: NURSE PRACTITIONER

## 2025-01-27 PROCEDURE — 81025 URINE PREGNANCY TEST: CPT | Performed by: NURSE PRACTITIONER

## 2025-01-27 PROCEDURE — 1036F TOBACCO NON-USER: CPT | Performed by: NURSE PRACTITIONER

## 2025-01-27 ASSESSMENT — ENCOUNTER SYMPTOMS: ABDOMINAL PAIN: 1

## 2025-01-27 NOTE — PROGRESS NOTES
Subjective   Patient ID: Danette Buenrostro is a 34 y.o. female. They present today with a chief complaint of Pelvic Pain (Right sided pelvic pain. Hx of multiple cysts on right ovary and endometriosis. Pain for  3 days).    History of Present Illness  Danette Buenrostro is a 34 y.o. female who presents to the clinic for right lower abdominal pain.  Patient states the pain started on Thursday.  Patient states she has taken over-the-counter medication with no relief.  Patient that she has a history of cysts on her ovaries.  Patient denies any urinary issues or change in bowel movements pt denies any chest pain, sob, N/V at this time in clinic.             Past Medical History  Allergies as of 2025    (No Known Allergies)       (Not in a hospital admission)       Past Medical History:   Diagnosis Date    Benign neoplasm of breast     Endometriosis     Epilepsy with partial complex seizures (Multi)     Migraine     Taking Depakote    Neuropathy 5/3/2024    Obesity     Ovarian cyst     measuring 5cm x 3cm x 3cm- Scheduled for surgery with Dr Theodore on 24    Pelvic floor dysfunction in female     Pelvic pain     Pulmonary embolism 2024    Left lung    Seizure disorder (Multi) At 7y    F/W Dr. Sigala (Neuro) Taking Trileptal, Last seizure 2 days ago during sleep    Type A blood, Rh positive     Blood type A+    Vitamin B1 deficiency        Past Surgical History:   Procedure Laterality Date     SECTION, LOW TRANSVERSE      x 2 w/ BTL at second    COLPOSCOPY      HYSTERECTOMY  2024    OOPHORECTOMY Left      Robotic LSO w/ R cystectomy/salpingectomy    OTHER SURGICAL HISTORY      Surgical Treatment Of Spontaneous     TUBAL LIGATION          reports that she has never smoked. She has never been exposed to tobacco smoke. She has never used smokeless tobacco. She reports current alcohol use. She reports that she does not use drugs.    Review of Systems  Review of Systems    Gastrointestinal:  Positive for abdominal pain.   All other systems reviewed and are negative.                                 Objective    Vitals:    01/27/25 0832   BP: 131/74   Pulse: 80   Resp: 18   Temp: 36.8 °C (98.2 °F)   SpO2: 95%     Patient's last menstrual period was 11/01/2023 (exact date).    Physical Exam  Constitutional:       Appearance: Normal appearance.   Abdominal:      Tenderness: There is abdominal tenderness in the right lower quadrant, periumbilical area and suprapubic area. There is no right CVA tenderness, left CVA tenderness, guarding or rebound. Positive signs include McBurney's sign. Negative signs include Deutsch's sign, Rovsing's sign, psoas sign and obturator sign.   Neurological:      General: No focal deficit present.      Mental Status: She is alert and oriented to person, place, and time. Mental status is at baseline.   Psychiatric:         Mood and Affect: Mood normal.         Behavior: Behavior normal.         Procedures    Point of Care Test & Imaging Results from this visit  Results for orders placed or performed in visit on 01/27/25   POCT UA Automated manually resulted   Result Value Ref Range    POC Glucose, Urine NEGATIVE NEGATIVE mg/dl    POC Bilirubin, Urine NEGATIVE NEGATIVE    POC Ketones, Urine NEGATIVE NEGATIVE mg/dl    POC Specific Gravity, Urine >=1.030 1.005 - 1.035    POC Blood, Urine NEGATIVE NEGATIVE    POC PH, Urine 5.5 No Reference Range Established PH    POC Protein, Urine NEGATIVE NEGATIVE mg/dl    POC Urobilinogen, Urine 0.2 0.2, 1.0 EU/DL    Poc Nitrite, Urine NEGATIVE NEGATIVE    POC Leukocytes, Urine NEGATIVE NEGATIVE      No results found.    Diagnostic study results (if any) were reviewed by BROOKE Gunn.    Assessment/Plan   Allergies, medications, history, and pertinent labs/EKGs/Imaging reviewed by RICARDA Gunn-CNP.     Medical Decision Making  Patient with signs and symptoms consistent with abdominal pain that cannot be  properly assessed in office and is stable. Patient referred to ED for further evaluation and testing.       Orders and Diagnoses  Diagnoses and all orders for this visit:  Right lower quadrant abdominal pain  Pelvic pain  -     POCT UA Automated manually resulted      Medical Admin Record      Patient disposition: Home    Electronically signed by Prabhu Breen, APRN-CNP  9:41 AM

## 2025-01-29 ENCOUNTER — HOSPITAL ENCOUNTER (EMERGENCY)
Facility: HOSPITAL | Age: 35
Discharge: HOME | End: 2025-01-29
Payer: MEDICARE

## 2025-01-29 VITALS
TEMPERATURE: 98.1 F | OXYGEN SATURATION: 97 % | RESPIRATION RATE: 18 BRPM | HEART RATE: 76 BPM | DIASTOLIC BLOOD PRESSURE: 79 MMHG | SYSTOLIC BLOOD PRESSURE: 144 MMHG | HEIGHT: 65 IN | BODY MASS INDEX: 43.32 KG/M2 | WEIGHT: 260 LBS

## 2025-01-29 DIAGNOSIS — R10.2 PELVIC PAIN IN FEMALE: ICD-10-CM

## 2025-01-29 DIAGNOSIS — N80.9 ENDOMETRIOSIS: Primary | ICD-10-CM

## 2025-01-29 PROCEDURE — 99284 EMERGENCY DEPT VISIT MOD MDM: CPT

## 2025-01-29 PROCEDURE — 96372 THER/PROPH/DIAG INJ SC/IM: CPT | Performed by: PHYSICIAN ASSISTANT

## 2025-01-29 PROCEDURE — 2500000001 HC RX 250 WO HCPCS SELF ADMINISTERED DRUGS (ALT 637 FOR MEDICARE OP): Performed by: PHYSICIAN ASSISTANT

## 2025-01-29 PROCEDURE — 2500000004 HC RX 250 GENERAL PHARMACY W/ HCPCS (ALT 636 FOR OP/ED): Performed by: PHYSICIAN ASSISTANT

## 2025-01-29 RX ORDER — OXYCODONE AND ACETAMINOPHEN 5; 325 MG/1; MG/1
1 TABLET ORAL ONCE
Status: COMPLETED | OUTPATIENT
Start: 2025-01-29 | End: 2025-01-29

## 2025-01-29 RX ORDER — OXYCODONE HYDROCHLORIDE 5 MG/1
5 TABLET ORAL EVERY 6 HOURS PRN
Qty: 12 TABLET | Refills: 0 | Status: SHIPPED | OUTPATIENT
Start: 2025-01-29 | End: 2025-02-01

## 2025-01-29 RX ORDER — KETOROLAC TROMETHAMINE 30 MG/ML
30 INJECTION, SOLUTION INTRAMUSCULAR; INTRAVENOUS ONCE
Status: COMPLETED | OUTPATIENT
Start: 2025-01-29 | End: 2025-01-29

## 2025-01-29 RX ADMIN — OXYCODONE HYDROCHLORIDE AND ACETAMINOPHEN 1 TABLET: 5; 325 TABLET ORAL at 11:10

## 2025-01-29 RX ADMIN — KETOROLAC TROMETHAMINE 30 MG: 30 INJECTION, SOLUTION INTRAMUSCULAR at 11:10

## 2025-01-29 ASSESSMENT — PAIN DESCRIPTION - LOCATION: LOCATION: ABDOMEN

## 2025-01-29 ASSESSMENT — PAIN - FUNCTIONAL ASSESSMENT
PAIN_FUNCTIONAL_ASSESSMENT: 0-10
PAIN_FUNCTIONAL_ASSESSMENT: 0-10

## 2025-01-29 ASSESSMENT — PAIN SCALES - GENERAL
PAINLEVEL_OUTOF10: 6
PAINLEVEL_OUTOF10: 10 - WORST POSSIBLE PAIN

## 2025-01-29 ASSESSMENT — PAIN DESCRIPTION - ORIENTATION: ORIENTATION: LOWER

## 2025-01-29 NOTE — ED PROVIDER NOTES
HPI   Chief Complaint   Patient presents with    Hip Pain       This is a 34-year-old female complains of endometriosis exacerbation.  She has a history of hysterectomy and left moved to her ectomy she does have right-sided cyst and has been ultrasound quite recently states her father recently passed away and she had to travel out of town and was dealing with that however the pain has returned.  Despite the pain increasing she does not feel that she needs an ultrasound today however 1 was offered and discussed importance of excluding ovarian torsion she states she is more here for pain exacerbation management as opposed to diagnostics and she is comfortable with the follow-up she has in place.  We plan to treat her pain and reevaluate and make sure she is comfortable with this decision.  No nausea vomiting fever chills discharge or other complaints              Patient History   Past Medical History:   Diagnosis Date    Benign neoplasm of breast     Endometriosis     Epilepsy with partial complex seizures (Multi)     Migraine     Taking Depakote    Neuropathy 5/3/2024    Obesity     Ovarian cyst     measuring 5cm x 3cm x 3cm- Scheduled for surgery with Dr Theodore on 24    Pelvic floor dysfunction in female     Pelvic pain     Pulmonary embolism 2024    Left lung    Seizure disorder (Multi) At 7y    F/W Dr. Sigala (Neuro) Taking Trileptal, Last seizure 2 days ago during sleep    Type A blood, Rh positive     Blood type A+    Vitamin B1 deficiency      Past Surgical History:   Procedure Laterality Date     SECTION, LOW TRANSVERSE      x 2 w/ BTL at second    COLPOSCOPY      HYSTERECTOMY  2024    OOPHORECTOMY Left      Robotic LSO w/ R cystectomy/salpingectomy    OTHER SURGICAL HISTORY      Surgical Treatment Of Spontaneous     TUBAL LIGATION       Family History   Problem Relation Name Age of Onset    Hypertension Mother Mohini     Stroke Mother Mohini     Heart attack Mother  Mohini     Breast cancer Mother's Sister Karol     Bone cancer Mother's Brother      Seizures Father's Brother      Breast cancer Maternal Grandmother Alexia     Other (cerebral infarction) Maternal Grandfather      Seizures Other Cousin      Social History     Tobacco Use    Smoking status: Never     Passive exposure: Never    Smokeless tobacco: Never   Vaping Use    Vaping status: Never Used   Substance Use Topics    Alcohol use: Yes     Comment: Occasionally    Drug use: Never       Physical Exam   ED Triage Vitals   Temperature Heart Rate Respirations BP   01/29/25 1056 01/29/25 1056 01/29/25 1056 01/29/25 1056   36.7 °C (98.1 °F) 82 18 141/82      Pulse Ox Temp src Heart Rate Source Patient Position   01/29/25 1056 -- 01/29/25 1248 --   99 %  Monitor       BP Location FiO2 (%)     -- --             Physical Exam  Vitals reviewed.   Constitutional:       General: She is not in acute distress.     Appearance: Normal appearance. She is normal weight. She is not ill-appearing, toxic-appearing or diaphoretic.   HENT:      Head: Normocephalic and atraumatic.      Right Ear: External ear normal.      Left Ear: External ear normal.      Nose: Nose normal.      Mouth/Throat:      Mouth: Mucous membranes are moist.   Eyes:      Extraocular Movements: Extraocular movements intact.      Conjunctiva/sclera: Conjunctivae normal.      Pupils: Pupils are equal, round, and reactive to light.   Cardiovascular:      Rate and Rhythm: Normal rate and regular rhythm.      Heart sounds: No murmur heard.  Pulmonary:      Effort: Pulmonary effort is normal. No respiratory distress.      Breath sounds: No stridor.   Abdominal:      General: There is no distension.      Tenderness: There is no right CVA tenderness, left CVA tenderness, guarding or rebound.   Musculoskeletal:         General: No swelling, tenderness, deformity or signs of injury.      Cervical back: Normal range of motion.   Skin:     Capillary Refill: Capillary refill  takes less than 2 seconds.      Findings: No rash.   Neurological:      General: No focal deficit present.      Mental Status: She is alert and oriented to person, place, and time. Mental status is at baseline.      Cranial Nerves: No cranial nerve deficit.      Sensory: No sensory deficit.      Motor: No weakness.   Psychiatric:         Mood and Affect: Mood normal.         Behavior: Behavior normal.         Thought Content: Thought content normal.         Judgment: Judgment normal.           ED Course & MDM   Diagnoses as of 01/29/25 1837   Endometriosis   Pelvic pain in female                 No data recorded                                 Medical Decision Making  Differential diagnosis endometriosis, ovarian torsion, known cyst, STI.    Recent ultrasound reviewed she has cyst.  Plan to discharge since her pain improved with the oxycodone and Toradol.  Will discharge with oxycodone recommend follow-up with OB return precautions given.  Low suspicion of torsion at this point in time.        Procedure  Procedures     Laci Ramirez PA-C  01/29/25 1832

## 2025-01-29 NOTE — ED TRIAGE NOTES
Pt reports to ed with c/o pelvic pain d/t endometriosis, pt states this is like her typical pain when she has a flare up, onset 1 week, pt states she was in [place] d/t her fathers death and was trying to manage the pain at home with naproxen but has no relief, denies hematuria/dysuria/discharge/odor, denies concern for std, ambulated to triage  gait steady

## 2025-02-17 ENCOUNTER — APPOINTMENT (OUTPATIENT)
Dept: RADIOLOGY | Facility: CLINIC | Age: 35
End: 2025-02-17
Payer: MEDICARE

## 2025-02-17 ENCOUNTER — HOSPITAL ENCOUNTER (EMERGENCY)
Facility: HOSPITAL | Age: 35
Discharge: HOME | End: 2025-02-17
Payer: MEDICARE

## 2025-02-17 VITALS
OXYGEN SATURATION: 96 % | SYSTOLIC BLOOD PRESSURE: 174 MMHG | DIASTOLIC BLOOD PRESSURE: 79 MMHG | BODY MASS INDEX: 43.32 KG/M2 | TEMPERATURE: 97.7 F | HEART RATE: 95 BPM | WEIGHT: 260 LBS | RESPIRATION RATE: 18 BRPM | HEIGHT: 65 IN

## 2025-02-17 DIAGNOSIS — N83.201 CYST OF RIGHT OVARY: ICD-10-CM

## 2025-02-17 DIAGNOSIS — N93.9 VAGINAL BLEEDING: Primary | ICD-10-CM

## 2025-02-17 DIAGNOSIS — R10.2 PELVIC PAIN IN FEMALE: ICD-10-CM

## 2025-02-17 PROCEDURE — 99283 EMERGENCY DEPT VISIT LOW MDM: CPT

## 2025-02-17 PROCEDURE — 2500000001 HC RX 250 WO HCPCS SELF ADMINISTERED DRUGS (ALT 637 FOR MEDICARE OP): Performed by: NURSE PRACTITIONER

## 2025-02-17 PROCEDURE — 76830 TRANSVAGINAL US NON-OB: CPT | Performed by: OBSTETRICS & GYNECOLOGY

## 2025-02-17 RX ORDER — OXYCODONE AND ACETAMINOPHEN 5; 325 MG/1; MG/1
1 TABLET ORAL ONCE
Status: COMPLETED | OUTPATIENT
Start: 2025-02-17 | End: 2025-02-17

## 2025-02-17 RX ORDER — NAPROXEN 500 MG/1
500 TABLET ORAL
Qty: 20 TABLET | Refills: 0 | Status: SHIPPED | OUTPATIENT
Start: 2025-02-17 | End: 2025-02-27

## 2025-02-17 RX ADMIN — OXYCODONE HYDROCHLORIDE AND ACETAMINOPHEN 1 TABLET: 5; 325 TABLET ORAL at 15:52

## 2025-02-17 ASSESSMENT — PAIN DESCRIPTION - PROGRESSION: CLINICAL_PROGRESSION: NOT CHANGED

## 2025-02-17 ASSESSMENT — PAIN DESCRIPTION - LOCATION
LOCATION: ABDOMEN
LOCATION: ABDOMEN

## 2025-02-17 ASSESSMENT — PAIN DESCRIPTION - FREQUENCY: FREQUENCY: CONSTANT/CONTINUOUS

## 2025-02-17 ASSESSMENT — PAIN - FUNCTIONAL ASSESSMENT: PAIN_FUNCTIONAL_ASSESSMENT: 0-10

## 2025-02-17 ASSESSMENT — PAIN DESCRIPTION - PAIN TYPE: TYPE: ACUTE PAIN

## 2025-02-17 ASSESSMENT — PAIN DESCRIPTION - ORIENTATION
ORIENTATION: LOWER;RIGHT
ORIENTATION: RIGHT

## 2025-02-17 ASSESSMENT — PAIN DESCRIPTION - DESCRIPTORS: DESCRIPTORS: CRAMPING

## 2025-02-17 ASSESSMENT — PAIN SCALES - GENERAL
PAINLEVEL_OUTOF10: 10 - WORST POSSIBLE PAIN
PAINLEVEL_OUTOF10: 10 - WORST POSSIBLE PAIN

## 2025-02-17 ASSESSMENT — PAIN DESCRIPTION - ONSET: ONSET: SUDDEN

## 2025-02-17 NOTE — ED PROVIDER NOTES
"HPI   Chief Complaint   Patient presents with    Vaginal Bleeding       34-year-old female presents today after she had an ultrasound this morning for follow-up with her endometriosis.  She has had a hysterectomy 1 year ago and she felt that the technician was very rough with her during the ultrasound she is now experiencing 10 out of 10 pain due to the roughness of the technician.  She denies fever or chills.  She denies chest pain or dyspnea.  She endorses slight nausea from the terrible pain that was inflicted upon her during the ultrasound.  She denies vomiting.  She is \"spotting\".  She is in a monogamous relationship, and she has no concern for STD.  She denies diarrhea or constipation.  She denies dysuria.  Her overdose risk score is 320, narcotic score 280, sedative score 150, and she has received oxycodone at 3 different locations in January on , on , and on  of this year.      History provided by:  Patient   used: No            Patient History   Past Medical History:   Diagnosis Date    Benign neoplasm of breast     Endometriosis     Epilepsy with partial complex seizures (Multi)     Migraine     Taking Depakote    Neuropathy 5/3/2024    Obesity     Ovarian cyst     measuring 5cm x 3cm x 3cm- Scheduled for surgery with Dr Theodore on 24    Pelvic floor dysfunction in female     Pelvic pain     Pulmonary embolism 2024    Left lung    Seizure disorder (Multi) At 7y    F/W Dr. Sigala (Neuro) Taking Trileptal, Last seizure 2 days ago during sleep    Type A blood, Rh positive     Blood type A+    Vitamin B1 deficiency      Past Surgical History:   Procedure Laterality Date     SECTION, LOW TRANSVERSE      x 2 w/ BTL at second    COLPOSCOPY      HYSTERECTOMY  2024    OOPHORECTOMY Left      Robotic LSO w/ R cystectomy/salpingectomy    OTHER SURGICAL HISTORY      Surgical Treatment Of Spontaneous     TUBAL LIGATION       Family " History   Problem Relation Name Age of Onset    Hypertension Mother Mohini     Stroke Mother Mohini     Heart attack Mother Mohini     Breast cancer Mother's Sister Karol     Bone cancer Mother's Brother      Seizures Father's Brother      Breast cancer Maternal Grandmother Alexia     Other (cerebral infarction) Maternal Grandfather      Seizures Other Cousin      Social History     Tobacco Use    Smoking status: Never     Passive exposure: Never    Smokeless tobacco: Never   Vaping Use    Vaping status: Never Used   Substance Use Topics    Alcohol use: Yes     Comment: Occasionally    Drug use: Never       Physical Exam   ED Triage Vitals [02/17/25 1507]   Temperature Heart Rate Respirations BP   36.5 °C (97.7 °F) 95 18 174/79      Pulse Ox Temp Source Heart Rate Source Patient Position   96 % Temporal Monitor Sitting      BP Location FiO2 (%)     Left arm --       Physical Exam  Constitutional:       Appearance: Normal appearance.   HENT:      Head: Normocephalic and atraumatic.   Cardiovascular:      Rate and Rhythm: Normal rate and regular rhythm.      Pulses: Normal pulses.      Heart sounds: Normal heart sounds.   Pulmonary:      Effort: Pulmonary effort is normal.      Breath sounds: Normal breath sounds.   Abdominal:      General: Abdomen is flat.      Palpations: Abdomen is soft.   Genitourinary:     General: Normal vulva.      Vagina: No vaginal discharge.      Rectum: Guaiac result negative.      Comments: Pelvic exam was grossly normal without any blood on the swab.  I did not appreciate any discharge.  There was no adnexal tenderness appreciated.  There was no sign of infection.  Musculoskeletal:         General: Normal range of motion.      Cervical back: Normal range of motion and neck supple.   Skin:     General: Skin is warm.      Capillary Refill: Capillary refill takes less than 2 seconds.   Neurological:      General: No focal deficit present.      Mental Status: She is alert and oriented to  person, place, and time.   Psychiatric:         Mood and Affect: Mood normal.         Behavior: Behavior normal.           ED Course & MDM   Diagnoses as of 02/17/25 1629   Vaginal bleeding   Pelvic pain in female                 No data recorded     Dina Coma Scale Score: 15 (02/17/25 1509 : Elma Hernandez RN)                           Medical Decision Making  Normal pelvic exam.  Patient received 1 Percocet in our emergency department.  I was worried about the amount of times she has received Percocet prescriptions just last month she received Percocet 3 times.  I recommended patient use Naprosyn which will not interact with her in a consistent pulse and medication.  I requested appointment with gynecology for outpatient follow-up.  Her  is bedside was requesting narcotic medication and I explained to them that she has received 3 narcotic medications in the last month and it can become very addictive and I did not want to continue with the narcotic prescriptions.        Procedure  Procedures     Kehinde Burgos, RICARDA-CNP  02/17/25 6820

## 2025-02-17 NOTE — ED TRIAGE NOTES
Patient presents to ED from US for RLQ pain related to a vaginal US for ovarian cyst. Patient states the woman performing the exam was rough and she even started spotting after the US. Patient states her pain has increased.

## 2025-02-19 DIAGNOSIS — N80.129 ENDOMETRIOMA: Primary | ICD-10-CM

## 2025-02-19 DIAGNOSIS — N80.9 ENDOMETRIOSIS: ICD-10-CM

## 2025-02-24 DIAGNOSIS — N80.9 ENDOMETRIOSIS: ICD-10-CM

## 2025-02-24 RX ORDER — DROSPIRENONE 4 MG/1
4 TABLET, FILM COATED ORAL DAILY
Qty: 84 TABLET | Refills: 3 | Status: SHIPPED | OUTPATIENT
Start: 2025-02-24 | End: 2025-03-08 | Stop reason: HOSPADM

## 2025-02-26 ENCOUNTER — APPOINTMENT (OUTPATIENT)
Dept: OBSTETRICS AND GYNECOLOGY | Facility: CLINIC | Age: 35
End: 2025-02-26
Payer: MEDICARE

## 2025-02-26 VITALS
DIASTOLIC BLOOD PRESSURE: 74 MMHG | WEIGHT: 293 LBS | SYSTOLIC BLOOD PRESSURE: 128 MMHG | BODY MASS INDEX: 48.82 KG/M2 | HEIGHT: 65 IN

## 2025-02-26 DIAGNOSIS — R68.89 COLD INTOLERANCE: ICD-10-CM

## 2025-02-26 DIAGNOSIS — N80.9 ENDOMETRIOSIS: ICD-10-CM

## 2025-02-26 DIAGNOSIS — R10.2 PELVIC PAIN: Primary | ICD-10-CM

## 2025-02-26 DIAGNOSIS — N95.1 VASOMOTOR SYMPTOMS DUE TO MENOPAUSE: ICD-10-CM

## 2025-02-26 PROCEDURE — 3008F BODY MASS INDEX DOCD: CPT | Performed by: STUDENT IN AN ORGANIZED HEALTH CARE EDUCATION/TRAINING PROGRAM

## 2025-02-26 PROCEDURE — 99214 OFFICE O/P EST MOD 30 MIN: CPT | Performed by: STUDENT IN AN ORGANIZED HEALTH CARE EDUCATION/TRAINING PROGRAM

## 2025-02-26 RX ORDER — CYCLOBENZAPRINE HCL 5 MG
5 TABLET ORAL 3 TIMES DAILY PRN
Qty: 30 TABLET | Refills: 1 | Status: SHIPPED | OUTPATIENT
Start: 2025-02-26

## 2025-02-26 RX ORDER — ACETAMINOPHEN AND CODEINE PHOSPHATE 300; 30 MG/1; MG/1
1 TABLET ORAL EVERY 6 HOURS PRN
Qty: 20 TABLET | Refills: 0 | Status: SHIPPED | OUTPATIENT
Start: 2025-02-26 | End: 2025-03-08 | Stop reason: HOSPADM

## 2025-02-26 ASSESSMENT — ENCOUNTER SYMPTOMS
FEVER: 0
DIARRHEA: 0
VOMITING: 0
CONSTIPATION: 0
ARTHRALGIAS: 0
ANOREXIA: 0
MYALGIAS: 0
BELCHING: 0
FREQUENCY: 0
NAUSEA: 1
HEMATOCHEZIA: 0
NAUSEA: 1
WEIGHT LOSS: 0
WEIGHT LOSS: 0
FLATUS: 0
CONSTIPATION: 0
VOMITING: 0
DYSURIA: 0
BELCHING: 0
FLATUS: 0
HEMATURIA: 0
HEMATURIA: 0
DYSURIA: 0
ABDOMINAL PAIN: 1
FEVER: 0
FREQUENCY: 0
HEADACHES: 0
HEMATOCHEZIA: 0
MYALGIAS: 0
ANOREXIA: 0
ABDOMINAL PAIN: 1
HEADACHES: 0
ARTHRALGIAS: 0
DIARRHEA: 0

## 2025-02-26 ASSESSMENT — PAIN SCALES - GENERAL: PAINLEVEL_OUTOF10: 0-NO PAIN

## 2025-02-26 NOTE — PROGRESS NOTES
"Division of Minimally Invasive Gynecologic Surgery  [facility]    Date: 2/26/2025 - Gynecology Visit    CC:   Chief Complaint   Patient presents with    Pelvic Pain     Patient is in to discuss pelvic pain   Plan of pain management   Found cyst on ct scan   A few weeks later returned and more cyst had developed       Danette Buenrostro is a 34 y.o. w/ path-proven endometriosis and hx of DVT/PE on Eliquis now s/p TRH-BS, excision of endometriosis, R ovarian cystectomy, and appendectomy on 2/12/24 presents in follow up for worsening pain symptoms.     She was doing well until 1 month ago. She has been to the ER twice now for episodes of severe pelvic pain. Pain has persisted since first episode. Pain is daily. Feels like a twisting pressure mostly in the RLQ. No dyschezia/dysuria. Sometimes dyspareunia. Pain is flared by lying in certain positions and certain movements. Also worsens over the course of the day. Heating pad helps a little bit. Some nausea w/ pain but no vomiting.     She also reports vasomotor symptoms, including hot flashes and night sweats     Not currently on suppression.     She does have upcoming appt for prior PE and is still on Eliquis.     Imaging:  - Pelvic US 2/17/25 showed 3cm x 3cm x 3cm right ovarian cyst suspicious for endometrioma vs hemorrhagic cyst   Screening:   - Last pap: s/p benign hyst   PMHx: obesity, partial complex seizures, DVT/PE following surgery   PSHx: CS x 2 w/ BTL, robotic LSO/R cystectomy + salpingectomy     PMHx, PSHx, SHx, Allergies, and Medications updated in Epic.    ROS: reviewed and negative    PE: /74   Ht 1.651 m (5' 5\")   Wt 133 kg (293 lb)   LMP 11/01/2023 (Exact Date)   BMI 48.76 kg/m²    Constitutional:  No acute distress, well-nourished and well-developed  HEENT: EOM grossly intact, MMM, neck supple and with full ROM  Pulm:  Effort normal. No accessory muscle usage.  No respiratory distress.  :  - EGBUS: grossly " WNL   - Single digit exam: tenderness of right OI and LA, otherwise unremarkable   Neurological:  She is alert and oriented to person place and time.  Skin: Warm, no pallor.  Psychiatric:  She has normal mood and affect.    A/P: Danette Buenrostro is a 34 y.o. w/ path-proven endometriosis and hx of DVT/PE on Eliquis now s/p TRH-BS, excision of endometriosis, R ovarian cystectomy, and appendectomy on 2/12/24 presents in follow up for worsening pain symptoms.   - Discussed possible pain sources, including hemorrhagic cyst, recurrent endometriosis, and pelvic floor dysfunction  - Recommend repeat imaging of ovarian cyst w/ MRI pelvis for better evaluation for recurrent endometriosis  - Flexeril, Tylenol 3 for pain   - Consider starting norethindrone pending MRI result   - Estradiol/FSH, TSH   - RTC MRI for follow up     Saritha Theodore MD  Division of Minimally Invasive Gynecologic Surgery  [facility]

## 2025-03-03 ENCOUNTER — APPOINTMENT (OUTPATIENT)
Dept: PRIMARY CARE | Facility: HOSPITAL | Age: 35
End: 2025-03-03
Payer: MEDICARE

## 2025-03-03 ENCOUNTER — APPOINTMENT (OUTPATIENT)
Dept: RADIOLOGY | Facility: HOSPITAL | Age: 35
End: 2025-03-03
Payer: MEDICARE

## 2025-03-07 ENCOUNTER — HOSPITAL ENCOUNTER (OUTPATIENT)
Facility: HOSPITAL | Age: 35
Setting detail: OBSERVATION
Discharge: HOME | End: 2025-03-08
Attending: EMERGENCY MEDICINE | Admitting: STUDENT IN AN ORGANIZED HEALTH CARE EDUCATION/TRAINING PROGRAM
Payer: MEDICARE

## 2025-03-07 ENCOUNTER — APPOINTMENT (OUTPATIENT)
Dept: RADIOLOGY | Facility: HOSPITAL | Age: 35
End: 2025-03-07
Payer: MEDICARE

## 2025-03-07 ENCOUNTER — APPOINTMENT (OUTPATIENT)
Dept: CARDIOLOGY | Facility: HOSPITAL | Age: 35
End: 2025-03-07
Payer: MEDICARE

## 2025-03-07 DIAGNOSIS — E51.9 VITAMIN B1 DEFICIENCY: ICD-10-CM

## 2025-03-07 DIAGNOSIS — G40.919 BREAKTHROUGH SEIZURE (MULTI): Primary | ICD-10-CM
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
## 2025-03-07 LAB
ABO GROUP (TYPE) IN BLOOD: NORMAL
ALBUMIN SERPL BCP-MCNC: 3.7 G/DL (ref 3.4–5)
ALP SERPL-CCNC: 55 U/L (ref 33–110)
ALT SERPL W P-5'-P-CCNC: 41 U/L (ref 7–45)
ANION GAP BLDV CALCULATED.4IONS-SCNC: 5 MMOL/L (ref 10–25)
ANION GAP SERPL CALC-SCNC: 10 MMOL/L (ref 10–20)
ANTIBODY SCREEN: NORMAL
AST SERPL W P-5'-P-CCNC: 48 U/L (ref 9–39)
B-HCG SERPL-ACNC: <2 MIU/ML
BASE EXCESS BLDV CALC-SCNC: 8.2 MMOL/L (ref -2–3)
BASOPHILS # BLD AUTO: 0.03 X10*3/UL (ref 0–0.1)
BASOPHILS NFR BLD AUTO: 0.4 %
BILIRUB SERPL-MCNC: 0.3 MG/DL (ref 0–1.2)
BODY TEMPERATURE: 37 DEGREES CELSIUS
BUN SERPL-MCNC: 14 MG/DL (ref 6–23)
CA-I BLDV-SCNC: 1.24 MMOL/L (ref 1.1–1.33)
CALCIUM SERPL-MCNC: 9 MG/DL (ref 8.6–10.3)
CARDIAC TROPONIN I PNL SERPL HS: 3 NG/L (ref 0–13)
CHLORIDE BLDV-SCNC: 100 MMOL/L (ref 98–107)
CHLORIDE SERPL-SCNC: 101 MMOL/L (ref 98–107)
CO2 SERPL-SCNC: 29 MMOL/L (ref 21–32)
CREAT SERPL-MCNC: 0.63 MG/DL (ref 0.5–1.05)
EGFRCR SERPLBLD CKD-EPI 2021: >90 ML/MIN/1.73M*2
EOSINOPHIL # BLD AUTO: 0.07 X10*3/UL (ref 0–0.7)
EOSINOPHIL NFR BLD AUTO: 0.9 %
ERYTHROCYTE [DISTWIDTH] IN BLOOD BY AUTOMATED COUNT: 13.9 % (ref 11.5–14.5)
FLUAV RNA RESP QL NAA+PROBE: NOT DETECTED
FLUBV RNA RESP QL NAA+PROBE: NOT DETECTED
GLUCOSE BLDV-MCNC: 107 MG/DL (ref 74–99)
GLUCOSE SERPL-MCNC: 103 MG/DL (ref 74–99)
HCO3 BLDV-SCNC: 34.7 MMOL/L (ref 22–26)
HCT VFR BLD AUTO: 35.1 % (ref 36–46)
HCT VFR BLD EST: 36 % (ref 36–46)
HGB BLD-MCNC: 11.7 G/DL (ref 12–16)
HGB BLDV-MCNC: 12.1 G/DL (ref 12–16)
IMM GRANULOCYTES # BLD AUTO: 0.04 X10*3/UL (ref 0–0.7)
IMM GRANULOCYTES NFR BLD AUTO: 0.5 % (ref 0–0.9)
INHALED O2 CONCENTRATION: 21 %
LACTATE BLDV-SCNC: 1 MMOL/L (ref 0.4–2)
LYMPHOCYTES # BLD AUTO: 2.54 X10*3/UL (ref 1.2–4.8)
LYMPHOCYTES NFR BLD AUTO: 33.4 %
MCH RBC QN AUTO: 29.9 PG (ref 26–34)
MCHC RBC AUTO-ENTMCNC: 33.3 G/DL (ref 32–36)
MCV RBC AUTO: 90 FL (ref 80–100)
MONOCYTES # BLD AUTO: 0.82 X10*3/UL (ref 0.1–1)
MONOCYTES NFR BLD AUTO: 10.8 %
NEUTROPHILS # BLD AUTO: 4.11 X10*3/UL (ref 1.2–7.7)
NEUTROPHILS NFR BLD AUTO: 54 %
NRBC BLD-RTO: 0 /100 WBCS (ref 0–0)
OXYHGB MFR BLDV: 28.8 % (ref 45–75)
PCO2 BLDV: 56 MM HG (ref 41–51)
PH BLDV: 7.4 PH (ref 7.33–7.43)
PLATELET # BLD AUTO: 343 X10*3/UL (ref 150–450)
PO2 BLDV: 26 MM HG (ref 35–45)
POTASSIUM BLDV-SCNC: 4.2 MMOL/L (ref 3.5–5.3)
POTASSIUM SERPL-SCNC: 4 MMOL/L (ref 3.5–5.3)
PROT SERPL-MCNC: 7.1 G/DL (ref 6.4–8.2)
RBC # BLD AUTO: 3.91 X10*6/UL (ref 4–5.2)
RH FACTOR (ANTIGEN D): NORMAL
SAO2 % BLDV: 29 % (ref 45–75)
SARS-COV-2 RNA RESP QL NAA+PROBE: NOT DETECTED
SODIUM BLDV-SCNC: 135 MMOL/L (ref 136–145)
SODIUM SERPL-SCNC: 136 MMOL/L (ref 136–145)
WBC # BLD AUTO: 7.6 X10*3/UL (ref 4.4–11.3)

## 2025-03-07 PROCEDURE — 70450 CT HEAD/BRAIN W/O DYE: CPT | Performed by: STUDENT IN AN ORGANIZED HEALTH CARE EDUCATION/TRAINING PROGRAM

## 2025-03-07 PROCEDURE — 96374 THER/PROPH/DIAG INJ IV PUSH: CPT | Mod: 59

## 2025-03-07 PROCEDURE — 84484 ASSAY OF TROPONIN QUANT: CPT | Performed by: EMERGENCY MEDICINE

## 2025-03-07 PROCEDURE — 93005 ELECTROCARDIOGRAM TRACING: CPT

## 2025-03-07 PROCEDURE — 99285 EMERGENCY DEPT VISIT HI MDM: CPT | Mod: 25 | Performed by: EMERGENCY MEDICINE

## 2025-03-07 PROCEDURE — 96361 HYDRATE IV INFUSION ADD-ON: CPT

## 2025-03-07 PROCEDURE — 84702 CHORIONIC GONADOTROPIN TEST: CPT | Performed by: EMERGENCY MEDICINE

## 2025-03-07 PROCEDURE — 85025 COMPLETE CBC W/AUTO DIFF WBC: CPT | Performed by: EMERGENCY MEDICINE

## 2025-03-07 PROCEDURE — 72128 CT CHEST SPINE W/O DYE: CPT | Performed by: STUDENT IN AN ORGANIZED HEALTH CARE EDUCATION/TRAINING PROGRAM

## 2025-03-07 PROCEDURE — 84443 ASSAY THYROID STIM HORMONE: CPT | Performed by: NURSE PRACTITIONER

## 2025-03-07 PROCEDURE — 2500000004 HC RX 250 GENERAL PHARMACY W/ HCPCS (ALT 636 FOR OP/ED): Performed by: EMERGENCY MEDICINE

## 2025-03-07 PROCEDURE — 86901 BLOOD TYPING SEROLOGIC RH(D): CPT | Performed by: EMERGENCY MEDICINE

## 2025-03-07 PROCEDURE — 84132 ASSAY OF SERUM POTASSIUM: CPT | Performed by: EMERGENCY MEDICINE

## 2025-03-07 PROCEDURE — 96375 TX/PRO/DX INJ NEW DRUG ADDON: CPT | Mod: 59

## 2025-03-07 PROCEDURE — 72125 CT NECK SPINE W/O DYE: CPT

## 2025-03-07 PROCEDURE — 80183 DRUG SCRN QUANT OXCARBAZEPIN: CPT | Performed by: EMERGENCY MEDICINE

## 2025-03-07 PROCEDURE — 2550000001 HC RX 255 CONTRASTS: Performed by: EMERGENCY MEDICINE

## 2025-03-07 PROCEDURE — 70450 CT HEAD/BRAIN W/O DYE: CPT

## 2025-03-07 PROCEDURE — 87636 SARSCOV2 & INF A&B AMP PRB: CPT | Performed by: EMERGENCY MEDICINE

## 2025-03-07 PROCEDURE — 71260 CT THORAX DX C+: CPT

## 2025-03-07 PROCEDURE — 72131 CT LUMBAR SPINE W/O DYE: CPT | Performed by: STUDENT IN AN ORGANIZED HEALTH CARE EDUCATION/TRAINING PROGRAM

## 2025-03-07 PROCEDURE — 36415 COLL VENOUS BLD VENIPUNCTURE: CPT | Performed by: EMERGENCY MEDICINE

## 2025-03-07 PROCEDURE — 80165 DIPROPYLACETIC ACID FREE: CPT | Performed by: EMERGENCY MEDICINE

## 2025-03-07 PROCEDURE — 72125 CT NECK SPINE W/O DYE: CPT | Performed by: STUDENT IN AN ORGANIZED HEALTH CARE EDUCATION/TRAINING PROGRAM

## 2025-03-07 PROCEDURE — 74177 CT ABD & PELVIS W/CONTRAST: CPT

## 2025-03-07 RX ORDER — MORPHINE SULFATE 4 MG/ML
4 INJECTION, SOLUTION INTRAMUSCULAR; INTRAVENOUS ONCE
Status: COMPLETED | OUTPATIENT
Start: 2025-03-07 | End: 2025-03-07

## 2025-03-07 RX ORDER — ORPHENADRINE CITRATE 30 MG/ML
60 INJECTION INTRAMUSCULAR; INTRAVENOUS ONCE
Status: COMPLETED | OUTPATIENT
Start: 2025-03-07 | End: 2025-03-08

## 2025-03-07 RX ORDER — ONDANSETRON HYDROCHLORIDE 2 MG/ML
4 INJECTION, SOLUTION INTRAVENOUS ONCE
Status: COMPLETED | OUTPATIENT
Start: 2025-03-07 | End: 2025-03-07

## 2025-03-07 RX ADMIN — SODIUM CHLORIDE, POTASSIUM CHLORIDE, SODIUM LACTATE AND CALCIUM CHLORIDE 1000 ML: 600; 310; 30; 20 INJECTION, SOLUTION INTRAVENOUS at 21:53

## 2025-03-07 RX ADMIN — MORPHINE SULFATE 4 MG: 4 INJECTION, SOLUTION INTRAMUSCULAR; INTRAVENOUS at 21:55

## 2025-03-07 RX ADMIN — ONDANSETRON 4 MG: 2 INJECTION, SOLUTION INTRAMUSCULAR; INTRAVENOUS at 21:54

## 2025-03-07 RX ADMIN — IOHEXOL 90 ML: 350 INJECTION, SOLUTION INTRAVENOUS at 21:07

## 2025-03-07 ASSESSMENT — PAIN DESCRIPTION - PAIN TYPE: TYPE: ACUTE PAIN

## 2025-03-07 ASSESSMENT — PAIN DESCRIPTION - LOCATION: LOCATION: OTHER (COMMENT)

## 2025-03-07 ASSESSMENT — PAIN SCALES - GENERAL: PAINLEVEL_OUTOF10: 10 - WORST POSSIBLE PAIN

## 2025-03-07 ASSESSMENT — PAIN - FUNCTIONAL ASSESSMENT: PAIN_FUNCTIONAL_ASSESSMENT: 0-10

## 2025-03-08 VITALS
DIASTOLIC BLOOD PRESSURE: 65 MMHG | WEIGHT: 293 LBS | HEIGHT: 65 IN | BODY MASS INDEX: 48.82 KG/M2 | SYSTOLIC BLOOD PRESSURE: 135 MMHG | HEART RATE: 94 BPM | RESPIRATION RATE: 17 BRPM | OXYGEN SATURATION: 96 % | TEMPERATURE: 96.8 F

## 2025-03-08 PROBLEM — G40.919 BREAKTHROUGH SEIZURE (MULTI): Status: ACTIVE | Noted: 2025-03-08

## 2025-03-08 LAB
ANION GAP SERPL CALC-SCNC: 11 MMOL/L (ref 10–20)
BUN SERPL-MCNC: 13 MG/DL (ref 6–23)
CALCIUM SERPL-MCNC: 8.7 MG/DL (ref 8.6–10.3)
CARDIAC TROPONIN I PNL SERPL HS: 3 NG/L (ref 0–13)
CHLORIDE SERPL-SCNC: 104 MMOL/L (ref 98–107)
CO2 SERPL-SCNC: 25 MMOL/L (ref 21–32)
CREAT SERPL-MCNC: 0.62 MG/DL (ref 0.5–1.05)
EGFRCR SERPLBLD CKD-EPI 2021: >90 ML/MIN/1.73M*2
ERYTHROCYTE [DISTWIDTH] IN BLOOD BY AUTOMATED COUNT: 13.5 % (ref 11.5–14.5)
GLUCOSE SERPL-MCNC: 126 MG/DL (ref 74–99)
HCT VFR BLD AUTO: 31.2 % (ref 36–46)
HGB BLD-MCNC: 11.3 G/DL (ref 12–16)
MCH RBC QN AUTO: 31 PG (ref 26–34)
MCHC RBC AUTO-ENTMCNC: 36.2 G/DL (ref 32–36)
MCV RBC AUTO: 86 FL (ref 80–100)
NRBC BLD-RTO: 0 /100 WBCS (ref 0–0)
PLATELET # BLD AUTO: 259 X10*3/UL (ref 150–450)
POTASSIUM SERPL-SCNC: 3.7 MMOL/L (ref 3.5–5.3)
RBC # BLD AUTO: 3.64 X10*6/UL (ref 4–5.2)
SODIUM SERPL-SCNC: 136 MMOL/L (ref 136–145)
TSH SERPL-ACNC: 3.16 MIU/L (ref 0.44–3.98)
VALPROATE SERPL-MCNC: 22 UG/ML (ref 50–100)
WBC # BLD AUTO: 6.8 X10*3/UL (ref 4.4–11.3)

## 2025-03-08 PROCEDURE — 80183 DRUG SCRN QUANT OXCARBAZEPIN: CPT | Performed by: NURSE PRACTITIONER

## 2025-03-08 PROCEDURE — 96375 TX/PRO/DX INJ NEW DRUG ADDON: CPT

## 2025-03-08 PROCEDURE — G0378 HOSPITAL OBSERVATION PER HR: HCPCS

## 2025-03-08 PROCEDURE — 99223 1ST HOSP IP/OBS HIGH 75: CPT | Performed by: PSYCHIATRY & NEUROLOGY

## 2025-03-08 PROCEDURE — 36415 COLL VENOUS BLD VENIPUNCTURE: CPT | Performed by: NURSE PRACTITIONER

## 2025-03-08 PROCEDURE — 2500000004 HC RX 250 GENERAL PHARMACY W/ HCPCS (ALT 636 FOR OP/ED): Mod: JZ | Performed by: EMERGENCY MEDICINE

## 2025-03-08 PROCEDURE — 80164 ASSAY DIPROPYLACETIC ACD TOT: CPT | Performed by: NURSE PRACTITIONER

## 2025-03-08 PROCEDURE — 85027 COMPLETE CBC AUTOMATED: CPT | Performed by: NURSE PRACTITIONER

## 2025-03-08 PROCEDURE — 2500000001 HC RX 250 WO HCPCS SELF ADMINISTERED DRUGS (ALT 637 FOR MEDICARE OP): Performed by: NURSE PRACTITIONER

## 2025-03-08 PROCEDURE — 82310 ASSAY OF CALCIUM: CPT | Performed by: NURSE PRACTITIONER

## 2025-03-08 RX ORDER — OXCARBAZEPINE 300 MG/1
1200 TABLET, FILM COATED ORAL 2 TIMES DAILY
Status: DISCONTINUED | OUTPATIENT
Start: 2025-03-08 | End: 2025-03-08 | Stop reason: HOSPADM

## 2025-03-08 RX ORDER — ACETAMINOPHEN 325 MG/1
650 TABLET ORAL EVERY 4 HOURS PRN
Status: DISCONTINUED | OUTPATIENT
Start: 2025-03-08 | End: 2025-03-08 | Stop reason: HOSPADM

## 2025-03-08 RX ORDER — PANTOPRAZOLE SODIUM 40 MG/10ML
40 INJECTION, POWDER, LYOPHILIZED, FOR SOLUTION INTRAVENOUS
Status: DISCONTINUED | OUTPATIENT
Start: 2025-03-08 | End: 2025-03-08 | Stop reason: HOSPADM

## 2025-03-08 RX ORDER — ACETAMINOPHEN 650 MG/1
650 SUPPOSITORY RECTAL EVERY 4 HOURS PRN
Status: DISCONTINUED | OUTPATIENT
Start: 2025-03-08 | End: 2025-03-08 | Stop reason: HOSPADM

## 2025-03-08 RX ORDER — ONDANSETRON HYDROCHLORIDE 2 MG/ML
4 INJECTION, SOLUTION INTRAVENOUS EVERY 8 HOURS PRN
Status: DISCONTINUED | OUTPATIENT
Start: 2025-03-08 | End: 2025-03-08 | Stop reason: HOSPADM

## 2025-03-08 RX ORDER — POLYETHYLENE GLYCOL 3350 17 G/17G
17 POWDER, FOR SOLUTION ORAL DAILY PRN
Status: DISCONTINUED | OUTPATIENT
Start: 2025-03-08 | End: 2025-03-08 | Stop reason: HOSPADM

## 2025-03-08 RX ORDER — ONDANSETRON 4 MG/1
4 TABLET, FILM COATED ORAL EVERY 8 HOURS PRN
Status: DISCONTINUED | OUTPATIENT
Start: 2025-03-08 | End: 2025-03-08 | Stop reason: HOSPADM

## 2025-03-08 RX ORDER — DIVALPROEX SODIUM 500 MG/1
500 TABLET, DELAYED RELEASE ORAL EVERY MORNING
Qty: 30 TABLET | Refills: 0 | Status: SHIPPED | OUTPATIENT
Start: 2025-03-08 | End: 2025-04-07

## 2025-03-08 RX ORDER — DIVALPROEX SODIUM 500 MG/1
1000 TABLET, FILM COATED, EXTENDED RELEASE ORAL EVERY EVENING
Qty: 60 TABLET | Refills: 0 | Status: SHIPPED | OUTPATIENT
Start: 2025-03-08 | End: 2025-04-07

## 2025-03-08 RX ORDER — PANTOPRAZOLE SODIUM 40 MG/1
40 TABLET, DELAYED RELEASE ORAL
Status: DISCONTINUED | OUTPATIENT
Start: 2025-03-08 | End: 2025-03-08 | Stop reason: HOSPADM

## 2025-03-08 RX ORDER — DIVALPROEX SODIUM 500 MG/1
500 TABLET, DELAYED RELEASE ORAL EVERY MORNING
Status: DISCONTINUED | OUTPATIENT
Start: 2025-03-08 | End: 2025-03-08 | Stop reason: HOSPADM

## 2025-03-08 RX ORDER — GABAPENTIN 100 MG/1
100 CAPSULE ORAL EVERY 8 HOURS SCHEDULED
Status: DISCONTINUED | OUTPATIENT
Start: 2025-03-08 | End: 2025-03-08 | Stop reason: HOSPADM

## 2025-03-08 RX ORDER — DIVALPROEX SODIUM 250 MG/1
1000 TABLET, FILM COATED, EXTENDED RELEASE ORAL EVERY EVENING
Status: DISCONTINUED | OUTPATIENT
Start: 2025-03-08 | End: 2025-03-08 | Stop reason: HOSPADM

## 2025-03-08 RX ORDER — ACETAMINOPHEN 160 MG/5ML
650 SOLUTION ORAL EVERY 4 HOURS PRN
Status: DISCONTINUED | OUTPATIENT
Start: 2025-03-08 | End: 2025-03-08 | Stop reason: HOSPADM

## 2025-03-08 RX ADMIN — HYDROMORPHONE HYDROCHLORIDE 0.5 MG: 1 INJECTION, SOLUTION INTRAMUSCULAR; INTRAVENOUS; SUBCUTANEOUS at 00:31

## 2025-03-08 RX ADMIN — APIXABAN 5 MG: 5 TABLET, FILM COATED ORAL at 09:30

## 2025-03-08 RX ADMIN — DIVALPROEX SODIUM 500 MG: 500 TABLET, DELAYED RELEASE ORAL at 09:30

## 2025-03-08 RX ADMIN — OXCARBAZEPINE 1200 MG: 300 TABLET, FILM COATED ORAL at 09:29

## 2025-03-08 RX ADMIN — ACETAMINOPHEN 650 MG: 325 TABLET, FILM COATED ORAL at 09:30

## 2025-03-08 RX ADMIN — ORPHENADRINE CITRATE 60 MG: 60 INJECTION INTRAMUSCULAR; INTRAVENOUS at 00:30

## 2025-03-08 SDOH — SOCIAL STABILITY: SOCIAL INSECURITY: WITHIN THE LAST YEAR, HAVE YOU BEEN HUMILIATED OR EMOTIONALLY ABUSED IN OTHER WAYS BY YOUR PARTNER OR EX-PARTNER?: NO

## 2025-03-08 SDOH — SOCIAL STABILITY: SOCIAL INSECURITY
WITHIN THE LAST YEAR, HAVE YOU BEEN KICKED, HIT, SLAPPED, OR OTHERWISE PHYSICALLY HURT BY YOUR PARTNER OR EX-PARTNER?: NO

## 2025-03-08 SDOH — HEALTH STABILITY: MENTAL HEALTH: HOW OFTEN DO YOU HAVE SIX OR MORE DRINKS ON ONE OCCASION?: NEVER

## 2025-03-08 SDOH — ECONOMIC STABILITY: FOOD INSECURITY: WITHIN THE PAST 12 MONTHS, YOU WORRIED THAT YOUR FOOD WOULD RUN OUT BEFORE YOU GOT THE MONEY TO BUY MORE.: NEVER TRUE

## 2025-03-08 SDOH — SOCIAL STABILITY: SOCIAL INSECURITY: WITHIN THE LAST YEAR, HAVE YOU BEEN AFRAID OF YOUR PARTNER OR EX-PARTNER?: NO

## 2025-03-08 SDOH — ECONOMIC STABILITY: FOOD INSECURITY: WITHIN THE PAST 12 MONTHS, THE FOOD YOU BOUGHT JUST DIDN'T LAST AND YOU DIDN'T HAVE MONEY TO GET MORE.: NEVER TRUE

## 2025-03-08 SDOH — SOCIAL STABILITY: SOCIAL INSECURITY: WERE YOU ABLE TO COMPLETE ALL THE BEHAVIORAL HEALTH SCREENINGS?: YES

## 2025-03-08 SDOH — SOCIAL STABILITY: SOCIAL INSECURITY: HAVE YOU HAD THOUGHTS OF HARMING ANYONE ELSE?: NO

## 2025-03-08 SDOH — ECONOMIC STABILITY: INCOME INSECURITY: IN THE PAST 12 MONTHS HAS THE ELECTRIC, GAS, OIL, OR WATER COMPANY THREATENED TO SHUT OFF SERVICES IN YOUR HOME?: NO

## 2025-03-08 SDOH — SOCIAL STABILITY: SOCIAL INSECURITY
WITHIN THE LAST YEAR, HAVE YOU BEEN RAPED OR FORCED TO HAVE ANY KIND OF SEXUAL ACTIVITY BY YOUR PARTNER OR EX-PARTNER?: NO

## 2025-03-08 SDOH — HEALTH STABILITY: MENTAL HEALTH: HOW OFTEN DO YOU HAVE A DRINK CONTAINING ALCOHOL?: NEVER

## 2025-03-08 SDOH — HEALTH STABILITY: MENTAL HEALTH: HOW MANY DRINKS CONTAINING ALCOHOL DO YOU HAVE ON A TYPICAL DAY WHEN YOU ARE DRINKING?: PATIENT DOES NOT DRINK

## 2025-03-08 ASSESSMENT — COGNITIVE AND FUNCTIONAL STATUS - GENERAL
DAILY ACTIVITIY SCORE: 24
MOBILITY SCORE: 24
PATIENT BASELINE BEDBOUND: NO

## 2025-03-08 ASSESSMENT — ACTIVITIES OF DAILY LIVING (ADL)
PATIENT'S MEMORY ADEQUATE TO SAFELY COMPLETE DAILY ACTIVITIES?: YES
JUDGMENT_ADEQUATE_SAFELY_COMPLETE_DAILY_ACTIVITIES: YES
BATHING: INDEPENDENT
WALKS IN HOME: INDEPENDENT
DRESSING YOURSELF: INDEPENDENT
LACK_OF_TRANSPORTATION: NO
GROOMING: INDEPENDENT
FEEDING YOURSELF: INDEPENDENT
HEARING - LEFT EAR: FUNCTIONAL
HEARING - RIGHT EAR: FUNCTIONAL
ADEQUATE_TO_COMPLETE_ADL: YES
TOILETING: INDEPENDENT

## 2025-03-08 ASSESSMENT — PATIENT HEALTH QUESTIONNAIRE - PHQ9
2. FEELING DOWN, DEPRESSED OR HOPELESS: NOT AT ALL
SUM OF ALL RESPONSES TO PHQ9 QUESTIONS 1 & 2: 0
1. LITTLE INTEREST OR PLEASURE IN DOING THINGS: NOT AT ALL

## 2025-03-08 ASSESSMENT — LIFESTYLE VARIABLES
AUDIT-C TOTAL SCORE: 0
HOW MANY STANDARD DRINKS CONTAINING ALCOHOL DO YOU HAVE ON A TYPICAL DAY: PATIENT DOES NOT DRINK
HOW OFTEN DO YOU HAVE 6 OR MORE DRINKS ON ONE OCCASION: NEVER
HOW OFTEN DO YOU HAVE A DRINK CONTAINING ALCOHOL: NEVER
AUDIT-C TOTAL SCORE: 0
SKIP TO QUESTIONS 9-10: 1
SKIP TO QUESTIONS 9-10: 1
AUDIT-C TOTAL SCORE: 0

## 2025-03-08 ASSESSMENT — PAIN SCALES - GENERAL: PAINLEVEL_OUTOF10: 7

## 2025-03-08 ASSESSMENT — PAIN DESCRIPTION - LOCATION: LOCATION: HEAD

## 2025-03-08 NOTE — DISCHARGE SUMMARY
Discharge Diagnosis  Breakthrough seizure (Multi)    Issues Requiring Follow-Up  Breakthrough seizures    Discharge Meds     Medication List      CHANGE how you take these medications     * divalproex 500 mg EC tablet; Commonly known as: Depakote; Take 1   tablet (500 mg) by mouth once daily in the morning. Do not crush, chew, or   split.; What changed: how much to take, how to take this, when to take   this, additional instructions   * divalproex 500 mg 24 hr tablet; Commonly known as: Depakote ER; Take 2   tablets (1,000 mg) by mouth once daily in the evening. Do not crush, chew,   or split.; What changed: You were already taking a medication with the   same name, and this prescription was added. Make sure you understand how   and when to take each.  * This list has 2 medication(s) that are the same as other medications   prescribed for you. Read the directions carefully, and ask your doctor or   other care provider to review them with you.     CONTINUE taking these medications     apixaban 5 mg tablet; Commonly known as: Eliquis; Take 1 tablet (5 mg)   by mouth 2 times a day.   cyclobenzaprine 5 mg tablet; Commonly known as: Flexeril; Take 1 tablet   (5 mg) by mouth 3 times a day as needed for muscle spasms.   OXcarbazepine 600 mg tablet; Commonly known as: Trileptal; TAKE 2   TABLETS BY MOUTH TWICE DAILY     STOP taking these medications     acetaminophen-codeine 300-30 mg tablet; Commonly known as: Tylenol w/   Codeine #3   levETIRAcetam 500 mg tablet; Commonly known as: Keppra   lidocaine 5 % patch; Commonly known as: Lidoderm   Slynd 4 mg (28) tablet; Generic drug: drospirenone (contraceptive)       Test Results Pending At Discharge  Pending Labs       Order Current Status    Oxcarbazepine level In process    Oxcarbazepine level In process    Valproic acid level, free In process            Hospital Course  Danette Buenrostro is a 34 y.o. female, with a PMH of epilepsy with partial complex seizures, migraine,  endometriosis s/p hysterectomy, hx of PE - on eliquis, who presented to [facility] ED on 3/7/2025 for possible seizure.     Patient states she was in the kitchen yesterday cooking when she didn't feel right, felt like her usual seizure onset -does not have an aura typically with her seizures. Denies any LH/dizziness/diaphoresis/palpitations. Next thing she remembers she is waking up on the floor underneath her kitchen table, she believes she fell back hitting her back and head and c/o head. Did bite her tongue. No loss of bowel or bladder. Not witnessed, children were upstairs. Felt very out of it/fatigued and still somewhat fatigued now. She reports taking her medications as prescribed with no missed doses. No recent illnesses. She is on Eliquis for hx of post op PE until she follows up next month with hematology to determined if she can stop.      3/7/25  ED Course: VSS. Labs notable for mildly elevated AST (48), h/h 11.7/35.1.  Lactate 1.0  CTH and C spine negative for any acute findings, +spondylotic changes of the cervical spine   CT c/a/p no acute findings, + hepatic steatosis   Received Dilaudid 0.5mg IVP, Morphine 4mg IVP, Norflex 60mg, Zofran, 1L LR      Breakthrough seizure vs syncopal event  CTH negative  Lactate 1.0  -check tsh, orthos, monitor on tele for now   -check valproic acid and oxcarbazepine serum levels   -continue home antiepileptics   -seizure precautions   -neurology consultation      Hx of PE  -continue Eliquis      3/8/25   Breakthrough seizure in the context of inadequate valproic acid level.   - Patient was seen by Dr. Sigala in Neurology  - patient lost to f/up with neurology outpatient  - Valproic acid level was low and oxcarbazepine level is pending   - Depakote is increased to 1000 mg BID  - Advised to continue current dose of oxcarbazepine  - cleared for discharge from neurology with close f/up     Trauma  - positive LOC, fell and struck the back of her head and bit her tongue   -  CTH and C spine negative for any acute findings, +spondylotic changes of the cervical spine   - CT c/a/p no acute findings, + hepatic steatosis      GI/VTE PPX: PPI, Eliquis     Chart, medical history, and labs/testing reviewed in detail.        Pertinent Physical Exam At Time of Discharge  Physical Exam  Vitals and nursing note reviewed.   Constitutional:       General: She is not in acute distress.     Appearance: She is not ill-appearing or toxic-appearing.   HENT:      Head: Normocephalic and atraumatic.      Nose: Nose normal.      Mouth/Throat:      Mouth: Mucous membranes are moist.   Eyes:      General: No scleral icterus.     Pupils: Pupils are equal, round, and reactive to light.   Cardiovascular:      Rate and Rhythm: Normal rate.      Heart sounds: Normal heart sounds.   Pulmonary:      Effort: Pulmonary effort is normal. No respiratory distress.   Abdominal:      Palpations: Abdomen is soft.      Tenderness: There is no abdominal tenderness.   Musculoskeletal:      Cervical back: Neck supple. No tenderness.      Right lower leg: No edema.      Left lower leg: No edema.   Lymphadenopathy:      Cervical: No cervical adenopathy.   Skin:     General: Skin is warm and dry.      Findings: No rash.   Neurological:      General: No focal deficit present.      Mental Status: She is alert.   Psychiatric:         Mood and Affect: Mood normal.         Behavior: Behavior normal.         Outpatient Follow-Up  Future Appointments   Date Time Provider Department Center   3/24/2025 10:30 AM [facility] 1 CMCMRI [facility]   3/25/2025  1:00 PM Saritha Theodore MD [id number] [facility]   3/31/2025 11:45 AM Kip Sigala MD [id number] [facility]   4/15/2025  2:00 PM Tatiana Echavarria MD [id number] [facility]   6/23/2025  4:30 PM Jorge Mckee MD [id number] [facility]   1/5/2026 10:00 AM Jorge Mckee MD [id number] [facility]     Chart, medical history, and labs/testing reviewed in detail.      Disposition: Discharge home  medically cleared and  stable, neurology consultation- cleared for discharge  Discussed patient in multidisciplinary rounds      Dionna Pérez, APRN-CNP

## 2025-03-08 NOTE — H&P
HPI: Danette Buenrostro is a 34 y.o. female, with a PMH of epilepsy with partial complex seizures, migraine, endometriosis s/p hysterectomy, hx of PE - on eliquis, who presented to [facility] ED on 3/7/2025 for possible seizure.    Patient states she was in the kitchen yesterday cooking when she didn't feel right, felt like her usual seizure onset/does not have an aura typically with her seizures. Denies any LH/dizziness/diaphoresis/palpitations. Next thing she remembers she is waking up on the floor underneath her kitchen table, she believes she fell back hitting her back and head and c/o head. Did bite her tongue. No loss of bowel or bladder. Not witnessed, children were upstairs. Felt very out of it/fatigued and still somewhat fatigued now. She reports taking her medications as prescribed with no missed doses. No recent illnesses. She is on Eliquis for hx of post op PE until she follows up next month with hematology to determined if she can stop.     ED Course: VSS. Labs notable for mildly elevated AST (48), h/h 11.7/35.1.  Lactate 1.0  CTH and C spine negative for any acute findings, +spondylotic changes of the cervical spine   CT c/a/p no acute findings, + hepatic steatosis   Received Dilaudid 0.5mg IVP, Morphine 4mg IVP, Norflex 60mg, Zofran, 1L LR     Patient History   PMH: She has a past medical history of Benign neoplasm of breast, Endometriosis, Epilepsy with partial complex seizures (Multi), Migraine, Neuropathy (5/3/2024), Obesity, Ovarian cyst, Pelvic floor dysfunction in female, Pelvic pain, Pulmonary embolism (02/2024), Seizure disorder (Multi) (At 7y), Type A blood, Rh positive, and Vitamin B1 deficiency.    She has no past medical history of KEN (acute kidney injury) (CMS-HCC), Anxiety, Arthritis, Autoimmune disorder (Multi), Bipolar disorder, BPH (benign prostatic hyperplasia), Cerebral aneurysm ([facility]-HCC), Cervical cancer, Cervical disc disease, Chronic kidney disease, CKD (chronic kidney disease),  Cognitive decline, Crohn's disease (Multi), Dementia, Depression, Dizziness, Dysphagia, Endometrial cancer (Multi), Esophageal cancer (Multi), Esophageal disease, ESRD (end stage renal disease) (Multi), Fibromyalgia, primary, Fractures, Gastric cancer (Multi), Gender dysphoria, GERD (gastroesophageal reflux disease), GI (gastrointestinal bleed), Hemodialysis status (CMS-HCC), Hernia, internal, History of peritoneal dialysis, HIV disease (Multi), Immunocompromised, Irritable bowel syndrome, Liver disease, Lumbar disc disease, Mastocytosis, MS (multiple sclerosis) (Multi), Muscular dystrophy (Multi), Myasthenia gravis, Ovarian cancer (Multi), Pancreatitis ([facility]), Peptic ulcer disease, Prematurity ([facility]), PTSD (post-traumatic stress disorder), Schizophrenia, Spinal stenosis, Substance addiction (Multi), Syncope, TIA (transient ischemic attack), Ulcerative colitis, Urinary tract infection, Uterine cancer (Multi), or Vertigo.  PSH: She has a past surgical history that includes  section, low transverse; Colposcopy; Other surgical history; Oophorectomy (Left); Tubal ligation; and Hysterectomy (2024).  SH: She reports that she has never smoked. She has never been exposed to tobacco smoke. She has never used smokeless tobacco. She reports current alcohol use. She reports that she does not use drugs.  FH: family history includes Bone cancer in her mother's brother; Breast cancer in her maternal grandmother and mother's sister; Heart attack in her mother; Hypertension in her mother; Seizures in her father's brother and another family member; Stroke in her mother; cerebral infarction in her maternal grandfather.     No Known Allergies  Current Outpatient Medications   Medication Instructions    apixaban (ELIQUIS) 5 mg, oral, 2 times daily    cyclobenzaprine (FLEXERIL) 5 mg, oral, 3 times daily PRN    divalproex (Depakote) 500 mg EC tablet TAKE 2 TABLETS BY MOUTH IN THE MORNING THEN TAKE 2 TABLETS BY MOUTH IN  "THE EVENING    drospirenone, contraceptive, (Slynd) 4 mg (28) tablet 1 tablet, oral, Daily    levETIRAcetam (KEPPRA) 500 mg, oral, 2 times daily    lidocaine (Lidoderm) 5 % patch 1 patch, transdermal, Daily, Remove & discard patch within 12 hours or as directed by MD.    OXcarbazepine (TRILEPTAL) 1,200 mg, oral, 2 times daily     Review of Systems   ROS: 10-point review of systems was performed and is otherwise negative except as noted in HPI.        Heart Rate:  []   Temperature:  [36.8 °C (98.2 °F)]   Respirations:  [18]   BP: (130-156)/()   Height:  [165.1 cm (5' 5\")]   Weight:  [133 kg (293 lb)]   Pulse Ox:  [97 %-99 %]      0-10 (Numeric) Pain Score: 10 - Worst possible pain   Vitals:    03/07/25 2032   Weight: 133 kg (293 lb)        Physical Exam:  Vitals and nursing notes reviewed.  GENERAL: Alert and awake, cooperative; in no acute distress  SKIN: Warm and dry, cap refill <2  HEENT: Normocephalic, PEERL, mucous membranes pink and moist  CARDIAC: Regular rate and rhythm, S1S2, no murmurs or abnormal heart sounds  CHEST: Normal respiratory effort, no abnormal breath sounds  ABDOMEN: soft, non-distended, non-tender with palpation  EXTREMITIES: No lower extremity edema, normal pulses all 4 extremities  NEURO: Alert and oriented, mental status at baseline, no focal deficits  PSYCH: Behavior and affect as expected     Medications      Diagnostic Results   CBC- 3/7/2025:  9:29 PM  7.6 11.7 343    35.1      BMP- 3/7/2025:  9:29 PM  136 14 _ 103   4.0 0.63 29    Estimated Creatinine Clearance: 125 mL/min (by C-G formula based on SCr of 0.63 mg/dL).     CA: 9.0 PROTIEN: 7.1 ALT: 41 Total Bili: 0.3 Mg: _   PHOS: _ ALBUMIN: 3.7 AST: 48   Alk Phos: 55      COAGS- 1/13/2025:  9:43 AM  1.2   14.0 35     CV Labs  Troponin I, High Sensitivity   Date/Time Value Ref Range Status   03/07/2025 09:29 PM 3 0 - 13 ng/L Final   10/13/2024 08:54 PM <3 0 - 13 ng/L Final   03/24/2024 10:29 PM <3 0 - 13 ng/L Final "   02/17/2024 09:11 AM 3 0 - 13 ng/L Final   02/17/2024 08:07 AM 3 0 - 13 ng/L Final     BNP   Date/Time Value Ref Range Status   03/24/2024 10:30 PM 10 0 - 99 pg/mL Final     Hemoglobin A1C   Date/Time Value Ref Range Status   12/11/2023 03:52 PM 5.6 see below % Final   05/21/2018 10:32 AM 5.2 % Final     Comment:          Diagnosis of Diabetes-Adults   Non-Diabetic: < or = 5.6%   Increased risk for developing diabetes: 5.7-6.4%   Diagnostic of diabetes: > or = 6.5%  .       Monitoring of Diabetes                Age (y)     Therapeutic Goal (%)   Adults:          >18           <7.0   Pediatrics:    13-18           <7.5                   7-12           <8.0                   0- 6            7.5-8.5   American Diabetes Association. Diabetes Care 33(S1), Jan 2010.           Pertinent Imaging  CT chest abdomen pelvis w IV contrast    Result Date: 3/7/2025  Interpreted By:  Abelardo Harris, STUDY: CT CHEST ABDOMEN PELVIS W IV CONTRAST;  3/7/2025 9:15 pm   INDICATION: Signs/Symptoms:Trauma -- fall on eliquis, abd pain.     COMPARISON: None.   ACCESSION NUMBER(S): [id number]   ORDERING CLINICIAN: ANGEL BIRMINGHAM   TECHNIQUE: Contiguous axial images of the chest, abdomen, and pelvis were obtained after the intravenous administration of iodinated contrast. Coronal and sagittal reformatted images were reconstructed from the axial data.   FINDINGS:   CT CHEST:   MEDIASTINUM AND LYMPH NODES:  The esophageal wall appears within normal limits.  No enlarged intrathoracic or axillary lymph nodes by imaging criteria. No pneumomediastinum.   VESSELS: Two-vessel aortic arch anatomy, with the left common carotid artery arising from the innominate artery. Normal caliber thoracic aorta without dissection. No significant aortic atherosclerosis.   HEART: Normal size.  No coronary artery calcifications. No significant pericardial effusion.   LUNG, AIRWAYS, PLEURA:  No consolidation, pulmonary edema, pleural effusion or pneumothorax.    OSSEOUS STRUCTURES: No acute osseous abnormality.   CHEST WALL SOFT TISSUES: No discernible acute abnormality.     ABDOMEN/PELVIS:   ABDOMINAL WALL: No significant abnormality.   LIVER: Diffuse hypoattenuation of the liver consistent with steatosis.   BILE DUCTS: No significant intrahepatic or extrahepatic dilatation.   GALLBLADDER: Cholelithiasis. No discernible gallbladder wall thickening, pericholecystic fluid, or stranding.   PANCREAS: No significant abnormality.   SPLEEN: No significant abnormality.   ADRENALS: No significant abnormality.   KIDNEYS, URETERS, BLADDER: No significant abnormality.   REPRODUCTIVE ORGANS: Uterus is surgically absent.   VESSELS: No acute vascular injury. Normal caliber of the aorta without significant atherosclerosis.   RETROPERITONEUM/LYMPH NODES: No acute retroperitoneal abnormality. No enlarged lymph nodes.   BOWEL/PERITONEUM:  No inflammatory bowel wall thickening or dilatation. The appendix is not identified; however, there are no pericecal inflammatory changes.   No ascites, free air, or fluid collection.     MUSCULOSKELETAL: No acute osseous abnormality.  No suspicious osseous lesion.       No acute abnormality in the chest, abdomen, or pelvis.   Hepatic steatosis.   MACRO: None.   Signed by: Abelardo Harris 3/7/2025 9:33 PM Dictation workstation:   [id number]    CT head W O contrast trauma protocol    Result Date: 3/7/2025  Interpreted By:  Abelardo Harris, STUDY: CT HEAD W/O CONTRAST TRAUMA PROTOCOL; CT CERVICAL SPINE WO IV CONTRAST; CT THORACIC SPINE TRAUMA RECONSTRUCTION PROTOCOL; CT LUMBAR SPINE TRAUMA RECONSTRUCTION PROTOCOL;  3/7/2025 9:15 pm   INDICATION: Signs/Symptoms:fall, on eliquis; Signs/Symptoms:fall     COMPARISON: 12/28/2024   ACCESSION NUMBER(S): [id number]; [id number]; [id number]; [id number]   ORDERING CLINICIAN: ANGEL BIRMINGHAM   TECHNIQUE: Axial noncontrast CT images of head with coronal and sagittal reconstructed images. Axial noncontrast  CT images of the cervical spine with coronal and sagittal reconstructed images. Multiplanar reformatted images of the thoracic and lumbar spine were obtained from the CT CAP raw data with accession # [id number].   FINDINGS: CT HEAD:   BRAIN PARENCHYMA:  No acute intraparenchymal hemorrhage or parenchymal evidence of acute large territory ischemic infarct. Gray-white matter distinction is preserved. No mass-effect.   VENTRICLES and EXTRA-AXIAL SPACES:  No acute extra-axial or intraventricular hemorrhage. No effacement of cerebral sulci. The ventricles and sulci are age-concordant.   PARANASAL SINUSES/MASTOIDS:  No hemorrhage or air-fluid levels within the visualized paranasal sinuses. The mastoids are well aerated.   CALVARIUM/ORBITS:  No skull fracture.  The orbits and globes are intact to the extent visualized.   EXTRACRANIAL SOFT TISSUES: No discernible acute abnormality.     CT CERVICAL SPINE:   PREVERTEBRAL SOFT TISSUES: Within normal limits.   CRANIOCERVICAL JUNCTION: Intact.   ALIGNMENT:  No traumatic malalignment or traumatic facet widening.   VERTEBRAE:  No acute fracture. Vertebral body heights are maintained.   SPINAL CANAL/INTERVERTEBRAL DISCS: No high-grade spinal canal stenosis. No significant disc height loss.   NEURAL FORAMINA: No significant neural foraminal stenosis.   OTHER: None.     CT THORACIC SPINE:   ALIGNMENT:  No traumatic spondylolisthesis or traumatic facet widening.   VERTEBRAE:  No acute fracture. Vertebral body heights are maintained.   SPINAL CANAL/INTERVERTEBRAL DISCS: No high-grade spinal canal stenosis. Intervertebral disc spaces are preserved. There is mild multilevel anterior endplate spurring.   PARASPINAL SOFT TISSUES:  No paravertebral soft tissue hematoma.   VISUALIZED CHEST: Please see separate report.     CT LUMBAR SPINE:   ALIGNMENT:  No traumatic spondylolisthesis or traumatic facet widening.   VERTEBRAE:  No acute fracture. Vertebral body heights are maintained.    SPINAL CANAL/INTERVERTEBRAL DISCS: No high-grade spinal canal stenosis.   NEUROFORAMINA:  No significant stenosis.   PARASPINAL SOFT TISSUES:  No paravertebral soft tissue hematoma.   VISUALIZED ABDOMEN: Please see separate report.       CT HEAD: 1. No acute intracranial abnormality or calvarial fracture.       CT CERVICAL SPINE: 1. No acute fracture or traumatic malalignment of the cervical spine. 2. Spondylotic changes of the cervical spine as detailed above.     CT THORACIC/LUMBAR SPINE: 1. No acute fracture or traumatic malalignment of the thoracic or lumbar spine.   MACRO: None.   Signed by: Abelardo Harris 3/7/2025 9:28 PM Dictation workstation:   [id number]    CT cervical spine wo IV contrast    Result Date: 3/7/2025  Interpreted By:  Abelardo Harris, STUDY: CT HEAD W/O CONTRAST TRAUMA PROTOCOL; CT CERVICAL SPINE WO IV CONTRAST; CT THORACIC SPINE TRAUMA RECONSTRUCTION PROTOCOL; CT LUMBAR SPINE TRAUMA RECONSTRUCTION PROTOCOL;  3/7/2025 9:15 pm   INDICATION: Signs/Symptoms:fall, on eliquis; Signs/Symptoms:fall     COMPARISON: 12/28/2024   ACCESSION NUMBER(S): [id number]; [id number]; [id number]; [id number]   ORDERING CLINICIAN: ANGEL BIRMINGHAM   TECHNIQUE: Axial noncontrast CT images of head with coronal and sagittal reconstructed images. Axial noncontrast CT images of the cervical spine with coronal and sagittal reconstructed images. Multiplanar reformatted images of the thoracic and lumbar spine were obtained from the CT CAP raw data with accession # [id number].   FINDINGS: CT HEAD:   BRAIN PARENCHYMA:  No acute intraparenchymal hemorrhage or parenchymal evidence of acute large territory ischemic infarct. Gray-white matter distinction is preserved. No mass-effect.   VENTRICLES and EXTRA-AXIAL SPACES:  No acute extra-axial or intraventricular hemorrhage. No effacement of cerebral sulci. The ventricles and sulci are age-concordant.   PARANASAL SINUSES/MASTOIDS:  No hemorrhage or air-fluid levels  within the visualized paranasal sinuses. The mastoids are well aerated.   CALVARIUM/ORBITS:  No skull fracture.  The orbits and globes are intact to the extent visualized.   EXTRACRANIAL SOFT TISSUES: No discernible acute abnormality.     CT CERVICAL SPINE:   PREVERTEBRAL SOFT TISSUES: Within normal limits.   CRANIOCERVICAL JUNCTION: Intact.   ALIGNMENT:  No traumatic malalignment or traumatic facet widening.   VERTEBRAE:  No acute fracture. Vertebral body heights are maintained.   SPINAL CANAL/INTERVERTEBRAL DISCS: No high-grade spinal canal stenosis. No significant disc height loss.   NEURAL FORAMINA: No significant neural foraminal stenosis.   OTHER: None.     CT THORACIC SPINE:   ALIGNMENT:  No traumatic spondylolisthesis or traumatic facet widening.   VERTEBRAE:  No acute fracture. Vertebral body heights are maintained.   SPINAL CANAL/INTERVERTEBRAL DISCS: No high-grade spinal canal stenosis. Intervertebral disc spaces are preserved. There is mild multilevel anterior endplate spurring.   PARASPINAL SOFT TISSUES:  No paravertebral soft tissue hematoma.   VISUALIZED CHEST: Please see separate report.     CT LUMBAR SPINE:   ALIGNMENT:  No traumatic spondylolisthesis or traumatic facet widening.   VERTEBRAE:  No acute fracture. Vertebral body heights are maintained.   SPINAL CANAL/INTERVERTEBRAL DISCS: No high-grade spinal canal stenosis.   NEUROFORAMINA:  No significant stenosis.   PARASPINAL SOFT TISSUES:  No paravertebral soft tissue hematoma.   VISUALIZED ABDOMEN: Please see separate report.       CT HEAD: 1. No acute intracranial abnormality or calvarial fracture.       CT CERVICAL SPINE: 1. No acute fracture or traumatic malalignment of the cervical spine. 2. Spondylotic changes of the cervical spine as detailed above.     CT THORACIC/LUMBAR SPINE: 1. No acute fracture or traumatic malalignment of the thoracic or lumbar spine.   MACRO: None.   Signed by: Abelardo Harris 3/7/2025 9:28 PM Dictation  workstation:   [id number]    CT thoracic spine trauma reconstruction protocol    Result Date: 3/7/2025  Interpreted By:  Abelardo Harris, STUDY: CT HEAD W/O CONTRAST TRAUMA PROTOCOL; CT CERVICAL SPINE WO IV CONTRAST; CT THORACIC SPINE TRAUMA RECONSTRUCTION PROTOCOL; CT LUMBAR SPINE TRAUMA RECONSTRUCTION PROTOCOL;  3/7/2025 9:15 pm   INDICATION: Signs/Symptoms:fall, on eliquis; Signs/Symptoms:fall     COMPARISON: 12/28/2024   ACCESSION NUMBER(S): [id number]; [id number]; [id number]; [id number]   ORDERING CLINICIAN: ANGEL BIRMINGHAM   TECHNIQUE: Axial noncontrast CT images of head with coronal and sagittal reconstructed images. Axial noncontrast CT images of the cervical spine with coronal and sagittal reconstructed images. Multiplanar reformatted images of the thoracic and lumbar spine were obtained from the CT CAP raw data with accession # [id number].   FINDINGS: CT HEAD:   BRAIN PARENCHYMA:  No acute intraparenchymal hemorrhage or parenchymal evidence of acute large territory ischemic infarct. Gray-white matter distinction is preserved. No mass-effect.   VENTRICLES and EXTRA-AXIAL SPACES:  No acute extra-axial or intraventricular hemorrhage. No effacement of cerebral sulci. The ventricles and sulci are age-concordant.   PARANASAL SINUSES/MASTOIDS:  No hemorrhage or air-fluid levels within the visualized paranasal sinuses. The mastoids are well aerated.   CALVARIUM/ORBITS:  No skull fracture.  The orbits and globes are intact to the extent visualized.   EXTRACRANIAL SOFT TISSUES: No discernible acute abnormality.     CT CERVICAL SPINE:   PREVERTEBRAL SOFT TISSUES: Within normal limits.   CRANIOCERVICAL JUNCTION: Intact.   ALIGNMENT:  No traumatic malalignment or traumatic facet widening.   VERTEBRAE:  No acute fracture. Vertebral body heights are maintained.   SPINAL CANAL/INTERVERTEBRAL DISCS: No high-grade spinal canal stenosis. No significant disc height loss.   NEURAL FORAMINA: No significant  neural foraminal stenosis.   OTHER: None.     CT THORACIC SPINE:   ALIGNMENT:  No traumatic spondylolisthesis or traumatic facet widening.   VERTEBRAE:  No acute fracture. Vertebral body heights are maintained.   SPINAL CANAL/INTERVERTEBRAL DISCS: No high-grade spinal canal stenosis. Intervertebral disc spaces are preserved. There is mild multilevel anterior endplate spurring.   PARASPINAL SOFT TISSUES:  No paravertebral soft tissue hematoma.   VISUALIZED CHEST: Please see separate report.     CT LUMBAR SPINE:   ALIGNMENT:  No traumatic spondylolisthesis or traumatic facet widening.   VERTEBRAE:  No acute fracture. Vertebral body heights are maintained.   SPINAL CANAL/INTERVERTEBRAL DISCS: No high-grade spinal canal stenosis.   NEUROFORAMINA:  No significant stenosis.   PARASPINAL SOFT TISSUES:  No paravertebral soft tissue hematoma.   VISUALIZED ABDOMEN: Please see separate report.       CT HEAD: 1. No acute intracranial abnormality or calvarial fracture.       CT CERVICAL SPINE: 1. No acute fracture or traumatic malalignment of the cervical spine. 2. Spondylotic changes of the cervical spine as detailed above.     CT THORACIC/LUMBAR SPINE: 1. No acute fracture or traumatic malalignment of the thoracic or lumbar spine.   MACRO: None.   Signed by: Abelardo Harris 3/7/2025 9:28 PM Dictation workstation:   [id number]    CT lumbar spine trauma reconstruction protocol    Result Date: 3/7/2025  Interpreted By:  Abelardo Harris, STUDY: CT HEAD W/O CONTRAST TRAUMA PROTOCOL; CT CERVICAL SPINE WO IV CONTRAST; CT THORACIC SPINE TRAUMA RECONSTRUCTION PROTOCOL; CT LUMBAR SPINE TRAUMA RECONSTRUCTION PROTOCOL;  3/7/2025 9:15 pm   INDICATION: Signs/Symptoms:fall, on eliquis; Signs/Symptoms:fall     COMPARISON: 12/28/2024   ACCESSION NUMBER(S): [id number]; [id number]; [id number]; [id number]   ORDERING CLINICIAN: ANGEL BIRMINGHAM   TECHNIQUE: Axial noncontrast CT images of head with coronal and sagittal reconstructed  images. Axial noncontrast CT images of the cervical spine with coronal and sagittal reconstructed images. Multiplanar reformatted images of the thoracic and lumbar spine were obtained from the CT CAP raw data with accession # [id number].   FINDINGS: CT HEAD:   BRAIN PARENCHYMA:  No acute intraparenchymal hemorrhage or parenchymal evidence of acute large territory ischemic infarct. Gray-white matter distinction is preserved. No mass-effect.   VENTRICLES and EXTRA-AXIAL SPACES:  No acute extra-axial or intraventricular hemorrhage. No effacement of cerebral sulci. The ventricles and sulci are age-concordant.   PARANASAL SINUSES/MASTOIDS:  No hemorrhage or air-fluid levels within the visualized paranasal sinuses. The mastoids are well aerated.   CALVARIUM/ORBITS:  No skull fracture.  The orbits and globes are intact to the extent visualized.   EXTRACRANIAL SOFT TISSUES: No discernible acute abnormality.     CT CERVICAL SPINE:   PREVERTEBRAL SOFT TISSUES: Within normal limits.   CRANIOCERVICAL JUNCTION: Intact.   ALIGNMENT:  No traumatic malalignment or traumatic facet widening.   VERTEBRAE:  No acute fracture. Vertebral body heights are maintained.   SPINAL CANAL/INTERVERTEBRAL DISCS: No high-grade spinal canal stenosis. No significant disc height loss.   NEURAL FORAMINA: No significant neural foraminal stenosis.   OTHER: None.     CT THORACIC SPINE:   ALIGNMENT:  No traumatic spondylolisthesis or traumatic facet widening.   VERTEBRAE:  No acute fracture. Vertebral body heights are maintained.   SPINAL CANAL/INTERVERTEBRAL DISCS: No high-grade spinal canal stenosis. Intervertebral disc spaces are preserved. There is mild multilevel anterior endplate spurring.   PARASPINAL SOFT TISSUES:  No paravertebral soft tissue hematoma.   VISUALIZED CHEST: Please see separate report.     CT LUMBAR SPINE:   ALIGNMENT:  No traumatic spondylolisthesis or traumatic facet widening.   VERTEBRAE:  No acute fracture. Vertebral body  heights are maintained.   SPINAL CANAL/INTERVERTEBRAL DISCS: No high-grade spinal canal stenosis.   NEUROFORAMINA:  No significant stenosis.   PARASPINAL SOFT TISSUES:  No paravertebral soft tissue hematoma.   VISUALIZED ABDOMEN: Please see separate report.       CT HEAD: 1. No acute intracranial abnormality or calvarial fracture.       CT CERVICAL SPINE: 1. No acute fracture or traumatic malalignment of the cervical spine. 2. Spondylotic changes of the cervical spine as detailed above.     CT THORACIC/LUMBAR SPINE: 1. No acute fracture or traumatic malalignment of the thoracic or lumbar spine.   MACRO: None.   Signed by: Abelardo Harris 3/7/2025 9:28 PM Dictation workstation:   [id number]            Assessment/Plan   Danette Buenrostro is a 34 y.o. female, with a PMH of epilepsy with partial complex seizures, migraine, endometriosis s/p hysterectomy, hx of PE - on eliquis, who presented to [facility] ED on 3/7/2025 for possible seizure.    Patient states she was in the kitchen yesterday cooking when she didn't feel right, felt like her usual seizure onset -does not have an aura typically with her seizures. Denies any LH/dizziness/diaphoresis/palpitations. Next thing she remembers she is waking up on the floor underneath her kitchen table, she believes she fell back hitting her back and head and c/o head. Did bite her tongue. No loss of bowel or bladder. Not witnessed, children were upstairs. Felt very out of it/fatigued and still somewhat fatigued now. She reports taking her medications as prescribed with no missed doses. No recent illnesses. She is on Eliquis for hx of post op PE until she follows up next month with hematology to determined if she can stop.     ED Course: VSS. Labs notable for mildly elevated AST (48), h/h 11.7/35.1.  Lactate 1.0  CTH and C spine negative for any acute findings, +spondylotic changes of the cervical spine   CT c/a/p no acute findings, + hepatic steatosis   Received Dilaudid 0.5mg  IVP, Morphine 4mg IVP, Norflex 60mg, Zofran, 1L LR     Breakthrough seizure vs syncopal event  CTH negative  Lactate 1.0  -check tsh, orthos, monitor on tele for now   -check valproic acid and oxcarbazepine serum levels   -continue home antiepileptics   -seizure precautions   -neurology consultation     Hx of PE  -continue Eliquis     GI/VTE PPX: PPI, Eliquis    Chart, medical history, and labs/testing reviewed in detail.       Disposition: Discharge home once medically cleared and stable, pending neurology consultation/clearance     RICARDA Charles-CNP   Observation/Internal Med SARAH  [facility]– [place] 2[place]  03/08/25  12:57 AM  Total time of 60 minutes spent on professional and overall care, with >50% of time dedicated to counseling/coordination of care.

## 2025-03-08 NOTE — ED TRIAGE NOTES
Pt states she was in the kitchen making dinner when she felt like she was going to have a seizure and ended up waking up on the floor. Pt takes Eliquis. Fall not witnessed. Pt c/o headache and body pain.

## 2025-03-08 NOTE — HOSPITAL COURSE
Danette Buenrostro is a 34 y.o. female, with a PMH of epilepsy with partial complex seizures, migraine, endometriosis s/p hysterectomy, hx of PE - on eliquis, who presented to [facility] ED on 3/7/2025 for possible seizure.     Patient states she was in the kitchen yesterday cooking when she didn't feel right, felt like her usual seizure onset -does not have an aura typically with her seizures. Denies any LH/dizziness/diaphoresis/palpitations. Next thing she remembers she is waking up on the floor underneath her kitchen table, she believes she fell back hitting her back and head and c/o head. Did bite her tongue. No loss of bowel or bladder. Not witnessed, children were upstairs. Felt very out of it/fatigued and still somewhat fatigued now. She reports taking her medications as prescribed with no missed doses. No recent illnesses. She is on Eliquis for hx of post op PE until she follows up next month with hematology to determined if she can stop.      3/7/25  ED Course: VSS. Labs notable for mildly elevated AST (48), h/h 11.7/35.1.  Lactate 1.0  CTH and C spine negative for any acute findings, +spondylotic changes of the cervical spine   CT c/a/p no acute findings, + hepatic steatosis   Received Dilaudid 0.5mg IVP, Morphine 4mg IVP, Norflex 60mg, Zofran, 1L LR      Breakthrough seizure vs syncopal event  CTH negative  Lactate 1.0  -check tsh, orthos, monitor on tele for now   -check valproic acid and oxcarbazepine serum levels   -continue home antiepileptics   -seizure precautions   -neurology consultation      Hx of PE  -continue Eliquis      3/8/25   Breakthrough seizure in the context of inadequate valproic acid level.   - Patient was seen by Dr. Sigala in Neurology  - patient lost to f/up with neurology outpatient  - Valproic acid level was low and oxcarbazepine level is pending   - Depakote is increased to 1000 mg BID  - Advised to continue current dose of oxcarbazepine  - cleared for discharge from neurology  with close f/up     Trauma  - positive LOC, fell and struck the back of her head and bit her tongue   - CTH and C spine negative for any acute findings, +spondylotic changes of the cervical spine   - CT c/a/p no acute findings, + hepatic steatosis      GI/VTE PPX: PPI, Eliquis     Chart, medical history, and labs/testing reviewed in detail.

## 2025-03-08 NOTE — CARE PLAN
The clinical goals for the shift include Pt will remain free from seizures throughout shift.    Problem: Pain - Adult  Goal: Verbalizes/displays adequate comfort level or baseline comfort level  Outcome: Progressing     Problem: Safety - Adult  Goal: Free from fall injury  Outcome: Met     Problem: Discharge Planning  Goal: Discharge to home or other facility with appropriate resources  Outcome: Met     Problem: Nutrition  Goal: Nutrient intake appropriate for maintaining nutritional needs  Outcome: Progressing

## 2025-03-08 NOTE — ED PROVIDER NOTES
Emergency Department Provider Note             History of Present Illness   CC: Seizures and Fall    History provided by: Patient  Limitations to History: None  External Records Reviewed: prior ED provider notes, clinic notes, discharge summaries    HPI:  Danette Buenrostro is a 34 y.o. female with history including seizure disorder, endometriosis s/p hysterectomy, PE on eliquis presenting to the emergency department after she had a suspected seizure and fell. States she was in the kitchen making dinner when she felt like she might have a seizure.  The next thing she remembers is waking up on the floor.  This was not witnessed.  Since falling she reports a headache and generalized pain. Notes she has chronic back pain which was bothering her prior to the fall. Last dose of eliquis was this evening just prior to the fall. Denies any recent chest pain, dyspnea, palpitations, vomiting, vision changes, focal deficits.     Physical Exam   Triage vitals:  T 36.8 °C (98.2 °F)  HR 87  /74  RR 18  O2 97 % None (Room air)    General: awake, well-appearing, no distress  Head: normocephalic, atraumatic  Eyes: pupils equal, extraocular movements grossly intact, no conjunctival injection or scleral icterus  ENT: nares patent, moist mucous membranes  Neck: supple, trachea midline, no masses  CV: regular rate and rhythm, well-perfused  Resp: breathing is non-labored, speaking in full sentences. Lungs are clear to auscultation bilaterally  GI: soft, non-distended, non-tender, no rebound or guarding  Extremities: no edema, no gross deformity   Neuro: alert, oriented, speech is fluent, face is symmetric, moving all extremities   Psych: Appropriate mood and affect    ED Course & Medical Decision Making     34 y.o. female with history including seizure disorder, endometriosis s/p hysterectomy, PE on eliquis presenting to the emergency department after she had a suspected seizure and fell.     Social Determinants Limiting Care:  "{Social Determinants of Health Impacting Care:33977::\"None identified\"}    EKG: EKG per my interpretation reveals normal sinus rhythm, rate 85, sinus arrhythmia, slight right axis deviation, normal intervals, no ST elevation/depression or significant T wave abnormalities    Results: Independently reviewed and interpreted by me. Please see ED course and [facility] for my full interpretation.     Chronic Medical Conditions Significantly Affecting Care: as per [facility]    Patient was discussed with the following consultants/services: {ED Consult:49465::\"None\"}     Care Considerations: as per [facility]    ED Course as of 03/08/25 0027   Fri Mar 07, 2025   2351 Labs are overall unremarkable.  Her lactate is normal, suggesting against seizure and raising concern for possible syncope. CT pan scan shows no acute process or other abnormality. [LM]      ED Course User Index  [LM] Saritha Ariza MD       Disposition   {ED Disposition:12832}    Procedures   Procedures    Saritha Ariza MD      "

## 2025-03-08 NOTE — CONSULTS
INITIAL NEUROLOGY CONSULT NOTE    IMPRESSION:  Breakthrough seizure in the context of inadequate valproic acid level.    RECOMMENDATIONS:  Increase Depakote to 1000 mg bid, which is the dose she is supposed to be on.  Continue current dose of oxcarbazepine.  Can release with neurological follow-up in my office as scheduled 3/31/25.    Kip Sigala Jr., M.D., FAAN       History Of Present Illness  Danette Buenrostro is a 34 y.o. female presenting with probable seizure.  History comes from the patient and from EMR review.    I am familiar with the patient as I have seen her in the past for various issues.  I last saw her 11 months ago for shoulder pain.  She was supposed to follow up for her seizures, but has missed multiple appointments in my office.  She currently has an appointment scheduled for 3/31/25.  At the time I last saw her, she was on Depakote 1 g bid and oxcarbazepine 1200 mg bid.    The patient had sudden loss of consciousness in her kitchen yesterday, without warning.  She next recalls awakening on the floor, having struck the back of her head and bitten her tongue.  She was able to call her cousin, who took her to the [facility] ED.  Valproic acid level was low and oxcarbazepine level is pending.  She reports not missing doses of medication recently.  She denies other focal neurological symptoms including dysarthria, dysphagia, diplopia, focal weakness, focal sensory change, ataxia, vertigo, or bowel/bladder incontinence, among others.      She was in the [facility] ED on 12/28/24 for potential seizure concern, and levetiracetam was added, but she is not taking it.  Chart review indicates multiple borderline valproic acid levels in the last year.    Past Medical History  Past Medical History:   Diagnosis Date    Benign neoplasm of breast     Endometriosis     Epilepsy with partial complex seizures (Multi)     Migraine     Taking Depakote    Neuropathy 5/3/2024    Obesity     Ovarian cyst     measuring 5cm x  3cm x 3cm- Scheduled for surgery with Dr Theodore on 24    Pelvic floor dysfunction in female     Pelvic pain     Pulmonary embolism 2024    Left lung    Seizure disorder (Multi) At 7y    F/W Dr. Sigala (Neuro) Taking Trileptal, Last seizure 2 days ago during sleep    Type A blood, Rh positive     Blood type A+    Vitamin B1 deficiency      Surgical History  Past Surgical History:   Procedure Laterality Date     SECTION, LOW TRANSVERSE      x 2 w/ BTL at second    COLPOSCOPY      HYSTERECTOMY  2024    OOPHORECTOMY Left      Robotic LSO w/ R cystectomy/salpingectomy    OTHER SURGICAL HISTORY      Surgical Treatment Of Spontaneous     TUBAL LIGATION       Social History  Social History     Tobacco Use    Smoking status: Never     Passive exposure: Never    Smokeless tobacco: Never   Vaping Use    Vaping status: Never Used   Substance Use Topics    Alcohol use: Yes     Comment: Occasionally    Drug use: Never       Scheduled medications  apixaban, 5 mg, oral, BID  divalproex, 1,000 mg, oral, q PM  divalproex, 500 mg, oral, q AM  gabapentin, 100 mg, oral, q8h FANY  OXcarbazepine, 1,200 mg, oral, BID  pantoprazole, 40 mg, oral, Daily before breakfast   Or  pantoprazole, 40 mg, intravenous, Daily before breakfast      Continuous medications     PRN medications  PRN medications: acetaminophen **OR** acetaminophen **OR** acetaminophen, ondansetron **OR** ondansetron, polyethylene glycol      Family History   Problem Relation Name Age of Onset    Hypertension Mother Mohini     Stroke Mother Mohini     Heart attack Mother Mohini     Breast cancer Mother's Sister Karol     Bone cancer Mother's Brother      Seizures Father's Brother      Breast cancer Maternal Grandmother Alexia     Other (cerebral infarction) Maternal Grandfather      Seizures Other Cousin      Allergies  Patient has no known allergies.  Medications Prior to Admission   Medication Sig Dispense Refill Last Dose/Taking     "apixaban (Eliquis) 5 mg tablet Take 1 tablet (5 mg) by mouth 2 times a day. 180 tablet 2 3/8/2025 Morning    divalproex (Depakote) 500 mg EC tablet TAKE 2 TABLETS BY MOUTH IN THE MORNING THEN TAKE 2 TABLETS BY MOUTH IN THE EVENING 360 tablet 4 3/8/2025 Morning    OXcarbazepine (Trileptal) 600 mg tablet TAKE 2 TABLETS BY MOUTH TWICE DAILY 360 tablet 0 3/8/2025 Morning    [] acetaminophen-codeine (Tylenol w/ Codeine #3) 300-30 mg tablet Take 1 tablet by mouth every 6 hours if needed for severe pain (7 - 10) for up to 7 days. 20 tablet 0     cyclobenzaprine (Flexeril) 5 mg tablet Take 1 tablet (5 mg) by mouth 3 times a day as needed for muscle spasms. 30 tablet 1     drospirenone, contraceptive, (Slynd) 4 mg (28) tablet Take 1 tablet by mouth once daily. (Patient not taking: Reported on 2025) 84 tablet 3     levETIRAcetam (Keppra) 500 mg tablet Take 1 tablet (500 mg) by mouth 2 times a day. (Patient not taking: Reported on 2025) 60 tablet 0     lidocaine (Lidoderm) 5 % patch Place 1 patch over 12 hours on the skin once daily. Remove & discard patch within 12 hours or as directed by MD. (Patient not taking: Reported on 2025) 20 patch 0        Scheduled medications  apixaban, 5 mg, oral, BID  divalproex, 1,000 mg, oral, q PM  divalproex, 500 mg, oral, q AM  gabapentin, 100 mg, oral, q8h FANY  OXcarbazepine, 1,200 mg, oral, BID  pantoprazole, 40 mg, oral, Daily before breakfast   Or  pantoprazole, 40 mg, intravenous, Daily before breakfast      Continuous medications     PRN medications  PRN medications: acetaminophen **OR** acetaminophen **OR** acetaminophen, ondansetron **OR** ondansetron, polyethylene glycol    Review of Systems    Last Recorded Vitals  Blood pressure 146/75, pulse 90, temperature 36.4 °C (97.5 °F), temperature source Temporal, resp. rate 18, height 1.651 m (5' 5\"), weight 133 kg (293 lb), last menstrual period 2023, SpO2 96%, not currently breastfeeding.    CONSTITUTIONAL:  " No acute distress    CARDIOVASCULAR:  Normal pulses in the distal legs, no edema of either arm or either leg.  No carotid bruits.    MENTAL STATUS:  Awake, alert, fully oriented to self, place, and time, with present short-term memory, good awareness of recent events, normal attention span, concentration, and fund of knowledge.    SPEECH AND LANGUAGE:  Can name and repeat, follows all commands, has no dysarthria    FUNDOSCOPIC:  No papilledema    CRANIAL NERVES:  II-Vision present, visual fields full to confrontational testing    III/IV/VI--EOMs are present in all directions.  Pupils are symmetrically reactive in dim light.  No ptosis.    V--Normal facial sensation.    VII--No facial asymmetry.    VIII--Hearing present to voice bilaterally.    IX/X--Symmetric soft palate rise.    XI--Normal trapezius power bilaterally.    XII--Tongue protrudes without deviation.    MOTOR:  Normal power, tone, and bulk in both arms and both legs.    SENSORY:  Normal pin sensation in both arms and both legs without distal-proximal gradient, asymmetry, or spinal sensory level.    COORDINATION:  Normal finger-to-nose and heel-to-shin testing in both arms and both legs.    REFLEXES are normal and symmetric at the biceps, triceps, brachioradialis, patella, and ankle.  The plantar responses are flexor.    GAIT is normal, without steppage, ataxia, shuffling, or spasticity.    Relevant Results  Results for orders placed or performed during the hospital encounter of 03/07/25 (from the past 24 hours)   CBC and Auto Differential   Result Value Ref Range    WBC 7.6 4.4 - 11.3 x10*3/uL    nRBC 0.0 0.0 - 0.0 /100 WBCs    RBC 3.91 (L) 4.00 - 5.20 x10*6/uL    Hemoglobin 11.7 (L) 12.0 - 16.0 g/dL    Hematocrit 35.1 (L) 36.0 - 46.0 %    MCV 90 80 - 100 fL    MCH 29.9 26.0 - 34.0 pg    MCHC 33.3 32.0 - 36.0 g/dL    RDW 13.9 11.5 - 14.5 %    Platelets 343 150 - 450 x10*3/uL    Neutrophils % 54.0 40.0 - 80.0 %    Immature Granulocytes %, Automated 0.5  0.0 - 0.9 %    Lymphocytes % 33.4 13.0 - 44.0 %    Monocytes % 10.8 2.0 - 10.0 %    Eosinophils % 0.9 0.0 - 6.0 %    Basophils % 0.4 0.0 - 2.0 %    Neutrophils Absolute 4.11 1.20 - 7.70 x10*3/uL    Immature Granulocytes Absolute, Automated 0.04 0.00 - 0.70 x10*3/uL    Lymphocytes Absolute 2.54 1.20 - 4.80 x10*3/uL    Monocytes Absolute 0.82 0.10 - 1.00 x10*3/uL    Eosinophils Absolute 0.07 0.00 - 0.70 x10*3/uL    Basophils Absolute 0.03 0.00 - 0.10 x10*3/uL   Comprehensive Metabolic Panel   Result Value Ref Range    Glucose 103 (H) 74 - 99 mg/dL    Sodium 136 136 - 145 mmol/L    Potassium 4.0 3.5 - 5.3 mmol/L    Chloride 101 98 - 107 mmol/L    Bicarbonate 29 21 - 32 mmol/L    Anion Gap 10 10 - 20 mmol/L    Urea Nitrogen 14 6 - 23 mg/dL    Creatinine 0.63 0.50 - 1.05 mg/dL    eGFR >90 >60 mL/min/1.73m*2    Calcium 9.0 8.6 - 10.3 mg/dL    Albumin 3.7 3.4 - 5.0 g/dL    Alkaline Phosphatase 55 33 - 110 U/L    Total Protein 7.1 6.4 - 8.2 g/dL    AST 48 (H) 9 - 39 U/L    Bilirubin, Total 0.3 0.0 - 1.2 mg/dL    ALT 41 7 - 45 U/L   Type And Screen   Result Value Ref Range    ABO TYPE A     Rh TYPE POS     ANTIBODY SCREEN NEG    BLOOD GAS VENOUS FULL PANEL   Result Value Ref Range    POCT pH, Venous 7.40 7.33 - 7.43 pH    POCT pCO2, Venous 56 (H) 41 - 51 mm Hg    POCT pO2, Venous 26 (L) 35 - 45 mm Hg    POCT SO2, Venous 29 (L) 45 - 75 %    POCT Oxy Hemoglobin, Venous 28.8 (L) 45.0 - 75.0 %    POCT Hematocrit Calculated, Venous 36.0 36.0 - 46.0 %    POCT Sodium, Venous 135 (L) 136 - 145 mmol/L    POCT Potassium, Venous 4.2 3.5 - 5.3 mmol/L    POCT Chloride, Venous 100 98 - 107 mmol/L    POCT Ionized Calicum, Venous 1.24 1.10 - 1.33 mmol/L    POCT Glucose, Venous 107 (H) 74 - 99 mg/dL    POCT Lactate, Venous 1.0 0.4 - 2.0 mmol/L    POCT Base Excess, Venous 8.2 (H) -2.0 - 3.0 mmol/L    POCT HCO3 Calculated, Venous 34.7 (H) 22.0 - 26.0 mmol/L    POCT Hemoglobin, Venous 12.1 12.0 - 16.0 g/dL    POCT Anion Gap, Venous 5.0 (L) 10.0  - 25.0 mmol/L    Patient Temperature 37.0 degrees Celsius    FiO2 21 %   hCG, quantitative, pregnancy   Result Value Ref Range    HCG, Beta-Quantitative <2 <5 mIU/mL   Troponin I, High Sensitivity   Result Value Ref Range    Troponin I, High Sensitivity 3 0 - 13 ng/L   Sars-CoV-2 and Influenza A/B PCR   Result Value Ref Range    Flu A Result Not Detected Not Detected    Flu B Result Not Detected Not Detected    Coronavirus 2019, PCR Not Detected Not Detected   Troponin I, High Sensitivity   Result Value Ref Range    Troponin I, High Sensitivity 3 0 - 13 ng/L   TSH   Result Value Ref Range    Thyroid Stimulating Hormone 3.16 0.44 - 3.98 mIU/L   CBC   Result Value Ref Range    WBC 6.8 4.4 - 11.3 x10*3/uL    nRBC 0.0 0.0 - 0.0 /100 WBCs    RBC 3.64 (L) 4.00 - 5.20 x10*6/uL    Hemoglobin 11.3 (L) 12.0 - 16.0 g/dL    Hematocrit 31.2 (L) 36.0 - 46.0 %    MCV 86 80 - 100 fL    MCH 31.0 26.0 - 34.0 pg    MCHC 36.2 (H) 32.0 - 36.0 g/dL    RDW 13.5 11.5 - 14.5 %    Platelets 259 150 - 450 x10*3/uL   Basic metabolic panel   Result Value Ref Range    Glucose 126 (H) 74 - 99 mg/dL    Sodium 136 136 - 145 mmol/L    Potassium 3.7 3.5 - 5.3 mmol/L    Chloride 104 98 - 107 mmol/L    Bicarbonate 25 21 - 32 mmol/L    Anion Gap 11 10 - 20 mmol/L    Urea Nitrogen 13 6 - 23 mg/dL    Creatinine 0.62 0.50 - 1.05 mg/dL    eGFR >90 >60 mL/min/1.73m*2    Calcium 8.7 8.6 - 10.3 mg/dL   Valproic acid level, total   Result Value Ref Range    Valproic Acid 22 (L) 50 - 100 ug/mL         I have personally reviewed the following imaging results:     CT HEAD W/O CONTRAST TRAUMA PROTOCOL; CT CERVICAL SPINE WO IV  CONTRAST; CT THORACIC SPINE TRAUMA RECONSTRUCTION PROTOCOL; CT LUMBAR  SPINE TRAUMA RECONSTRUCTION PROTOCOL;  3/7/2025 9:15 pm      INDICATION:  Signs/Symptoms:fall, on eliquis; Signs/Symptoms:fall          COMPARISON:  12/28/2024      ACCESSION NUMBER(S):  [id number]; [id number]; [id number]; [id number]      ORDERING  CLINICIAN:  ANGEL BIRMINGHAM      TECHNIQUE:  Axial noncontrast CT images of head with coronal and sagittal  reconstructed images. Axial noncontrast CT images of the cervical  spine with coronal and sagittal reconstructed images. Multiplanar  reformatted images of the thoracic and lumbar spine were obtained  from the CT CAP raw data with accession # [id number].      FINDINGS:  CT HEAD:      BRAIN PARENCHYMA:  No acute intraparenchymal hemorrhage or  parenchymal evidence of acute large territory ischemic infarct.  Gray-white matter distinction is preserved. No mass-effect.      VENTRICLES and EXTRA-AXIAL SPACES:  No acute extra-axial or  intraventricular hemorrhage. No effacement of cerebral sulci. The  ventricles and sulci are age-concordant.      PARANASAL SINUSES/MASTOIDS:  No hemorrhage or air-fluid levels within  the visualized paranasal sinuses. The mastoids are well aerated.      CALVARIUM/ORBITS:  No skull fracture.  The orbits and globes are  intact to the extent visualized.      EXTRACRANIAL SOFT TISSUES: No discernible acute abnormality.          CT CERVICAL SPINE:      PREVERTEBRAL SOFT TISSUES: Within normal limits.      CRANIOCERVICAL JUNCTION: Intact.      ALIGNMENT:  No traumatic malalignment or traumatic facet widening.      VERTEBRAE:  No acute fracture. Vertebral body heights are maintained.      SPINAL CANAL/INTERVERTEBRAL DISCS: No high-grade spinal canal  stenosis. No significant disc height loss.      NEURAL FORAMINA: No significant neural foraminal stenosis.      OTHER: None.          CT THORACIC SPINE:      ALIGNMENT:  No traumatic spondylolisthesis or traumatic facet  widening.      VERTEBRAE:  No acute fracture. Vertebral body heights are maintained.      SPINAL CANAL/INTERVERTEBRAL DISCS: No high-grade spinal canal  stenosis. Intervertebral disc spaces are preserved. There is mild  multilevel anterior endplate spurring.      PARASPINAL SOFT TISSUES:  No paravertebral soft tissue  hematoma.      VISUALIZED CHEST: Please see separate report.          CT LUMBAR SPINE:      ALIGNMENT:  No traumatic spondylolisthesis or traumatic facet  widening.      VERTEBRAE:  No acute fracture. Vertebral body heights are maintained.      SPINAL CANAL/INTERVERTEBRAL DISCS: No high-grade spinal canal  stenosis.      NEUROFORAMINA:  No significant stenosis.      PARASPINAL SOFT TISSUES:  No paravertebral soft tissue hematoma.      VISUALIZED ABDOMEN: Please see separate report.      IMPRESSION:  CT HEAD:  1. No acute intracranial abnormality or calvarial fracture.              CT CERVICAL SPINE:  1. No acute fracture or traumatic malalignment of the cervical spine.  2. Spondylotic changes of the cervical spine as detailed above.          CT THORACIC/LUMBAR SPINE:  1. No acute fracture or traumatic malalignment of the thoracic or  lumbar spine.      MACRO:  None.      Signed by: Abelardo Harris 3/7/2025 9:28 PM  Dictation workstation:   [id number]      Kip Sigala Jr., M.D., FAAN

## 2025-03-10 LAB
10OH-CARBAZEPINE SERPL-MCNC: 7 UG/ML (ref 10–35)
ATRIAL RATE: 85 BPM
P AXIS: 55 DEGREES
P OFFSET: 181 MS
P ONSET: 130 MS
PR INTERVAL: 174 MS
Q ONSET: 217 MS
QRS COUNT: 14 BEATS
QRS DURATION: 90 MS
QT INTERVAL: 386 MS
QTC CALCULATION(BAZETT): 459 MS
QTC FREDERICIA: 433 MS
R AXIS: 68 DEGREES
T AXIS: 29 DEGREES
T OFFSET: 410 MS
VENTRICULAR RATE: 85 BPM

## 2025-03-11 LAB
10OH-CARBAZEPINE SERPL-MCNC: 4.8 UG/ML (ref 10–35)
SCAN RESULT: ABNORMAL
VALPROATE FREE SERPL-MCNC: 2.3 UG/ML (ref 4–30)

## 2025-03-13 DIAGNOSIS — R10.2 PELVIC PAIN: ICD-10-CM

## 2025-03-13 DIAGNOSIS — N80.9 ENDOMETRIOSIS: Primary | ICD-10-CM

## 2025-03-24 ENCOUNTER — HOSPITAL ENCOUNTER (OUTPATIENT)
Dept: RADIOLOGY | Facility: HOSPITAL | Age: 35
Discharge: HOME | End: 2025-03-24
Payer: MEDICARE

## 2025-03-24 DIAGNOSIS — N80.9 ENDOMETRIOSIS: ICD-10-CM

## 2025-03-24 DIAGNOSIS — R10.2 PELVIC PAIN: ICD-10-CM

## 2025-03-24 PROCEDURE — 2550000001 HC RX 255 CONTRASTS: Performed by: STUDENT IN AN ORGANIZED HEALTH CARE EDUCATION/TRAINING PROGRAM

## 2025-03-24 PROCEDURE — 72197 MRI PELVIS W/O & W/DYE: CPT

## 2025-03-24 PROCEDURE — A9575 INJ GADOTERATE MEGLUMI 0.1ML: HCPCS | Performed by: STUDENT IN AN ORGANIZED HEALTH CARE EDUCATION/TRAINING PROGRAM

## 2025-03-24 RX ORDER — GADOTERATE MEGLUMINE 376.9 MG/ML
20 INJECTION INTRAVENOUS
Status: COMPLETED | OUTPATIENT
Start: 2025-03-24 | End: 2025-03-24

## 2025-03-24 RX ADMIN — GADOTERATE MEGLUMINE 20 ML: 376.9 INJECTION INTRAVENOUS at 12:30

## 2025-03-25 ENCOUNTER — OFFICE VISIT (OUTPATIENT)
Dept: OBSTETRICS AND GYNECOLOGY | Facility: CLINIC | Age: 35
End: 2025-03-25
Payer: MEDICARE

## 2025-03-25 VITALS
WEIGHT: 293 LBS | SYSTOLIC BLOOD PRESSURE: 144 MMHG | HEART RATE: 102 BPM | DIASTOLIC BLOOD PRESSURE: 79 MMHG | BODY MASS INDEX: 50.02 KG/M2

## 2025-03-25 DIAGNOSIS — G89.29 CHRONIC LOW BACK PAIN WITH SCIATICA, SCIATICA LATERALITY UNSPECIFIED, UNSPECIFIED BACK PAIN LATERALITY: ICD-10-CM

## 2025-03-25 DIAGNOSIS — N80.9 ENDOMETRIOSIS: Primary | ICD-10-CM

## 2025-03-25 DIAGNOSIS — M54.40 CHRONIC LOW BACK PAIN WITH SCIATICA, SCIATICA LATERALITY UNSPECIFIED, UNSPECIFIED BACK PAIN LATERALITY: ICD-10-CM

## 2025-03-25 PROCEDURE — 99214 OFFICE O/P EST MOD 30 MIN: CPT | Performed by: STUDENT IN AN ORGANIZED HEALTH CARE EDUCATION/TRAINING PROGRAM

## 2025-03-25 PROCEDURE — 1036F TOBACCO NON-USER: CPT | Performed by: STUDENT IN AN ORGANIZED HEALTH CARE EDUCATION/TRAINING PROGRAM

## 2025-03-25 RX ORDER — NORETHINDRONE 5 MG/1
5 TABLET ORAL DAILY
Qty: 90 TABLET | Refills: 3 | Status: SHIPPED | OUTPATIENT
Start: 2025-03-25 | End: 2026-03-25

## 2025-03-25 ASSESSMENT — PAIN SCALES - GENERAL: PAINLEVEL_OUTOF10: 1

## 2025-03-25 NOTE — PROGRESS NOTES
Division of Minimally Invasive Gynecologic Surgery  [facility]    Date: 2/26/2025 - Gynecology Visit    CC:   Chief Complaint   Patient presents with    Follow-up       Danette Buenrostro is a 34 y.o. w/ path-proven endometriosis and hx of DVT/PE on Eliquis now s/p TRH-BS, excision of endometriosis, R ovarian cystectomy, and appendectomy on 2/12/24 presents in follow up for worsening pain symptoms.     Did not  Slynd due to cost.     She reports about 3-4 pain days per month. Most pain is in her back, worsens over the course of the day. She does not feel the Flexeril has been helpful but it does make her tired. Pain Medicine appointment is scheduling pending.     She does have upcoming appt for prior PE and is still on Eliquis.     Imaging:  - MRI pelvis showed T2 thickening R>L of round ligament. Suspected endometriotic plaque. Right ovarian endometrioma noted measuring 2cm.   - Pelvic US 2/17/25 showed 3cm x 3cm x 3cm right ovarian cyst suspicious for endometrioma vs hemorrhagic cyst   Screening:   - Last pap: s/p benign hyst   PMHx: obesity, partial complex seizures, DVT/PE following surgery   PSHx: CS x 2 w/ BTL, robotic LSO/R cystectomy + salpingectomy     PMHx, PSHx, SHx, Allergies, and Medications updated in Epic.    ROS: reviewed and negative    PE: Wt 136 kg (300 lb 9.6 oz)   LMP 11/01/2023 (Exact Date)   BMI 50.02 kg/m²    Constitutional:  No acute distress, well-nourished and well-developed  HEENT: EOM grossly intact, MMM, neck supple and with full ROM  Pulm:  Effort normal. No accessory muscle usage.  No respiratory distress.  :  Neurological:  She is alert and oriented to person place and time.  Skin: Warm, no pallor.  Psychiatric:  She has normal mood and affect.    A/P: Danette Buenrostro is a 34 y.o. w/ path-proven endometriosis and hx of DVT/PE on Eliquis now s/p TRH-BS, excision of endometriosis, R ovarian cystectomy, and appendectomy on 2/12/24 presents in  follow up for worsening pain symptoms.   - Start norethindrone for suppression  - Follow up w/ Pain Medicine, will place referral for PT for back pain and hx of sciatica  - Repeat MRI pelvis in one year  - RTC in 6 months, earlier PRN       Saritha Theodore MD  Division of Minimally Invasive Gynecologic Surgery  [facility]

## 2025-03-31 ENCOUNTER — APPOINTMENT (OUTPATIENT)
Dept: NEUROLOGY | Facility: CLINIC | Age: 35
End: 2025-03-31
Payer: MEDICARE

## 2025-03-31 VITALS
HEART RATE: 83 BPM | SYSTOLIC BLOOD PRESSURE: 144 MMHG | BODY MASS INDEX: 48.82 KG/M2 | WEIGHT: 293 LBS | DIASTOLIC BLOOD PRESSURE: 73 MMHG | HEIGHT: 65 IN | TEMPERATURE: 96.9 F

## 2025-03-31 DIAGNOSIS — G40.219 LOCALIZATION-RELATED (FOCAL) (PARTIAL) SYMPTOMATIC EPILEPSY AND EPILEPTIC SYNDROMES WITH COMPLEX PARTIAL SEIZURES, INTRACTABLE, WITHOUT STATUS EPILEPTICUS: Primary | ICD-10-CM

## 2025-03-31 PROCEDURE — 99213 OFFICE O/P EST LOW 20 MIN: CPT | Performed by: PSYCHIATRY & NEUROLOGY

## 2025-03-31 PROCEDURE — 1036F TOBACCO NON-USER: CPT | Performed by: PSYCHIATRY & NEUROLOGY

## 2025-03-31 PROCEDURE — 3008F BODY MASS INDEX DOCD: CPT | Performed by: PSYCHIATRY & NEUROLOGY

## 2025-03-31 RX ORDER — OXCARBAZEPINE 600 MG/1
1200 TABLET, FILM COATED ORAL 2 TIMES DAILY
Qty: 360 TABLET | Refills: 3 | Status: SHIPPED | OUTPATIENT
Start: 2025-03-31 | End: 2026-03-31

## 2025-03-31 RX ORDER — DIVALPROEX SODIUM 500 MG/1
1000 TABLET, DELAYED RELEASE ORAL 2 TIMES DAILY
Qty: 360 TABLET | Refills: 3 | Status: SHIPPED | OUTPATIENT
Start: 2025-03-31 | End: 2026-03-31

## 2025-03-31 NOTE — PROGRESS NOTES
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  -     divalproex (Depakote) 500 mg EC tablet; Take 2 tablets (1,000 mg) by mouth 2 times a day. Do not crush, chew, or split.      IMPRESSION:  Complex partial seizures.    PLAN:  I ordered random oxcarbazepine and valproic acid levels to be done immediately after this visit, to ensure she is taking the medication, and to adjust dosing.  I will see her in 3-4 months or prn.      Kip Sigala Jr., M.D., FAAN   ----------    Subjective     Danette Buenrostro is a 34 y.o. year old female here for follow-up.  Her  accompanies her today.    Seen in the [facility] [place] ED 3/7/25 for breakthrough seizure.  Valproic acid level was 2.3 (very low) and oxcarbazepine level was 7 (mildly low).  I advised increasing divalproex EC to 1 g bid, which she hadn't been taking properly.  I kept the oxcarbazepine at 1200 mg bid.  The NP in the hospital wrote for divalperoex ER instead of EC and those tablets are too large for the patient to swallow.  She is therefore on her old divalproex EC at 2 tablets (500 mg x 2) bid. Her  was a short spell of twitching early this morning lasting less than a minute, in the context of her having not had rest due to travel.  Last medication dose was at around 0800 today.  She denies other focal neurological symptoms including dysarthria, dysphagia, diplopia, focal weakness, focal sensory change, ataxia, vertigo, or bowel/bladder incontinence, among others.      Past Medical History:   Diagnosis Date    Benign neoplasm of breast     Endometriosis     Epilepsy with partial  complex seizures (Multi)     Migraine     Taking Depakote    Neuropathy 5/3/2024    Obesity     Ovarian cyst     measuring 5cm x 3cm x 3cm- Scheduled for surgery with Dr Theodore on 24    Pelvic floor dysfunction in female     Pelvic pain     Pulmonary embolism 2024    Left lung    Seizure disorder (Multi) At 7y    F/W Dr. Sigala (Neuro) Taking Trileptal, Last seizure 2 days ago during sleep    Type A blood, Rh positive     Blood type A+    Vitamin B1 deficiency      Past Surgical History:   Procedure Laterality Date     SECTION, LOW TRANSVERSE      x 2 w/ BTL at second    COLPOSCOPY      HYSTERECTOMY  2024    OOPHORECTOMY Left      Robotic LSO w/ R cystectomy/salpingectomy    OTHER SURGICAL HISTORY      Surgical Treatment Of Spontaneous     TUBAL LIGATION       Social History     Tobacco Use    Smoking status: Never     Passive exposure: Never    Smokeless tobacco: Never   Substance Use Topics    Alcohol use: Yes     Comment: Occasionally     family history includes Bone cancer in her mother's brother; Breast cancer in her maternal grandmother and mother's sister; Heart attack in her mother; Hypertension in her mother; Seizures in her father's brother and another family member; Stroke in her mother; cerebral infarction in her maternal grandfather.    Current Outpatient Medications:     apixaban (Eliquis) 5 mg tablet, Take 1 tablet (5 mg) by mouth 2 times a day., Disp: 180 tablet, Rfl: 2    cyclobenzaprine (Flexeril) 5 mg tablet, Take 1 tablet (5 mg) by mouth 3 times a day as needed for muscle spasms., Disp: 30 tablet, Rfl: 1    divalproex (Depakote ER) 500 mg 24 hr tablet, Take 2 tablets (1,000 mg) by mouth once daily in the evening. Do not crush, chew, or split., Disp: 60 tablet, Rfl: 0    divalproex (Depakote) 500 mg EC tablet, Take 1 tablet (500 mg) by mouth once daily in the morning. Do not crush, chew, or split., Disp: 30 tablet, Rfl: 0    norethindrone (Aygestin) 5 mg  "tablet, Take 1 tablet (5 mg) by mouth once daily., Disp: 90 tablet, Rfl: 3    OXcarbazepine (Trileptal) 600 mg tablet, TAKE 2 TABLETS BY MOUTH TWICE DAILY, Disp: 360 tablet, Rfl: 0  No Known Allergies    Objective     /73   Pulse 83   Temp 36.1 °C (96.9 °F)   Ht 1.651 m (5' 5\")   Wt 138 kg (305 lb)   LMP 11/01/2023 (Exact Date)   BMI 50.75 kg/m²     CONSTITUTIONAL:  No acute distress    MENTAL STATUS:  Awake, alert, fully oriented to self, place, and time, with present short-term memory, good awareness of recent events, normal attention span, concentration, and fund of knowledge.    SPEECH AND LANGUAGE:  Can name and repeat, follows all commands, has no dysarthria    CRANIAL NERVES:  II-Vision present, visual fields full to confrontational testing    III/IV/VI--EOMs are present in all directions.  Pupils are symmetrically reactive in dim light.  No ptosis.    V--Normal facial sensation.    VII--No facial asymmetry.    VIII--Hearing present to voice bilaterally.    IX/X--Symmetric soft palate rise.    XI--Normal trapezius power bilaterally.    XII--Tongue protrudes without deviation.    MOTOR:  Normal power, tone, and bulk in both arms and both legs.    SENSORY:  Normal pin sensation in both arms and both legs without distal-proximal gradient, asymmetry, or spinal sensory level.    COORDINATION:  Normal finger-to-nose and heel-to-shin testing in both arms and both legs.    REFLEXES are normal and symmetric at the biceps, triceps, brachioradialis, patella, and ankle.  The plantar responses are flexor.    GAIT is normal, without steppage, ataxia, shuffling, or spasticity.      Kip Sigala Jr., M.D., FAAN    "

## 2025-04-03 LAB
10OH-CARBAZEPINE SERPL-MCNC: 7.6 MCG/ML (ref 8–35)
VALPROATE SERPL-MCNC: 37.5 MG/L (ref 50–100)

## 2025-04-15 ENCOUNTER — OFFICE VISIT (OUTPATIENT)
Dept: CARDIOLOGY | Facility: CLINIC | Age: 35
End: 2025-04-15
Payer: MEDICARE

## 2025-04-15 VITALS
BODY MASS INDEX: 48.82 KG/M2 | WEIGHT: 293 LBS | OXYGEN SATURATION: 100 % | DIASTOLIC BLOOD PRESSURE: 75 MMHG | HEIGHT: 65 IN | SYSTOLIC BLOOD PRESSURE: 130 MMHG | HEART RATE: 87 BPM

## 2025-04-15 DIAGNOSIS — I26.99 PULMONARY THROMBOEMBOLISM (MULTI): ICD-10-CM

## 2025-04-15 PROCEDURE — 1036F TOBACCO NON-USER: CPT | Performed by: INTERNAL MEDICINE

## 2025-04-15 PROCEDURE — 99214 OFFICE O/P EST MOD 30 MIN: CPT | Performed by: INTERNAL MEDICINE

## 2025-04-15 PROCEDURE — 99204 OFFICE O/P NEW MOD 45 MIN: CPT | Performed by: INTERNAL MEDICINE

## 2025-04-15 PROCEDURE — 3008F BODY MASS INDEX DOCD: CPT | Performed by: INTERNAL MEDICINE

## 2025-04-15 ASSESSMENT — PAIN SCALES - GENERAL: PAINLEVEL_OUTOF10: 8

## 2025-04-15 ASSESSMENT — ENCOUNTER SYMPTOMS
LOSS OF SENSATION IN FEET: 0
DEPRESSION: 0
OCCASIONAL FEELINGS OF UNSTEADINESS: 0

## 2025-04-15 NOTE — PROGRESS NOTES
Chief complaint: VTE     Subjective   Patient is accompanied by her partner, here to establish care, she has VM related history of  2024 she was dx with Acute PE. No UE/LE DVT. Has been on Eliquis since  Dx by VQ. CTPEs are very limited.  5 days post oophorectomy and hysterectomy for endometriosis and ovarian cyst   2024, MRV with focal area of stenosis of the superior sagittal sinus concerning for prior dural sinus thrombosis  Mom with h/o MI and strokes x3 in her 40s, etiology is not entirely clear     She continues to be on Eliquis 5 mg twice daily, tolerated well  She is not using compression stockings  She does have a standing job  Following with her providers for up-to-date screening    Review of Systems  As noted above   Chronic intermittent ankle swelling, more at the end of the day  Intermittent chest tightness, resolves on its own  Headaches   Seizures   Neuropathy   Back pain   No melena or other obvious bleeding  No other complaints today     Past Medical History:   Diagnosis Date    Benign neoplasm of breast     Endometriosis     Epilepsy with partial complex seizures (Multi)     Migraine     Taking Depakote    Neuropathy 5/3/2024    Obesity     Ovarian cyst     measuring 5cm x 3cm x 3cm- Scheduled for surgery with Dr Theodore on 24    Pelvic floor dysfunction in female     Pelvic pain     Pulmonary embolism 2024    Left lung    Seizure disorder (Multi) At 7y    F/W Dr. Sigala (Neuro) Taking Trileptal, Last seizure 2 days ago during sleep    Type A blood, Rh positive     Blood type A+    Vitamin B1 deficiency      Past Surgical History:   Procedure Laterality Date     SECTION, LOW TRANSVERSE      x 2 w/ BTL at second    COLPOSCOPY      HYSTERECTOMY  2024    OOPHORECTOMY Left      Robotic LSO w/ R cystectomy/salpingectomy    OTHER SURGICAL HISTORY      Surgical Treatment Of Spontaneous     TUBAL LIGATION       Social History     Socioeconomic History    Marital  status: Single   Tobacco Use    Smoking status: Never     Passive exposure: Never    Smokeless tobacco: Never   Vaping Use    Vaping status: Never Used   Substance and Sexual Activity    Alcohol use: Yes     Comment: Occasionally    Drug use: Never    Sexual activity: Yes     Partners: Male     Birth control/protection: Other, Surgical     Comment: Pill     Social Drivers of Health     Financial Resource Strain: Low Risk  (3/8/2025)    Overall Financial Resource Strain (CARDIA)     Difficulty of Paying Living Expenses: Not hard at all   Food Insecurity: No Food Insecurity (3/8/2025)    Hunger Vital Sign     Worried About Running Out of Food in the Last Year: Never true     Ran Out of Food in the Last Year: Never true   Transportation Needs: No Transportation Needs (3/8/2025)    PRAPARE - Transportation     Lack of Transportation (Medical): No     Lack of Transportation (Non-Medical): No   Physical Activity: Sufficiently Active (12/11/2023)    Exercise Vital Sign     Days of Exercise per Week: 5 days     Minutes of Exercise per Session: 50 min   Stress: No Stress Concern Present (12/11/2023)    [employer] - Occupational Stress Questionnaire     Feeling of Stress : Not at all   Social Connections: Unknown (12/11/2023)    Social Connection and Isolation Panel [NHANES]     Frequency of Communication with Friends and Family: More than three times a week     Frequency of Social Gatherings with Friends and Family: More than three times a week     Attends [employer] Services: More than 4 times per year     Active Member of Clubs or Organizations: Patient declined     Attends Club or Organization Meetings: Patient declined     Marital Status: Patient declined   Intimate Partner Violence: Not At Risk (3/8/2025)    Humiliation, Afraid, Rape, and Kick questionnaire     Fear of Current or Ex-Partner: No     Emotionally Abused: No     Physically Abused: No     Sexually Abused: No   Housing Stability:  "Low Risk  (3/8/2025)    Housing Stability Vital Sign     Unable to Pay for Housing in the Last Year: No     Number of Times Moved in the Last Year: 0     Homeless in the Last Year: No      Family History   Problem Relation Name Age of Onset    Hypertension Mother Mohini     Stroke Mother Mohini     Heart attack Mother Mohini     Breast cancer Mother's Sister Karol     Bone cancer Mother's Brother      Seizures Father's Brother      Breast cancer Maternal Grandmother Alexia     Other (cerebral infarction) Maternal Grandfather      Seizures Other Cousin       No Known Allergies    Objective   Physical Exam  /75 (BP Location: Right leg, Patient Position: Sitting, BP Cuff Size: Large adult)   Pulse 87   Ht 1.651 m (5' 5\")   Wt 136 kg (300 lb 8 oz)   BMI 50.01 kg/m²      General:  In no acute distress, overweight  Neuro: alert and oriented x3  CV:  RRR  Lungs: CTA bilaterally  Abd:  Soft, non-tender   Psych:  Appropriate affect  Upper extremities: No swelling, +2 radial   Lower extremities: No edema.  +2 DP  Skin:  No open ulcers.  No spider/reticular/varicose veins.  No chronic venous stasis changes     Medications   Current Outpatient Medications   Medication Instructions    apixaban (ELIQUIS) 5 mg, oral, 2 times daily    cyclobenzaprine (FLEXERIL) 5 mg, oral, 3 times daily PRN    divalproex (DEPAKOTE) 1,000 mg, oral, 2 times daily, Do not crush, chew, or split.    norethindrone (AYGESTIN) 5 mg, oral, Daily    OXcarbazepine (TRILEPTAL) 1,200 mg, oral, 2 times daily       Lab Review   Recent Labs     03/08/25  0500 03/07/25  2129 01/13/25  0943 12/28/24  1629 10/13/24  2054 07/06/24  1358 04/25/24  2104 03/24/24  2229 01/26/24  0955 12/11/23  1552 08/17/23  0137 08/03/23  0522 08/02/23  1612 07/30/23  0752 07/11/18  1312 04/13/18  1443 02/09/18  1241    136 139 137 134* 137 139 135*   < > 136   < > 136   < > 139   < > 135* 136   K 3.7 4.0 3.8 4.1 4.0 3.6 4.2 3.7   < > 4.0   < > 4.3   < > 3.7   < > 4.2 " 4.6    101 105 101 98 103 101 101   < > 103   < > 101   < > 105   < > 103 105   CO2 25 29 28 31 28 26 31 26   < > 30   < > 24   < > 25   < > 25 25   ANIONGAP 11 10 10 9* 12 12 11 12   < > 7*   < > 15   < > 13   < > 11 11   BUN 13 14 11 10 12 14 14 9   < > 12   < > 12   < > 15   < > 14 11   CREATININE 0.62 0.63 0.59 0.64 1.14* 0.81 0.98 0.75   < > 0.69   < > 0.74   < > 0.71   < > 0.65 0.63   EGFR >90 >90 >90 >90 64 >90 77 >90   < > >90   < >  --   --   --   --   --   --    MG  --   --   --   --   --   --   --   --   --  1.90  --  1.89  --  2.04  --  2.06 2.22    < > = values in this interval not displayed.     Recent Labs     03/07/25  2129 01/13/25  0943 12/28/24  1629 10/13/24  2054 07/06/24  1358 11/17/23  0153 10/23/23  1149 10/01/23  0118 10/01/23  0118 08/26/23  2216 08/17/23  0137 08/02/23  1612 05/22/23  1108   ALBUMIN 3.7 3.6 3.7 3.7 3.8   < > 4.0   < > 3.8 3.7   < > 4.1 4.1   ALKPHOS 55 49 60 48 54   < > 46   < > 41 40   < > 51 55   ALT 41 33 31 70* 33   < > 21   < > 16 17   < > 24 22   AST 48* 32 33 84* 29   < > 18   < > 19 16   < > 23 17   BILITOT 0.3 0.3 0.2 0.2 0.3   < > 0.3   < > 0.2 0.2   < > 0.2 0.4   LIPASE  --   --   --   --   --   --  7*  --  19 21  --  16 15    < > = values in this interval not displayed.     Recent Labs     03/08/25  0500 03/07/25 2129 01/13/25 0943 12/28/24  1629 10/13/24  2054 07/06/24  1358 04/25/24  2104 03/24/24  2229   WBC 6.8 7.6 7.0 7.6 7.4 7.3 8.8 6.9   HGB 11.3* 11.7* 11.7* 12.6 12.5 12.4 12.0 10.7*   HCT 31.2* 35.1* 35.2* 36.9 36.8 36.2 36.0 32.3*    343 320 330 360 360 485* 422   MCV 86 90 91 87 90 85 89 90     Recent Labs     01/13/25  0943 07/06/24  1358 03/24/24  2229 02/17/24  0807 04/13/18  1443 02/09/18  1241   INR 1.2* 1.2* 1.3* 1.3* 1.1 1.2*     PTT - 1/13/2025:  9:43 AM    Recent Labs     12/11/23  1552   CHOL 131   HDL 29.1     Lab Results   Component Value Date    HGBA1C 5.6 12/11/2023     Lab Results   Component Value Date    TSH 3.16  03/07/2025       Imaging  CT Angio chest 12/28/24  No evidence of aortic aneurysm or dissection.  No airspace consolidation or pleural effusion.  Hepatic steatosis and cholelithiasis.    MRV brain 7/6/24  Focal area of stenosis in the posterior aspect of the superior  sagittal sinus. Etiology is unclear and could be secondary to chronic  post thrombotic change from prior dural sinus thrombosis. Otherwise,  the dural venous sinuses are widely patent and there is no evidence  of deep cerebral vein thrombosis.    UE DVT US 2/19/24  Right Upper Venous: No evidence of acute deep vein thrombus visualized in the right upper extremity.  Left Upper Venous: No evidence of acute deep vein thrombus visualized in the left upper extremity.     LE DVT US 2/19/24  Right Lower Venous: No evidence of acute deep vein thrombus visualized in the right lower extremity.  Left Lower Venous: No evidence of acute deep vein thrombus visualized in the left lower extremity.     NM lung 2/17/24  Wedge-shaped segmental perfusion defect in the left upper lobe  seen on SPECT/CT suggestive of acute pulmonary embolism..    Imaging reports and labs listed above reviewed     Assessment/Plan   Danette Buenrostro is a 34 y.o. female with PMHx of Obesity, endometriosis s/p hysterectomy, h/o abnormal Paps, benign breast lesion, migraine, epilepsy since she was 7 years old    _ 2/2024, Acute PE. No UE/LE DVT. Has been on Eliquis since  Dx by VQ. CTPEs are very limited.  5 days post oophorectomy and hysterectomy for endometriosis and ovarian cyst   _ 7/2024, MRV with focal area of stenosis of the superior sagittal sinus concerning for prior dural sinus thrombosis  _ Body mass index is 50.01 kg/m².  _ Mom with h/o MI and strokes x3 in her 40s, etiology is not entirely clear     Plan   --- Patient completed treatment for a provoked VTE, but given BMI of 50, MRV results and family history, favoring continuing for longer term as long it is tolerated  --- We did  extensively discuss anticoagulation moving forward, ideally she should be on Coumadin especially with her being on antiseizure medications and BMI >50, she will think about it and let me know, if she is to continue Eliquis, would continue full dose  --- APLA   --- Highly emphasized that any chest tightness or chest pain require emergency evaluation  --- Continue following up with the PCP and GYN for up-to-date screening  --- Rest as detailed in the patient's instructions    Tatiana Echavarria MD

## 2025-04-15 NOTE — PATIENT INSTRUCTIONS
--- Close monitoring to bleeding and fall precautions while on anticoagulation   --- Advise being up to date on cancer screening, follow up with PCP  --- Advise against estrogen containing OCP/hormonal therapy if it is to be required in the future   --- Avoid first and second hand smoking   --- Weight loss highly encouraged  --- Use compression stockings daily, remove at night for comfort   --- Leg elevation above the level of the heart when sitting/sleeping  --- If traveling, please stop every 2 hours for at least 15 mins, walk around, wear your compression stockings   --- Follow up in 4-6 months, earlier if needed

## 2025-04-22 DIAGNOSIS — G40.219 LOCALIZATION-RELATED (FOCAL) (PARTIAL) SYMPTOMATIC EPILEPSY AND EPILEPTIC SYNDROMES WITH COMPLEX PARTIAL SEIZURES, INTRACTABLE, WITHOUT STATUS EPILEPTICUS: ICD-10-CM

## 2025-04-23 RX ORDER — DIVALPROEX SODIUM 500 MG/1
1000 TABLET, DELAYED RELEASE ORAL 2 TIMES DAILY
Qty: 360 TABLET | Refills: 3 | Status: SHIPPED | OUTPATIENT
Start: 2025-04-23 | End: 2026-04-23

## 2025-05-12 ENCOUNTER — HOSPITAL ENCOUNTER (EMERGENCY)
Facility: HOSPITAL | Age: 35
Discharge: HOME | End: 2025-05-12
Payer: MEDICARE

## 2025-05-12 VITALS
DIASTOLIC BLOOD PRESSURE: 95 MMHG | TEMPERATURE: 98.1 F | HEART RATE: 83 BPM | RESPIRATION RATE: 18 BRPM | OXYGEN SATURATION: 98 % | SYSTOLIC BLOOD PRESSURE: 161 MMHG

## 2025-05-12 DIAGNOSIS — M54.42 ACUTE LEFT-SIDED LOW BACK PAIN WITH LEFT-SIDED SCIATICA: Primary | ICD-10-CM

## 2025-05-12 PROCEDURE — 96372 THER/PROPH/DIAG INJ SC/IM: CPT | Performed by: PHYSICIAN ASSISTANT

## 2025-05-12 PROCEDURE — 2500000005 HC RX 250 GENERAL PHARMACY W/O HCPCS: Performed by: PHYSICIAN ASSISTANT

## 2025-05-12 PROCEDURE — 2500000004 HC RX 250 GENERAL PHARMACY W/ HCPCS (ALT 636 FOR OP/ED): Mod: JZ | Performed by: PHYSICIAN ASSISTANT

## 2025-05-12 PROCEDURE — 99284 EMERGENCY DEPT VISIT MOD MDM: CPT

## 2025-05-12 RX ORDER — TIZANIDINE 4 MG/1
4 TABLET ORAL EVERY 6 HOURS PRN
Qty: 30 TABLET | Refills: 0 | Status: SHIPPED | OUTPATIENT
Start: 2025-05-12 | End: 2025-05-22

## 2025-05-12 RX ORDER — ORPHENADRINE CITRATE 30 MG/ML
60 INJECTION INTRAMUSCULAR; INTRAVENOUS ONCE
Status: COMPLETED | OUTPATIENT
Start: 2025-05-12 | End: 2025-05-12

## 2025-05-12 RX ORDER — LIDOCAINE 560 MG/1
1 PATCH PERCUTANEOUS; TOPICAL; TRANSDERMAL DAILY
Status: DISCONTINUED | OUTPATIENT
Start: 2025-05-12 | End: 2025-05-12 | Stop reason: HOSPADM

## 2025-05-12 RX ORDER — LIDOCAINE 560 MG/1
1 PATCH PERCUTANEOUS; TOPICAL; TRANSDERMAL DAILY
Qty: 6 PATCH | Refills: 0 | Status: SHIPPED | OUTPATIENT
Start: 2025-05-12

## 2025-05-12 RX ORDER — KETOROLAC TROMETHAMINE 30 MG/ML
30 INJECTION, SOLUTION INTRAMUSCULAR; INTRAVENOUS ONCE
Status: COMPLETED | OUTPATIENT
Start: 2025-05-12 | End: 2025-05-12

## 2025-05-12 RX ORDER — IBUPROFEN 600 MG/1
600 TABLET ORAL EVERY 6 HOURS PRN
Qty: 28 TABLET | Refills: 0 | Status: SHIPPED | OUTPATIENT
Start: 2025-05-12 | End: 2025-05-19

## 2025-05-12 RX ADMIN — KETOROLAC TROMETHAMINE 30 MG: 30 INJECTION, SOLUTION INTRAMUSCULAR at 08:46

## 2025-05-12 RX ADMIN — LIDOCAINE 4% 1 PATCH: 40 PATCH TOPICAL at 08:46

## 2025-05-12 RX ADMIN — ORPHENADRINE CITRATE 60 MG: 60 INJECTION INTRAMUSCULAR; INTRAVENOUS at 08:46

## 2025-05-12 ASSESSMENT — PAIN - FUNCTIONAL ASSESSMENT: PAIN_FUNCTIONAL_ASSESSMENT: 0-10

## 2025-05-12 ASSESSMENT — PAIN SCALES - GENERAL: PAINLEVEL_OUTOF10: 10 - WORST POSSIBLE PAIN

## 2025-05-12 NOTE — ED TRIAGE NOTES
Patient to ED for c/o lower back pain. Patient denies any urinary issues. Patient denies any injury.

## 2025-05-12 NOTE — ED PROVIDER NOTES
HPI   Chief Complaint   Patient presents with    Back Pain       Is a 35-year-old female who complains of low back pain on the left side with some radiation to the left thigh.  Nothing makes it better or worse denies other injuries.  Has had sciatica in the past but did not lift Lafleur bend to cause her symptoms.  No bowel or bladder incontinence no saddle anesthesia or paresthesia              Patient History   Medical History[1]  Surgical History[2]  Family History[3]  Social History[4]    Physical Exam   ED Triage Vitals [05/12/25 0750]   Temperature Heart Rate Respirations BP   36.7 °C (98.1 °F) 83 18 (!) 161/95      Pulse Ox Temp src Heart Rate Source Patient Position   98 % -- -- --      BP Location FiO2 (%)     -- --       Physical Exam  Vitals and nursing note reviewed.   Constitutional:       General: She is not in acute distress.     Appearance: Normal appearance. She is normal weight. She is not ill-appearing, toxic-appearing or diaphoretic.   HENT:      Head: Normocephalic and atraumatic.      Right Ear: External ear normal.      Left Ear: External ear normal.      Nose: Nose normal.      Mouth/Throat:      Mouth: Mucous membranes are moist.   Eyes:      Extraocular Movements: Extraocular movements intact.      Conjunctiva/sclera: Conjunctivae normal.      Pupils: Pupils are equal, round, and reactive to light.   Cardiovascular:      Rate and Rhythm: Normal rate and regular rhythm.      Heart sounds: No murmur heard.  Pulmonary:      Effort: Pulmonary effort is normal. No respiratory distress.      Breath sounds: No stridor.   Abdominal:      General: There is no distension.      Tenderness: There is no right CVA tenderness, left CVA tenderness, guarding or rebound.   Musculoskeletal:         General: Tenderness present. No swelling or deformity.      Cervical back: Normal range of motion.      Comments: L lumbar sI joint pain. No straight leg raise or foot drop   Skin:     General: Skin is warm.       Capillary Refill: Capillary refill takes less than 2 seconds.      Coloration: Skin is not jaundiced.      Findings: No bruising or rash.   Neurological:      General: No focal deficit present.      Mental Status: She is alert and oriented to person, place, and time. Mental status is at baseline.      Cranial Nerves: No cranial nerve deficit.      Sensory: No sensory deficit.      Motor: No weakness.   Psychiatric:         Mood and Affect: Mood normal.         Behavior: Behavior normal.         Thought Content: Thought content normal.         Judgment: Judgment normal.           ED Course & MDM   Diagnoses as of 25   Acute left-sided low back pain with left-sided sciatica                 No data recorded     Dina Coma Scale Score: 15 (25 0852 : Nilson Dueñas RN)                           Medical Decision Making  Differential diagnosis of back pain is sprain strain radiculopathy, fracture, spondylolisthesis, cauda equina syndrome, mets, abscess, dissection, pyelonephritis/UTI, shingles,    Considered imaging but unwarranted no red flags of back pain.  Discharged with NSAIDs and muscle relaxant lidocaine patch return precautions given        Procedure  Procedures       [1]   Past Medical History:  Diagnosis Date    Benign neoplasm of breast     Endometriosis     Epilepsy with partial complex seizures (Multi)     Migraine     Taking Depakote    Neuropathy 5/3/2024    Obesity     Ovarian cyst     measuring 5cm x 3cm x 3cm- Scheduled for surgery with Dr Theodore on 24    Pelvic floor dysfunction in female     Pelvic pain     Pulmonary embolism 2024    Left lung    Seizure disorder (Multi) At 7y    F/W Dr. Sigala (Neuro) Taking Trileptal, Last seizure 2 days ago during sleep    Type A blood, Rh positive     Blood type A+    Vitamin B1 deficiency    [2]   Past Surgical History:  Procedure Laterality Date     SECTION, LOW TRANSVERSE      x 2 w/ BTL at second    COLPOSCOPY       HYSTERECTOMY  2024    OOPHORECTOMY Left      Robotic LSO w/ R cystectomy/salpingectomy    OTHER SURGICAL HISTORY      Surgical Treatment Of Spontaneous     TUBAL LIGATION     [3]   Family History  Problem Relation Name Age of Onset    Hypertension Mother Mohini     Stroke Mother Mohini     Heart attack Mother Mohini     Breast cancer Mother's Sister Karol     Bone cancer Mother's Brother      Seizures Father's Brother      Breast cancer Maternal Grandmother Alexia     Other (cerebral infarction) Maternal Grandfather      Seizures Other Cousin    [4]   Social History  Tobacco Use    Smoking status: Never     Passive exposure: Never    Smokeless tobacco: Never   Vaping Use    Vaping status: Never Used   Substance Use Topics    Alcohol use: Yes     Comment: Occasionally    Drug use: Never        Laci Ramirez PA-C  [phone]

## 2025-05-30 DIAGNOSIS — R10.2 PELVIC PAIN: Primary | ICD-10-CM

## 2025-05-30 RX ORDER — ACETAMINOPHEN AND CODEINE PHOSPHATE 300; 30 MG/1; MG/1
1 TABLET ORAL EVERY 6 HOURS PRN
Qty: 10 TABLET | Refills: 0 | Status: SHIPPED | OUTPATIENT
Start: 2025-05-30

## 2025-06-26 ENCOUNTER — HOSPITAL ENCOUNTER (EMERGENCY)
Facility: HOSPITAL | Age: 35
Discharge: HOME | DRG: 418 | End: 2025-06-26
Attending: EMERGENCY MEDICINE
Payer: MEDICARE

## 2025-06-26 VITALS
WEIGHT: 280 LBS | HEIGHT: 65 IN | DIASTOLIC BLOOD PRESSURE: 88 MMHG | BODY MASS INDEX: 46.65 KG/M2 | RESPIRATION RATE: 18 BRPM | OXYGEN SATURATION: 98 % | HEART RATE: 94 BPM | SYSTOLIC BLOOD PRESSURE: 128 MMHG | TEMPERATURE: 97.7 F

## 2025-06-26 DIAGNOSIS — R19.7 DIARRHEA, UNSPECIFIED TYPE: Primary | ICD-10-CM

## 2025-06-26 LAB
ALBUMIN SERPL BCP-MCNC: 4 G/DL (ref 3.4–5)
ALP SERPL-CCNC: 53 U/L (ref 33–110)
ALT SERPL W P-5'-P-CCNC: 64 U/L (ref 7–45)
ANION GAP SERPL CALC-SCNC: 13 MMOL/L (ref 10–20)
AST SERPL W P-5'-P-CCNC: 68 U/L (ref 9–39)
B-HCG SERPL-ACNC: <2 MIU/ML
BASOPHILS # BLD AUTO: 0.02 X10*3/UL (ref 0–0.1)
BASOPHILS NFR BLD AUTO: 0.2 %
BILIRUB SERPL-MCNC: 0.4 MG/DL (ref 0–1.2)
BUN SERPL-MCNC: 8 MG/DL (ref 6–23)
CALCIUM SERPL-MCNC: 8.8 MG/DL (ref 8.6–10.3)
CHLORIDE SERPL-SCNC: 104 MMOL/L (ref 98–107)
CO2 SERPL-SCNC: 23 MMOL/L (ref 21–32)
CREAT SERPL-MCNC: 0.76 MG/DL (ref 0.5–1.05)
EGFRCR SERPLBLD CKD-EPI 2021: >90 ML/MIN/1.73M*2
EOSINOPHIL # BLD AUTO: 0.68 X10*3/UL (ref 0–0.7)
EOSINOPHIL NFR BLD AUTO: 6.2 %
ERYTHROCYTE [DISTWIDTH] IN BLOOD BY AUTOMATED COUNT: 13.7 % (ref 11.5–14.5)
GLUCOSE SERPL-MCNC: 101 MG/DL (ref 74–99)
HCT VFR BLD AUTO: 39.6 % (ref 36–46)
HGB BLD-MCNC: 13.9 G/DL (ref 12–16)
IMM GRANULOCYTES # BLD AUTO: 0.09 X10*3/UL (ref 0–0.7)
IMM GRANULOCYTES NFR BLD AUTO: 0.8 % (ref 0–0.9)
LIPASE SERPL-CCNC: 8 U/L (ref 9–82)
LYMPHOCYTES # BLD AUTO: 2.62 X10*3/UL (ref 1.2–4.8)
LYMPHOCYTES NFR BLD AUTO: 24 %
MCH RBC QN AUTO: 30.4 PG (ref 26–34)
MCHC RBC AUTO-ENTMCNC: 35.1 G/DL (ref 32–36)
MCV RBC AUTO: 87 FL (ref 80–100)
MONOCYTES # BLD AUTO: 0.97 X10*3/UL (ref 0.1–1)
MONOCYTES NFR BLD AUTO: 8.9 %
NEUTROPHILS # BLD AUTO: 6.52 X10*3/UL (ref 1.2–7.7)
NEUTROPHILS NFR BLD AUTO: 59.9 %
NRBC BLD-RTO: 0 /100 WBCS (ref 0–0)
PLATELET # BLD AUTO: 352 X10*3/UL (ref 150–450)
POTASSIUM SERPL-SCNC: 3.7 MMOL/L (ref 3.5–5.3)
PROT SERPL-MCNC: 7.6 G/DL (ref 6.4–8.2)
RBC # BLD AUTO: 4.57 X10*6/UL (ref 4–5.2)
SODIUM SERPL-SCNC: 136 MMOL/L (ref 136–145)
WBC # BLD AUTO: 10.9 X10*3/UL (ref 4.4–11.3)

## 2025-06-26 PROCEDURE — 2500000001 HC RX 250 WO HCPCS SELF ADMINISTERED DRUGS (ALT 637 FOR MEDICARE OP): Performed by: EMERGENCY MEDICINE

## 2025-06-26 PROCEDURE — 96375 TX/PRO/DX INJ NEW DRUG ADDON: CPT

## 2025-06-26 PROCEDURE — 99284 EMERGENCY DEPT VISIT MOD MDM: CPT | Mod: 25 | Performed by: EMERGENCY MEDICINE

## 2025-06-26 PROCEDURE — 36415 COLL VENOUS BLD VENIPUNCTURE: CPT | Performed by: EMERGENCY MEDICINE

## 2025-06-26 PROCEDURE — 96374 THER/PROPH/DIAG INJ IV PUSH: CPT

## 2025-06-26 PROCEDURE — 96361 HYDRATE IV INFUSION ADD-ON: CPT

## 2025-06-26 PROCEDURE — 85025 COMPLETE CBC W/AUTO DIFF WBC: CPT | Performed by: EMERGENCY MEDICINE

## 2025-06-26 PROCEDURE — 2500000004 HC RX 250 GENERAL PHARMACY W/ HCPCS (ALT 636 FOR OP/ED): Performed by: EMERGENCY MEDICINE

## 2025-06-26 PROCEDURE — 84075 ASSAY ALKALINE PHOSPHATASE: CPT | Performed by: EMERGENCY MEDICINE

## 2025-06-26 PROCEDURE — 84702 CHORIONIC GONADOTROPIN TEST: CPT | Performed by: EMERGENCY MEDICINE

## 2025-06-26 PROCEDURE — 83690 ASSAY OF LIPASE: CPT | Performed by: EMERGENCY MEDICINE

## 2025-06-26 RX ORDER — LOPERAMIDE HYDROCHLORIDE 2 MG/1
4 CAPSULE ORAL ONCE
Status: COMPLETED | OUTPATIENT
Start: 2025-06-26 | End: 2025-06-26

## 2025-06-26 RX ORDER — DICYCLOMINE HYDROCHLORIDE 20 MG/1
20 TABLET ORAL 3 TIMES DAILY
Qty: 15 TABLET | Refills: 0 | Status: SHIPPED | OUTPATIENT
Start: 2025-06-26 | End: 2025-07-01

## 2025-06-26 RX ORDER — LOPERAMIDE HYDROCHLORIDE 2 MG/1
2 CAPSULE ORAL 4 TIMES DAILY PRN
Qty: 20 CAPSULE | Refills: 0 | Status: SHIPPED | OUTPATIENT
Start: 2025-06-26 | End: 2025-07-01

## 2025-06-26 RX ORDER — KETOROLAC TROMETHAMINE 30 MG/ML
15 INJECTION, SOLUTION INTRAMUSCULAR; INTRAVENOUS ONCE
Status: COMPLETED | OUTPATIENT
Start: 2025-06-26 | End: 2025-06-26

## 2025-06-26 RX ORDER — ONDANSETRON HYDROCHLORIDE 2 MG/ML
4 INJECTION, SOLUTION INTRAVENOUS ONCE
Status: COMPLETED | OUTPATIENT
Start: 2025-06-26 | End: 2025-06-26

## 2025-06-26 RX ORDER — ONDANSETRON 8 MG/1
8 TABLET, FILM COATED ORAL EVERY 8 HOURS PRN
Qty: 15 TABLET | Refills: 0 | Status: SHIPPED | OUTPATIENT
Start: 2025-06-26 | End: 2025-07-05 | Stop reason: WASHOUT

## 2025-06-26 RX ADMIN — KETOROLAC TROMETHAMINE 15 MG: 30 INJECTION, SOLUTION INTRAMUSCULAR at 15:12

## 2025-06-26 RX ADMIN — LOPERAMIDE HYDROCHLORIDE 4 MG: 2 CAPSULE ORAL at 15:15

## 2025-06-26 RX ADMIN — SODIUM CHLORIDE 1000 ML: 0.9 INJECTION, SOLUTION INTRAVENOUS at 15:12

## 2025-06-26 RX ADMIN — ONDANSETRON 4 MG: 2 INJECTION, SOLUTION INTRAMUSCULAR; INTRAVENOUS at 15:12

## 2025-06-26 ASSESSMENT — PAIN DESCRIPTION - LOCATION: LOCATION: ABDOMEN

## 2025-06-26 ASSESSMENT — PAIN - FUNCTIONAL ASSESSMENT
PAIN_FUNCTIONAL_ASSESSMENT: 0-10
PAIN_FUNCTIONAL_ASSESSMENT: 0-10

## 2025-06-26 ASSESSMENT — PAIN DESCRIPTION - DESCRIPTORS: DESCRIPTORS: CRAMPING

## 2025-06-26 ASSESSMENT — PAIN SCALES - GENERAL
PAINLEVEL_OUTOF10: 8
PAINLEVEL_OUTOF10: 4

## 2025-06-26 NOTE — ED TRIAGE NOTES
TRIAGE NOTE   I saw the patient as the Clinician in Triage and performed a brief history and physical exam, established acuity, and ordered appropriate tests to develop basic plan of care. Patient will be seen by an SARAH, resident and/or physician who will independently evaluate the patient. Please see subsequent provider notes for further details and disposition.     Chief complaint: Diarrhea, abdominal pain    History of present illness: Patient is a 35-year-old female with history of epilepsy presenting to the emergency department with complaints of diarrhea.  According to the patient, she has been experiencing her symptoms over the past 4 days.  The patient states that during this period of time, she has been having persistent diarrhea and abdominal discomfort.  The patient denies any recent sick contact she denies any recent antibiotic use.  The patient denies any melena or hematochezia.  Concern for this, the patient presents to the emergency department for further evaluation.  Patient states that recently her son started experiencing similar symptoms.    Physical examination: General: Patient is an age-appropriate well-appearing female resting comfortably in the examination table  Cardiovascular: Patient has a regular rate and rhythm  Lungs: Lungs are clear to auscultation bilaterally  Abdomen: Patient has diffuse tenderness to palpation there is no guarding there is no rebound tenderness.  Patient has normal bowel sounds  Neuro: Patient is alert she moves all 4 extremities spontaneously there are no lateralizing neurologic deficits cranial nerves III through XII are intact.    Medical Decision Making: Patient presents today with history and physical examination very suggestive of a viral gastroenteritis.  Blood work was ordered.  I ordered antiemetics IV hydration as well as antidiarrheals for the patient.

## 2025-06-26 NOTE — ED PROVIDER NOTES
HPI   Chief Complaint   Patient presents with    Abdominal Pain    Diarrhea       Chief complaint: Diarrhea, abdominal pain     History of present illness: Patient is a 35-year-old female with history of epilepsy presenting to the emergency department with complaints of diarrhea.  According to the patient, she has been experiencing her symptoms over the past 4 days.  The patient states that during this period of time, she has been having persistent diarrhea and abdominal discomfort.  The patient denies any recent sick contact she denies any recent antibiotic use.  The patient denies any melena or hematochezia.  Concern for this, the patient presents to the emergency department for further evaluation.  Patient states that recently her son started experiencing similar symptoms.        History provided by:  Patient   used: No            Patient History   Medical History[1]  Surgical History[2]  Family History[3]  Social History[4]    Physical Exam   ED Triage Vitals [06/26/25 1342]   Temperature Heart Rate Respirations BP   36.5 °C (97.7 °F) 94 18 128/88      Pulse Ox Temp Source Heart Rate Source Patient Position   98 % Temporal -- --      BP Location FiO2 (%)     -- --       Physical Exam  Constitutional:       Appearance: Normal appearance.   HENT:      Head: Normocephalic and atraumatic.      Right Ear: External ear normal.      Left Ear: External ear normal.      Nose: Nose normal.      Mouth/Throat:      Mouth: Mucous membranes are moist.   Eyes:      General: Lids are normal.      Extraocular Movements: Extraocular movements intact.      Pupils: Pupils are equal, round, and reactive to light.   Cardiovascular:      Rate and Rhythm: Normal rate and regular rhythm.      Heart sounds: Normal heart sounds.   Pulmonary:      Effort: Pulmonary effort is normal.      Breath sounds: Normal breath sounds.   Abdominal:      General: Abdomen is flat.      Palpations: Abdomen is soft.      Tenderness:  There is abdominal tenderness. There is no guarding or rebound.   Musculoskeletal:         General: No deformity. Normal range of motion.      Cervical back: Normal range of motion and neck supple.   Skin:     General: Skin is warm.      Capillary Refill: Capillary refill takes less than 2 seconds.      Coloration: Skin is not jaundiced.   Neurological:      General: No focal deficit present.      Mental Status: She is alert and oriented to person, place, and time.   Psychiatric:         Mood and Affect: Mood normal.         Behavior: Behavior normal.           ED Course & MDM                  No data recorded     [place] Coma Scale Score: 15 (25 1343 : Paolo Hill, LYNNETTE)                           Medical Decision Making      Procedure  Procedures       [1]   Past Medical History:  Diagnosis Date    Benign neoplasm of breast     Endometriosis     Epilepsy with partial complex seizures (Multi)     Migraine     Taking Depakote    Neuropathy 5/3/2024    Obesity     Ovarian cyst     measuring 5cm x 3cm x 3cm- Scheduled for surgery with Dr Theodore on 24    Pelvic floor dysfunction in female     Pelvic pain     Pulmonary embolism 2024    Left lung    Seizure disorder (Multi) At 7y    F/W Dr. Sigala (Neuro) Taking Trileptal, Last seizure 2 days ago during sleep    Type A blood, Rh positive     Blood type A+    Vitamin B1 deficiency    [2]   Past Surgical History:  Procedure Laterality Date     SECTION, LOW TRANSVERSE      x 2 w/ BTL at second    COLPOSCOPY      HYSTERECTOMY  2024    OOPHORECTOMY Left      Robotic LSO w/ R cystectomy/salpingectomy    OTHER SURGICAL HISTORY      Surgical Treatment Of Spontaneous     TUBAL LIGATION     [3]   Family History  Problem Relation Name Age of Onset    Hypertension Mother Mohini     Stroke Mother Mohini     Heart attack Mother Mohini     Breast cancer Mother's Sister Karol     Bone cancer Mother's Brother      Seizures Father's  Brother      Breast cancer Maternal Grandmother Alexia     Other (cerebral infarction) Maternal Grandfather      Seizures Other Cousin    [4]   Social History  Tobacco Use    Smoking status: Never     Passive exposure: Never    Smokeless tobacco: Never   Vaping Use    Vaping status: Never Used   Substance Use Topics    Alcohol use: Yes     Comment: Occasionally    Drug use: Never

## 2025-06-28 ENCOUNTER — APPOINTMENT (OUTPATIENT)
Dept: RADIOLOGY | Facility: HOSPITAL | Age: 35
DRG: 418 | End: 2025-06-28
Payer: MEDICARE

## 2025-06-28 ENCOUNTER — HOSPITAL ENCOUNTER (INPATIENT)
Facility: HOSPITAL | Age: 35
DRG: 418 | End: 2025-06-28
Attending: EMERGENCY MEDICINE | Admitting: NURSE PRACTITIONER
Payer: MEDICARE

## 2025-06-28 DIAGNOSIS — K80.20 GALLSTONES: Primary | ICD-10-CM

## 2025-06-28 DIAGNOSIS — N12 PYELONEPHRITIS: ICD-10-CM

## 2025-06-28 DIAGNOSIS — M25.511 ACUTE PAIN OF RIGHT SHOULDER: ICD-10-CM

## 2025-06-28 DIAGNOSIS — K80.20 CALCULUS OF GALLBLADDER WITHOUT CHOLECYSTITIS WITHOUT OBSTRUCTION: ICD-10-CM

## 2025-06-28 LAB
ALBUMIN SERPL BCP-MCNC: 3.8 G/DL (ref 3.4–5)
ALP SERPL-CCNC: 53 U/L (ref 33–110)
ALT SERPL W P-5'-P-CCNC: 142 U/L (ref 7–45)
ANION GAP SERPL CALC-SCNC: 11 MMOL/L (ref 10–20)
AST SERPL W P-5'-P-CCNC: 98 U/L (ref 9–39)
B-HCG SERPL-ACNC: <2 MIU/ML
BASOPHILS # BLD AUTO: 0.04 X10*3/UL (ref 0–0.1)
BASOPHILS NFR BLD AUTO: 0.4 %
BILIRUB SERPL-MCNC: 0.4 MG/DL (ref 0–1.2)
BUN SERPL-MCNC: 6 MG/DL (ref 6–23)
CALCIUM SERPL-MCNC: 8.4 MG/DL (ref 8.6–10.3)
CHLORIDE SERPL-SCNC: 105 MMOL/L (ref 98–107)
CO2 SERPL-SCNC: 25 MMOL/L (ref 21–32)
CREAT SERPL-MCNC: 0.77 MG/DL (ref 0.5–1.05)
EGFRCR SERPLBLD CKD-EPI 2021: >90 ML/MIN/1.73M*2
EOSINOPHIL # BLD AUTO: 0.98 X10*3/UL (ref 0–0.7)
EOSINOPHIL NFR BLD AUTO: 8.8 %
ERYTHROCYTE [DISTWIDTH] IN BLOOD BY AUTOMATED COUNT: 13.7 % (ref 11.5–14.5)
GLUCOSE SERPL-MCNC: 92 MG/DL (ref 74–99)
HCT VFR BLD AUTO: 35.9 % (ref 36–46)
HGB BLD-MCNC: 12.5 G/DL (ref 12–16)
IMM GRANULOCYTES # BLD AUTO: 0.14 X10*3/UL (ref 0–0.7)
IMM GRANULOCYTES NFR BLD AUTO: 1.3 % (ref 0–0.9)
LACTATE SERPL-SCNC: 1 MMOL/L (ref 0.4–2)
LIPASE SERPL-CCNC: 8 U/L (ref 9–82)
LYMPHOCYTES # BLD AUTO: 3.14 X10*3/UL (ref 1.2–4.8)
LYMPHOCYTES NFR BLD AUTO: 28.2 %
MCH RBC QN AUTO: 30 PG (ref 26–34)
MCHC RBC AUTO-ENTMCNC: 34.8 G/DL (ref 32–36)
MCV RBC AUTO: 86 FL (ref 80–100)
MONOCYTES # BLD AUTO: 0.92 X10*3/UL (ref 0.1–1)
MONOCYTES NFR BLD AUTO: 8.3 %
NEUTROPHILS # BLD AUTO: 5.93 X10*3/UL (ref 1.2–7.7)
NEUTROPHILS NFR BLD AUTO: 53 %
NRBC BLD-RTO: 0 /100 WBCS (ref 0–0)
PLATELET # BLD AUTO: 314 X10*3/UL (ref 150–450)
POTASSIUM SERPL-SCNC: 3.7 MMOL/L (ref 3.5–5.3)
PROT SERPL-MCNC: 7.2 G/DL (ref 6.4–8.2)
RBC # BLD AUTO: 4.17 X10*6/UL (ref 4–5.2)
SODIUM SERPL-SCNC: 137 MMOL/L (ref 136–145)
WBC # BLD AUTO: 11.2 X10*3/UL (ref 4.4–11.3)

## 2025-06-28 PROCEDURE — 96375 TX/PRO/DX INJ NEW DRUG ADDON: CPT

## 2025-06-28 PROCEDURE — 96376 TX/PRO/DX INJ SAME DRUG ADON: CPT

## 2025-06-28 PROCEDURE — 96374 THER/PROPH/DIAG INJ IV PUSH: CPT

## 2025-06-28 PROCEDURE — 2550000001 HC RX 255 CONTRASTS: Performed by: EMERGENCY MEDICINE

## 2025-06-28 PROCEDURE — 74177 CT ABD & PELVIS W/CONTRAST: CPT

## 2025-06-28 PROCEDURE — 76705 ECHO EXAM OF ABDOMEN: CPT | Performed by: STUDENT IN AN ORGANIZED HEALTH CARE EDUCATION/TRAINING PROGRAM

## 2025-06-28 PROCEDURE — 76856 US EXAM PELVIC COMPLETE: CPT | Performed by: SURGERY

## 2025-06-28 PROCEDURE — 96361 HYDRATE IV INFUSION ADD-ON: CPT

## 2025-06-28 PROCEDURE — 83690 ASSAY OF LIPASE: CPT | Performed by: EMERGENCY MEDICINE

## 2025-06-28 PROCEDURE — 36415 COLL VENOUS BLD VENIPUNCTURE: CPT | Performed by: EMERGENCY MEDICINE

## 2025-06-28 PROCEDURE — 99221 1ST HOSP IP/OBS SF/LOW 40: CPT

## 2025-06-28 PROCEDURE — 82565 ASSAY OF CREATININE: CPT | Performed by: EMERGENCY MEDICINE

## 2025-06-28 PROCEDURE — 76705 ECHO EXAM OF ABDOMEN: CPT

## 2025-06-28 PROCEDURE — 2500000004 HC RX 250 GENERAL PHARMACY W/ HCPCS (ALT 636 FOR OP/ED): Performed by: EMERGENCY MEDICINE

## 2025-06-28 PROCEDURE — 74177 CT ABD & PELVIS W/CONTRAST: CPT | Performed by: SURGERY

## 2025-06-28 PROCEDURE — 1210000001 HC SEMI-PRIVATE ROOM DAILY

## 2025-06-28 PROCEDURE — 85025 COMPLETE CBC W/AUTO DIFF WBC: CPT | Performed by: EMERGENCY MEDICINE

## 2025-06-28 PROCEDURE — 76856 US EXAM PELVIC COMPLETE: CPT

## 2025-06-28 PROCEDURE — 84702 CHORIONIC GONADOTROPIN TEST: CPT | Performed by: EMERGENCY MEDICINE

## 2025-06-28 PROCEDURE — 87040 BLOOD CULTURE FOR BACTERIA: CPT | Mod: AHULAB | Performed by: EMERGENCY MEDICINE

## 2025-06-28 PROCEDURE — 83605 ASSAY OF LACTIC ACID: CPT | Performed by: EMERGENCY MEDICINE

## 2025-06-28 PROCEDURE — 99285 EMERGENCY DEPT VISIT HI MDM: CPT | Mod: 25 | Performed by: EMERGENCY MEDICINE

## 2025-06-28 RX ORDER — CEFTRIAXONE 1 G/50ML
1 INJECTION, SOLUTION INTRAVENOUS EVERY 24 HOURS
Status: DISCONTINUED | OUTPATIENT
Start: 2025-06-29 | End: 2025-06-30

## 2025-06-28 RX ORDER — ONDANSETRON HYDROCHLORIDE 2 MG/ML
4 INJECTION, SOLUTION INTRAVENOUS ONCE
Status: COMPLETED | OUTPATIENT
Start: 2025-06-28 | End: 2025-06-28

## 2025-06-28 RX ORDER — TALC
6 POWDER (GRAM) TOPICAL NIGHTLY PRN
Status: DISCONTINUED | OUTPATIENT
Start: 2025-06-28 | End: 2025-06-30 | Stop reason: HOSPADM

## 2025-06-28 RX ORDER — PANTOPRAZOLE SODIUM 40 MG/10ML
40 INJECTION, POWDER, LYOPHILIZED, FOR SOLUTION INTRAVENOUS
Status: DISCONTINUED | OUTPATIENT
Start: 2025-06-29 | End: 2025-06-30 | Stop reason: HOSPADM

## 2025-06-28 RX ORDER — CEFTRIAXONE 1 G/50ML
1 INJECTION, SOLUTION INTRAVENOUS ONCE
Status: COMPLETED | OUTPATIENT
Start: 2025-06-28 | End: 2025-06-28

## 2025-06-28 RX ORDER — PANTOPRAZOLE SODIUM 40 MG/1
40 TABLET, DELAYED RELEASE ORAL
Status: DISCONTINUED | OUTPATIENT
Start: 2025-06-29 | End: 2025-06-30 | Stop reason: HOSPADM

## 2025-06-28 RX ORDER — AMOXICILLIN 250 MG
2 CAPSULE ORAL 2 TIMES DAILY PRN
Status: DISCONTINUED | OUTPATIENT
Start: 2025-06-28 | End: 2025-06-30 | Stop reason: HOSPADM

## 2025-06-28 RX ORDER — SODIUM CHLORIDE, SODIUM LACTATE, POTASSIUM CHLORIDE, CALCIUM CHLORIDE 600; 310; 30; 20 MG/100ML; MG/100ML; MG/100ML; MG/100ML
125 INJECTION, SOLUTION INTRAVENOUS CONTINUOUS
Status: ACTIVE | OUTPATIENT
Start: 2025-06-28 | End: 2025-06-29

## 2025-06-28 RX ORDER — KETOROLAC TROMETHAMINE 30 MG/ML
15 INJECTION, SOLUTION INTRAMUSCULAR; INTRAVENOUS ONCE
Status: COMPLETED | OUTPATIENT
Start: 2025-06-28 | End: 2025-06-28

## 2025-06-28 RX ORDER — TALC
6 POWDER (GRAM) TOPICAL NIGHTLY PRN
Status: DISCONTINUED | OUTPATIENT
Start: 2025-06-28 | End: 2025-06-28

## 2025-06-28 RX ORDER — ACETAMINOPHEN 325 MG/1
950 TABLET ORAL EVERY 6 HOURS PRN
Status: DISCONTINUED | OUTPATIENT
Start: 2025-06-28 | End: 2025-06-30 | Stop reason: HOSPADM

## 2025-06-28 RX ORDER — ACETAMINOPHEN 325 MG/1
650 TABLET ORAL EVERY 6 HOURS PRN
Status: DISCONTINUED | OUTPATIENT
Start: 2025-06-28 | End: 2025-06-30 | Stop reason: HOSPADM

## 2025-06-28 RX ADMIN — KETOROLAC TROMETHAMINE 15 MG: 30 INJECTION, SOLUTION INTRAMUSCULAR at 17:02

## 2025-06-28 RX ADMIN — CEFTRIAXONE 1 G: 1 INJECTION, SOLUTION INTRAVENOUS at 22:31

## 2025-06-28 RX ADMIN — ONDANSETRON 4 MG: 2 INJECTION, SOLUTION INTRAMUSCULAR; INTRAVENOUS at 17:02

## 2025-06-28 RX ADMIN — IOHEXOL 75 ML: 350 INJECTION, SOLUTION INTRAVENOUS at 18:03

## 2025-06-28 RX ADMIN — SODIUM CHLORIDE, SODIUM LACTATE, POTASSIUM CHLORIDE, AND CALCIUM CHLORIDE 1000 ML: .6; .31; .03; .02 INJECTION, SOLUTION INTRAVENOUS at 17:03

## 2025-06-28 RX ADMIN — HYDROMORPHONE HYDROCHLORIDE 0.5 MG: 1 INJECTION, SOLUTION INTRAMUSCULAR; INTRAVENOUS; SUBCUTANEOUS at 18:13

## 2025-06-28 RX ADMIN — HYDROMORPHONE HYDROCHLORIDE 0.5 MG: 1 INJECTION, SOLUTION INTRAMUSCULAR; INTRAVENOUS; SUBCUTANEOUS at 22:43

## 2025-06-28 RX ADMIN — SODIUM CHLORIDE, POTASSIUM CHLORIDE, SODIUM LACTATE AND CALCIUM CHLORIDE 125 ML/HR: 600; 310; 30; 20 INJECTION, SOLUTION INTRAVENOUS at 22:44

## 2025-06-28 ASSESSMENT — PAIN SCALES - GENERAL
PAINLEVEL_OUTOF10: 10 - WORST POSSIBLE PAIN
PAINLEVEL_OUTOF10: 7
PAINLEVEL_OUTOF10: 1

## 2025-06-28 ASSESSMENT — PAIN DESCRIPTION - LOCATION: LOCATION: ABDOMEN

## 2025-06-28 ASSESSMENT — PAIN - FUNCTIONAL ASSESSMENT
PAIN_FUNCTIONAL_ASSESSMENT: 0-10
PAIN_FUNCTIONAL_ASSESSMENT: 0-10

## 2025-06-28 ASSESSMENT — PAIN DESCRIPTION - PAIN TYPE: TYPE: ACUTE PAIN

## 2025-06-28 NOTE — Clinical Note
[facility] Recommendation: [facility]- Rec. (06/28/25 1954 : Dionna Cheek RN)

## 2025-06-28 NOTE — ED PROVIDER NOTES
HPI   Chief Complaint   Patient presents with    Abdominal Pain       The patient is a 35-year-old female past medical history of hysterectomy/left salpingo-oophorectomy presenting with abdominal pain, nausea, vomiting, diarrhea.  Patient developed cramping abdominal pain and diarrhea earlier this week, was evaluated 6/27 in the emergency department, underwent laboratory workup which was remarkable only for minimally elevated LFTs and was subsequently discharged home.  Returns for ongoing right-sided abdominal pain, worsening nausea/vomiting.  No symptoms or not precipitated/worsened by notable factor.  Notes stools been loose, denies any dark black or bloody stools.  Denies any hematemesis.  Denies any dark black or bilious emesis as well.  Does localize pain primarily to her right side and some radiation to her right flank.  Denies any fevers or chills.  Denies any sick contacts.  Denies any recent travel.  Denies any vaginal discharge or bleeding.              Patient History   Medical History[1]  Surgical History[2]  Family History[3]  Social History[4]    Physical Exam   ED Triage Vitals [06/28/25 1543]   Temperature Heart Rate Respirations BP   36.3 °C (97.3 °F) 75 16 150/80      Pulse Ox Temp Source Heart Rate Source Patient Position   96 % Temporal -- --      BP Location FiO2 (%)     -- --       Physical Exam  Vitals and nursing note reviewed.   Constitutional:       General: She is not in acute distress.     Appearance: Normal appearance. She is not ill-appearing or toxic-appearing.      Comments: Uncomfortable appearing   HENT:      Head: Normocephalic and atraumatic.      Nose: No congestion or rhinorrhea.      Mouth/Throat:      Mouth: Mucous membranes are moist.      Pharynx: Oropharynx is clear. No oropharyngeal exudate or posterior oropharyngeal erythema.   Eyes:      Extraocular Movements: Extraocular movements intact.      Right eye: Normal extraocular motion.      Left eye: Normal extraocular  motion.      Conjunctiva/sclera: Conjunctivae normal.      Pupils: Pupils are equal, round, and reactive to light.   Cardiovascular:      Rate and Rhythm: Normal rate and regular rhythm.      Pulses: Normal pulses.      Heart sounds: Normal heart sounds, S1 normal and S2 normal. No murmur heard.     No friction rub. No gallop.   Pulmonary:      Effort: Pulmonary effort is normal. No respiratory distress.      Breath sounds: Normal breath sounds. No stridor. No wheezing, rhonchi or rales.   Abdominal:      General: Abdomen is flat. Bowel sounds are normal. There is no distension.      Palpations: Abdomen is soft.      Tenderness: There is abdominal tenderness in the right upper quadrant and right lower quadrant. There is no right CVA tenderness, left CVA tenderness, guarding or rebound. Positive signs include Deutsch's sign. Negative signs include McBurney's sign.      Hernia: No hernia is present.   Musculoskeletal:      Cervical back: Full passive range of motion without pain.      Right lower leg: No edema.      Left lower leg: No edema.   Skin:     General: Skin is warm and dry.   Neurological:      General: No focal deficit present.      Mental Status: She is alert and oriented to person, place, and time.      GCS: GCS eye subscore is 4. GCS verbal subscore is 5. GCS motor subscore is 6.      Cranial Nerves: No cranial nerve deficit.      Sensory: No sensory deficit.      Motor: No weakness, tremor or abnormal muscle tone.   Psychiatric:         Mood and Affect: Mood normal.           ED Course & MDM   Diagnoses as of 06/28/25 2210   Gallstones                 No data recorded     [place] Coma Scale Score: 15 (06/28/25 1544 : Edmond Farooq RN)                           Medical Decision Making  Patient presenting with abdominal pain, nausea, vomiting, diarrhea ongoing over the last several days.  On examination patient does have right upper quadrant/right lower quadrant abdominal tenderness although seems more  localized to the right upper quadrant.  Concern for pancreatitis, cholelithiasis/cholecystitis, hepatic pathology, patient does have moderate right lower quadrant tenderness as well raising concern for appendicitis, ovarian pathology, UTI.  No CVA tenderness to suggest stone/pyelonephritis.  Will treat symptomatically with IV fluids, antiemetics, analgesics.  Will obtain right upper quadrant ultrasound, CT abdomen and pelvis.  Patient status post hysterectomy therefore no indication for pregnancy testing.  No  symptoms to suggest PID/TOA.    Patient's right upper quadrant ultrasound shows impacted gallstone, LFTs are uptrending from prior.  Given this did consult acute care surgery who agreed with admission, potential cholecystectomy.  CT scan did show findings consistent with right-sided pyelonephritis, urinalysis pending at this time but did start IV antibiotics with ceftriaxone.  CT scan also showed apparent hyperemia of the right ovary therefore sent for pelvic ultrasound to assess for ovarian torsion, this appears to show a hemorrhagic ovarian cyst without evidence of associated torsion.  Overall plan for admission for symptomatic cholelithiasis/potential pyelonephritis, discussed with Dr. Szymanski of [facility] medicine who agrees to admit patient to his service.        Procedure  Procedures         [1]   Past Medical History:  Diagnosis Date    Benign neoplasm of breast     Endometriosis     Epilepsy with partial complex seizures (Multi)     Migraine     Taking Depakote    Neuropathy 5/3/2024    Obesity     Ovarian cyst     measuring 5cm x 3cm x 3cm- Scheduled for surgery with Dr Theodore on 02/12/24    Pelvic floor dysfunction in female     Pelvic pain     Pulmonary embolism 02/2024    Left lung    Seizure disorder (Multi) At 7y    F/W Dr. Sigala (Neuro) Taking Trileptal, Last seizure 2 days ago during sleep    Type A blood, Rh positive     Blood type A+    Vitamin B1 deficiency    [2]   Past Surgical  History:  Procedure Laterality Date     SECTION, LOW TRANSVERSE      x 2 w/ BTL at second    COLPOSCOPY      HYSTERECTOMY  2024    OOPHORECTOMY Left      Robotic LSO w/ R cystectomy/salpingectomy    OTHER SURGICAL HISTORY      Surgical Treatment Of Spontaneous     TUBAL LIGATION     [3]   Family History  Problem Relation Name Age of Onset    Hypertension Mother Mohini     Stroke Mother Mohini     Heart attack Mother Mohini     Breast cancer Mother's Sister Karol     Bone cancer Mother's Brother      Seizures Father's Brother      Breast cancer Maternal Grandmother Alexia     Other (cerebral infarction) Maternal Grandfather      Seizures Other Cousin    [4]   Social History  Tobacco Use    Smoking status: Never     Passive exposure: Never    Smokeless tobacco: Never   Vaping Use    Vaping status: Never Used   Substance Use Topics    Alcohol use: Yes     Comment: Occasionally    Drug use: Never        Dom Sue MD  [phone]

## 2025-06-28 NOTE — ED TRIAGE NOTES
Pt reports ABD pain  that she was seen for earlier in the week but is not getting relief from the proscribed meds

## 2025-06-29 ENCOUNTER — APPOINTMENT (OUTPATIENT)
Dept: RADIOLOGY | Facility: HOSPITAL | Age: 35
DRG: 418 | End: 2025-06-29
Payer: MEDICARE

## 2025-06-29 ENCOUNTER — ANESTHESIA (OUTPATIENT)
Dept: OPERATING ROOM | Facility: HOSPITAL | Age: 35
DRG: 418 | End: 2025-06-29
Payer: MEDICARE

## 2025-06-29 ENCOUNTER — ANESTHESIA EVENT (OUTPATIENT)
Dept: OPERATING ROOM | Facility: HOSPITAL | Age: 35
DRG: 418 | End: 2025-06-29
Payer: MEDICARE

## 2025-06-29 VITALS
BODY MASS INDEX: 47.32 KG/M2 | HEIGHT: 65 IN | SYSTOLIC BLOOD PRESSURE: 146 MMHG | WEIGHT: 284 LBS | RESPIRATION RATE: 20 BRPM | DIASTOLIC BLOOD PRESSURE: 94 MMHG | OXYGEN SATURATION: 98 % | TEMPERATURE: 98.3 F | HEART RATE: 98 BPM

## 2025-06-29 PROBLEM — K76.0 FATTY LIVER: Status: ACTIVE | Noted: 2025-06-29

## 2025-06-29 LAB
ALBUMIN SERPL BCP-MCNC: 3.5 G/DL (ref 3.4–5)
ALP SERPL-CCNC: 52 U/L (ref 33–110)
ALT SERPL W P-5'-P-CCNC: 167 U/L (ref 7–45)
ANION GAP SERPL CALC-SCNC: 14 MMOL/L (ref 10–20)
APPEARANCE UR: CLEAR
AST SERPL W P-5'-P-CCNC: 135 U/L (ref 9–39)
BACTERIA #/AREA URNS AUTO: ABNORMAL /HPF
BACTERIA BLD CULT: NORMAL
BACTERIA BLD CULT: NORMAL
BASOPHILS # BLD AUTO: 0.04 X10*3/UL (ref 0–0.1)
BASOPHILS NFR BLD AUTO: 0.4 %
BILIRUB SERPL-MCNC: 0.4 MG/DL (ref 0–1.2)
BILIRUB UR STRIP.AUTO-MCNC: NEGATIVE MG/DL
BUN SERPL-MCNC: 6 MG/DL (ref 6–23)
CALCIUM SERPL-MCNC: 8.2 MG/DL (ref 8.6–10.3)
CHLORIDE SERPL-SCNC: 106 MMOL/L (ref 98–107)
CO2 SERPL-SCNC: 23 MMOL/L (ref 21–32)
COLOR UR: YELLOW
CREAT SERPL-MCNC: 0.77 MG/DL (ref 0.5–1.05)
EGFRCR SERPLBLD CKD-EPI 2021: >90 ML/MIN/1.73M*2
EOSINOPHIL # BLD AUTO: 1.51 X10*3/UL (ref 0–0.7)
EOSINOPHIL NFR BLD AUTO: 14.3 %
ERYTHROCYTE [DISTWIDTH] IN BLOOD BY AUTOMATED COUNT: 13.9 % (ref 11.5–14.5)
GLUCOSE BLD MANUAL STRIP-MCNC: 97 MG/DL (ref 74–99)
GLUCOSE SERPL-MCNC: 85 MG/DL (ref 74–99)
GLUCOSE UR STRIP.AUTO-MCNC: NORMAL MG/DL
HCT VFR BLD AUTO: 33.1 % (ref 36–46)
HGB BLD-MCNC: 11.8 G/DL (ref 12–16)
IMM GRANULOCYTES # BLD AUTO: 0.13 X10*3/UL (ref 0–0.7)
IMM GRANULOCYTES NFR BLD AUTO: 1.2 % (ref 0–0.9)
KETONES UR STRIP.AUTO-MCNC: ABNORMAL MG/DL
LEUKOCYTE ESTERASE UR QL STRIP.AUTO: ABNORMAL
LYMPHOCYTES # BLD AUTO: 2.81 X10*3/UL (ref 1.2–4.8)
LYMPHOCYTES NFR BLD AUTO: 26.7 %
MCH RBC QN AUTO: 30.6 PG (ref 26–34)
MCHC RBC AUTO-ENTMCNC: 35.6 G/DL (ref 32–36)
MCV RBC AUTO: 86 FL (ref 80–100)
MONOCYTES # BLD AUTO: 0.85 X10*3/UL (ref 0.1–1)
MONOCYTES NFR BLD AUTO: 8.1 %
MUCOUS THREADS #/AREA URNS AUTO: ABNORMAL /LPF
NEUTROPHILS # BLD AUTO: 5.2 X10*3/UL (ref 1.2–7.7)
NEUTROPHILS NFR BLD AUTO: 49.3 %
NITRITE UR QL STRIP.AUTO: NEGATIVE
NRBC BLD-RTO: 0 /100 WBCS (ref 0–0)
PH UR STRIP.AUTO: 6 [PH]
PLATELET # BLD AUTO: 299 X10*3/UL (ref 150–450)
POTASSIUM SERPL-SCNC: 3.9 MMOL/L (ref 3.5–5.3)
PROT SERPL-MCNC: 6.3 G/DL (ref 6.4–8.2)
PROT UR STRIP.AUTO-MCNC: NEGATIVE MG/DL
RBC # BLD AUTO: 3.86 X10*6/UL (ref 4–5.2)
RBC # UR STRIP.AUTO: NEGATIVE MG/DL
RBC #/AREA URNS AUTO: ABNORMAL /HPF
SODIUM SERPL-SCNC: 139 MMOL/L (ref 136–145)
SP GR UR STRIP.AUTO: 1.02
SQUAMOUS #/AREA URNS AUTO: ABNORMAL /HPF
UROBILINOGEN UR STRIP.AUTO-MCNC: ABNORMAL MG/DL
WBC # BLD AUTO: 10.5 X10*3/UL (ref 4.4–11.3)
WBC #/AREA URNS AUTO: ABNORMAL /HPF

## 2025-06-29 PROCEDURE — 2780000003 HC OR 278 NO HCPCS: Performed by: SURGERY

## 2025-06-29 PROCEDURE — 81001 URINALYSIS AUTO W/SCOPE: CPT | Performed by: EMERGENCY MEDICINE

## 2025-06-29 PROCEDURE — 2500000001 HC RX 250 WO HCPCS SELF ADMINISTERED DRUGS (ALT 637 FOR MEDICARE OP): Performed by: INTERNAL MEDICINE

## 2025-06-29 PROCEDURE — 87086 URINE CULTURE/COLONY COUNT: CPT | Mod: AHULAB | Performed by: EMERGENCY MEDICINE

## 2025-06-29 PROCEDURE — 1100000001 HC PRIVATE ROOM DAILY

## 2025-06-29 PROCEDURE — 2500000001 HC RX 250 WO HCPCS SELF ADMINISTERED DRUGS (ALT 637 FOR MEDICARE OP): Performed by: ANESTHESIOLOGY

## 2025-06-29 PROCEDURE — 99231 SBSQ HOSP IP/OBS SF/LOW 25: CPT

## 2025-06-29 PROCEDURE — 2720000007 HC OR 272 NO HCPCS: Performed by: SURGERY

## 2025-06-29 PROCEDURE — 2500000004 HC RX 250 GENERAL PHARMACY W/ HCPCS (ALT 636 FOR OP/ED): Performed by: EMERGENCY MEDICINE

## 2025-06-29 PROCEDURE — 2500000004 HC RX 250 GENERAL PHARMACY W/ HCPCS (ALT 636 FOR OP/ED): Performed by: INTERNAL MEDICINE

## 2025-06-29 PROCEDURE — 3700000001 HC GENERAL ANESTHESIA TIME - INITIAL BASE CHARGE: Performed by: SURGERY

## 2025-06-29 PROCEDURE — 2500000004 HC RX 250 GENERAL PHARMACY W/ HCPCS (ALT 636 FOR OP/ED): Performed by: STUDENT IN AN ORGANIZED HEALTH CARE EDUCATION/TRAINING PROGRAM

## 2025-06-29 PROCEDURE — 2500000005 HC RX 250 GENERAL PHARMACY W/O HCPCS: Performed by: SURGERY

## 2025-06-29 PROCEDURE — 82947 ASSAY GLUCOSE BLOOD QUANT: CPT

## 2025-06-29 PROCEDURE — 2500000004 HC RX 250 GENERAL PHARMACY W/ HCPCS (ALT 636 FOR OP/ED): Performed by: SURGERY

## 2025-06-29 PROCEDURE — 47562 LAPAROSCOPIC CHOLECYSTECTOMY: CPT | Performed by: SURGERY

## 2025-06-29 PROCEDURE — 80053 COMPREHEN METABOLIC PANEL: CPT

## 2025-06-29 PROCEDURE — 2500000004 HC RX 250 GENERAL PHARMACY W/ HCPCS (ALT 636 FOR OP/ED): Mod: JZ | Performed by: NURSE PRACTITIONER

## 2025-06-29 PROCEDURE — 3600000009 HC OR TIME - EACH INCREMENTAL 1 MINUTE - PROCEDURE LEVEL FOUR: Performed by: SURGERY

## 2025-06-29 PROCEDURE — 0FT44ZZ RESECTION OF GALLBLADDER, PERCUTANEOUS ENDOSCOPIC APPROACH: ICD-10-PCS | Performed by: SURGERY

## 2025-06-29 PROCEDURE — A47562 PR LAP,CHOLECYSTECTOMY: Performed by: ANESTHESIOLOGY

## 2025-06-29 PROCEDURE — 36415 COLL VENOUS BLD VENIPUNCTURE: CPT

## 2025-06-29 PROCEDURE — 7100000002 HC RECOVERY ROOM TIME - EACH INCREMENTAL 1 MINUTE: Performed by: SURGERY

## 2025-06-29 PROCEDURE — 2500000001 HC RX 250 WO HCPCS SELF ADMINISTERED DRUGS (ALT 637 FOR MEDICARE OP): Performed by: SURGERY

## 2025-06-29 PROCEDURE — 3700000002 HC GENERAL ANESTHESIA TIME - EACH INCREMENTAL 1 MINUTE: Performed by: SURGERY

## 2025-06-29 PROCEDURE — 2500000004 HC RX 250 GENERAL PHARMACY W/ HCPCS (ALT 636 FOR OP/ED): Mod: JZ | Performed by: ANESTHESIOLOGY

## 2025-06-29 PROCEDURE — 2500000005 HC RX 250 GENERAL PHARMACY W/O HCPCS: Performed by: ANESTHESIOLOGY

## 2025-06-29 PROCEDURE — 7100000001 HC RECOVERY ROOM TIME - INITIAL BASE CHARGE: Performed by: SURGERY

## 2025-06-29 PROCEDURE — 3600000004 HC OR TIME - INITIAL BASE CHARGE - PROCEDURE LEVEL FOUR: Performed by: SURGERY

## 2025-06-29 PROCEDURE — 85025 COMPLETE CBC W/AUTO DIFF WBC: CPT | Performed by: NURSE PRACTITIONER

## 2025-06-29 RX ORDER — TIRZEPATIDE 5 MG/.5ML
5 INJECTION, SOLUTION SUBCUTANEOUS
COMMUNITY

## 2025-06-29 RX ORDER — DROPERIDOL 2.5 MG/ML
0.62 INJECTION, SOLUTION INTRAMUSCULAR; INTRAVENOUS ONCE AS NEEDED
Status: DISCONTINUED | OUTPATIENT
Start: 2025-06-29 | End: 2025-06-29 | Stop reason: HOSPADM

## 2025-06-29 RX ORDER — SODIUM CHLORIDE 0.9 G/100ML
INJECTION, SOLUTION IRRIGATION AS NEEDED
Status: DISCONTINUED | OUTPATIENT
Start: 2025-06-29 | End: 2025-06-29 | Stop reason: HOSPADM

## 2025-06-29 RX ORDER — ACETAMINOPHEN 650 MG/1
650 SUPPOSITORY RECTAL EVERY 4 HOURS PRN
Status: DISCONTINUED | OUTPATIENT
Start: 2025-06-29 | End: 2025-06-29 | Stop reason: SDUPTHER

## 2025-06-29 RX ORDER — SODIUM CHLORIDE, SODIUM LACTATE, POTASSIUM CHLORIDE, CALCIUM CHLORIDE 600; 310; 30; 20 MG/100ML; MG/100ML; MG/100ML; MG/100ML
100 INJECTION, SOLUTION INTRAVENOUS CONTINUOUS
Status: DISCONTINUED | OUTPATIENT
Start: 2025-06-29 | End: 2025-06-29 | Stop reason: HOSPADM

## 2025-06-29 RX ORDER — POLYETHYLENE GLYCOL 3350 17 G/17G
17 POWDER, FOR SOLUTION ORAL DAILY
Status: DISCONTINUED | OUTPATIENT
Start: 2025-06-29 | End: 2025-06-30 | Stop reason: HOSPADM

## 2025-06-29 RX ORDER — MIDAZOLAM HYDROCHLORIDE 1 MG/ML
INJECTION INTRAMUSCULAR; INTRAVENOUS AS NEEDED
Status: DISCONTINUED | OUTPATIENT
Start: 2025-06-29 | End: 2025-06-29

## 2025-06-29 RX ORDER — BUPIVACAINE HYDROCHLORIDE 5 MG/ML
INJECTION, SOLUTION PERINEURAL AS NEEDED
Status: DISCONTINUED | OUTPATIENT
Start: 2025-06-29 | End: 2025-06-29 | Stop reason: HOSPADM

## 2025-06-29 RX ORDER — CEFAZOLIN SODIUM 2 G/50ML
2 SOLUTION INTRAVENOUS EVERY 6 HOURS
Status: DISCONTINUED | OUTPATIENT
Start: 2025-06-29 | End: 2025-06-29 | Stop reason: ALTCHOICE

## 2025-06-29 RX ORDER — IPRATROPIUM BROMIDE 0.5 MG/2.5ML
500 SOLUTION RESPIRATORY (INHALATION) ONCE
Status: DISCONTINUED | OUTPATIENT
Start: 2025-06-29 | End: 2025-06-29 | Stop reason: HOSPADM

## 2025-06-29 RX ORDER — ONDANSETRON HYDROCHLORIDE 2 MG/ML
INJECTION, SOLUTION INTRAVENOUS AS NEEDED
Status: DISCONTINUED | OUTPATIENT
Start: 2025-06-29 | End: 2025-06-29

## 2025-06-29 RX ORDER — LIDOCAINE HYDROCHLORIDE 20 MG/ML
INJECTION, SOLUTION INFILTRATION; PERINEURAL AS NEEDED
Status: DISCONTINUED | OUTPATIENT
Start: 2025-06-29 | End: 2025-06-29

## 2025-06-29 RX ORDER — DEXMEDETOMIDINE IN 0.9 % NACL 20 MCG/5ML
SYRINGE (ML) INTRAVENOUS AS NEEDED
Status: DISCONTINUED | OUTPATIENT
Start: 2025-06-29 | End: 2025-06-29

## 2025-06-29 RX ORDER — KETOROLAC TROMETHAMINE 30 MG/ML
15 INJECTION, SOLUTION INTRAMUSCULAR; INTRAVENOUS EVERY 6 HOURS
Status: DISCONTINUED | OUTPATIENT
Start: 2025-06-29 | End: 2025-06-30 | Stop reason: HOSPADM

## 2025-06-29 RX ORDER — ROCURONIUM BROMIDE 10 MG/ML
INJECTION, SOLUTION INTRAVENOUS AS NEEDED
Status: DISCONTINUED | OUTPATIENT
Start: 2025-06-29 | End: 2025-06-29

## 2025-06-29 RX ORDER — ONDANSETRON HYDROCHLORIDE 2 MG/ML
4 INJECTION, SOLUTION INTRAVENOUS EVERY 4 HOURS PRN
Status: DISCONTINUED | OUTPATIENT
Start: 2025-06-29 | End: 2025-06-30 | Stop reason: HOSPADM

## 2025-06-29 RX ORDER — ESMOLOL HYDROCHLORIDE 10 MG/ML
INJECTION INTRAVENOUS AS NEEDED
Status: DISCONTINUED | OUTPATIENT
Start: 2025-06-29 | End: 2025-06-29

## 2025-06-29 RX ORDER — ACETAMINOPHEN 325 MG/1
650 TABLET ORAL EVERY 4 HOURS PRN
Status: DISCONTINUED | OUTPATIENT
Start: 2025-06-29 | End: 2025-06-29 | Stop reason: SDUPTHER

## 2025-06-29 RX ORDER — ACETAMINOPHEN 160 MG/5ML
650 SOLUTION ORAL EVERY 4 HOURS PRN
Status: DISCONTINUED | OUTPATIENT
Start: 2025-06-29 | End: 2025-06-29 | Stop reason: SDUPTHER

## 2025-06-29 RX ORDER — CEFAZOLIN 1 G/1
INJECTION, POWDER, FOR SOLUTION INTRAVENOUS AS NEEDED
Status: DISCONTINUED | OUTPATIENT
Start: 2025-06-29 | End: 2025-06-29

## 2025-06-29 RX ORDER — OXYCODONE AND ACETAMINOPHEN 5; 325 MG/1; MG/1
1 TABLET ORAL EVERY 4 HOURS PRN
Status: DISCONTINUED | OUTPATIENT
Start: 2025-06-29 | End: 2025-06-30 | Stop reason: HOSPADM

## 2025-06-29 RX ORDER — ALBUTEROL SULFATE 0.83 MG/ML
2.5 SOLUTION RESPIRATORY (INHALATION) ONCE AS NEEDED
Status: DISCONTINUED | OUTPATIENT
Start: 2025-06-29 | End: 2025-06-29 | Stop reason: HOSPADM

## 2025-06-29 RX ORDER — OXCARBAZEPINE 300 MG/1
1200 TABLET, FILM COATED ORAL 2 TIMES DAILY
Status: DISCONTINUED | OUTPATIENT
Start: 2025-06-29 | End: 2025-06-30 | Stop reason: HOSPADM

## 2025-06-29 RX ORDER — PROPOFOL 10 MG/ML
INJECTION, EMULSION INTRAVENOUS AS NEEDED
Status: DISCONTINUED | OUTPATIENT
Start: 2025-06-29 | End: 2025-06-29

## 2025-06-29 RX ORDER — NORETHINDRONE 5 MG/1
5 TABLET ORAL DAILY
Status: DISCONTINUED | OUTPATIENT
Start: 2025-06-29 | End: 2025-06-29

## 2025-06-29 RX ORDER — HEPARIN SODIUM 5000 [USP'U]/ML
7500 INJECTION, SOLUTION INTRAVENOUS; SUBCUTANEOUS EVERY 8 HOURS
Status: DISCONTINUED | OUTPATIENT
Start: 2025-06-29 | End: 2025-06-30 | Stop reason: HOSPADM

## 2025-06-29 RX ORDER — ACETAMINOPHEN 325 MG/1
650 TABLET ORAL EVERY 4 HOURS PRN
Status: DISCONTINUED | OUTPATIENT
Start: 2025-06-29 | End: 2025-06-29 | Stop reason: HOSPADM

## 2025-06-29 RX ORDER — OXYCODONE HYDROCHLORIDE 5 MG/1
5 TABLET ORAL EVERY 4 HOURS PRN
Status: DISCONTINUED | OUTPATIENT
Start: 2025-06-29 | End: 2025-06-29 | Stop reason: HOSPADM

## 2025-06-29 RX ORDER — FENTANYL CITRATE 50 UG/ML
INJECTION, SOLUTION INTRAMUSCULAR; INTRAVENOUS AS NEEDED
Status: DISCONTINUED | OUTPATIENT
Start: 2025-06-29 | End: 2025-06-29

## 2025-06-29 RX ORDER — ONDANSETRON HYDROCHLORIDE 2 MG/ML
4 INJECTION, SOLUTION INTRAVENOUS ONCE AS NEEDED
Status: DISCONTINUED | OUTPATIENT
Start: 2025-06-29 | End: 2025-06-29 | Stop reason: HOSPADM

## 2025-06-29 RX ORDER — HYDROMORPHONE HYDROCHLORIDE 1 MG/ML
INJECTION, SOLUTION INTRAMUSCULAR; INTRAVENOUS; SUBCUTANEOUS AS NEEDED
Status: DISCONTINUED | OUTPATIENT
Start: 2025-06-29 | End: 2025-06-29

## 2025-06-29 RX ORDER — LIDOCAINE HYDROCHLORIDE 10 MG/ML
0.1 INJECTION, SOLUTION EPIDURAL; INFILTRATION; INTRACAUDAL; PERINEURAL ONCE
Status: DISCONTINUED | OUTPATIENT
Start: 2025-06-29 | End: 2025-06-29 | Stop reason: HOSPADM

## 2025-06-29 RX ORDER — DIVALPROEX SODIUM 250 MG/1
1000 TABLET, DELAYED RELEASE ORAL 2 TIMES DAILY
Status: DISCONTINUED | OUTPATIENT
Start: 2025-06-29 | End: 2025-06-30 | Stop reason: HOSPADM

## 2025-06-29 RX ADMIN — ESMOLOL HYDROCHLORIDE 20 MG: 100 INJECTION, SOLUTION INTRAVENOUS at 17:08

## 2025-06-29 RX ADMIN — CEFAZOLIN 3 G: 330 INJECTION, POWDER, FOR SOLUTION INTRAMUSCULAR; INTRAVENOUS at 16:36

## 2025-06-29 RX ADMIN — ONDANSETRON 4 MG: 2 INJECTION, SOLUTION INTRAMUSCULAR; INTRAVENOUS at 11:21

## 2025-06-29 RX ADMIN — HYDROMORPHONE HYDROCHLORIDE 0.5 MG: 1 INJECTION, SOLUTION INTRAMUSCULAR; INTRAVENOUS; SUBCUTANEOUS at 21:04

## 2025-06-29 RX ADMIN — ESMOLOL HYDROCHLORIDE 20 MG: 100 INJECTION, SOLUTION INTRAVENOUS at 16:53

## 2025-06-29 RX ADMIN — SODIUM CHLORIDE, POTASSIUM CHLORIDE, SODIUM LACTATE AND CALCIUM CHLORIDE: 600; 310; 30; 20 INJECTION, SOLUTION INTRAVENOUS at 16:35

## 2025-06-29 RX ADMIN — MIDAZOLAM HYDROCHLORIDE 2 MG: 1 INJECTION, SOLUTION INTRAMUSCULAR; INTRAVENOUS at 16:23

## 2025-06-29 RX ADMIN — Medication 12 MCG: at 16:49

## 2025-06-29 RX ADMIN — HYDROMORPHONE HYDROCHLORIDE 0.5 MG: 1 INJECTION, SOLUTION INTRAMUSCULAR; INTRAVENOUS; SUBCUTANEOUS at 05:27

## 2025-06-29 RX ADMIN — ROCURONIUM BROMIDE 80 MG: 10 INJECTION, SOLUTION INTRAVENOUS at 16:30

## 2025-06-29 RX ADMIN — ONDANSETRON 4 MG: 2 INJECTION, SOLUTION INTRAMUSCULAR; INTRAVENOUS at 16:53

## 2025-06-29 RX ADMIN — OXCARBAZEPINE 1200 MG: 300 TABLET, FILM COATED ORAL at 21:34

## 2025-06-29 RX ADMIN — Medication 8 MCG: at 16:44

## 2025-06-29 RX ADMIN — PANTOPRAZOLE SODIUM 40 MG: 40 INJECTION, POWDER, FOR SOLUTION INTRAVENOUS at 06:16

## 2025-06-29 RX ADMIN — CEFTRIAXONE 1 G: 1 INJECTION, SOLUTION INTRAVENOUS at 21:34

## 2025-06-29 RX ADMIN — OXCARBAZEPINE 1200 MG: 300 TABLET, FILM COATED ORAL at 12:38

## 2025-06-29 RX ADMIN — OXYCODONE HYDROCHLORIDE 5 MG: 5 TABLET ORAL at 18:15

## 2025-06-29 RX ADMIN — ONDANSETRON 4 MG: 2 INJECTION, SOLUTION INTRAMUSCULAR; INTRAVENOUS at 21:50

## 2025-06-29 RX ADMIN — ESMOLOL HYDROCHLORIDE 30 MG: 100 INJECTION, SOLUTION INTRAVENOUS at 16:35

## 2025-06-29 RX ADMIN — DIVALPROEX SODIUM 1000 MG: 250 TABLET, DELAYED RELEASE ORAL at 12:38

## 2025-06-29 RX ADMIN — HYDROMORPHONE HYDROCHLORIDE 0.5 MG: 1 INJECTION, SOLUTION INTRAMUSCULAR; INTRAVENOUS; SUBCUTANEOUS at 11:16

## 2025-06-29 RX ADMIN — SODIUM CHLORIDE, POTASSIUM CHLORIDE, SODIUM LACTATE AND CALCIUM CHLORIDE: 600; 310; 30; 20 INJECTION, SOLUTION INTRAVENOUS at 16:43

## 2025-06-29 RX ADMIN — DEXAMETHASONE SODIUM PHOSPHATE 8 MG: 4 INJECTION, SOLUTION INTRAMUSCULAR; INTRAVENOUS at 16:30

## 2025-06-29 RX ADMIN — KETOROLAC TROMETHAMINE 15 MG: 30 INJECTION, SOLUTION INTRAMUSCULAR at 19:02

## 2025-06-29 RX ADMIN — HYDROMORPHONE HYDROCHLORIDE 0.5 MG: 1 INJECTION, SOLUTION INTRAMUSCULAR; INTRAVENOUS; SUBCUTANEOUS at 16:48

## 2025-06-29 RX ADMIN — HYDROMORPHONE HYDROCHLORIDE 0.5 MG: 1 INJECTION, SOLUTION INTRAMUSCULAR; INTRAVENOUS; SUBCUTANEOUS at 16:53

## 2025-06-29 RX ADMIN — SODIUM CHLORIDE, POTASSIUM CHLORIDE, SODIUM LACTATE AND CALCIUM CHLORIDE 125 ML/HR: 600; 310; 30; 20 INJECTION, SOLUTION INTRAVENOUS at 07:08

## 2025-06-29 RX ADMIN — DIVALPROEX SODIUM 1000 MG: 250 TABLET, DELAYED RELEASE ORAL at 21:35

## 2025-06-29 RX ADMIN — Medication 6 L/MIN: at 17:45

## 2025-06-29 RX ADMIN — SUGAMMADEX 400 MG: 100 INJECTION, SOLUTION INTRAVENOUS at 17:27

## 2025-06-29 RX ADMIN — LIDOCAINE HYDROCHLORIDE 100 MG: 20 INJECTION, SOLUTION INFILTRATION; PERINEURAL at 16:30

## 2025-06-29 RX ADMIN — PROPOFOL 200 MG: 10 INJECTION, EMULSION INTRAVENOUS at 16:30

## 2025-06-29 RX ADMIN — HYDROMORPHONE HYDROCHLORIDE 0.5 MG: 1 INJECTION, SOLUTION INTRAMUSCULAR; INTRAVENOUS; SUBCUTANEOUS at 18:01

## 2025-06-29 RX ADMIN — FENTANYL CITRATE 100 MCG: 50 INJECTION, SOLUTION INTRAMUSCULAR; INTRAVENOUS at 16:30

## 2025-06-29 RX ADMIN — HYDROMORPHONE HYDROCHLORIDE 0.5 MG: 1 INJECTION, SOLUTION INTRAMUSCULAR; INTRAVENOUS; SUBCUTANEOUS at 18:08

## 2025-06-29 SDOH — SOCIAL STABILITY: SOCIAL INSECURITY: DO YOU FEEL ANYONE HAS EXPLOITED OR TAKEN ADVANTAGE OF YOU FINANCIALLY OR OF YOUR PERSONAL PROPERTY?: NO

## 2025-06-29 SDOH — ECONOMIC STABILITY: FOOD INSECURITY: WITHIN THE PAST 12 MONTHS, THE FOOD YOU BOUGHT JUST DIDN'T LAST AND YOU DIDN'T HAVE MONEY TO GET MORE.: NEVER TRUE

## 2025-06-29 SDOH — ECONOMIC STABILITY: FOOD INSECURITY: HOW HARD IS IT FOR YOU TO PAY FOR THE VERY BASICS LIKE FOOD, HOUSING, MEDICAL CARE, AND HEATING?: NOT VERY HARD

## 2025-06-29 SDOH — SOCIAL STABILITY: SOCIAL INSECURITY: WITHIN THE LAST YEAR, HAVE YOU BEEN HUMILIATED OR EMOTIONALLY ABUSED IN OTHER WAYS BY YOUR PARTNER OR EX-PARTNER?: NO

## 2025-06-29 SDOH — ECONOMIC STABILITY: HOUSING INSECURITY: AT ANY TIME IN THE PAST 12 MONTHS, WERE YOU HOMELESS OR LIVING IN A SHELTER (INCLUDING NOW)?: NO

## 2025-06-29 SDOH — ECONOMIC STABILITY: FOOD INSECURITY: WITHIN THE PAST 12 MONTHS, YOU WORRIED THAT YOUR FOOD WOULD RUN OUT BEFORE YOU GOT THE MONEY TO BUY MORE.: NEVER TRUE

## 2025-06-29 SDOH — HEALTH STABILITY: PHYSICAL HEALTH: ON AVERAGE, HOW MANY MINUTES DO YOU ENGAGE IN EXERCISE AT THIS LEVEL?: PATIENT DECLINED

## 2025-06-29 SDOH — ECONOMIC STABILITY: HOUSING INSECURITY: IN THE PAST 12 MONTHS, HOW MANY TIMES HAVE YOU MOVED WHERE YOU WERE LIVING?: 0

## 2025-06-29 SDOH — SOCIAL STABILITY: SOCIAL INSECURITY: ARE THERE ANY APPARENT SIGNS OF INJURIES/BEHAVIORS THAT COULD BE RELATED TO ABUSE/NEGLECT?: NO

## 2025-06-29 SDOH — ECONOMIC STABILITY: HOUSING INSECURITY: IN THE LAST 12 MONTHS, WAS THERE A TIME WHEN YOU WERE NOT ABLE TO PAY THE MORTGAGE OR RENT ON TIME?: NO

## 2025-06-29 SDOH — SOCIAL STABILITY: SOCIAL INSECURITY: WITHIN THE LAST YEAR, HAVE YOU BEEN AFRAID OF YOUR PARTNER OR EX-PARTNER?: NO

## 2025-06-29 SDOH — ECONOMIC STABILITY: TRANSPORTATION INSECURITY: IN THE PAST 12 MONTHS, HAS LACK OF TRANSPORTATION KEPT YOU FROM MEDICAL APPOINTMENTS OR FROM GETTING MEDICATIONS?: NO

## 2025-06-29 SDOH — SOCIAL STABILITY: SOCIAL INSECURITY: ABUSE: ADULT

## 2025-06-29 SDOH — SOCIAL STABILITY: SOCIAL INSECURITY: ARE YOU OR HAVE YOU BEEN THREATENED OR ABUSED PHYSICALLY, EMOTIONALLY, OR SEXUALLY BY ANYONE?: NO

## 2025-06-29 SDOH — SOCIAL STABILITY: SOCIAL INSECURITY: HAVE YOU HAD ANY THOUGHTS OF HARMING ANYONE ELSE?: NO

## 2025-06-29 SDOH — SOCIAL STABILITY: SOCIAL INSECURITY: HAS ANYONE EVER THREATENED TO HURT YOUR FAMILY OR YOUR PETS?: NO

## 2025-06-29 SDOH — SOCIAL STABILITY: SOCIAL INSECURITY: WERE YOU ABLE TO COMPLETE ALL THE BEHAVIORAL HEALTH SCREENINGS?: YES

## 2025-06-29 SDOH — ECONOMIC STABILITY: INCOME INSECURITY: IN THE PAST 12 MONTHS HAS THE ELECTRIC, GAS, OIL, OR WATER COMPANY THREATENED TO SHUT OFF SERVICES IN YOUR HOME?: NO

## 2025-06-29 SDOH — SOCIAL STABILITY: SOCIAL INSECURITY: HAVE YOU HAD THOUGHTS OF HARMING ANYONE ELSE?: NO

## 2025-06-29 SDOH — HEALTH STABILITY: PHYSICAL HEALTH
ON AVERAGE, HOW MANY DAYS PER WEEK DO YOU ENGAGE IN MODERATE TO STRENUOUS EXERCISE (LIKE A BRISK WALK)?: PATIENT DECLINED

## 2025-06-29 SDOH — SOCIAL STABILITY: SOCIAL INSECURITY: DOES ANYONE TRY TO KEEP YOU FROM HAVING/CONTACTING OTHER FRIENDS OR DOING THINGS OUTSIDE YOUR HOME?: NO

## 2025-06-29 SDOH — SOCIAL STABILITY: SOCIAL INSECURITY: DO YOU FEEL UNSAFE GOING BACK TO THE PLACE WHERE YOU ARE LIVING?: NO

## 2025-06-29 ASSESSMENT — PAIN SCALES - GENERAL
PAINLEVEL_OUTOF10: 6
PAINLEVEL_OUTOF10: 7
PAINLEVEL_OUTOF10: 9
PAINLEVEL_OUTOF10: 6
PAINLEVEL_OUTOF10: 6
PAINLEVEL_OUTOF10: 7
PAINLEVEL_OUTOF10: 7
PAINLEVEL_OUTOF10: 2
PAINLEVEL_OUTOF10: 3
PAINLEVEL_OUTOF10: 8
PAINLEVEL_OUTOF10: 0 - NO PAIN
PAINLEVEL_OUTOF10: 10 - WORST POSSIBLE PAIN
PAINLEVEL_OUTOF10: 6
PAINLEVEL_OUTOF10: 10 - WORST POSSIBLE PAIN

## 2025-06-29 ASSESSMENT — ENCOUNTER SYMPTOMS
EYES NEGATIVE: 1
NAUSEA: 1
DIARRHEA: 1
RESPIRATORY NEGATIVE: 1
NEUROLOGICAL NEGATIVE: 1
CARDIOVASCULAR NEGATIVE: 1
CONSTITUTIONAL NEGATIVE: 1
ABDOMINAL PAIN: 1
MUSCULOSKELETAL NEGATIVE: 1

## 2025-06-29 ASSESSMENT — COGNITIVE AND FUNCTIONAL STATUS - GENERAL
WALKING IN HOSPITAL ROOM: A LITTLE
DAILY ACTIVITIY SCORE: 21
HELP NEEDED FOR BATHING: A LITTLE
MOBILITY SCORE: 24
MOBILITY SCORE: 24
DRESSING REGULAR LOWER BODY CLOTHING: A LITTLE
CLIMB 3 TO 5 STEPS WITH RAILING: A LITTLE
TOILETING: A LITTLE
PATIENT BASELINE BEDBOUND: NO
DAILY ACTIVITIY SCORE: 24
MOVING TO AND FROM BED TO CHAIR: A LITTLE
DAILY ACTIVITIY SCORE: 24
MOBILITY SCORE: 21
DAILY ACTIVITIY SCORE: 24
MOBILITY SCORE: 24

## 2025-06-29 ASSESSMENT — PAIN - FUNCTIONAL ASSESSMENT
PAIN_FUNCTIONAL_ASSESSMENT: 0-10
PAIN_FUNCTIONAL_ASSESSMENT: UNABLE TO SELF-REPORT
PAIN_FUNCTIONAL_ASSESSMENT: 0-10
PAIN_FUNCTIONAL_ASSESSMENT: 0-10

## 2025-06-29 ASSESSMENT — ACTIVITIES OF DAILY LIVING (ADL)
JUDGMENT_ADEQUATE_SAFELY_COMPLETE_DAILY_ACTIVITIES: YES
PATIENT'S MEMORY ADEQUATE TO SAFELY COMPLETE DAILY ACTIVITIES?: YES
FEEDING YOURSELF: INDEPENDENT
GROOMING: INDEPENDENT
BATHING: INDEPENDENT
WALKS IN HOME: INDEPENDENT
HEARING - RIGHT EAR: FUNCTIONAL
DRESSING YOURSELF: INDEPENDENT
LACK_OF_TRANSPORTATION: NO
LACK_OF_TRANSPORTATION: NO
TOILETING: INDEPENDENT
ADEQUATE_TO_COMPLETE_ADL: YES
HEARING - LEFT EAR: FUNCTIONAL

## 2025-06-29 ASSESSMENT — PAIN DESCRIPTION - DESCRIPTORS
DESCRIPTORS: CRAMPING
DESCRIPTORS: CRAMPING

## 2025-06-29 ASSESSMENT — LIFESTYLE VARIABLES
AUDIT-C TOTAL SCORE: 0
AUDIT-C TOTAL SCORE: 0
HOW OFTEN DO YOU HAVE 6 OR MORE DRINKS ON ONE OCCASION: NEVER
HOW MANY STANDARD DRINKS CONTAINING ALCOHOL DO YOU HAVE ON A TYPICAL DAY: PATIENT DOES NOT DRINK
HOW OFTEN DO YOU HAVE A DRINK CONTAINING ALCOHOL: NEVER
SKIP TO QUESTIONS 9-10: 1

## 2025-06-29 ASSESSMENT — PAIN DESCRIPTION - LOCATION
LOCATION: ABDOMEN
LOCATION: ABDOMEN

## 2025-06-29 ASSESSMENT — PAIN DESCRIPTION - ORIENTATION: ORIENTATION: POSTERIOR;MID

## 2025-06-29 NOTE — CARE PLAN
The patient's goals for the shift include  ezra rest    The clinical goals for the shift include  pain control    Problem: Pain - Adult  Goal: Verbalizes/displays adequate comfort level or baseline comfort level  Outcome: Progressing     Problem: Safety - Adult  Goal: Free from fall injury  Outcome: Progressing     Problem: Discharge Planning  Goal: Discharge to home or other facility with appropriate resources  Outcome: Progressing     Problem: Chronic Conditions and Co-morbidities  Goal: Patient's chronic conditions and co-morbidity symptoms are monitored and maintained or improved  Outcome: Progressing     Problem: Nutrition  Goal: Nutrient intake appropriate for maintaining nutritional needs  Outcome: Progressing

## 2025-06-29 NOTE — ANESTHESIA PREPROCEDURE EVALUATION
Patient: Danette Buenrostro    Procedure Information       Date/Time: 06/29/25 1600    Procedure: CHOLECYSTECTOMY, LAPAROSCOPIC, WITH CHOLANGIOGRAM    Location: [facility]    Surgeons: Tato Alexander MD            Relevant Problems   Pulmonary   (+) Pulmonary thromboembolism (Multi)      Neuro   (+) Breakthrough seizure (Multi)   (+) Localization-related (focal) (partial) symptomatic epilepsy and epileptic syndromes with complex partial seizures, intractable, without status epilepticus   (+) Low back pain with sciatica   (+) Partial epilepsy with impairment of consciousness   (+) Seizure disorder (Multi)   (+) Seizure disorder during pregnancy, antepartum (Multi)   (+) Seizure disorder during pregnancy, postpartum (Multi)      Liver   (+) Fatty liver   (+) Gallstones      Endocrine   (+) Obesity in pregnancy, antepartum (HHS-HCC)      Hematology   (+) Pulmonary thromboembolism (Multi)      ID   (+) Bacterial vaginosis      GYN   (+) Ovarian endometriosis       Clinical information reviewed:    Allergies                NPO Detail:  No data recorded     Physical Exam    Airway  Mallampati: II     Cardiovascular   Rhythm: regular  Rate: normal     Dental    Pulmonary    Abdominal                                                                    Pre-Anesthesia Evaluation     Danette Buenrostro is a 35 y.o. female presents for the above mentioned procedure. She is admitted to the hospital for Pyelonephritis [N12];Gallstones [K80.20];Calculus of gallbladder without cholecystitis without obstruction [K80.20]. Today is Hospital Day: 2   Code Status: Full Code  Medical History[1]  Surgical History[2]  Social History[3]  RX Allergies[4]  Medications:  Current Medications[5]  Scheduled Medications[6]  PRN Medications[7]  Continuous Medications[8]  Patient recently received an antibiotic (last 12 hours)       Date/Time Action Medication Dose Rate    06/28/25 2231 New Bag    cefTRIAXone (Rocephin) 1 g in  dextrose (iso) IV 50 mL 1 g 100 mL/hr          Prior to Admission medications    Medication Sig Start Date End Date Taking? Authorizing Provider   acetaminophen-codeine (Tylenol w/ Codeine #3) 300-30 mg tablet Take 1 tablet by mouth every 6 hours if needed for severe pain (7 - 10). 5/30/25   Saritha Theodore MD   apixaban (Eliquis) 5 mg tablet Take 1 tablet (5 mg) by mouth 2 times a day. 10/7/24   Jorge Mckee MD   cyclobenzaprine (Flexeril) 5 mg tablet Take 1 tablet (5 mg) by mouth 3 times a day as needed for muscle spasms. 2/26/25   Saritha Theodore MD   dicyclomine (Bentyl) 20 mg tablet Take 1 tablet (20 mg) by mouth 3 times a day for 5 days. As needed for abdominal pain 6/26/25 7/1/25  Rene Pruitt MD   divalproex (Depakote) 500 mg EC tablet Take 2 tablets (1,000 mg) by mouth 2 times a day. Do not crush, chew, or split. 4/23/25 4/23/26  Kip Sigala MD   lidocaine 4 % patch Place 1 patch over 12 hours on the skin once daily. Remove & discard patch within 12 hours or as directed by MD. 5/12/25   Laci Ramirez PA-C   loperamide (Imodium) 2 mg capsule Take 1 capsule (2 mg) by mouth 4 times a day as needed for diarrhea for up to 5 days. 6/26/25 7/1/25  Rene Pruitt MD   norethindrone (Aygestin) 5 mg tablet Take 1 tablet (5 mg) by mouth once daily. 3/25/25 3/25/26  Saritha Theodore MD   ondansetron (Zofran) 8 mg tablet Take 1 tablet (8 mg) by mouth every 8 hours if needed for nausea or vomiting for up to 15 doses. 6/26/25   Rene Pruitt MD   OXcarbazepine (Trileptal) 600 mg tablet Take 2 tablets (1,200 mg) by mouth 2 times a day. 3/31/25 3/31/26  Kip Sigala MD   tiZANidine (Zanaflex) 4 mg tablet Take 1 tablet (4 mg) by mouth every 6 hours if needed for muscle spasms for up to 10 days. 5/12/25 5/22/25  Laci Ramirez PA-C     Vitals range for last 24 hours   Heart Rate:  [73-84]   Temp:  [36.3 °C (97.3 °F)-36.7 °C (98 °F)]   Resp:  [16-20]   BP: (128-150)/(77-90)   Height:  [165.1  "cm (5' 5\")]   Weight:  [129 kg (284 lb)]   SpO2:  [96 %-99 %]   Visit Vitals  /77 (BP Location: Right arm, Patient Position: Lying)   Pulse 73   Temp 36.7 °C (98 °F) (Temporal)   Resp 17   Ht 1.651 m (5' 5\")   Wt 129 kg (284 lb)   LMP 11/01/2023 (Exact Date)   SpO2 97%   BMI 47.26 kg/m²   OB Status Hysterectomy   Smoking Status Never   BSA 2.43 m²     Medical Gas Therapy: None (Room air)    Results from last 7 days   Lab Units 06/28/25 1657 06/26/25  1421   WBC AUTO x10*3/uL 11.2 10.9   HEMOGLOBIN g/dL 12.5 13.9   HEMATOCRIT % 35.9* 39.6   PLATELETS AUTO x10*3/uL 314 352     Lab Results   Component Value Date/Time    PROTIME 14.0 (H) 01/13/2025 0943    INR 1.2 (H) 01/13/2025 0943    ABO A 03/07/2025 2129     No results for input(s): \"FERRITIN\", \"TIBC\", \"IRONSAT\" in the last 96157 hours.  Results from last 7 days   Lab Units 06/28/25  1657 06/26/25  1421   EGFR mL/min/1.73m*2 >90 >90   ANION GAP mmol/L 11 13   BUN mg/dL 6 8   CREATININE mg/dL 0.77 0.76   SODIUM mmol/L 137 136   POTASSIUM mmol/L 3.7 3.7   CHLORIDE mmol/L 105 104   CO2 mmol/L 25 23   GLUCOSE mg/dL 92 101*   LACTATE mmol/L 1.0  --      Results from last 7 days   Lab Units 06/28/25  1657 06/26/25  1421   CALCIUM mg/dL 8.4* 8.8     Results from last 7 days   Lab Units 06/28/25  1657 06/26/25  1421   PROTEIN TOTAL g/dL 7.2 7.6   ALBUMIN g/dL 3.8 4.0   BILIRUBIN TOTAL mg/dL 0.4 0.4   ALK PHOS U/L 53 53   ALT U/L 142* 64*   AST U/L 98* 68*   LIPASE U/L 8* 8*           No lab exists for component: \"TROPONIN\"          Lab Results   Component Value Date    HEPCAB Nonreactive 12/11/2023     No components found for: \"HIV\"  No results found for the last 14 days.     Imaging Results:  US pelvis   Final Result   There is a slightly heterogeneous cystic structure in the right ovary   with internal linear and reticular echogenicity, measuring up to 3.2   cm, most compatible with hemorrhagic cyst. No evidence of ovarian   torsion.        Uterus and left ovary " are absent.        MACRO:   None        Signed by: Christiano King 6/28/2025 9:58 PM   Dictation workstation:   [id number]      CT abdomen pelvis w IV contrast   Final Result   Asymmetric patchy cortical hypoenhancement in the right kidney,   compatible with acute pyelonephritis.        Urothelial hyperemia and perivesical fat stranding is consistent with   acute cystitis.        Right ovary appears subjectively enlarged and possibly edematous   (series 604, images 118-129). Consider further evaluation with pelvic   sonography to exclude torsion as the etiology for this finding.        Hepatomegaly.        Cholelithiasis.        Additional findings as discussed above.        MACRO:   None        Signed by: Christiano King 6/28/2025 7:20 PM   Dictation workstation:   [id number]      US gallbladder   Final Result   2.6 cm calcified stone in the gallbladder neck that is in mobile with   changes in positioning suggesting it could be impacted. The   gallbladder is distended but there are no other features of acute   cholecystitis including negative sonographic Deutsch sign. Findings   could be related to early cholecystitis in the appropriate clinical   context and HIDA scan would better evaluate if clinically warranted.        Hepatic steatosis and hepatomegaly.             MACRO:   None.        Signed by: Abelardo Harris 6/28/2025 6:03 PM   Dictation workstation:   [id number]        EKG:  Encounter Date: 03/07/25   ECG 12 lead   Result Value    Ventricular Rate 85    Atrial Rate 85    AL Interval 174    QRS Duration 90    QT Interval 386    QTC Calculation(Bazett) 459    P Axis 55    R Axis 68    T Axis 29    QRS Count 14    Q Onset 217    P Onset 130    P Offset 181    T Offset 410    QTC Fredericia 433    Narrative    Normal sinus rhythm with sinus arrhythmia  Normal ECG  When compared with ECG of 28-DEC-2024 16:20,  No significant change was found  See ED provider note for full interpretation and clinical  "correlation  Confirmed by Helene Coleman ([id number]) on 3/10/2025 9:54:15 PM     Ejection Fractions:No results found for: \"EF\"  Echo:    No results found for this or any previous visit from the past 86782 days.    No results found for this or any previous visit from the past 1800 days.    Stress TestingNo results found for this or any previous visit from the past 48082 days.    Cardiac CatheterizationNo results found for this or any previous visit from the past 17479 days.    Cardiac Scoring No results found for this or any previous visit from the past 25028 days.    AAA screenNo results found for this or any previous visit from the past 93545 days.    Carotid DopplerNo results found for this or any previous visit from the past 37648 days.    OTHER No results found for this or any previous visit from the past 1825 days.    The ASCVD Risk score (Rekha GUTIERREZ, et al., 2019) failed to calculate for the following reasons:    The 2019 ASCVD risk score is only valid for ages 40 to 79  Last I/O:    Intake/Output Summary (Last 24 hours) at 2025 0747  Last data filed at 2025 0340  Gross per 24 hour   Intake 1245.83 ml   Output 200 ml   Net 1045.83 ml     Net IO Since Admission: 1,045.83 mL [25 0747]   Weight  Av kg (284 lb)  Min: 129 kg (284 lb)  Max: 129 kg (284 lb)    Lab Results   Component Value Date    PREGTESTUR Negative 2025    HCGQUANT <2 2025                    Anesthesia Plan    History of general anesthesia?: yes  History of complications of general anesthesia?: no    ASA 3     general     intravenous induction   Anesthetic plan and risks discussed with patient.    Plan discussed with CRNA and CAA.           [1]   Past Medical History:  Diagnosis Date    Benign neoplasm of breast     Endometriosis     Epilepsy with partial complex seizures (Multi)     Migraine     Taking Depakote    Neuropathy 5/3/2024    Obesity     Ovarian cyst     measuring 5cm x 3cm x 3cm- Scheduled for surgery " with Dr Theodore on 24    Pelvic floor dysfunction in female     Pelvic pain     Pulmonary embolism 2024    Left lung    Seizure disorder (Multi) At 7y    F/W Dr. Sigala (Neuro) Taking Trileptal, Last seizure 2 days ago during sleep    Type A blood, Rh positive     Blood type A+    Vitamin B1 deficiency    [2]   Past Surgical History:  Procedure Laterality Date     SECTION, LOW TRANSVERSE      x 2 w/ BTL at second    COLPOSCOPY      HYSTERECTOMY  2024    OOPHORECTOMY Left      Robotic LSO w/ R cystectomy/salpingectomy    OTHER SURGICAL HISTORY      Surgical Treatment Of Spontaneous     TUBAL LIGATION     [3]   Social History  Tobacco Use    Smoking status: Never     Passive exposure: Never    Smokeless tobacco: Never   Vaping Use    Vaping status: Never Used   Substance Use Topics    Alcohol use: Yes     Comment: Occasionally    Drug use: Never   [4] No Known Allergies  [5]   Current Facility-Administered Medications:     acetaminophen (Tylenol) tablet 650 mg, 650 mg, oral, q6h PRN, RICARDA Covington-CNP    acetaminophen (Tylenol) tablet 975 mg, 975 mg, oral, q6h PRN, RICARDA Covington-CNP    cefTRIAXone (Rocephin) 1 g in dextrose (iso) IV 50 mL, 1 g, intravenous, q24h, BROOKE Covington    HYDROmorphone (Dilaudid) injection 0.5 mg, 0.5 mg, intravenous, q4h PRN, BROOKE Covington, 0.5 mg at 25 0527    lactated Ringer's infusion, 125 mL/hr, intravenous, Continuous, Dom Sue MD, Last Rate: 125 mL/hr at 25 0708, 125 mL/hr at 25 0708    melatonin tablet 6 mg, 6 mg, oral, Nightly PRN, RICARDA Covington-CNP    pantoprazole (ProtoNix) EC tablet 40 mg, 40 mg, oral, Daily before breakfast **OR** pantoprazole (Protonix) injection 40 mg, 40 mg, intravenous, Daily before breakfast, BROOKE Covington, 40 mg at 25 0616    sennosides-docusate sodium (Sienna-Colace) 8.6-50 mg per tablet 2 tablet, 2 tablet, oral, BID PRN,  Alana Palma, APRN-CNP  [6]   Scheduled medications   Medication Dose Route Frequency    cefTRIAXone  1 g intravenous q24h    pantoprazole  40 mg oral Daily before breakfast    Or    pantoprazole  40 mg intravenous Daily before breakfast   [7]   PRN medications   Medication    acetaminophen    acetaminophen    HYDROmorphone    melatonin    sennosides-docusate sodium   [8]   Continuous Medications   Medication Dose Last Rate    lactated Ringer's  125 mL/hr 125 mL/hr (06/29/25 0708)

## 2025-06-29 NOTE — H&P
INTERNAL MEDICINE     HISTORY AND PHYSICAL      Chief Complaint:  Nausea, abdominal pain    History Of Present Illness  Danette Buenrostro is a 35 y.o. female presenting with nausea and abdominal pain of 5 days duration.  Patient has had some bouts of emesis too.  She initially attributed her symptoms to a stomach virus versus her Mounjaro.  However symptoms persisted and she presented to emergency room.  Imaging revealed distended gallbladder with gallstones with no evidence of acute cholecystitis.  She remains symptomatic.  She also had increased liver enzymes.  She received analgesics and was admitted for further treatment.  Patient has a remote history of pulmonary embolism and has been on Eliquis.  She took her dose last on Saturday morning.  She has had no shortness of breath or chest pain.     Past Medical History  She has a past medical history of Benign neoplasm of breast, Endometriosis, Epilepsy with partial complex seizures (Multi), Fatty liver (6/29/2025), Migraine, Neuropathy (5/3/2024), Obesity, Ovarian cyst, Pelvic floor dysfunction in female, Pelvic pain, Pulmonary embolism (02/2024), Seizure disorder (Multi) (At 7y), Type A blood, Rh positive, and Vitamin B1 deficiency.    She has no past medical history of KEN (acute kidney injury), Anxiety, Arthritis, Autoimmune disorder (Multi), Bipolar disorder, BPH (benign prostatic hyperplasia), Cerebral aneurysm ([facility]), Cervical cancer, Cervical disc disease, Chronic kidney disease, CKD (chronic kidney disease), Cognitive decline, Crohn's disease (Multi), Dementia, Depression, Dizziness, Dysphagia, Endometrial cancer (Multi), Esophageal cancer (Multi), Esophageal disease, ESRD (end stage renal disease) (Multi), Fibromyalgia, primary, Fractures, Gastric cancer (Multi), Gender dysphoria, GERD (gastroesophageal reflux disease), GI (gastrointestinal bleed), Hemodialysis status, Hernia, internal, History of peritoneal dialysis, HIV disease (Multi),  Immunocompromised, Irritable bowel syndrome, Lumbar disc disease, Mastocytosis, MS (multiple sclerosis) (Multi), Muscular dystrophy (Multi), Myasthenia gravis, Ovarian cancer (Multi), Pancreatitis (HHS-HCC), Peptic ulcer disease, Prematurity (HHS-HCC), PTSD (post-traumatic stress disorder), Schizophrenia, Spinal stenosis, Substance addiction (Multi), Syncope, TIA (transient ischemic attack), Ulcerative colitis, Urinary tract infection, Uterine cancer (Multi), or Vertigo.    Surgical History  She has a past surgical history that includes  section, low transverse; Colposcopy; Other surgical history; Oophorectomy (Left); Tubal ligation; and Hysterectomy (2024).     Social History  She reports that she has never smoked. She has never been exposed to tobacco smoke. She has never used smokeless tobacco. She reports current alcohol use. She reports that she does not use drugs.    Family History  Hypertension     Allergies  Patient has no known allergies.    Home Medications:  Prior to Admission medications    Medication Sig Start Date End Date Taking? Authorizing Provider   acetaminophen-codeine (Tylenol w/ Codeine #3) 300-30 mg tablet Take 1 tablet by mouth every 6 hours if needed for severe pain (7 - 10). 25   Saritha Theodore MD   apixaban (Eliquis) 5 mg tablet Take 1 tablet (5 mg) by mouth 2 times a day. 10/7/24   Jorge Mckee MD   cyclobenzaprine (Flexeril) 5 mg tablet Take 1 tablet (5 mg) by mouth 3 times a day as needed for muscle spasms.  Patient not taking: Reported on 2025   Saritha Theodore MD   dicyclomine (Bentyl) 20 mg tablet Take 1 tablet (20 mg) by mouth 3 times a day for 5 days. As needed for abdominal pain 25  Rene Pruitt MD   divalproex (Depakote) 500 mg EC tablet Take 2 tablets (1,000 mg) by mouth 2 times a day. Do not crush, chew, or split. 25  Kip Sigala MD   lidocaine 4 % patch Place 1 patch over 12 hours on the skin once  "daily. Remove & discard patch within 12 hours or as directed by MD. 5/12/25   Laci Ramirez PA-C   loperamide (Imodium) 2 mg capsule Take 1 capsule (2 mg) by mouth 4 times a day as needed for diarrhea for up to 5 days. 6/26/25 7/1/25  Rene Pruitt MD   norethindrone (Aygestin) 5 mg tablet Take 1 tablet (5 mg) by mouth once daily. 3/25/25 3/25/26  Saritha Theodore MD   ondansetron (Zofran) 8 mg tablet Take 1 tablet (8 mg) by mouth every 8 hours if needed for nausea or vomiting for up to 15 doses. 6/26/25   Rene Pruitt MD   OXcarbazepine (Trileptal) 600 mg tablet Take 2 tablets (1,200 mg) by mouth 2 times a day. 3/31/25 3/31/26  Kip Sigala MD   tirzepatide (Mounjaro) 5 mg/0.5 mL pen injector Inject 5 mg under the skin every 7 days. Takes on Sundays    Historical Provider, MD   tiZANidine (Zanaflex) 4 mg tablet Take 1 tablet (4 mg) by mouth every 6 hours if needed for muscle spasms for up to 10 days. 5/12/25 5/22/25  Laci Ramirez PA-C        Review of Systems   Constitutional: Negative.    HENT: Negative.     Eyes: Negative.    Respiratory: Negative.     Cardiovascular: Negative.    Gastrointestinal:  Positive for abdominal pain, diarrhea and nausea.   Musculoskeletal: Negative.    Skin: Negative.    Neurological: Negative.        Last Recorded Vitals  Blood pressure 138/83, pulse 76, temperature 36.4 °C (97.5 °F), resp. rate 18, height 1.651 m (5' 5\"), weight 129 kg (284 lb), last menstrual period 11/01/2023, SpO2 97%, not currently breastfeeding.    Physical Exam      Constitutional:       Appearance: Young, overweight, in no distress  HENT:      Head: Normocephalic and atraumatic.   Eyes:      Extraocular Movements: Extraocular movements intact.      Pupils: Pupils are equal, round, and reactive to light.   Cardiovascular:      Rate and Rhythm: Normal rate and regular rhythm.      Pulses: Normal pulses.      Heart sounds: Normal heart sounds.   Pulmonary:      Effort: Pulmonary effort is " normal.      Breath sounds: Normal breath sounds.   Abdominal:      General: Abdomen is flat. Bowel sounds are normal.      Palpations: Abdomen is soft.  Mild tenderness in the right upper quadrant with no rebound, guarding, or mass.  Musculoskeletal:         General: Normal range of motion.      Cervical back: Normal range of motion and neck supple.   Skin:     General: Skin is warm and dry.   Neurological:      General: No focal deficit present.      Mental Status: Alert and oriented x 3, speech fluent.     Relevant Results    Results for orders placed or performed during the hospital encounter of 06/28/25 (from the past 24 hours)   CBC and Auto Differential   Result Value Ref Range    WBC 11.2 4.4 - 11.3 x10*3/uL    nRBC 0.0 0.0 - 0.0 /100 WBCs    RBC 4.17 4.00 - 5.20 x10*6/uL    Hemoglobin 12.5 12.0 - 16.0 g/dL    Hematocrit 35.9 (L) 36.0 - 46.0 %    MCV 86 80 - 100 fL    MCH 30.0 26.0 - 34.0 pg    MCHC 34.8 32.0 - 36.0 g/dL    RDW 13.7 11.5 - 14.5 %    Platelets 314 150 - 450 x10*3/uL    Neutrophils % 53.0 40.0 - 80.0 %    Immature Granulocytes %, Automated 1.3 (H) 0.0 - 0.9 %    Lymphocytes % 28.2 13.0 - 44.0 %    Monocytes % 8.3 2.0 - 10.0 %    Eosinophils % 8.8 0.0 - 6.0 %    Basophils % 0.4 0.0 - 2.0 %    Neutrophils Absolute 5.93 1.20 - 7.70 x10*3/uL    Immature Granulocytes Absolute, Automated 0.14 0.00 - 0.70 x10*3/uL    Lymphocytes Absolute 3.14 1.20 - 4.80 x10*3/uL    Monocytes Absolute 0.92 0.10 - 1.00 x10*3/uL    Eosinophils Absolute 0.98 (H) 0.00 - 0.70 x10*3/uL    Basophils Absolute 0.04 0.00 - 0.10 x10*3/uL   Comprehensive metabolic panel   Result Value Ref Range    Glucose 92 74 - 99 mg/dL    Sodium 137 136 - 145 mmol/L    Potassium 3.7 3.5 - 5.3 mmol/L    Chloride 105 98 - 107 mmol/L    Bicarbonate 25 21 - 32 mmol/L    Anion Gap 11 10 - 20 mmol/L    Urea Nitrogen 6 6 - 23 mg/dL    Creatinine 0.77 0.50 - 1.05 mg/dL    eGFR >90 >60 mL/min/1.73m*2    Calcium 8.4 (L) 8.6 - 10.3 mg/dL    Albumin 3.8  3.4 - 5.0 g/dL    Alkaline Phosphatase 53 33 - 110 U/L    Total Protein 7.2 6.4 - 8.2 g/dL    AST 98 (H) 9 - 39 U/L    Bilirubin, Total 0.4 0.0 - 1.2 mg/dL     (H) 7 - 45 U/L   Lipase   Result Value Ref Range    Lipase 8 (L) 9 - 82 U/L   Lactate   Result Value Ref Range    Lactate 1.0 0.4 - 2.0 mmol/L   Human Chorionic Gonadotropin, Serum Quantitative   Result Value Ref Range    HCG, Beta-Quantitative <2 <5 mIU/mL   Blood Culture    Specimen: Peripheral Venipuncture; Blood culture   Result Value Ref Range    Blood Culture Loaded on Instrument - Culture in progress    Blood Culture    Specimen: Peripheral Venipuncture; Blood culture   Result Value Ref Range    Blood Culture Loaded on Instrument - Culture in progress    Comprehensive Metabolic Panel   Result Value Ref Range    Glucose 85 74 - 99 mg/dL    Sodium 139 136 - 145 mmol/L    Potassium 3.9 3.5 - 5.3 mmol/L    Chloride 106 98 - 107 mmol/L    Bicarbonate 23 21 - 32 mmol/L    Anion Gap 14 10 - 20 mmol/L    Urea Nitrogen 6 6 - 23 mg/dL    Creatinine 0.77 0.50 - 1.05 mg/dL    eGFR >90 >60 mL/min/1.73m*2    Calcium 8.2 (L) 8.6 - 10.3 mg/dL    Albumin 3.5 3.4 - 5.0 g/dL    Alkaline Phosphatase 52 33 - 110 U/L    Total Protein 6.3 (L) 6.4 - 8.2 g/dL     (H) 9 - 39 U/L    Bilirubin, Total 0.4 0.0 - 1.2 mg/dL     (H) 7 - 45 U/L   CBC and Auto Differential   Result Value Ref Range    WBC 10.5 4.4 - 11.3 x10*3/uL    nRBC 0.0 0.0 - 0.0 /100 WBCs    RBC 3.86 (L) 4.00 - 5.20 x10*6/uL    Hemoglobin 11.8 (L) 12.0 - 16.0 g/dL    Hematocrit 33.1 (L) 36.0 - 46.0 %    MCV 86 80 - 100 fL    MCH 30.6 26.0 - 34.0 pg    MCHC 35.6 32.0 - 36.0 g/dL    RDW 13.9 11.5 - 14.5 %    Platelets 299 150 - 450 x10*3/uL    Neutrophils % 49.3 40.0 - 80.0 %    Immature Granulocytes %, Automated 1.2 (H) 0.0 - 0.9 %    Lymphocytes % 26.7 13.0 - 44.0 %    Monocytes % 8.1 2.0 - 10.0 %    Eosinophils % 14.3 0.0 - 6.0 %    Basophils % 0.4 0.0 - 2.0 %    Neutrophils Absolute 5.20 1.20  - 7.70 x10*3/uL    Immature Granulocytes Absolute, Automated 0.13 0.00 - 0.70 x10*3/uL    Lymphocytes Absolute 2.81 1.20 - 4.80 x10*3/uL    Monocytes Absolute 0.85 0.10 - 1.00 x10*3/uL    Eosinophils Absolute 1.51 (H) 0.00 - 0.70 x10*3/uL    Basophils Absolute 0.04 0.00 - 0.10 x10*3/uL   POCT GLUCOSE   Result Value Ref Range    POCT Glucose 97 74 - 99 mg/dL             Assessment & Plan  Gallstones    Fatty liver          Problem list:  Assessment & Plan  Gallstones    Fatty liver        ASSESSMENT AND PLAN:    Symptomatic cholelithiasis -General Surgery following, will need cholecystectomy.  History of pulmonary embolism -remote, will not need resumption of Eliquis upon discharge.  Prediabetes -low carbohydrate diet.  Seizure disorder -continue with preadmission regimen of antiepileptics.  Ovarian cyst -no evidence of torsion, likely hemorrhagic cyst, outpatient gynecology follow-up advised.  Abnormal CT scan consistent with pyelonephritis -no white count or fever, continue with ceftriaxone.    Discussed with general surgery and family at bedside.      Abelardo Szymanski MD  06/29/2511:05 AM

## 2025-06-29 NOTE — INTERVAL H&P NOTE
H&P reviewed. The patient was examined and there are no changes to the H&P.    Rationale for lap denilson explained. Risks and complications described. She is agreeable

## 2025-06-29 NOTE — ANESTHESIA PROCEDURE NOTES
Airway  Date/Time: 6/29/2025 4:36 PM  Reason: elective    Airway not difficult    Staffing  Performed: NAREN   Authorized by: Seven Choudhury MD    Performed by: NAREN Bueno  Patient location during procedure: OR    Patient Condition  Indications for airway management: anesthesia  Patient position: sniffing  MILS not maintained throughout  Planned trial extubation  Sedation level: deep     Final Airway Details   Preoxygenated: yes  Final airway type: endotracheal airway  Successful airway: ETT  Cuffed: yes   Successful intubation technique: video laryngoscopy  Adjuncts used in placement: intubating stylet  Endotracheal tube insertion site: oral  Blade: Liliana  Blade size: #3  ETT size (mm): 7.0  Cormack-Lehane Classification: grade I - full view of glottis  Placement verified by: chest auscultation and capnometry   Measured from: lips  ETT to lips (cm): 23  Number of attempts at approach: 1    Additional Comments  atraumatic

## 2025-06-29 NOTE — ANESTHESIA POSTPROCEDURE EVALUATION
Patient: Danette Buenrostro    Procedure Summary       Date: 06/29/25 Room / Location: [facility] OR 01 / Virtual [facility] OR    Anesthesia Start: 1625 Anesthesia Stop: 1751    Procedure: CHOLECYSTECTOMY, LAPAROSCOPIC, WITH CHOLANGIOGRAM Diagnosis:       Gallstones      (Gallstones [K80.20])    Surgeons: Tato Alexander MD Responsible Provider: Seven Choudhury MD    Anesthesia Type: general ASA Status: 3            Anesthesia Type: general    Vitals Value Taken Time   /69 06/29/25 17:45   Temp 36.4 °C (97.5 °F) 06/29/25 17:45   Pulse 105 06/29/25 17:45   Resp 20 06/29/25 17:45   SpO2 96 % 06/29/25 17:45       Anesthesia Post Evaluation    Patient location during evaluation: bedside  Patient participation: complete - patient participated  Level of consciousness: awake  Pain management: adequate  Airway patency: patent  Cardiovascular status: acceptable  Respiratory status: acceptable  Hydration status: acceptable  Postoperative Nausea and Vomiting: none        No notable events documented.

## 2025-06-29 NOTE — OP NOTE
CHOLECYSTECTOMY, LAPAROSCOPIC, WITH CHOLANGIOGRAM Operative Note     Date: 2025 - 2025  OR Location: [facility] A OR    Name: Danette Buenrostro YOB: 1990, Age: 35 y.o., MRN: [id number], Sex: female    Diagnosis  Pre-op Diagnosis      * Gallstones [K80.20] Post-op Diagnosis     * Gallstones [K80.20]   acute cholecystitis     Procedures  Laparoscopic cholecystectomy    Surgeons      * Tato Alexander - Primary    Resident/Fellow/Other Assistant:  Surgeons and Role:  * No surgeons found with a matching role *    Staff:   Surgical Assistant:   Circulator: Megha Guzmanub Person: Raina Guzmanub Person: Dana    Anesthesia Staff: Anesthesiologist: Seven Choudhury MD  C-AA: NAREN Bueno    Procedure Summary  Anesthesia: General  ASA: III  Estimated Blood Loss: 20mL  Intra-op Medications:   Administrations occurring from 1600 to 1740 on 25:   Medication Name Total Dose   BUPivacaine HCl (Marcaine) 0.5 % (5 mg/mL) injection 4 mL   sodium chloride 0.9 % irrigation solution 2,000 mL   ceFAZolin (Ancef) vial 1 g 3 g   dexAMETHasone (Decadron) 4 mg/mL IV Syringe 2 mL 8 mg   dexMEDETOMidine 4 mcg/mL in NS syringe 20 mcg   esmolol (Brevibloc) injection 70 mg   fentaNYL (Sublimaze) injection 50 mcg/mL 100 mcg   HYDROmorphone (Dilaudid) injection 1 mg/mL 1 mg   lactated Ringer's infusion 135.42 mL   lidocaine (Xylocaine) injection 2 % 100 mg   midazolam PF (Versed) injection 1 mg/mL 2 mg   ondansetron (Zofran) 2 mg/mL injection 4 mg   propofol (Diprivan) injection 10 mg/mL 200 mg   rocuronium (ZeMuron) 50 mg/5 mL injection 80 mg   sugammadex (Bridion) 200 mg/2 mL injection 400 mg              Anesthesia Record               Intraprocedure I/O Totals       None           Specimen:   ID Type Source Tests Collected by Time   1 : GALLBLADDER Tissue GALLBLADDER CHOLECYSTECTOMY SURGICAL PATHOLOGY EXAM Tato Alexander MD 2025 [id number]                 Drains and/or Catheters: * None in log *    Tourniquet Times:          Implants:     Findings: Acute cholecystitis, massive gallbladder with giant stone impacted in the neck.    Indications: Danette Buenrostro is an 35 y.o. female who is having surgery for Gallstones [K80.20].     The patient was seen in the preoperative area. The risks, benefits, complications, treatment options, non-operative alternatives, expected recovery and outcomes were discussed with the patient. The possibilities of reaction to medication, pulmonary aspiration, injury to surrounding structures, bleeding, recurrent infection, the need for additional procedures, failure to diagnose a condition, and creating a complication requiring transfusion or operation were discussed with the patient. The patient concurred with the proposed plan, giving informed consent.  The site of surgery was properly noted/marked if necessary per policy. The patient has been actively warmed in preoperative area. Preoperative antibiotics have been ordered and given within 1 hours of incision. Venous thrombosis prophylaxis have been ordered including bilateral sequential compression devices    Procedure Details: Patient was brought to the OR and placed supine on the table.  Time out was performed and we confirmed patient and procedure.  Jennifer operative antibiotics were administered. General anesthesia given through ET tube.  We then prepped and draped sterilely.      I made an jennifer-umbilical incision with scalpel and then entered the peritoneal cavity by open cut down technique.  Stay sutures of 0 vicryl placed.  Jeremiah trocar inserted directly.  We insufflated and placed 5mm, 30 degree camera.  Patient placed in reverse Trendelenburg position.  5 mm ports placed under direct visualization in midline sub xiphoid and RUQ areas.      Gallbladder was visualized.  It was noted to be massively distended and edematous.  Some inflammatory adhesions taken down with blunt dissection and judicious cautery.  I was able to grab the body of the  gallbladder and retract cephalad and to the right.  Meticulous dissection then performed in Calot's triangle.  Giant stone noted in the neck of the gallbladder.  Cystic duct and cystic artery structures were identified and skeletonized.  Posterior cystic plate visualized.  The critical view was achieved.  The cystic artery was divided between hemolock clips.  The cystic duct was noted to be short and very very thin caliber.  Cholangiogram was going to be challenging and potentially dangerous if it were to avulse.  Three clips placed on proximal cystic duct and one toward the gallbladder side.  We divided in between.  The gallbladder was then dissected atraumatically out of the liver bed with hook cautery.  It was then placed in the endocatch bag and extracted ultimately through umbilical port site.  The liver bed was inspected for hemostasis.  The dorian hepatis was noted to be without bleeding or bile leakage.  Clips well secured.  We gently irrigated and aspirated the effluent.  The ports were removed under direct visualization.  We desufflated and closed the umbilical fascia with 0 vicryl.  Skin incisions were closed with 4-0 biosyn and dermabond.      Patient was extubated and transferred to the PACU in stable condition.    Evidence of Infection: No   Complications:  None; patient tolerated the procedure well.    Disposition: PACU - hemodynamically stable.  Condition: stable         Task Performed by RNFA or Surgical Assistant:  Hold camera, closed wounds          Additional Details:     Attending Attestation: I was present for the entire procedure.    Tato Alexander  Phone Number: [phone]

## 2025-06-29 NOTE — CONSULTS
Reason For Consult  cholelithiasis     History Of Present Illness  Danette Buenrostro is a 35 y.o. female presenting with right sided abdominal pain, which started Tuesday night at around 0300. She has had a few episodes of N/V. Reports anorexia. She was seen in the ED on 6/26 due to the pain and diarrhea (since resolved). No imaging was completed but labs were drawn. She was discharged with Bentyl, Imodium, and Zofran.     She does have a prior abdominal surgical history of a robotic right ovary cystectomy, right salpingectomy, left salpingo-oophorectomy and HARRIET in 2023. She underwent a total robotic hysterotomy, excision of endometriosis, right ovarian cystectomy, appendectomy and oversewing of bowel and bladder in 2024.    She does have a history of seizures. She also reports a prior PE, which she is on Eliquis for (last dose this AM around 7).     In the ED, she is afebrile, not tachycardic nor hypotensive. WBC 11.2, H/H 12.5/35.9, alk phos 53,  (was 64 on 6/26), AST 98 (68 on 6/26), Tbili 0.4, lactate 1, lipase 8. US gallbladder revealed a 2.6 cm calcified stone in the gallbladder neck that is in mobile with changes in positioning suggesting it could be impacted. The gallbladder is distended, but there are no other features of acute cholecystitis. CT A/P revealed cholelithiasis. Due to these findings, general surgery was consulted.      Past Medical History  She has a past medical history of Benign neoplasm of breast, Endometriosis, Epilepsy with partial complex seizures (Multi), Migraine, Neuropathy (5/3/2024), Obesity, Ovarian cyst, Pelvic floor dysfunction in female, Pelvic pain, Pulmonary embolism (02/2024), Seizure disorder (Multi) (At 7y), Type A blood, Rh positive, and Vitamin B1 deficiency.    She has no past medical history of KEN (acute kidney injury), Anxiety, Arthritis, Autoimmune disorder (Multi), Bipolar disorder, BPH (benign prostatic hyperplasia), Cerebral aneurysm (HHS-HCC), Cervical  cancer, Cervical disc disease, Chronic kidney disease, CKD (chronic kidney disease), Cognitive decline, Crohn's disease (Multi), Dementia, Depression, Dizziness, Dysphagia, Endometrial cancer (Multi), Esophageal cancer (Multi), Esophageal disease, ESRD (end stage renal disease) (Multi), Fibromyalgia, primary, Fractures, Gastric cancer (Multi), Gender dysphoria, GERD (gastroesophageal reflux disease), GI (gastrointestinal bleed), Hemodialysis status, Hernia, internal, History of peritoneal dialysis, HIV disease (Multi), Immunocompromised, Irritable bowel syndrome, Liver disease, Lumbar disc disease, Mastocytosis, MS (multiple sclerosis) (Multi), Muscular dystrophy (Multi), Myasthenia gravis, Ovarian cancer (Multi), Pancreatitis (HHS-HCC), Peptic ulcer disease, Prematurity (HHS-HCC), PTSD (post-traumatic stress disorder), Schizophrenia, Spinal stenosis, Substance addiction (Multi), Syncope, TIA (transient ischemic attack), Ulcerative colitis, Urinary tract infection, Uterine cancer (Multi), or Vertigo.    Surgical History  She has a past surgical history that includes  section, low transverse; Colposcopy; Other surgical history; Oophorectomy (Left); Tubal ligation; and Hysterectomy (2024).     Social History  She reports that she has never smoked. She has never been exposed to tobacco smoke. She has never used smokeless tobacco. She reports current alcohol use. She reports that she does not use drugs.    Family History  Family History[1]     Allergies  Patient has no known allergies.    Review of Systems  ABD: + pain, nausea, vomiting. Denies current diarrhea or constipation.      Physical Exam  Constitutional: Awake/alert/oriented x3, no distress, cooperative.  Skin: Warm and dry.  Eyes: EOMI.  ENMT: Mucus membranes moist, no apparent injury.  Head/Neck: Neck supple, no apparent injury.  Respiratory: Unlabored breathing.  Cardiovascular: Regular rate.  GI: Non distended, soft, obese, mildly tender to  "palpation of right side of abdomen.   : Voiding independently.  Musculoskeletal: ROM intact, normal strength.  Extremities: GOODWIN.  Neurological: Alert and oriented x3, no focal deficits.  Psychological: Appropriate mood and behavior.      Last Recorded Vitals  Blood pressure 135/90, pulse 84, temperature 36.3 °C (97.3 °F), temperature source Oral, resp. rate 20, height 1.651 m (5' 5\"), weight 129 kg (284 lb), last menstrual period 11/01/2023, SpO2 98%, not currently breastfeeding.    Relevant Results  Results for orders placed or performed during the hospital encounter of 06/28/25 (from the past 24 hours)   CBC and Auto Differential   Result Value Ref Range    WBC 11.2 4.4 - 11.3 x10*3/uL    nRBC 0.0 0.0 - 0.0 /100 WBCs    RBC 4.17 4.00 - 5.20 x10*6/uL    Hemoglobin 12.5 12.0 - 16.0 g/dL    Hematocrit 35.9 (L) 36.0 - 46.0 %    MCV 86 80 - 100 fL    MCH 30.0 26.0 - 34.0 pg    MCHC 34.8 32.0 - 36.0 g/dL    RDW 13.7 11.5 - 14.5 %    Platelets 314 150 - 450 x10*3/uL    Neutrophils % 53.0 40.0 - 80.0 %    Immature Granulocytes %, Automated 1.3 (H) 0.0 - 0.9 %    Lymphocytes % 28.2 13.0 - 44.0 %    Monocytes % 8.3 2.0 - 10.0 %    Eosinophils % 8.8 0.0 - 6.0 %    Basophils % 0.4 0.0 - 2.0 %    Neutrophils Absolute 5.93 1.20 - 7.70 x10*3/uL    Immature Granulocytes Absolute, Automated 0.14 0.00 - 0.70 x10*3/uL    Lymphocytes Absolute 3.14 1.20 - 4.80 x10*3/uL    Monocytes Absolute 0.92 0.10 - 1.00 x10*3/uL    Eosinophils Absolute 0.98 (H) 0.00 - 0.70 x10*3/uL    Basophils Absolute 0.04 0.00 - 0.10 x10*3/uL   Comprehensive metabolic panel   Result Value Ref Range    Glucose 92 74 - 99 mg/dL    Sodium 137 136 - 145 mmol/L    Potassium 3.7 3.5 - 5.3 mmol/L    Chloride 105 98 - 107 mmol/L    Bicarbonate 25 21 - 32 mmol/L    Anion Gap 11 10 - 20 mmol/L    Urea Nitrogen 6 6 - 23 mg/dL    Creatinine 0.77 0.50 - 1.05 mg/dL    eGFR >90 >60 mL/min/1.73m*2    Calcium 8.4 (L) 8.6 - 10.3 mg/dL    Albumin 3.8 3.4 - 5.0 g/dL    Alkaline " Phosphatase 53 33 - 110 U/L    Total Protein 7.2 6.4 - 8.2 g/dL    AST 98 (H) 9 - 39 U/L    Bilirubin, Total 0.4 0.0 - 1.2 mg/dL     (H) 7 - 45 U/L   Lipase   Result Value Ref Range    Lipase 8 (L) 9 - 82 U/L   Lactate   Result Value Ref Range    Lactate 1.0 0.4 - 2.0 mmol/L   Human Chorionic Gonadotropin, Serum Quantitative   Result Value Ref Range    HCG, Beta-Quantitative <2 <5 mIU/mL      CT abdomen pelvis w IV contrast  Result Date: 6/28/2025  Interpreted By:  Christiano King, STUDY: CT ABDOMEN PELVIS W IV CONTRAST; ;  6/28/2025 6:10 pm   INDICATION: Signs/Symptoms:right upper/ lower abdominal pain/ tenderness.     COMPARISON: None.   ACCESSION NUMBER(S): [id number]   ORDERING CLINICIAN: EIML GODINEZ   TECHNIQUE: Axial CT images of the abdomen and pelvis with coronal and sagittal reconstructed images performed after intravenous administration of 75 cc Omnipaque 350   FINDINGS: LOWER CHEST: No acute abnormality of the lung bases. Normal heart size. BONES: No acute osseous abnormality. ABDOMINAL WALL: Rectus diastasis with mild ventral midline protrusion of abdominal contents. Fat containing umbilical hernia.   ABDOMEN:   LIVER: Enlarged. No focal lesion. BILE DUCTS: Normal caliber. GALLBLADDER: Cholelithiasis. No wall thickening or pericholecystic fluid. PANCREAS: Within normal limits. SPLEEN: Within normal limits. ADRENALS: Within normal limits. KIDNEYS and URETERS: Asymmetric patchy cortical hypoenhancement in the right kidney, compatible with acute pyelonephritis. Otherwise, normal.     VESSELS: Within normal limits. RETROPERITONEUM: No pathologically enlarged retroperitoneal lymph nodes.   PELVIS:   REPRODUCTIVE ORGANS: Right ovary appears subjectively enlarged and possibly edematous (series 604, images 118-129). Consider further evaluation with pelvic sonography to exclude torsion as the etiology for this finding. BLADDER: Urothelial hyperemia and perivesical fat stranding is consistent with acute  cystitis.   BOWEL: No dilated or definitely abnormally thickened bowel. Fluid in the proximal colon could relate to indolent enterocolitis in the appropriate clinical context. No dilated bowel.  Normal appendix. PERITONEUM: No ascites or free air, no fluid collection.       Asymmetric patchy cortical hypoenhancement in the right kidney, compatible with acute pyelonephritis.   Urothelial hyperemia and perivesical fat stranding is consistent with acute cystitis.   Right ovary appears subjectively enlarged and possibly edematous (series 604, images 118-129). Consider further evaluation with pelvic sonography to exclude torsion as the etiology for this finding.   Hepatomegaly.   Cholelithiasis.   Additional findings as discussed above.   MACRO: None   Signed by: Christiano King 6/28/2025 7:20 PM Dictation workstation:   [id number]    US gallbladder  Result Date: 6/28/2025  Interpreted By:  Abelardo Harris, STUDY: US GALLBLADDER;  6/28/2025 5:01 pm   INDICATION: Signs/Symptoms:RUQ pain, appears to have cholelithiasis on bedside US.     COMPARISON: CT abdomen pelvis 03/07/2025   ACCESSION NUMBER(S): [id number]   ORDERING CLINICIAN: EMIL GODINEZ   TECHNIQUE: Grayscale and color Doppler sonographic imaging of the right upper quadrant.   FINDINGS: LIVER: The liver is enlarged, measuring 19.6 cm. Increased hepatic parenchymal echogenicity indicative of steatosis/hepatocellular disease. No focal abnormalities.   BILE DUCTS: No intrahepatic or extrahepatic bile duct dilatation. Extrahepatic bile duct = 0.7 cm   GALLBLADDER: The gallbladder is distended. There is a 2.6 cm hyperechoic shadowing stone near the neck that does not move with changes in positioning suggesting it could be impacted at the gallbladder neck. The gallbladder wall is normal in thickness. There is no pericholecystic fluid. The sonographic Deutsch sign is negative.   PANCREAS: The visualized portions of the pancreas appear within normal limits.   RIGHT KIDNEY:  The right kidney is normal in size. Normal renal cortical thickness and echogenicity. No hydronephrosis. No renal calculus. No perinephric fluid.   PERITONEUM/OTHER: No upper abdominal ascites.       2.6 cm calcified stone in the gallbladder neck that is in mobile with changes in positioning suggesting it could be impacted. The gallbladder is distended but there are no other features of acute cholecystitis including negative sonographic Deutsch sign. Findings could be related to early cholecystitis in the appropriate clinical context and HIDA scan would better evaluate if clinically warranted.   Hepatic steatosis and hepatomegaly.     MACRO: None.   Signed by: Abelardo Harris 6/28/2025 6:03 PM Dictation workstation:   [id number]        Assessment/Plan   - Abdomen non distended, soft, obese, mildly tender to palpation of right side of abdomen.   - Pain and nausea control as needed  - IVF  - Recommend holding Eliquis  - NPO at midnight  - Will add onto OR schedule tomorrow for laparoscopic cholecystectomy with Dr. Alexander  - Trend labs    Discussed with Dr. Alexander     I spent 45 minutes in the professional and overall care of this patient.      Lory Stokes, APRN-CNP         [1]   Family History  Problem Relation Name Age of Onset    Hypertension Mother Mohini     Stroke Mother Mohini     Heart attack Mother Mohini     Breast cancer Mother's Sister Karol     Bone cancer Mother's Brother      Seizures Father's Brother      Breast cancer Maternal Grandmother Alexia     Other (cerebral infarction) Maternal Grandfather      Seizures Other Cousin

## 2025-06-29 NOTE — H&P (VIEW-ONLY)
Reason For Consult  cholelithiasis     History Of Present Illness  Danette Buenrostro is a 35 y.o. female presenting with right sided abdominal pain, which started Tuesday night at around 0300. She has had a few episodes of N/V. Reports anorexia. She was seen in the ED on 6/26 due to the pain and diarrhea (since resolved). No imaging was completed but labs were drawn. She was discharged with Bentyl, Imodium, and Zofran.     She does have a prior abdominal surgical history of a robotic right ovary cystectomy, right salpingectomy, left salpingo-oophorectomy and HARRIET in 2023. She underwent a total robotic hysterotomy, excision of endometriosis, right ovarian cystectomy, appendectomy and oversewing of bowel and bladder in 2024.    She does have a history of seizures. She also reports a prior PE, which she is on Eliquis for (last dose this AM around 7).     In the ED, she is afebrile, not tachycardic nor hypotensive. WBC 11.2, H/H 12.5/35.9, alk phos 53,  (was 64 on 6/26), AST 98 (68 on 6/26), Tbili 0.4, lactate 1, lipase 8. US gallbladder revealed a 2.6 cm calcified stone in the gallbladder neck that is in mobile with changes in positioning suggesting it could be impacted. The gallbladder is distended, but there are no other features of acute cholecystitis. CT A/P revealed cholelithiasis. Due to these findings, general surgery was consulted.      Past Medical History  She has a past medical history of Benign neoplasm of breast, Endometriosis, Epilepsy with partial complex seizures (Multi), Migraine, Neuropathy (5/3/2024), Obesity, Ovarian cyst, Pelvic floor dysfunction in female, Pelvic pain, Pulmonary embolism (02/2024), Seizure disorder (Multi) (At 7y), Type A blood, Rh positive, and Vitamin B1 deficiency.    She has no past medical history of KEN (acute kidney injury), Anxiety, Arthritis, Autoimmune disorder (Multi), Bipolar disorder, BPH (benign prostatic hyperplasia), Cerebral aneurysm (HHS-HCC), Cervical  cancer, Cervical disc disease, Chronic kidney disease, CKD (chronic kidney disease), Cognitive decline, Crohn's disease (Multi), Dementia, Depression, Dizziness, Dysphagia, Endometrial cancer (Multi), Esophageal cancer (Multi), Esophageal disease, ESRD (end stage renal disease) (Multi), Fibromyalgia, primary, Fractures, Gastric cancer (Multi), Gender dysphoria, GERD (gastroesophageal reflux disease), GI (gastrointestinal bleed), Hemodialysis status, Hernia, internal, History of peritoneal dialysis, HIV disease (Multi), Immunocompromised, Irritable bowel syndrome, Liver disease, Lumbar disc disease, Mastocytosis, MS (multiple sclerosis) (Multi), Muscular dystrophy (Multi), Myasthenia gravis, Ovarian cancer (Multi), Pancreatitis (HHS-HCC), Peptic ulcer disease, Prematurity (HHS-HCC), PTSD (post-traumatic stress disorder), Schizophrenia, Spinal stenosis, Substance addiction (Multi), Syncope, TIA (transient ischemic attack), Ulcerative colitis, Urinary tract infection, Uterine cancer (Multi), or Vertigo.    Surgical History  She has a past surgical history that includes  section, low transverse; Colposcopy; Other surgical history; Oophorectomy (Left); Tubal ligation; and Hysterectomy (2024).     Social History  She reports that she has never smoked. She has never been exposed to tobacco smoke. She has never used smokeless tobacco. She reports current alcohol use. She reports that she does not use drugs.    Family History  Family History[1]     Allergies  Patient has no known allergies.    Review of Systems  ABD: + pain, nausea, vomiting. Denies current diarrhea or constipation.      Physical Exam  Constitutional: Awake/alert/oriented x3, no distress, cooperative.  Skin: Warm and dry.  Eyes: EOMI.  ENMT: Mucus membranes moist, no apparent injury.  Head/Neck: Neck supple, no apparent injury.  Respiratory: Unlabored breathing.  Cardiovascular: Regular rate.  GI: Non distended, soft, obese, mildly tender to  "palpation of right side of abdomen.   : Voiding independently.  Musculoskeletal: ROM intact, normal strength.  Extremities: GOODWIN.  Neurological: Alert and oriented x3, no focal deficits.  Psychological: Appropriate mood and behavior.      Last Recorded Vitals  Blood pressure 135/90, pulse 84, temperature 36.3 °C (97.3 °F), temperature source Oral, resp. rate 20, height 1.651 m (5' 5\"), weight 129 kg (284 lb), last menstrual period 11/01/2023, SpO2 98%, not currently breastfeeding.    Relevant Results  Results for orders placed or performed during the hospital encounter of 06/28/25 (from the past 24 hours)   CBC and Auto Differential   Result Value Ref Range    WBC 11.2 4.4 - 11.3 x10*3/uL    nRBC 0.0 0.0 - 0.0 /100 WBCs    RBC 4.17 4.00 - 5.20 x10*6/uL    Hemoglobin 12.5 12.0 - 16.0 g/dL    Hematocrit 35.9 (L) 36.0 - 46.0 %    MCV 86 80 - 100 fL    MCH 30.0 26.0 - 34.0 pg    MCHC 34.8 32.0 - 36.0 g/dL    RDW 13.7 11.5 - 14.5 %    Platelets 314 150 - 450 x10*3/uL    Neutrophils % 53.0 40.0 - 80.0 %    Immature Granulocytes %, Automated 1.3 (H) 0.0 - 0.9 %    Lymphocytes % 28.2 13.0 - 44.0 %    Monocytes % 8.3 2.0 - 10.0 %    Eosinophils % 8.8 0.0 - 6.0 %    Basophils % 0.4 0.0 - 2.0 %    Neutrophils Absolute 5.93 1.20 - 7.70 x10*3/uL    Immature Granulocytes Absolute, Automated 0.14 0.00 - 0.70 x10*3/uL    Lymphocytes Absolute 3.14 1.20 - 4.80 x10*3/uL    Monocytes Absolute 0.92 0.10 - 1.00 x10*3/uL    Eosinophils Absolute 0.98 (H) 0.00 - 0.70 x10*3/uL    Basophils Absolute 0.04 0.00 - 0.10 x10*3/uL   Comprehensive metabolic panel   Result Value Ref Range    Glucose 92 74 - 99 mg/dL    Sodium 137 136 - 145 mmol/L    Potassium 3.7 3.5 - 5.3 mmol/L    Chloride 105 98 - 107 mmol/L    Bicarbonate 25 21 - 32 mmol/L    Anion Gap 11 10 - 20 mmol/L    Urea Nitrogen 6 6 - 23 mg/dL    Creatinine 0.77 0.50 - 1.05 mg/dL    eGFR >90 >60 mL/min/1.73m*2    Calcium 8.4 (L) 8.6 - 10.3 mg/dL    Albumin 3.8 3.4 - 5.0 g/dL    Alkaline " Phosphatase 53 33 - 110 U/L    Total Protein 7.2 6.4 - 8.2 g/dL    AST 98 (H) 9 - 39 U/L    Bilirubin, Total 0.4 0.0 - 1.2 mg/dL     (H) 7 - 45 U/L   Lipase   Result Value Ref Range    Lipase 8 (L) 9 - 82 U/L   Lactate   Result Value Ref Range    Lactate 1.0 0.4 - 2.0 mmol/L   Human Chorionic Gonadotropin, Serum Quantitative   Result Value Ref Range    HCG, Beta-Quantitative <2 <5 mIU/mL      CT abdomen pelvis w IV contrast  Result Date: 6/28/2025  Interpreted By:  Christiano King, STUDY: CT ABDOMEN PELVIS W IV CONTRAST; ;  6/28/2025 6:10 pm   INDICATION: Signs/Symptoms:right upper/ lower abdominal pain/ tenderness.     COMPARISON: None.   ACCESSION NUMBER(S): [id number]   ORDERING CLINICIAN: EMIL GODINEZ   TECHNIQUE: Axial CT images of the abdomen and pelvis with coronal and sagittal reconstructed images performed after intravenous administration of 75 cc Omnipaque 350   FINDINGS: LOWER CHEST: No acute abnormality of the lung bases. Normal heart size. BONES: No acute osseous abnormality. ABDOMINAL WALL: Rectus diastasis with mild ventral midline protrusion of abdominal contents. Fat containing umbilical hernia.   ABDOMEN:   LIVER: Enlarged. No focal lesion. BILE DUCTS: Normal caliber. GALLBLADDER: Cholelithiasis. No wall thickening or pericholecystic fluid. PANCREAS: Within normal limits. SPLEEN: Within normal limits. ADRENALS: Within normal limits. KIDNEYS and URETERS: Asymmetric patchy cortical hypoenhancement in the right kidney, compatible with acute pyelonephritis. Otherwise, normal.     VESSELS: Within normal limits. RETROPERITONEUM: No pathologically enlarged retroperitoneal lymph nodes.   PELVIS:   REPRODUCTIVE ORGANS: Right ovary appears subjectively enlarged and possibly edematous (series 604, images 118-129). Consider further evaluation with pelvic sonography to exclude torsion as the etiology for this finding. BLADDER: Urothelial hyperemia and perivesical fat stranding is consistent with acute  cystitis.   BOWEL: No dilated or definitely abnormally thickened bowel. Fluid in the proximal colon could relate to indolent enterocolitis in the appropriate clinical context. No dilated bowel.  Normal appendix. PERITONEUM: No ascites or free air, no fluid collection.       Asymmetric patchy cortical hypoenhancement in the right kidney, compatible with acute pyelonephritis.   Urothelial hyperemia and perivesical fat stranding is consistent with acute cystitis.   Right ovary appears subjectively enlarged and possibly edematous (series 604, images 118-129). Consider further evaluation with pelvic sonography to exclude torsion as the etiology for this finding.   Hepatomegaly.   Cholelithiasis.   Additional findings as discussed above.   MACRO: None   Signed by: Christiano King 6/28/2025 7:20 PM Dictation workstation:   [id number]    US gallbladder  Result Date: 6/28/2025  Interpreted By:  Abelardo Harris, STUDY: US GALLBLADDER;  6/28/2025 5:01 pm   INDICATION: Signs/Symptoms:RUQ pain, appears to have cholelithiasis on bedside US.     COMPARISON: CT abdomen pelvis 03/07/2025   ACCESSION NUMBER(S): [id number]   ORDERING CLINICIAN: EMIL GODINEZ   TECHNIQUE: Grayscale and color Doppler sonographic imaging of the right upper quadrant.   FINDINGS: LIVER: The liver is enlarged, measuring 19.6 cm. Increased hepatic parenchymal echogenicity indicative of steatosis/hepatocellular disease. No focal abnormalities.   BILE DUCTS: No intrahepatic or extrahepatic bile duct dilatation. Extrahepatic bile duct = 0.7 cm   GALLBLADDER: The gallbladder is distended. There is a 2.6 cm hyperechoic shadowing stone near the neck that does not move with changes in positioning suggesting it could be impacted at the gallbladder neck. The gallbladder wall is normal in thickness. There is no pericholecystic fluid. The sonographic Deutsch sign is negative.   PANCREAS: The visualized portions of the pancreas appear within normal limits.   RIGHT KIDNEY:  The right kidney is normal in size. Normal renal cortical thickness and echogenicity. No hydronephrosis. No renal calculus. No perinephric fluid.   PERITONEUM/OTHER: No upper abdominal ascites.       2.6 cm calcified stone in the gallbladder neck that is in mobile with changes in positioning suggesting it could be impacted. The gallbladder is distended but there are no other features of acute cholecystitis including negative sonographic Deutsch sign. Findings could be related to early cholecystitis in the appropriate clinical context and HIDA scan would better evaluate if clinically warranted.   Hepatic steatosis and hepatomegaly.     MACRO: None.   Signed by: Abelardo Harris 6/28/2025 6:03 PM Dictation workstation:   [id number]        Assessment/Plan   - Abdomen non distended, soft, obese, mildly tender to palpation of right side of abdomen.   - Pain and nausea control as needed  - IVF  - Recommend holding Eliquis  - NPO at midnight  - Will add onto OR schedule tomorrow for laparoscopic cholecystectomy with Dr. Alexander  - Trend labs    Discussed with Dr. Alexander     I spent 45 minutes in the professional and overall care of this patient.      Lory Stokes, APRN-CNP         [1]   Family History  Problem Relation Name Age of Onset    Hypertension Mother Mohini     Stroke Mother Mohini     Heart attack Mother Mohini     Breast cancer Mother's Sister Karol     Bone cancer Mother's Brother      Seizures Father's Brother      Breast cancer Maternal Grandmother Alexia     Other (cerebral infarction) Maternal Grandfather      Seizures Other Cousin

## 2025-06-30 VITALS
OXYGEN SATURATION: 94 % | HEIGHT: 65 IN | WEIGHT: 284 LBS | HEART RATE: 99 BPM | RESPIRATION RATE: 18 BRPM | TEMPERATURE: 99 F | SYSTOLIC BLOOD PRESSURE: 155 MMHG | DIASTOLIC BLOOD PRESSURE: 87 MMHG | BODY MASS INDEX: 47.32 KG/M2

## 2025-06-30 DIAGNOSIS — M25.511 ACUTE PAIN OF RIGHT SHOULDER: ICD-10-CM

## 2025-06-30 LAB
ALBUMIN SERPL BCP-MCNC: 3.6 G/DL (ref 3.4–5)
ALP SERPL-CCNC: 57 U/L (ref 33–110)
ALT SERPL W P-5'-P-CCNC: 279 U/L (ref 7–45)
ANION GAP SERPL CALC-SCNC: 11 MMOL/L (ref 10–20)
AST SERPL W P-5'-P-CCNC: 213 U/L (ref 9–39)
BACTERIA UR CULT: NO GROWTH
BASOPHILS # BLD AUTO: 0.03 X10*3/UL (ref 0–0.1)
BASOPHILS NFR BLD AUTO: 0.2 %
BILIRUB SERPL-MCNC: 0.4 MG/DL (ref 0–1.2)
BUN SERPL-MCNC: 5 MG/DL (ref 6–23)
CALCIUM SERPL-MCNC: 8.2 MG/DL (ref 8.6–10.3)
CHLORIDE SERPL-SCNC: 104 MMOL/L (ref 98–107)
CO2 SERPL-SCNC: 24 MMOL/L (ref 21–32)
CREAT SERPL-MCNC: 0.66 MG/DL (ref 0.5–1.05)
EGFRCR SERPLBLD CKD-EPI 2021: >90 ML/MIN/1.73M*2
EOSINOPHIL # BLD AUTO: 0.27 X10*3/UL (ref 0–0.7)
EOSINOPHIL NFR BLD AUTO: 2 %
ERYTHROCYTE [DISTWIDTH] IN BLOOD BY AUTOMATED COUNT: 13.5 % (ref 11.5–14.5)
GLUCOSE SERPL-MCNC: 87 MG/DL (ref 74–99)
HCT VFR BLD AUTO: 35.1 % (ref 36–46)
HGB BLD-MCNC: 12.3 G/DL (ref 12–16)
HOLD SPECIMEN: NORMAL
IMM GRANULOCYTES # BLD AUTO: 0.26 X10*3/UL (ref 0–0.7)
IMM GRANULOCYTES NFR BLD AUTO: 1.9 % (ref 0–0.9)
LYMPHOCYTES # BLD AUTO: 2.03 X10*3/UL (ref 1.2–4.8)
LYMPHOCYTES NFR BLD AUTO: 15.1 %
MCH RBC QN AUTO: 30.4 PG (ref 26–34)
MCHC RBC AUTO-ENTMCNC: 35 G/DL (ref 32–36)
MCV RBC AUTO: 87 FL (ref 80–100)
MONOCYTES # BLD AUTO: 0.92 X10*3/UL (ref 0.1–1)
MONOCYTES NFR BLD AUTO: 6.9 %
NEUTROPHILS # BLD AUTO: 9.91 X10*3/UL (ref 1.2–7.7)
NEUTROPHILS NFR BLD AUTO: 73.9 %
NRBC BLD-RTO: 0 /100 WBCS (ref 0–0)
PLATELET # BLD AUTO: 301 X10*3/UL (ref 150–450)
POTASSIUM SERPL-SCNC: 4.2 MMOL/L (ref 3.5–5.3)
PROT SERPL-MCNC: 6.7 G/DL (ref 6.4–8.2)
RBC # BLD AUTO: 4.04 X10*6/UL (ref 4–5.2)
SODIUM SERPL-SCNC: 135 MMOL/L (ref 136–145)
WBC # BLD AUTO: 13.4 X10*3/UL (ref 4.4–11.3)

## 2025-06-30 PROCEDURE — 2500000001 HC RX 250 WO HCPCS SELF ADMINISTERED DRUGS (ALT 637 FOR MEDICARE OP): Performed by: SURGERY

## 2025-06-30 PROCEDURE — 36415 COLL VENOUS BLD VENIPUNCTURE: CPT | Performed by: SURGERY

## 2025-06-30 PROCEDURE — 85025 COMPLETE CBC W/AUTO DIFF WBC: CPT | Performed by: SURGERY

## 2025-06-30 PROCEDURE — 2500000004 HC RX 250 GENERAL PHARMACY W/ HCPCS (ALT 636 FOR OP/ED): Performed by: SURGERY

## 2025-06-30 PROCEDURE — 80053 COMPREHEN METABOLIC PANEL: CPT | Performed by: SURGERY

## 2025-06-30 PROCEDURE — 2500000001 HC RX 250 WO HCPCS SELF ADMINISTERED DRUGS (ALT 637 FOR MEDICARE OP): Performed by: INTERNAL MEDICINE

## 2025-06-30 RX ORDER — APIXABAN 5 MG/1
5 TABLET, FILM COATED ORAL 2 TIMES DAILY
Qty: 180 TABLET | Refills: 2 | Status: SHIPPED | OUTPATIENT
Start: 2025-06-30 | End: 2025-06-30 | Stop reason: SDUPTHER

## 2025-06-30 RX ORDER — CEFTRIAXONE 2 G/50ML
2 INJECTION, SOLUTION INTRAVENOUS EVERY 24 HOURS
Status: DISCONTINUED | OUTPATIENT
Start: 2025-06-30 | End: 2025-06-30

## 2025-06-30 RX ORDER — CEPHALEXIN 500 MG/1
500 CAPSULE ORAL EVERY 6 HOURS SCHEDULED
Qty: 28 CAPSULE | Refills: 0 | Status: SHIPPED | OUTPATIENT
Start: 2025-06-30 | End: 2025-07-07

## 2025-06-30 RX ORDER — CEPHALEXIN 500 MG/1
500 CAPSULE ORAL EVERY 6 HOURS SCHEDULED
Status: DISCONTINUED | OUTPATIENT
Start: 2025-06-30 | End: 2025-06-30 | Stop reason: HOSPADM

## 2025-06-30 RX ORDER — OXYCODONE AND ACETAMINOPHEN 5; 325 MG/1; MG/1
1 TABLET ORAL EVERY 4 HOURS PRN
Qty: 5 TABLET | Refills: 0 | Status: SHIPPED | OUTPATIENT
Start: 2025-06-30 | End: 2025-07-05

## 2025-06-30 RX ADMIN — KETOROLAC TROMETHAMINE 15 MG: 30 INJECTION, SOLUTION INTRAMUSCULAR at 06:50

## 2025-06-30 RX ADMIN — HEPARIN SODIUM 7500 UNITS: 5000 INJECTION, SOLUTION INTRAVENOUS; SUBCUTANEOUS at 00:40

## 2025-06-30 RX ADMIN — DIVALPROEX SODIUM 1000 MG: 250 TABLET, DELAYED RELEASE ORAL at 08:36

## 2025-06-30 RX ADMIN — KETOROLAC TROMETHAMINE 15 MG: 30 INJECTION, SOLUTION INTRAMUSCULAR at 00:40

## 2025-06-30 RX ADMIN — OXCARBAZEPINE 1200 MG: 300 TABLET, FILM COATED ORAL at 08:36

## 2025-06-30 RX ADMIN — OXYCODONE HYDROCHLORIDE AND ACETAMINOPHEN 1 TABLET: 5; 325 TABLET ORAL at 09:00

## 2025-06-30 RX ADMIN — PANTOPRAZOLE SODIUM 40 MG: 40 TABLET, DELAYED RELEASE ORAL at 06:49

## 2025-06-30 RX ADMIN — HYDROMORPHONE HYDROCHLORIDE 0.5 MG: 1 INJECTION, SOLUTION INTRAMUSCULAR; INTRAVENOUS; SUBCUTANEOUS at 03:36

## 2025-06-30 RX ADMIN — KETOROLAC TROMETHAMINE 15 MG: 30 INJECTION, SOLUTION INTRAMUSCULAR at 12:27

## 2025-06-30 RX ADMIN — HEPARIN SODIUM 7500 UNITS: 5000 INJECTION, SOLUTION INTRAVENOUS; SUBCUTANEOUS at 08:37

## 2025-06-30 RX ADMIN — POLYETHYLENE GLYCOL 3350 17 G: 17 POWDER, FOR SOLUTION ORAL at 08:36

## 2025-06-30 RX ADMIN — CEPHALEXIN 500 MG: 500 CAPSULE ORAL at 12:27

## 2025-06-30 RX ADMIN — OXYCODONE HYDROCHLORIDE AND ACETAMINOPHEN 1 TABLET: 5; 325 TABLET ORAL at 00:35

## 2025-06-30 ASSESSMENT — COGNITIVE AND FUNCTIONAL STATUS - GENERAL
DRESSING REGULAR LOWER BODY CLOTHING: A LITTLE
MOVING TO AND FROM BED TO CHAIR: A LITTLE
DAILY ACTIVITIY SCORE: 23
MOBILITY SCORE: 22
CLIMB 3 TO 5 STEPS WITH RAILING: A LITTLE

## 2025-06-30 ASSESSMENT — PAIN SCALES - GENERAL
PAINLEVEL_OUTOF10: 8
PAINLEVEL_OUTOF10: 5 - MODERATE PAIN
PAINLEVEL_OUTOF10: 0 - NO PAIN
PAINLEVEL_OUTOF10: 8
PAINLEVEL_OUTOF10: 9
PAINLEVEL_OUTOF10: 2
PAINLEVEL_OUTOF10: 4

## 2025-06-30 ASSESSMENT — PAIN DESCRIPTION - DESCRIPTORS
DESCRIPTORS: ACHING;CRAMPING;DISCOMFORT
DESCRIPTORS: ACHING;SORE
DESCRIPTORS: ACHING
DESCRIPTORS: THROBBING;TIGHTNESS

## 2025-06-30 ASSESSMENT — PAIN - FUNCTIONAL ASSESSMENT
PAIN_FUNCTIONAL_ASSESSMENT: 0-10

## 2025-06-30 ASSESSMENT — PAIN DESCRIPTION - LOCATION
LOCATION: ABDOMEN
LOCATION: ABDOMEN

## 2025-06-30 ASSESSMENT — PAIN DESCRIPTION - ORIENTATION: ORIENTATION: RIGHT

## 2025-06-30 ASSESSMENT — ACTIVITIES OF DAILY LIVING (ADL): LACK_OF_TRANSPORTATION: NO

## 2025-06-30 NOTE — POST-PROCEDURE NOTE
Ms. Buenrosrto is a 35 year old female who is POD #0 from a laparoscopic cholecystectomy (Intraoperative Findings: Acute cholecystitis, massive gallbladder with giant stone impacted in the neck). She does report abdominal discomfort post-operatively. Denies any current nausea or vomiting. She has not yet urinated since OR; however, she feels like she can go and nursing is assisting her to the restroom.     PE:  Constitutional: Slightly drowsy, calm and cooperative, NAD.  Eyes: PERRL.  ENMT: Moist mucous membranes.  Head/Neck: Neck supple.  Cardiovascular: Mildly tachycardic.  Respiratory/Thorax: Unlabored breathing.  Gastrointestinal: Abdomen slightly distended, soft, obese, appropriately tender to palpation, no peritoneal signs, lap sites c/d/i, well approximated with Dermabond, no erythema or drainage.  Genitourinary: Awaiting post-op urinary void.  Musculoskeletal: ROM intact.  Skin: Warm and dry.    Radiology and labs reviewed. VSS, afebrile.    Plan:   - Diet: 40 gram fat restricted  - IVF, Rocephin  - Continue current pain regimen   - PRN antiemetic   - DVT Proph: SCDs/ ambulate/ Heparin  - Bowel regimen: Miralax, PRN Sienna-Colace  - Monitor VS every 4 hours   - Labs ordered for AM - trend LFTs  - IS every hour while awake   - Encourage ambulation / OOB as tolerated   - Monitor lap sites for drainage, erythema, excessive bruising   - F/u with Dr. Alexander outpatient once d/c from hospital     Dispo: Pain and nausea control as needed. OOB as able. Trend LFTs. Anticipate discharge from a surgical perspective tomorrow.    Total time spent 25 minutes, and greater than 50% of time was spent in counseling/coordination of care.

## 2025-06-30 NOTE — CARE PLAN
The patient's goals for the shift include      The clinical goals for the shift include remain comfortable during shift      Problem: Pain - Adult  Goal: Verbalizes/displays adequate comfort level or baseline comfort level  Outcome: Progressing     Problem: Safety - Adult  Goal: Free from fall injury  Outcome: Progressing     Problem: Skin  Goal: Decreased wound size/increased tissue granulation at next dressing change  Outcome: Progressing  Goal: Participates in plan/prevention/treatment measures  Outcome: Progressing  Goal: Prevent/manage excess moisture  Outcome: Progressing  Goal: Prevent/minimize sheer/friction injuries  Outcome: Progressing  Goal: Promote/optimize nutrition  Outcome: Progressing  Goal: Promote skin healing  Outcome: Progressing

## 2025-06-30 NOTE — NURSING NOTE
Discharge instructions provided using teachback method.  Patient's health-related risk factors discussed with patient.  Patient educated to look for worsening signs and symptoms and educated to seek medical attention if experiencing medical emergency.  Patient aware of needs to follow up with outpatient clinics as scheduled. Home going meds reviewed with patient.  Patient verbalized understanding of disposition and discharge instructions.  All questions answered to patient's satisfaction and within nursing scope of practice.  Vitals stable; IV(s) removed by bedside.

## 2025-06-30 NOTE — PROGRESS NOTES
"Danette Buenrostro is a 35 y.o. female on day 2 of admission presenting with Gallstones.    Subjective   Evaluated at bedside. She is awake, alert, sitting up comfortably in bed. States abdominal pain is controlled. Tolerating fat restricted diet. No issues urinating. She is passing gas. She has no questions or concerns.       Objective     Physical Exam  Vitals and nursing note reviewed.   Constitutional:       General: She is not in acute distress.     Appearance: She is not ill-appearing or toxic-appearing.   HENT:      Head: Normocephalic and atraumatic.      Mouth/Throat:      Mouth: Mucous membranes are moist.   Eyes:      Pupils: Pupils are equal, round, and reactive to light.      Comments: Clear sclera.   Cardiovascular:      Rate and Rhythm: Normal rate.   Pulmonary:      Effort: Pulmonary effort is normal.      Comments: No labored breathing.  Abdominal:      General: There is distension.      Palpations: Abdomen is soft.      Tenderness: There is abdominal tenderness.      Comments: Abdomen soft, mildly distended, appropriately tender, no peritoneal signs, laparoscopy sites C/D/I, well approximated with Dermabond, no erythema or drainage.   Genitourinary:     Comments: Voiding independently.  Musculoskeletal:      Cervical back: Neck supple.      Right lower leg: No edema.      Left lower leg: No edema.   Skin:     General: Skin is warm and dry.   Neurological:      General: No focal deficit present.      Mental Status: She is alert and oriented to person, place, and time.   Psychiatric:         Mood and Affect: Mood normal.         Behavior: Behavior normal. Behavior is cooperative.         Last Recorded Vitals  Blood pressure 136/82, pulse 99, temperature 37.2 °C (99 °F), temperature source Oral, resp. rate 19, height 1.651 m (5' 5\"), weight 129 kg (284 lb), last menstrual period 11/01/2023, SpO2 98%, not currently breastfeeding.  Intake/Output last 3 Shifts:  I/O last 3 completed shifts:  In: 2295.8 " (17.8 mL/kg) [I.V.:1195.8 (9.3 mL/kg); IV Piggyback:1100]  Out: 810 (6.3 mL/kg) [Urine:800 (0.2 mL/kg/hr); Blood:10]  Weight: 128.8 kg     Relevant Results  Scheduled medications  Scheduled Medications[1]  Continuous medications  Continuous Medications[2]  PRN medications  PRN Medications[3]    Results for orders placed or performed during the hospital encounter of 06/28/25 (from the past 24 hours)   Urinalysis with Reflex Culture and Microscopic   Result Value Ref Range    Color, Urine Yellow Light-Yellow, Yellow, Dark-Yellow    Appearance, Urine Clear Clear    Specific Gravity, Urine 1.021 1.005 - 1.035    pH, Urine 6.0 5.0, 5.5, 6.0, 6.5, 7.0, 7.5, 8.0    Protein, Urine NEGATIVE NEGATIVE, 10 (TRACE), 20 (TRACE) mg/dL    Glucose, Urine Normal Normal mg/dL    Blood, Urine NEGATIVE NEGATIVE mg/dL    Ketones, Urine TRACE (A) NEGATIVE mg/dL    Bilirubin, Urine NEGATIVE NEGATIVE mg/dL    Urobilinogen, Urine 2 (1+) (A) Normal mg/dL    Nitrite, Urine NEGATIVE NEGATIVE    Leukocyte Esterase, Urine 75 Pema/uL (A) NEGATIVE   Extra Urine Gray Tube   Result Value Ref Range    Extra Tube     Microscopic Only, Urine   Result Value Ref Range    WBC, Urine 11-20 (A) 1-5, NONE /HPF    RBC, Urine 1-2 NONE, 1-2, 3-5 /HPF    Squamous Epithelial Cells, Urine 10-25 (FEW) Reference range not established. /HPF    Bacteria, Urine 1+ (A) NONE SEEN /HPF    Mucus, Urine FEW Reference range not established. /LPF   CBC and Auto Differential   Result Value Ref Range    WBC 13.4 (H) 4.4 - 11.3 x10*3/uL    nRBC 0.0 0.0 - 0.0 /100 WBCs    RBC 4.04 4.00 - 5.20 x10*6/uL    Hemoglobin 12.3 12.0 - 16.0 g/dL    Hematocrit 35.1 (L) 36.0 - 46.0 %    MCV 87 80 - 100 fL    MCH 30.4 26.0 - 34.0 pg    MCHC 35.0 32.0 - 36.0 g/dL    RDW 13.5 11.5 - 14.5 %    Platelets 301 150 - 450 x10*3/uL    Neutrophils % 73.9 40.0 - 80.0 %    Immature Granulocytes %, Automated 1.9 (H) 0.0 - 0.9 %    Lymphocytes % 15.1 13.0 - 44.0 %    Monocytes % 6.9 2.0 - 10.0 %     Eosinophils % 2.0 0.0 - 6.0 %    Basophils % 0.2 0.0 - 2.0 %    Neutrophils Absolute 9.91 (H) 1.20 - 7.70 x10*3/uL    Immature Granulocytes Absolute, Automated 0.26 0.00 - 0.70 x10*3/uL    Lymphocytes Absolute 2.03 1.20 - 4.80 x10*3/uL    Monocytes Absolute 0.92 0.10 - 1.00 x10*3/uL    Eosinophils Absolute 0.27 0.00 - 0.70 x10*3/uL    Basophils Absolute 0.03 0.00 - 0.10 x10*3/uL   Comprehensive Metabolic Panel   Result Value Ref Range    Glucose 87 74 - 99 mg/dL    Sodium 135 (L) 136 - 145 mmol/L    Potassium 4.2 3.5 - 5.3 mmol/L    Chloride 104 98 - 107 mmol/L    Bicarbonate 24 21 - 32 mmol/L    Anion Gap 11 10 - 20 mmol/L    Urea Nitrogen 5 (L) 6 - 23 mg/dL    Creatinine 0.66 0.50 - 1.05 mg/dL    eGFR >90 >60 mL/min/1.73m*2    Calcium 8.2 (L) 8.6 - 10.3 mg/dL    Albumin 3.6 3.4 - 5.0 g/dL    Alkaline Phosphatase 57 33 - 110 U/L    Total Protein 6.7 6.4 - 8.2 g/dL     (H) 9 - 39 U/L    Bilirubin, Total 0.4 0.0 - 1.2 mg/dL     (H) 7 - 45 U/L     US pelvis   Final Result   There is a slightly heterogeneous cystic structure in the right ovary   with internal linear and reticular echogenicity, measuring up to 3.2   cm, most compatible with hemorrhagic cyst. No evidence of ovarian   torsion.        Uterus and left ovary are absent.        MACRO:   None        Signed by: Christiano King 6/28/2025 9:58 PM   Dictation workstation:   [id number]      CT abdomen pelvis w IV contrast   Final Result   Asymmetric patchy cortical hypoenhancement in the right kidney,   compatible with acute pyelonephritis.        Urothelial hyperemia and perivesical fat stranding is consistent with   acute cystitis.        Right ovary appears subjectively enlarged and possibly edematous   (series 604, images 118-129). Consider further evaluation with pelvic   sonography to exclude torsion as the etiology for this finding.        Hepatomegaly.        Cholelithiasis.        Additional findings as discussed above.        MACRO:   None         Signed by: Christiano King 6/28/2025 7:20 PM   Dictation workstation:   [id number]      US gallbladder   Final Result   2.6 cm calcified stone in the gallbladder neck that is in mobile with   changes in positioning suggesting it could be impacted. The   gallbladder is distended but there are no other features of acute   cholecystitis including negative sonographic Deutsch sign. Findings   could be related to early cholecystitis in the appropriate clinical   context and HIDA scan would better evaluate if clinically warranted.        Hepatic steatosis and hepatomegaly.             MACRO:   None.        Signed by: Abelardo Harris 6/28/2025 6:03 PM   Dictation workstation:   [id number]        Assessment & Plan  Gallstones    Fatty liver    POD1 s/p laparoscopic cholecystectomy (Intraoperative Findings: Acute cholecystitis, massive gallbladder with giant stone impacted in the neck). She is feeling well overall, recovering as anticipated. Passing some gas. VSS, afebrile. Labs and imaging reviewed.    Plan:  - Stable from surgical standpoint, will sign off, available if needed, appreciate consult, ok to be discharged when medically stable  - POV with Dr. Alexander in 2 weeks   - Diet: fat restricted   - PO ABX upon DC per primary team  - Encourage IS  - Encourage OOB/ambulation as tolerated   - DVT prophylaxis: SCD/chemoprophylaxis per primary team   - Continue current pain control regimen  - Monitor lap sites for drainage, erythema, or s/s of infection    Discussed plan with Dr. Alexander.    I spent 35 minutes in the professional and overall care of this patient.     Ana Shine PA-C  06/30/25  12:08 PM           [1] cephalexin, 500 mg, oral, q6h FANY  divalproex, 1,000 mg, oral, BID  heparin (porcine), 7,500 Units, subcutaneous, q8h  ketorolac, 15 mg, intravenous, q6h  OXcarbazepine, 1,200 mg, oral, BID  pantoprazole, 40 mg, oral, Daily before breakfast   Or  pantoprazole, 40 mg, intravenous, Daily before  breakfast  polyethylene glycol, 17 g, oral, Daily  [Held by provider] tirzepatide, 5 mg, subcutaneous, q7 days     [2]    [3] PRN medications: acetaminophen, acetaminophen, HYDROmorphone, melatonin, ondansetron, oxyCODONE-acetaminophen, sennosides-docusate sodium

## 2025-06-30 NOTE — DISCHARGE INSTRUCTIONS
Instructions After Gallbladder Surgery    Wound Care:  - Ice packs to wounds every hour the first day  - Ok to shower 24hrs after surgery  - No baths or swimming until follow up appointment  - Keep wounds clean and dry  - Do not apply topical creams or ointments  - Call if you notice redness around wound, foul-smelling drainage, or increasing pain     Diet:   - Avoid greasy, fatty foods first few weeks   - [place], soft meals first day or two after surgery    Activity   - Take it easy for the first 48 hours   - Stairs and walks are fine   - Resume activities gradually over the first week   - Ask Dr. Alexander before resuming strenuous physical exertions   - No driving while on pain medication    Medications   - Pain medicine prescription attached for severe pain, if needed   - Resume your home medications unless otherwise directed   - To avoid constipation please take Colace 100mg twice a day (over the counter) or Miralax once or twice per day, if needed    Other Instructions   - Call to make appointment within 1-2 days: [phone]   - Call the doctor for the following:  Severe unrelieved pain  Fevers > 101F  Nausea/vomiting  Wound issues  Insurance/return to work forms  Shortness of breath  Chest Pain

## 2025-06-30 NOTE — PROGRESS NOTES
INTERNAL MEDICINE PROGRESS NOTE      HPI:    Patient underwent surgery.  Has fair pain control.  Has passed flatus.    Vital signs in last 24 hours:  Temp:  [36.3 °C (97.3 °F)-37.3 °C (99.2 °F)] 37.2 °C (99 °F)  Heart Rate:  [] 99  Resp:  [16-22] 19  BP: (133-160)/(69-94) 136/82    Physical Examination:  Physical Exam    Constitutional:       Appearance: Young, overweight, in no distress  HENT:      Head: Normocephalic and atraumatic.   Eyes:      Extraocular Movements: Extraocular movements intact.      Pupils: Pupils are equal, round, and reactive to light.   Cardiovascular:      Rate and Rhythm: Normal rate and regular rhythm.      Pulses: Normal pulses.      Heart sounds: Normal heart sounds.   Pulmonary:      Effort: Pulmonary effort is normal.      Breath sounds: Normal breath sounds.   Abdominal:      General: Abdomen is flat. Bowel sounds are normal.      Palpations: Abdomen is soft.  Mild tenderness in the right upper quadrant with no rebound, guarding, or mass.  Musculoskeletal:         General: Normal range of motion.      Cervical back: Normal range of motion and neck supple.   Skin:     General: Skin is warm and dry.   Neurological:      General: No focal deficit present.      Mental Status: Alert and oriented x 3, speech fluent.    Medications:  Per MAR    Laboratory Findings:  Lab Results   Component Value Date    WBC 13.4 (H) 06/30/2025    HGB 12.3 06/30/2025    HCT 35.1 (L) 06/30/2025    MCV 87 06/30/2025     06/30/2025     Lab Results   Component Value Date    INR 1.2 (H) 01/13/2025    INR 1.2 (H) 07/06/2024    INR 1.3 (H) 03/24/2024    PROTIME 14.0 (H) 01/13/2025    PROTIME 13.1 (H) 07/06/2024    PROTIME 14.5 (H) 03/24/2024     Lab Results   Component Value Date    GLUCOSE 87 06/30/2025    CALCIUM 8.2 (L) 06/30/2025     (L) 06/30/2025    K 4.2 06/30/2025    CO2 24 06/30/2025     06/30/2025    BUN 5 (L) 06/30/2025    CREATININE 0.66 06/30/2025       Assessment and  Plan:     Symptomatic cholelithiasis -underwent surgery successfully.  History of pulmonary embolism -remote, discussed with PCP for cessation of Eliquis.  Ovarian cyst -no evidence of torsion, likely hemorrhagic cyst, outpatient gynecology follow-up advised.  Abnormal CT scan consistent with pyelonephritis -no white count or fever, continue with oral antibiotics upon discharge.    Home today with oral antibiotics.       Abelardo Szymanski MD  06/30/25  11:29 AM

## 2025-06-30 NOTE — PROGRESS NOTES
06/30/25 1229   Discharge Planning   Living Arrangements Spouse/significant other   Support Systems Spouse/significant other   Assistance Needed Independent   Type of Residence Private residence   Number of Stairs to Enter Residence 2   Number of Stairs Within Residence 0   Who is requesting discharge planning? Provider   Home or Post Acute Services None   Expected Discharge Disposition Home   Does the patient need discharge transport arranged? No  (Spouse will transport)   Financial Resource Strain   How hard is it for you to pay for the very basics like food, housing, medical care, and heating? Not very   Housing Stability   In the last 12 months, was there a time when you were not able to pay the mortgage or rent on time? N   In the past 12 months, how many times have you moved where you were living? 0   At any time in the past 12 months, were you homeless or living in a shelter (including now)? N   Transportation Needs   In the past 12 months, has lack of transportation kept you from medical appointments or from getting medications? no   In the past 12 months, has lack of transportation kept you from meetings, work, or from getting things needed for daily living? No   Stroke Family Assessment   Stroke Family Assessment Needed No   Intensity of Service   Intensity of Service 0-30 min     Patient is POD 1 for a  lap-denilson.  PLAN/BARRIER: labs, pain control  DISP: home  HHC: none  O2: none  WOUNDS: lap sites  DME: none  ADOD: today  Patient has no discharge needs.  Will go home on oral antibiotics.  Meds to beds.  Meghann Gardner RN

## 2025-06-30 NOTE — PROGRESS NOTES
"Danette Buenrostro is a 35 y.o. female on day 2 of admission presenting with Gallstones.    Assessment/Plan   Assess for dc later    Subjective   Feels ok       Objective     Physical Exam  NAD  A&Ox3  Non icteric  CTA  RR  Abdomen soft min tender. Wounds clean, intact  Extremities warm, well perfused     Last Recorded Vitals  Blood pressure 136/82, pulse 99, temperature 37.2 °C (99 °F), temperature source Oral, resp. rate 19, height 1.651 m (5' 5\"), weight 129 kg (284 lb), last menstrual period 11/01/2023, SpO2 98%, not currently breastfeeding.  Intake/Output last 3 Shifts:  I/O last 3 completed shifts:  In: 2295.8 (17.8 mL/kg) [I.V.:1195.8 (9.3 mL/kg); IV Piggyback:1100]  Out: 810 (6.3 mL/kg) [Urine:800 (0.2 mL/kg/hr); Blood:10]  Weight: 128.8 kg     Relevant Results    Scheduled medications  Scheduled Medications[1]  Continuous medications  Continuous Medications[2]  PRN medications  PRN Medications[3]    Results for orders placed or performed during the hospital encounter of 06/28/25 (from the past 24 hours)   Urinalysis with Reflex Culture and Microscopic   Result Value Ref Range    Color, Urine Yellow Light-Yellow, Yellow, Dark-Yellow    Appearance, Urine Clear Clear    Specific Gravity, Urine 1.021 1.005 - 1.035    pH, Urine 6.0 5.0, 5.5, 6.0, 6.5, 7.0, 7.5, 8.0    Protein, Urine NEGATIVE NEGATIVE, 10 (TRACE), 20 (TRACE) mg/dL    Glucose, Urine Normal Normal mg/dL    Blood, Urine NEGATIVE NEGATIVE mg/dL    Ketones, Urine TRACE (A) NEGATIVE mg/dL    Bilirubin, Urine NEGATIVE NEGATIVE mg/dL    Urobilinogen, Urine 2 (1+) (A) Normal mg/dL    Nitrite, Urine NEGATIVE NEGATIVE    Leukocyte Esterase, Urine 75 Pema/uL (A) NEGATIVE   Extra Urine Gray Tube   Result Value Ref Range    Extra Tube     Microscopic Only, Urine   Result Value Ref Range    WBC, Urine 11-20 (A) 1-5, NONE /HPF    RBC, Urine 1-2 NONE, 1-2, 3-5 /HPF    Squamous Epithelial Cells, Urine 10-25 (FEW) Reference range not established. /HPF    Bacteria, " Urine 1+ (A) NONE SEEN /HPF    Mucus, Urine FEW Reference range not established. /LPF   CBC and Auto Differential   Result Value Ref Range    WBC 13.4 (H) 4.4 - 11.3 x10*3/uL    nRBC 0.0 0.0 - 0.0 /100 WBCs    RBC 4.04 4.00 - 5.20 x10*6/uL    Hemoglobin 12.3 12.0 - 16.0 g/dL    Hematocrit 35.1 (L) 36.0 - 46.0 %    MCV 87 80 - 100 fL    MCH 30.4 26.0 - 34.0 pg    MCHC 35.0 32.0 - 36.0 g/dL    RDW 13.5 11.5 - 14.5 %    Platelets 301 150 - 450 x10*3/uL    Neutrophils % 73.9 40.0 - 80.0 %    Immature Granulocytes %, Automated 1.9 (H) 0.0 - 0.9 %    Lymphocytes % 15.1 13.0 - 44.0 %    Monocytes % 6.9 2.0 - 10.0 %    Eosinophils % 2.0 0.0 - 6.0 %    Basophils % 0.2 0.0 - 2.0 %    Neutrophils Absolute 9.91 (H) 1.20 - 7.70 x10*3/uL    Immature Granulocytes Absolute, Automated 0.26 0.00 - 0.70 x10*3/uL    Lymphocytes Absolute 2.03 1.20 - 4.80 x10*3/uL    Monocytes Absolute 0.92 0.10 - 1.00 x10*3/uL    Eosinophils Absolute 0.27 0.00 - 0.70 x10*3/uL    Basophils Absolute 0.03 0.00 - 0.10 x10*3/uL   Comprehensive Metabolic Panel   Result Value Ref Range    Glucose 87 74 - 99 mg/dL    Sodium 135 (L) 136 - 145 mmol/L    Potassium 4.2 3.5 - 5.3 mmol/L    Chloride 104 98 - 107 mmol/L    Bicarbonate 24 21 - 32 mmol/L    Anion Gap 11 10 - 20 mmol/L    Urea Nitrogen 5 (L) 6 - 23 mg/dL    Creatinine 0.66 0.50 - 1.05 mg/dL    eGFR >90 >60 mL/min/1.73m*2    Calcium 8.2 (L) 8.6 - 10.3 mg/dL    Albumin 3.6 3.4 - 5.0 g/dL    Alkaline Phosphatase 57 33 - 110 U/L    Total Protein 6.7 6.4 - 8.2 g/dL     (H) 9 - 39 U/L    Bilirubin, Total 0.4 0.0 - 1.2 mg/dL     (H) 7 - 45 U/L     *Note: Due to a large number of results and/or encounters for the requested time period, some results have not been displayed. A complete set of results can be found in Results Review.           I spent 25 minutes in the professional and overall care of this patient.      Tato Alexander MD           [1] cefTRIAXone, 2 g, intravenous, q24h  divalproex,  1,000 mg, oral, BID  heparin (porcine), 7,500 Units, subcutaneous, q8h  ketorolac, 15 mg, intravenous, q6h  OXcarbazepine, 1,200 mg, oral, BID  pantoprazole, 40 mg, oral, Daily before breakfast   Or  pantoprazole, 40 mg, intravenous, Daily before breakfast  polyethylene glycol, 17 g, oral, Daily  [Held by provider] tirzepatide, 5 mg, subcutaneous, q7 days    [2]    [3] PRN medications: acetaminophen, acetaminophen, HYDROmorphone, melatonin, ondansetron, oxyCODONE-acetaminophen, sennosides-docusate sodium

## 2025-06-30 NOTE — CARE PLAN
The patient's goals for the shift include      The clinical goals for the shift include Patient Pain control taken      Problem: Pain - Adult  Goal: Verbalizes/displays adequate comfort level or baseline comfort level  Outcome: Progressing     Problem: Safety - Adult  Goal: Free from fall injury  Outcome: Progressing     Problem: Discharge Planning  Goal: Discharge to home or other facility with appropriate resources  Outcome: Progressing     Problem: Chronic Conditions and Co-morbidities  Goal: Patient's chronic conditions and co-morbidity symptoms are monitored and maintained or improved  Outcome: Progressing     Problem: Nutrition  Goal: Nutrient intake appropriate for maintaining nutritional needs  Outcome: Progressing     Problem: Skin  Goal: Decreased wound size/increased tissue granulation at next dressing change  Outcome: Progressing  Goal: Participates in plan/prevention/treatment measures  Outcome: Progressing  Goal: Prevent/manage excess moisture  Outcome: Progressing  Goal: Prevent/minimize sheer/friction injuries  Outcome: Progressing  Goal: Promote/optimize nutrition  Outcome: Progressing  Goal: Promote skin healing  Outcome: Progressing

## 2025-07-02 ENCOUNTER — PATIENT OUTREACH (OUTPATIENT)
Dept: PRIMARY CARE | Facility: CLINIC | Age: 35
End: 2025-07-02
Payer: MEDICARE

## 2025-07-02 NOTE — PROGRESS NOTES
TCM completed 07/02/25   Discharge Facility:  [facility]   Discharge Diagnosis: Cholecystitis s/p laparoscopic cholecystectomy   Admission Date: 6/28/25  Discharge Date: 6/30/25   Discharge Disposition: Home to self    PCP Appointment Date: Tasked to office to schedule        Specialist Appointment Date:   Cardiology-  11/10/25  General Surgeon- 7/15/25  Neurology- 8/11/25      Hospital Encounter and Summary Linked: Yes  ED to Hosp-Admission (Discharged) with Abelardo Szymanski MD; Dom Sue MD (06/28/2025)                               Unable to reach patient; Two attempts were made to reach patient within two business days after discharge.  No return call as of this note.  Needs seen by: 7/13/25.

## 2025-07-03 LAB
BACTERIA BLD CULT: NORMAL
BACTERIA BLD CULT: NORMAL
LABORATORY COMMENT REPORT: NORMAL
PATH REPORT.FINAL DX SPEC: NORMAL
PATH REPORT.GROSS SPEC: NORMAL
PATH REPORT.RELEVANT HX SPEC: NORMAL
PATH REPORT.TOTAL CANCER: NORMAL

## 2025-07-03 NOTE — DISCHARGE SUMMARY
Discharge Diagnosis  Gallstones           Issues Requiring Follow-Up      Discharge Meds     Medication List      START taking these medications     cephalexin 500 mg capsule; Commonly known as: Keflex; Take 1 capsule   (500 mg) by mouth every 6 hours for 7 days.   oxyCODONE-acetaminophen 5-325 mg tablet; Commonly known as: Percocet;   Take 1 tablet by mouth every 4 hours if needed for severe pain (7 - 10)   for up to 5 days.     CONTINUE taking these medications     acetaminophen-codeine 300-30 mg tablet; Commonly known as: Tylenol w/   Codeine #3; Take 1 tablet by mouth every 6 hours if needed for severe pain   (7 - 10).   divalproex 500 mg EC tablet; Commonly known as: Depakote; Take 2 tablets   (1,000 mg) by mouth 2 times a day. Do not crush, chew, or split.   lidocaine 4 % patch; Place 1 patch over 12 hours on the skin once daily.   Remove & discard patch within 12 hours or as directed by MD.   Mounjaro 5 mg/0.5 mL pen injector; Generic drug: tirzepatide   norethindrone 5 mg tablet; Commonly known as: Aygestin; Take 1 tablet (5   mg) by mouth once daily.   ondansetron 8 mg tablet; Commonly known as: Zofran; Take 1 tablet (8 mg)   by mouth every 8 hours if needed for nausea or vomiting for up to 15   doses.   OXcarbazepine 600 mg tablet; Commonly known as: Trileptal; Take 2   tablets (1,200 mg) by mouth 2 times a day.   tiZANidine 4 mg tablet; Commonly known as: Zanaflex; Take 1 tablet (4   mg) by mouth every 6 hours if needed for muscle spasms for up to 10 days.     STOP taking these medications     apixaban 5 mg tablet; Commonly known as: Eliquis   cyclobenzaprine 5 mg tablet; Commonly known as: Flexeril     ASK your doctor about these medications     dicyclomine 20 mg tablet; Commonly known as: Bentyl; Take 1 tablet (20   mg) by mouth 3 times a day for 5 days. As needed for abdominal pain; Ask   about: Should I take this medication?   loperamide 2 mg capsule; Commonly known as: Imodium; Take 1 capsule (2    mg) by mouth 4 times a day as needed for diarrhea for up to 5 days.; Ask   about: Should I take this medication?       Test Results Pending At Discharge  Pending Labs       Order Current Status    Surgical Pathology Exam In process    Blood Culture Preliminary result    Blood Culture Preliminary result            Hospital Course     Danette Buenrostro is a 35 y.o. female presenting with nausea and abdominal pain of 5 days duration.  Patient has had some bouts of emesis too.  She initially attributed her symptoms to a stomach virus versus her Mounjaro.  However symptoms persisted and she presented to emergency room.  Imaging revealed distended gallbladder with gallstones with no evidence of acute cholecystitis.  She remains symptomatic.  She also had increased liver enzymes.  She received analgesics and was admitted for further treatment.  Patient has a remote history of pulmonary embolism and has been on Eliquis.  She took her dose last on Saturday morning.  She has had no shortness of breath or chest pain.     The patient underwent surgery, eliquis was stopped. She was recommended outpatient follow up with GYN, stable for dc on atbs and close outpatient follow up.     Pertinent Physical Exam At Time of Discharge      Outpatient Follow-Up  Future Appointments   Date Time Provider Department Center   8/11/2025 11:45 AM Kip Sigala MD [id number]   11/10/2025  8:30 AM Tatiana Echavarria MD [id number]   1/5/2026 10:00 AM Jorge Mckee MD [id number]     Time and care for discharge management > 30 minutes.     Abelardo Szymanski MD

## 2025-07-05 ENCOUNTER — APPOINTMENT (OUTPATIENT)
Dept: RADIOLOGY | Facility: HOSPITAL | Age: 35
End: 2025-07-05
Payer: MEDICARE

## 2025-07-05 ENCOUNTER — HOSPITAL ENCOUNTER (EMERGENCY)
Facility: HOSPITAL | Age: 35
Discharge: HOME | End: 2025-07-05
Attending: EMERGENCY MEDICINE
Payer: MEDICARE

## 2025-07-05 VITALS
TEMPERATURE: 97 F | SYSTOLIC BLOOD PRESSURE: 135 MMHG | HEART RATE: 77 BPM | BODY MASS INDEX: 47.32 KG/M2 | RESPIRATION RATE: 18 BRPM | HEIGHT: 65 IN | OXYGEN SATURATION: 97 % | WEIGHT: 284 LBS | DIASTOLIC BLOOD PRESSURE: 79 MMHG

## 2025-07-05 DIAGNOSIS — G89.18 POST-OPERATIVE PAIN: Primary | ICD-10-CM

## 2025-07-05 DIAGNOSIS — N12 PYELONEPHRITIS: ICD-10-CM

## 2025-07-05 LAB
ALBUMIN SERPL BCP-MCNC: 3.6 G/DL (ref 3.4–5)
ALP SERPL-CCNC: 52 U/L (ref 33–110)
ALT SERPL W P-5'-P-CCNC: 65 U/L (ref 7–45)
ANION GAP SERPL CALC-SCNC: 11 MMOL/L (ref 10–20)
APPEARANCE UR: CLEAR
AST SERPL W P-5'-P-CCNC: 31 U/L (ref 9–39)
BASOPHILS # BLD AUTO: 0.1 X10*3/UL (ref 0–0.1)
BASOPHILS NFR BLD AUTO: 1 %
BILIRUB DIRECT SERPL-MCNC: 0.1 MG/DL (ref 0–0.3)
BILIRUB SERPL-MCNC: 0.3 MG/DL (ref 0–1.2)
BILIRUB UR STRIP.AUTO-MCNC: NEGATIVE MG/DL
BUN SERPL-MCNC: 13 MG/DL (ref 6–23)
CALCIUM SERPL-MCNC: 9.4 MG/DL (ref 8.6–10.3)
CHLORIDE SERPL-SCNC: 106 MMOL/L (ref 98–107)
CO2 SERPL-SCNC: 26 MMOL/L (ref 21–32)
COLOR UR: YELLOW
CREAT SERPL-MCNC: 0.72 MG/DL (ref 0.5–1.05)
EGFRCR SERPLBLD CKD-EPI 2021: >90 ML/MIN/1.73M*2
EOSINOPHIL # BLD AUTO: 1.88 X10*3/UL (ref 0–0.7)
EOSINOPHIL NFR BLD AUTO: 18 %
ERYTHROCYTE [DISTWIDTH] IN BLOOD BY AUTOMATED COUNT: 14.2 % (ref 11.5–14.5)
GLUCOSE SERPL-MCNC: 88 MG/DL (ref 74–99)
GLUCOSE UR STRIP.AUTO-MCNC: NORMAL MG/DL
HCT VFR BLD AUTO: 36.1 % (ref 36–46)
HGB BLD-MCNC: 12.3 G/DL (ref 12–16)
IMM GRANULOCYTES # BLD AUTO: 0.09 X10*3/UL (ref 0–0.7)
IMM GRANULOCYTES NFR BLD AUTO: 0.9 % (ref 0–0.9)
KETONES UR STRIP.AUTO-MCNC: NEGATIVE MG/DL
LACTATE SERPL-SCNC: 1.2 MMOL/L (ref 0.4–2)
LEUKOCYTE ESTERASE UR QL STRIP.AUTO: ABNORMAL
LYMPHOCYTES # BLD AUTO: 3.24 X10*3/UL (ref 1.2–4.8)
LYMPHOCYTES NFR BLD AUTO: 31 %
MAGNESIUM SERPL-MCNC: 1.9 MG/DL (ref 1.6–2.4)
MCH RBC QN AUTO: 30.6 PG (ref 26–34)
MCHC RBC AUTO-ENTMCNC: 34.1 G/DL (ref 32–36)
MCV RBC AUTO: 90 FL (ref 80–100)
MONOCYTES # BLD AUTO: 0.77 X10*3/UL (ref 0.1–1)
MONOCYTES NFR BLD AUTO: 7.4 %
MUCOUS THREADS #/AREA URNS AUTO: NORMAL /LPF
NEUTROPHILS # BLD AUTO: 4.37 X10*3/UL (ref 1.2–7.7)
NEUTROPHILS NFR BLD AUTO: 41.7 %
NITRITE UR QL STRIP.AUTO: NEGATIVE
NRBC BLD-RTO: 0 /100 WBCS (ref 0–0)
PH UR STRIP.AUTO: 5.5 [PH]
PLATELET # BLD AUTO: 324 X10*3/UL (ref 150–450)
POTASSIUM SERPL-SCNC: 4.3 MMOL/L (ref 3.5–5.3)
PROT SERPL-MCNC: 7.2 G/DL (ref 6.4–8.2)
PROT UR STRIP.AUTO-MCNC: NEGATIVE MG/DL
RBC # BLD AUTO: 4.02 X10*6/UL (ref 4–5.2)
RBC # UR STRIP.AUTO: NEGATIVE MG/DL
RBC #/AREA URNS AUTO: NORMAL /HPF
SODIUM SERPL-SCNC: 139 MMOL/L (ref 136–145)
SP GR UR STRIP.AUTO: 1.03
SQUAMOUS #/AREA URNS AUTO: NORMAL /HPF
UROBILINOGEN UR STRIP.AUTO-MCNC: ABNORMAL MG/DL
WBC # BLD AUTO: 10.5 X10*3/UL (ref 4.4–11.3)
WBC #/AREA URNS AUTO: NORMAL /HPF

## 2025-07-05 PROCEDURE — 96375 TX/PRO/DX INJ NEW DRUG ADDON: CPT

## 2025-07-05 PROCEDURE — 36415 COLL VENOUS BLD VENIPUNCTURE: CPT

## 2025-07-05 PROCEDURE — 80048 BASIC METABOLIC PNL TOTAL CA: CPT

## 2025-07-05 PROCEDURE — 87086 URINE CULTURE/COLONY COUNT: CPT | Mod: AHULAB | Performed by: EMERGENCY MEDICINE

## 2025-07-05 PROCEDURE — 99285 EMERGENCY DEPT VISIT HI MDM: CPT | Mod: 25 | Performed by: EMERGENCY MEDICINE

## 2025-07-05 PROCEDURE — 2500000004 HC RX 250 GENERAL PHARMACY W/ HCPCS (ALT 636 FOR OP/ED): Performed by: EMERGENCY MEDICINE

## 2025-07-05 PROCEDURE — 81001 URINALYSIS AUTO W/SCOPE: CPT | Performed by: EMERGENCY MEDICINE

## 2025-07-05 PROCEDURE — 96365 THER/PROPH/DIAG IV INF INIT: CPT | Mod: 59

## 2025-07-05 PROCEDURE — 83735 ASSAY OF MAGNESIUM: CPT

## 2025-07-05 PROCEDURE — 82248 BILIRUBIN DIRECT: CPT

## 2025-07-05 PROCEDURE — 85025 COMPLETE CBC W/AUTO DIFF WBC: CPT

## 2025-07-05 PROCEDURE — 83605 ASSAY OF LACTIC ACID: CPT

## 2025-07-05 PROCEDURE — 2550000001 HC RX 255 CONTRASTS: Performed by: EMERGENCY MEDICINE

## 2025-07-05 PROCEDURE — 74177 CT ABD & PELVIS W/CONTRAST: CPT | Performed by: RADIOLOGY

## 2025-07-05 PROCEDURE — 74177 CT ABD & PELVIS W/CONTRAST: CPT

## 2025-07-05 RX ORDER — ACETAMINOPHEN 500 MG
1000 TABLET ORAL EVERY 6 HOURS PRN
Qty: 56 TABLET | Refills: 0 | Status: SHIPPED | OUTPATIENT
Start: 2025-07-05 | End: 2025-07-12

## 2025-07-05 RX ORDER — CEFTRIAXONE 1 G/50ML
1 INJECTION, SOLUTION INTRAVENOUS ONCE
Status: COMPLETED | OUTPATIENT
Start: 2025-07-05 | End: 2025-07-05

## 2025-07-05 RX ORDER — ONDANSETRON HYDROCHLORIDE 2 MG/ML
4 INJECTION, SOLUTION INTRAVENOUS ONCE
Status: COMPLETED | OUTPATIENT
Start: 2025-07-05 | End: 2025-07-05

## 2025-07-05 RX ORDER — ONDANSETRON 4 MG/1
4 TABLET, FILM COATED ORAL EVERY 8 HOURS PRN
Qty: 12 TABLET | Refills: 0 | Status: SHIPPED | OUTPATIENT
Start: 2025-07-05 | End: 2025-07-08

## 2025-07-05 RX ORDER — CIPROFLOXACIN 500 MG/1
500 TABLET, FILM COATED ORAL 2 TIMES DAILY
Qty: 20 TABLET | Refills: 0 | Status: SHIPPED | OUTPATIENT
Start: 2025-07-05 | End: 2025-07-15

## 2025-07-05 RX ORDER — OXYCODONE HYDROCHLORIDE 5 MG/1
5 TABLET ORAL EVERY 4 HOURS PRN
Qty: 18 TABLET | Refills: 0 | Status: SHIPPED | OUTPATIENT
Start: 2025-07-05 | End: 2025-07-08

## 2025-07-05 RX ORDER — KETOROLAC TROMETHAMINE 30 MG/ML
15 INJECTION, SOLUTION INTRAMUSCULAR; INTRAVENOUS ONCE
Status: COMPLETED | OUTPATIENT
Start: 2025-07-05 | End: 2025-07-05

## 2025-07-05 RX ADMIN — IOHEXOL 75 ML: 350 INJECTION, SOLUTION INTRAVENOUS at 19:01

## 2025-07-05 RX ADMIN — KETOROLAC TROMETHAMINE 15 MG: 30 INJECTION, SOLUTION INTRAMUSCULAR at 20:07

## 2025-07-05 RX ADMIN — HYDROMORPHONE HYDROCHLORIDE 0.5 MG: 1 INJECTION, SOLUTION INTRAMUSCULAR; INTRAVENOUS; SUBCUTANEOUS at 20:07

## 2025-07-05 RX ADMIN — CEFTRIAXONE 1 G: 1 INJECTION, SOLUTION INTRAVENOUS at 21:28

## 2025-07-05 RX ADMIN — ONDANSETRON 4 MG: 2 INJECTION, SOLUTION INTRAMUSCULAR; INTRAVENOUS at 20:07

## 2025-07-05 ASSESSMENT — PAIN - FUNCTIONAL ASSESSMENT
PAIN_FUNCTIONAL_ASSESSMENT: 0-10
PAIN_FUNCTIONAL_ASSESSMENT: 0-10

## 2025-07-05 ASSESSMENT — PAIN SCALES - GENERAL
PAINLEVEL_OUTOF10: 10 - WORST POSSIBLE PAIN
PAINLEVEL_OUTOF10: 10 - WORST POSSIBLE PAIN

## 2025-07-05 ASSESSMENT — PAIN DESCRIPTION - ORIENTATION: ORIENTATION: RIGHT

## 2025-07-05 ASSESSMENT — PAIN DESCRIPTION - LOCATION: LOCATION: ABDOMEN

## 2025-07-05 NOTE — ED TRIAGE NOTES
As provider-in-triage, I performed a medical screening history and physical exam on this patient.  HISTORY OF PRESENT ILLNESS  Patient is a 35-year-old female presenting the ED with concern for abdominal pain.  Patient had a cholecystectomy over the weekend but starting yesterday she has had worsening abdominal pain.  She had a couple oxycodone that she took along with ibuprofen which has not helped with the pain.  Pain is located on the right upper and right lower quadrant.  Denies any fever, chills, nausea, vomiting, diarrhea, constipation, urinary symptoms.  She is also noticed slight drainage from her incision sites.     PHYSICAL EXAM  Vital Signs reviewed.  Cardiovascular: Regular rate and rhythm. No m/g/r  Respiratory: No respiratory distress. No accessory muscle use. Breath sounds are present and equal b/l. No adventitious sounds.   Abdomen: Abdomen is soft with no guarding, rigidity, or rebound. No CVA tenderness bilaterally.  RUQ, epigastric, and RLQ tenderness to palpation.       MDM  Workup initiated. Pt stable pending bed availability and further evaluation. Please see subsequent provider note for further details and disposition.         I evaluated this patient in triage with the RN. Due to the patients complaint labs and or imaging were ordered by myself in an attempt to expedite patient care however I am not participating in care after evaluation. This is a preliminary assessment. Pt does not appear in acute distress at this time. They will have a full evaluation as soon as possible. They will be cared for by another provider who will possibly order more labs, imaging or interventions. Pt did not have a full ROS or PE completed by myself however above is a summary with reasons for orders.  For the remainder of the patient's workup and ED course, please refer to the main ED provider note. We discussed need for diagnostic testing including laboratory studies and imaging.  We also discussed that patient  may be asked to wait in the waiting room while these tests are pending.  They understand that if they choose to leave without having the testing completed or resulted that we cannot rule out acute life threatening illnesses and the risks involved could lead to worsening condition, permanent disability or even death.

## 2025-07-05 NOTE — ED TRIAGE NOTES
Pt with recent cholecystectomy (about 1 week ago) with increasing pain and possible broken sutures at site

## 2025-07-06 NOTE — ED PROVIDER NOTES
HPI   Chief Complaint   Patient presents with   • Post-op Problem     Cholecystectomy        HPI        Patient History   Medical History[1]  Surgical History[2]  Family History[3]  Social History[4]    Physical Exam   ED Triage Vitals [25 1424]   Temperature Heart Rate Respirations BP   36.1 °C (97 °F) 78 18 138/69      Pulse Ox Temp src Heart Rate Source Patient Position   97 % -- -- --      BP Location FiO2 (%)     -- --       Physical Exam      ED Course & MDM   ED Course as of 25   Sat  CT scan shows fluid collection in the surgical bed, concern for postoperative hematoma, discussed case with Dr. Alexander who felt this was an expected finding postoperatively. [BR]      ED Course User Index  [BR] Dom Sue MD                 No data recorded     [place] Coma Scale Score: 15 (25 1428 : Álvaro Estrada RN)                           Medical Decision Making      Procedure  Procedures           [1]  Past Medical History:  Diagnosis Date   • Benign neoplasm of breast    • Endometriosis    • Epilepsy with partial complex seizures (Multi)    • Fatty liver 2025   • Migraine     Taking Depakote   • Neuropathy 5/3/2024   • Obesity    • Ovarian cyst     measuring 5cm x 3cm x 3cm- Scheduled for surgery with Dr Theodore on 24   • Pelvic floor dysfunction in female    • Pelvic pain    • Pulmonary embolism 2024    Left lung   • Seizure disorder (Multi) At 7y    F/W Dr. Sigala (Neuro) Taking Trileptal, Last seizure 2 days ago during sleep   • Type A blood, Rh positive     Blood type A+   • Vitamin B1 deficiency    [2]  Past Surgical History:  Procedure Laterality Date   •  SECTION, LOW TRANSVERSE      x 2 w/ BTL at second   • CHOLECYSTECTOMY  2025   • COLPOSCOPY     • HYSTERECTOMY  2024   • OOPHORECTOMY Left      Robotic LSO w/ R cystectomy/salpingectomy   • OTHER SURGICAL HISTORY      Surgical Treatment Of Spontaneous    • TUBAL LIGATION      [3]  Family History  Problem Relation Name Age of Onset   • Hypertension Mother Mohini    • Stroke Mother Mohini    • Heart attack Mother Mohini    • Breast cancer Mother's Sister Karol    • Bone cancer Mother's Brother     • Seizures Father's Brother     • Breast cancer Maternal Grandmother Alexia    • Other (cerebral infarction) Maternal Grandfather     • Seizures Other Cousin    [4]  Social History  Tobacco Use   • Smoking status: Never     Passive exposure: Never   • Smokeless tobacco: Never   Vaping Use   • Vaping status: Never Used   Substance Use Topics   • Alcohol use: Yes     Comment: Occasionally   • Drug use: Never    being compliant with this, she is still interested in being treated as an outpatient therefore will transition to ciprofloxacin, otherwise well patient follow closely with Dr. Alexander as well as her primary care physician.  Patient discharged in good condition. [BR]      ED Course User Index  [BR] Dom Sue MD         Diagnoses as of 07/10/25 0025   Post-operative pain   Pyelonephritis                 No data recorded     [place] Coma Scale Score: 15 (07/05/25 1428 : Álvaro Estrada RN)                           Medical Decision Making  Patient presenting with right upper quadrant pain in the setting of recent cholecystectomy, on examination patient does have moderate right upper quadrant tenderness.  Concern for biliary obstructive process, postoperative infection/abscess, also consideration for pancreatitis, hepatitis, ileus/bowel obstruction (although patient notes no change in bowel habits lowering suspicion for this).  Patient was assessed with labs, CT abdomen pelvis.  CT does show fluid collection of the surgical bed, per radiology read could represent a postoperative hematoma or potential abscess.  Remainder patient's labs largely unrevealing.  Did discuss the case with general surgery on-call who per review of patient's imaging and exam felt postoperative infectious etiology was unlikely at that fluid collection in this area would be expected postoperatively.  Symptoms markedly improved after medication administration, she is tolerating p.o. food and fluids in the ED, per discussion with surgery they feel that she is appropriate for discharge from their perspective.  As the patient is otherwise symptomatically improved after medication administration with a persistently nonperitoneal abdominal examination plan for discharge with close outpatient follow-up.  Patient discharged in good condition.        Procedure  Procedures         [1]   Past Medical History:  Diagnosis Date    Benign neoplasm of breast      Endometriosis     Epilepsy with partial complex seizures (Multi)     Fatty liver 2025    Migraine     Taking Depakote    Neuropathy 5/3/2024    Obesity     Ovarian cyst     measuring 5cm x 3cm x 3cm- Scheduled for surgery with Dr Theodore on 24    Pelvic floor dysfunction in female     Pelvic pain     Pulmonary embolism 2024    Left lung    Seizure disorder (Multi) At 7y    F/W Dr. Sigala (Neuro) Taking Trileptal, Last seizure 2 days ago during sleep    Type A blood, Rh positive     Blood type A+    Vitamin B1 deficiency    [2]   Past Surgical History:  Procedure Laterality Date     SECTION, LOW TRANSVERSE      x 2 w/ BTL at second    CHOLECYSTECTOMY  2025    COLPOSCOPY      HYSTERECTOMY  2024    OOPHORECTOMY Left      Robotic LSO w/ R cystectomy/salpingectomy    OTHER SURGICAL HISTORY      Surgical Treatment Of Spontaneous     TUBAL LIGATION     [3]   Family History  Problem Relation Name Age of Onset    Hypertension Mother Mohini     Stroke Mother Mohini     Heart attack Mother Mohini     Breast cancer Mother's Sister Karol     Bone cancer Mother's Brother      Seizures Father's Brother      Breast cancer Maternal Grandmother Alexia     Other (cerebral infarction) Maternal Grandfather      Seizures Other Cousin    [4]   Social History  Tobacco Use    Smoking status: Never     Passive exposure: Never    Smokeless tobacco: Never   Vaping Use    Vaping status: Never Used   Substance Use Topics    Alcohol use: Yes     Comment: Occasionally    Drug use: Never        Dom Sue MD  07/10/25 0029

## 2025-07-07 LAB — BACTERIA UR CULT: NORMAL

## 2025-07-29 ENCOUNTER — OFFICE VISIT (OUTPATIENT)
Dept: SURGERY | Facility: HOSPITAL | Age: 35
End: 2025-07-29
Payer: MEDICARE

## 2025-07-29 VITALS — HEART RATE: 84 BPM | TEMPERATURE: 97.4 F | DIASTOLIC BLOOD PRESSURE: 78 MMHG | SYSTOLIC BLOOD PRESSURE: 136 MMHG

## 2025-07-29 DIAGNOSIS — Z09 SURGERY FOLLOW-UP: Primary | ICD-10-CM

## 2025-07-29 PROCEDURE — 99211 OFF/OP EST MAY X REQ PHY/QHP: CPT | Performed by: SURGERY

## 2025-07-29 PROCEDURE — 1036F TOBACCO NON-USER: CPT | Performed by: SURGERY

## 2025-07-29 ASSESSMENT — PAIN SCALES - GENERAL: PAINLEVEL_OUTOF10: 0-NO PAIN

## 2025-07-29 NOTE — PROGRESS NOTES
Assessment/Plan   Excellent recovery following recent laparoscopic cholecystectomy  -We reviewed intraoperative findings and final pathology report  -Patient encouraged to resume regular activities including exercise as tolerated  -Avoid greasy and fatty foods first couple months after surgery  -Follow-up with me as needed    Subjective   Following up after laparoscopic cholecystectomy on June 29.  No pain.  Doing very well.       Objective     Physical Exam  NAD  A&Ox3  Non icteric  CTA  RR  Abdomen soft min tender. Wounds clean, intact  Extremities warm, well perfused         Relevant Results    d 6/29/2025 17:22       Status: Final result       Dx: Gallstones    Test Result Released: Yes (not seen)    0 Result Notes      Component    FINAL DIAGNOSIS   GALLBLADDER, CHOLECYSTECTOMY:  - Chronic cholecystitis with focal acute inflammation and cholelithiasis.      Electronically signed by Manasa Long MD on 7/3/2025 at 1657 EDT        By the signature on this report, the individual or group listed as making         No results found. However, due to the size of the patient record, not all encounters were searched. Please check Results Review for a complete set of results.        I spent 25 minutes in the professional and overall care of this patient.      Tato Alexander MD

## 2025-07-29 NOTE — LETTER
July 29, 2025     Jorge Mckee MD  [id number] [facility]  [address]  [facility] [id number]    Patient: Danette Buenrostro   YOB: 1990   Date of Visit: 7/29/2025       Dear Dr. Jorge Mckee MD:    Thank you for referring Danette Buenrostro to me for evaluation. Below are my notes for this consultation.  If you have questions, please do not hesitate to call me. I look forward to following your patient along with you.       Sincerely,     Tato Alexander MD      CC: No Recipients  ______________________________________________________________________________________    Assessment/Plan  Excellent recovery following recent laparoscopic cholecystectomy  -We reviewed intraoperative findings and final pathology report  -Patient encouraged to resume regular activities including exercise as tolerated  -Avoid greasy and fatty foods first couple months after surgery  -Follow-up with me as needed    Subjective  Following up after laparoscopic cholecystectomy on June 29.  No pain.  Doing very well.       Objective    Physical Exam  NAD  A&Ox3  Non icteric  CTA  RR  Abdomen soft min tender. Wounds clean, intact  Extremities warm, well perfused         Relevant Results    d 6/29/2025 17:22       Status: Final result       Dx: Gallstones    Test Result Released: Yes (not seen)    0 Result Notes      Component    FINAL DIAGNOSIS   GALLBLADDER, CHOLECYSTECTOMY:  - Chronic cholecystitis with focal acute inflammation and cholelithiasis.      Electronically signed by Manasa Long MD on 7/3/2025 at 1657 EDT        By the signature on this report, the individual or group listed as making         No results found. However, due to the size of the patient record, not all encounters were searched. Please check Results Review for a complete set of results.        I spent 25 minutes in the professional and overall care of this patient.      Tato Alexander MD

## 2025-07-31 ENCOUNTER — HOSPITAL ENCOUNTER (EMERGENCY)
Facility: HOSPITAL | Age: 35
Discharge: HOME | End: 2025-07-31
Payer: MEDICARE

## 2025-07-31 VITALS
SYSTOLIC BLOOD PRESSURE: 141 MMHG | WEIGHT: 282 LBS | OXYGEN SATURATION: 99 % | HEIGHT: 65 IN | TEMPERATURE: 98 F | BODY MASS INDEX: 46.98 KG/M2 | DIASTOLIC BLOOD PRESSURE: 91 MMHG | RESPIRATION RATE: 16 BRPM | HEART RATE: 91 BPM

## 2025-07-31 DIAGNOSIS — T78.40XA ALLERGIC REACTION, INITIAL ENCOUNTER: ICD-10-CM

## 2025-07-31 DIAGNOSIS — R21 RASH: Primary | ICD-10-CM

## 2025-07-31 PROCEDURE — 99282 EMERGENCY DEPT VISIT SF MDM: CPT

## 2025-07-31 PROCEDURE — 2500000004 HC RX 250 GENERAL PHARMACY W/ HCPCS (ALT 636 FOR OP/ED)

## 2025-07-31 PROCEDURE — 99283 EMERGENCY DEPT VISIT LOW MDM: CPT

## 2025-07-31 PROCEDURE — 2500000001 HC RX 250 WO HCPCS SELF ADMINISTERED DRUGS (ALT 637 FOR MEDICARE OP)

## 2025-07-31 RX ORDER — EPINEPHRINE 0.3 MG/.3ML
1 INJECTION SUBCUTANEOUS ONCE AS NEEDED
Qty: 1 EACH | Refills: 0 | Status: SHIPPED | OUTPATIENT
Start: 2025-07-31

## 2025-07-31 RX ORDER — PREDNISONE 20 MG/1
60 TABLET ORAL DAILY
Qty: 6 TABLET | Refills: 0 | Status: SHIPPED | OUTPATIENT
Start: 2025-07-31 | End: 2025-08-02

## 2025-07-31 RX ORDER — FAMOTIDINE 20 MG/1
20 TABLET, FILM COATED ORAL ONCE
Status: COMPLETED | OUTPATIENT
Start: 2025-07-31 | End: 2025-07-31

## 2025-07-31 RX ORDER — ACETAMINOPHEN 325 MG/1
975 TABLET ORAL ONCE
Status: COMPLETED | OUTPATIENT
Start: 2025-07-31 | End: 2025-07-31

## 2025-07-31 RX ORDER — CETIRIZINE HYDROCHLORIDE 10 MG/1
10 TABLET ORAL DAILY
Qty: 7 TABLET | Refills: 0 | Status: SHIPPED | OUTPATIENT
Start: 2025-07-31 | End: 2025-08-07

## 2025-07-31 RX ORDER — FAMOTIDINE 20 MG/1
20 TABLET, FILM COATED ORAL 2 TIMES DAILY
Qty: 14 TABLET | Refills: 0 | Status: SHIPPED | OUTPATIENT
Start: 2025-07-31 | End: 2025-08-07

## 2025-07-31 RX ORDER — PREDNISONE 20 MG/1
60 TABLET ORAL ONCE
Status: COMPLETED | OUTPATIENT
Start: 2025-07-31 | End: 2025-07-31

## 2025-07-31 RX ORDER — CETIRIZINE HYDROCHLORIDE 10 MG/1
10 TABLET ORAL ONCE
Status: COMPLETED | OUTPATIENT
Start: 2025-07-31 | End: 2025-07-31

## 2025-07-31 RX ADMIN — ACETAMINOPHEN 975 MG: 325 TABLET ORAL at 14:46

## 2025-07-31 RX ADMIN — FAMOTIDINE 20 MG: 20 TABLET, FILM COATED ORAL at 14:46

## 2025-07-31 RX ADMIN — PREDNISONE 60 MG: 20 TABLET ORAL at 14:46

## 2025-07-31 RX ADMIN — CETIRIZINE HYDROCHLORIDE 10 MG: 10 TABLET, FILM COATED ORAL at 14:46

## 2025-07-31 ASSESSMENT — PAIN - FUNCTIONAL ASSESSMENT: PAIN_FUNCTIONAL_ASSESSMENT: 0-10

## 2025-07-31 ASSESSMENT — PAIN SCALES - GENERAL: PAINLEVEL_OUTOF10: 8

## 2025-07-31 ASSESSMENT — PAIN DESCRIPTION - PROGRESSION: CLINICAL_PROGRESSION: NOT CHANGED

## 2025-07-31 NOTE — ED TRIAGE NOTES
Patient states that she was at work and her hands started itching. Has rash on her hands and arms. Hands are swollen. Jhony coming into contact with anything new.

## 2025-07-31 NOTE — ED PROVIDER NOTES
HPI   Chief Complaint   Patient presents with    Rash       35-year-old female past medical history of seizures, history of PE, type II diabetes presents to the ED today with a chief concern of rash.  Patient reports symptoms started about an hour prior to arrival.  Hands started swelling and itching.  She reports that she was doing some bees eyelashes when she started having itching on both of her hands.  She denies lip or tongue swelling.  She put some cortisone cream in the area.  No fevers.  No nausea or vomiting.  No lip or tongue swelling.  No difficulty breathing.  No abdominal pain, nausea, or vomiting.  Denies any chance of pregnancy.  She denies any chest pain or shortness of breath.  No new products use to her knowledge.  She has no other symptoms or concerns at this time.      History provided by:  Patient   used: No            Patient History   Medical History[1]  Surgical History[2]  Family History[3]  Social History[4]    Physical Exam   ED Triage Vitals [07/31/25 1424]   Temperature Heart Rate Respirations BP   36.7 °C (98 °F) 91 16 (!) 141/91      Pulse Ox Temp src Heart Rate Source Patient Position   99 % -- -- --      BP Location FiO2 (%)     -- --       Physical Exam  Constitutional: Vital signs per nursing notes.  Well developed, well nourished.  No acute distress.    Psychiatric: alert and oriented to person, place, and time; no abnormalities of mood or affect; memory intact  Eyes:  conjunctivae and lids normal  ENT: Ears normal externally; face symmetric. voice normal.  No lip or tongue swelling.  Airway patent.  Neck: neck supple, no meningismus; trachea midline without deviation  Respiratory: normal respiratory effort and excursion; no rales, rhonchi, or wheezes; equal air entry  Cardiovascular: RRR, 2+ pulses extremities   Neurological: normal speech; CN II-XII grossly intact, normal motor and sensory function  GI: no distention, soft, nontender  :  Deferred  Musculoskeletal: normal gait and station; normal digits and nails; normal to palpation; normal strength/tone; neurovascular status intact.  Skin: Urticarial rash and swelling noted to dorsum of bilateral hands.  No rash into the upper forearms.  No vesicles or bullae.  No petechia or purpura.  No sloughing of the skin.      ED Course & MDM   Diagnoses as of 07/31/25 1700   Rash   Allergic reaction, initial encounter                 No data recorded                                 Medical Decision Making  35-year-old female past medical history of seizures, history of PE, type II diabetes presents to the ED today with a chief concern of rash.  Vital signs reassuring.  Patient overall appears well and is nontoxic-appearing.  Was given prednisone, famotidine, Tylenol, and cetirizine in the ED.    Patient has full range of motion of the neck without meningismus.  Satting well on room air.  Not hypoxic.  Not tachycardic.  A 5.  She has no evidence of anaphylaxis at this time.  There is no airway compromise.  No persistent cough.  No abdominal pain or nausea or vomiting.  She was given prednisone, famotidine, and cetirizine in the ED.  Was also given Tylenol.  Again airway is patent.  I have low suspicion for syphilis.  There is no petechia or purpura.  No vesicles or bullae.  Her rash is itchy and she does have a urticaria noted on the dorsum of the hands.  Will treat patient with 2 more doses of prednisone, 7-day course of cetirizine, and also famotidine.  Discussed that patient can continue to use her topical corticosteroid.  There is no sloughing of the skin.  No evidence of SSSS or Osborn-James syndrome or toxic epidermal necrolysis.  Rash does not look infectious.  Discussed impression and findings with patient she feels comfortable returning home.  We discussed very strict precautions include returning for any new or worsening symptoms.  Patient is in agreement with this plan.  She will follow-up with  the PCP and allergy within 3 days.  She was given an EpiPen.    Differential diagnosis: Allergic reaction, anaphylaxis, Osborn-James syndrome, toxic epidermal necrolysis    Disposition/treatment  1.  See diagnosis    Shared decision-making was used patient feels comfortable returning home     Patient was advised to follow up with recommended provider in 1 day for another evaluation and exam. I advised patient/guardian to return or go to closest emergency room immediately if symptoms change, get worse, new symptoms develop prior to follow up. If there is no improvement in symptoms in the next 24 hours they are advised to return for further evaluation and exam. I also explained the plan and treatment course. Patient/guardian is in agreement with plan, treatment course, and follow up and states verbally that they will comply.    Patient is homegoing. I discussed the differential; results and discharge plan with the patient and/or family/friend/caregiver if present.  I emphasized the importance of follow-up with the physician I referred them to in the timeframe recommended.  I explained reasons for the patient to return to the Emergency Department. They agreed that if they feel their condition is worsening or if they have any other concern they should call 911 immediately for further assistance. I gave the patient an opportunity to ask all questions they had and answered all of them accordingly. They understand return precautions and discharge instructions. The patient and/or family/friend/caregiver expressed understanding verbally and that they would comply.        This note has been transcribed using voice recognition and may contain grammatical errors, misplaced words, incorrect words, incorrect phrases or other errors.        Procedure  Procedures       [1]   Past Medical History:  Diagnosis Date    Benign neoplasm of breast     Endometriosis     Epilepsy with partial complex seizures (Multi)     Fatty liver  2025    Migraine     Taking Depakote    Neuropathy 5/3/2024    Obesity     Ovarian cyst     measuring 5cm x 3cm x 3cm- Scheduled for surgery with Dr Theodore on 24    Pelvic floor dysfunction in female     Pelvic pain     Pulmonary embolism 2024    Left lung    Seizure disorder (Multi) At 7y    F/W Dr. Sigala (Neuro) Taking Trileptal, Last seizure 2 days ago during sleep    Type A blood, Rh positive     Blood type A+    Vitamin B1 deficiency    [2]   Past Surgical History:  Procedure Laterality Date    BREAST LUMPECTOMY  2014     SECTION, LOW TRANSVERSE  02/11/2011    x 2 w/ BTL at second    CHOLECYSTECTOMY  2025    COLPOSCOPY      HYSTERECTOMY  2024    OOPHORECTOMY Left      Robotic LSO w/ R cystectomy/salpingectomy    OTHER SURGICAL HISTORY      Surgical Treatment Of Spontaneous     TUBAL LIGATION     [3]   Family History  Problem Relation Name Age of Onset    Hypertension Mother Mohini     Stroke Mother Mohini     Heart attack Mother Mohini     Breast cancer Mother's Sister Karol     Bone cancer Mother's Brother      Seizures Father's Brother      Breast cancer Maternal Grandmother Alexia     Other (cerebral infarction) Maternal Grandfather      Seizures Other Cousin     Cancer Mother's Brother Felice ALMODOVAR    [4]   Social History  Tobacco Use    Smoking status: Never     Passive exposure: Never    Smokeless tobacco: Never   Vaping Use    Vaping status: Never Used   Substance Use Topics    Alcohol use: Yes     Comment: Occasionally    Drug use: Never        Marcell Arzate PA-C  25 1700

## 2025-07-31 NOTE — DISCHARGE INSTRUCTIONS
Return to the ED immediately if new or worsening symptoms  Please follow with your primary care doctor within 3 days

## 2025-08-11 ENCOUNTER — APPOINTMENT (OUTPATIENT)
Dept: NEUROLOGY | Facility: CLINIC | Age: 35
End: 2025-08-11
Payer: MEDICARE

## 2025-08-11 VITALS
HEIGHT: 65 IN | DIASTOLIC BLOOD PRESSURE: 91 MMHG | HEART RATE: 78 BPM | SYSTOLIC BLOOD PRESSURE: 166 MMHG | BODY MASS INDEX: 46.82 KG/M2 | WEIGHT: 281 LBS | TEMPERATURE: 97.1 F

## 2025-08-11 DIAGNOSIS — G40.219 LOCALIZATION-RELATED (FOCAL) (PARTIAL) SYMPTOMATIC EPILEPSY AND EPILEPTIC SYNDROMES WITH COMPLEX PARTIAL SEIZURES, INTRACTABLE, WITHOUT STATUS EPILEPTICUS: Primary | ICD-10-CM

## 2025-08-11 PROCEDURE — 1036F TOBACCO NON-USER: CPT | Performed by: PSYCHIATRY & NEUROLOGY

## 2025-08-11 PROCEDURE — 99213 OFFICE O/P EST LOW 20 MIN: CPT | Performed by: PSYCHIATRY & NEUROLOGY

## 2025-08-11 PROCEDURE — 3008F BODY MASS INDEX DOCD: CPT | Performed by: PSYCHIATRY & NEUROLOGY

## 2025-08-20 ENCOUNTER — HOSPITAL ENCOUNTER (EMERGENCY)
Facility: HOSPITAL | Age: 35
Discharge: HOME | End: 2025-08-20
Payer: MEDICARE

## 2025-08-20 ENCOUNTER — APPOINTMENT (OUTPATIENT)
Dept: RADIOLOGY | Facility: HOSPITAL | Age: 35
End: 2025-08-20
Payer: MEDICARE

## 2025-08-20 VITALS
HEART RATE: 88 BPM | RESPIRATION RATE: 18 BRPM | DIASTOLIC BLOOD PRESSURE: 78 MMHG | SYSTOLIC BLOOD PRESSURE: 128 MMHG | BODY MASS INDEX: 46.82 KG/M2 | TEMPERATURE: 98.1 F | WEIGHT: 281 LBS | OXYGEN SATURATION: 98 % | HEIGHT: 65 IN

## 2025-08-20 DIAGNOSIS — R09.81 SINUS CONGESTION: ICD-10-CM

## 2025-08-20 DIAGNOSIS — Z87.42 HISTORY OF ENDOMETRIOSIS: ICD-10-CM

## 2025-08-20 DIAGNOSIS — R10.2 PELVIC PAIN: Primary | ICD-10-CM

## 2025-08-20 LAB
ALBUMIN SERPL BCP-MCNC: 4.2 G/DL (ref 3.4–5)
ALP SERPL-CCNC: 46 U/L (ref 33–110)
ALT SERPL W P-5'-P-CCNC: 26 U/L (ref 7–45)
ANION GAP SERPL CALC-SCNC: 15 MMOL/L (ref 10–20)
APPEARANCE UR: CLEAR
AST SERPL W P-5'-P-CCNC: 20 U/L (ref 9–39)
B-HCG SERPL-ACNC: <2 MIU/ML
BASOPHILS # BLD AUTO: 0.02 X10*3/UL (ref 0–0.1)
BASOPHILS NFR BLD AUTO: 0.2 %
BILIRUB SERPL-MCNC: 0.3 MG/DL (ref 0–1.2)
BILIRUB UR STRIP.AUTO-MCNC: NEGATIVE MG/DL
BUN SERPL-MCNC: 7 MG/DL (ref 6–23)
CALCIUM SERPL-MCNC: 9.1 MG/DL (ref 8.6–10.3)
CHLORIDE SERPL-SCNC: 105 MMOL/L (ref 98–107)
CO2 SERPL-SCNC: 22 MMOL/L (ref 21–32)
COLOR UR: YELLOW
CREAT SERPL-MCNC: 0.64 MG/DL (ref 0.5–1.05)
EGFRCR SERPLBLD CKD-EPI 2021: >90 ML/MIN/1.73M*2
EOSINOPHIL # BLD AUTO: 0.16 X10*3/UL (ref 0–0.7)
EOSINOPHIL NFR BLD AUTO: 2 %
ERYTHROCYTE [DISTWIDTH] IN BLOOD BY AUTOMATED COUNT: 14.9 % (ref 11.5–14.5)
FLUAV RNA RESP QL NAA+PROBE: NOT DETECTED
FLUBV RNA RESP QL NAA+PROBE: NOT DETECTED
GLUCOSE SERPL-MCNC: 82 MG/DL (ref 74–99)
GLUCOSE UR STRIP.AUTO-MCNC: NORMAL MG/DL
HCT VFR BLD AUTO: 36.1 % (ref 36–46)
HGB BLD-MCNC: 12.1 G/DL (ref 12–16)
IMM GRANULOCYTES # BLD AUTO: 0.02 X10*3/UL (ref 0–0.7)
IMM GRANULOCYTES NFR BLD AUTO: 0.2 % (ref 0–0.9)
KETONES UR STRIP.AUTO-MCNC: NEGATIVE MG/DL
LACTATE SERPL-SCNC: 1 MMOL/L (ref 0.4–2)
LEUKOCYTE ESTERASE UR QL STRIP.AUTO: ABNORMAL
LIPASE SERPL-CCNC: 13 U/L (ref 9–82)
LYMPHOCYTES # BLD AUTO: 2.61 X10*3/UL (ref 1.2–4.8)
LYMPHOCYTES NFR BLD AUTO: 32.3 %
MCH RBC QN AUTO: 30.1 PG (ref 26–34)
MCHC RBC AUTO-ENTMCNC: 33.5 G/DL (ref 32–36)
MCV RBC AUTO: 90 FL (ref 80–100)
MONOCYTES # BLD AUTO: 0.69 X10*3/UL (ref 0.1–1)
MONOCYTES NFR BLD AUTO: 8.5 %
MUCOUS THREADS #/AREA URNS AUTO: ABNORMAL /LPF
NEUTROPHILS # BLD AUTO: 4.59 X10*3/UL (ref 1.2–7.7)
NEUTROPHILS NFR BLD AUTO: 56.8 %
NITRITE UR QL STRIP.AUTO: NEGATIVE
NRBC BLD-RTO: 0 /100 WBCS (ref 0–0)
PH UR STRIP.AUTO: 6.5 [PH]
PLATELET # BLD AUTO: 394 X10*3/UL (ref 150–450)
POTASSIUM SERPL-SCNC: 4.1 MMOL/L (ref 3.5–5.3)
PROT SERPL-MCNC: 7.8 G/DL (ref 6.4–8.2)
PROT UR STRIP.AUTO-MCNC: ABNORMAL MG/DL
RBC # BLD AUTO: 4.02 X10*6/UL (ref 4–5.2)
RBC # UR STRIP.AUTO: NEGATIVE MG/DL
RBC #/AREA URNS AUTO: ABNORMAL /HPF
SARS-COV-2 RNA RESP QL NAA+PROBE: NOT DETECTED
SODIUM SERPL-SCNC: 138 MMOL/L (ref 136–145)
SP GR UR STRIP.AUTO: 1.02
SQUAMOUS #/AREA URNS AUTO: ABNORMAL /HPF
UROBILINOGEN UR STRIP.AUTO-MCNC: ABNORMAL MG/DL
WBC # BLD AUTO: 8.1 X10*3/UL (ref 4.4–11.3)
WBC #/AREA URNS AUTO: ABNORMAL /HPF

## 2025-08-20 PROCEDURE — 71046 X-RAY EXAM CHEST 2 VIEWS: CPT

## 2025-08-20 PROCEDURE — 76856 US EXAM PELVIC COMPLETE: CPT

## 2025-08-20 PROCEDURE — 36415 COLL VENOUS BLD VENIPUNCTURE: CPT

## 2025-08-20 PROCEDURE — 99285 EMERGENCY DEPT VISIT HI MDM: CPT | Mod: 25

## 2025-08-20 PROCEDURE — 81001 URINALYSIS AUTO W/SCOPE: CPT

## 2025-08-20 PROCEDURE — 2500000004 HC RX 250 GENERAL PHARMACY W/ HCPCS (ALT 636 FOR OP/ED)

## 2025-08-20 PROCEDURE — 2500000004 HC RX 250 GENERAL PHARMACY W/ HCPCS (ALT 636 FOR OP/ED): Mod: JW

## 2025-08-20 PROCEDURE — 96374 THER/PROPH/DIAG INJ IV PUSH: CPT

## 2025-08-20 PROCEDURE — 84702 CHORIONIC GONADOTROPIN TEST: CPT

## 2025-08-20 PROCEDURE — 87636 SARSCOV2 & INF A&B AMP PRB: CPT

## 2025-08-20 PROCEDURE — 85025 COMPLETE CBC W/AUTO DIFF WBC: CPT

## 2025-08-20 PROCEDURE — 71046 X-RAY EXAM CHEST 2 VIEWS: CPT | Performed by: STUDENT IN AN ORGANIZED HEALTH CARE EDUCATION/TRAINING PROGRAM

## 2025-08-20 PROCEDURE — 2500000001 HC RX 250 WO HCPCS SELF ADMINISTERED DRUGS (ALT 637 FOR MEDICARE OP)

## 2025-08-20 PROCEDURE — 93975 VASCULAR STUDY: CPT

## 2025-08-20 PROCEDURE — 96375 TX/PRO/DX INJ NEW DRUG ADDON: CPT

## 2025-08-20 PROCEDURE — 87086 URINE CULTURE/COLONY COUNT: CPT | Mod: AHULAB

## 2025-08-20 PROCEDURE — 80053 COMPREHEN METABOLIC PANEL: CPT

## 2025-08-20 PROCEDURE — 83605 ASSAY OF LACTIC ACID: CPT

## 2025-08-20 PROCEDURE — 83690 ASSAY OF LIPASE: CPT

## 2025-08-20 RX ORDER — MORPHINE SULFATE 4 MG/ML
6 INJECTION, SOLUTION INTRAMUSCULAR; INTRAVENOUS ONCE
Status: COMPLETED | OUTPATIENT
Start: 2025-08-20 | End: 2025-08-20

## 2025-08-20 RX ORDER — ONDANSETRON HYDROCHLORIDE 2 MG/ML
4 INJECTION, SOLUTION INTRAVENOUS ONCE
Status: COMPLETED | OUTPATIENT
Start: 2025-08-20 | End: 2025-08-20

## 2025-08-20 RX ORDER — HYDROCODONE BITARTRATE AND ACETAMINOPHEN 5; 325 MG/1; MG/1
1 TABLET ORAL ONCE
Refills: 0 | Status: COMPLETED | OUTPATIENT
Start: 2025-08-20 | End: 2025-08-20

## 2025-08-20 RX ORDER — KETOROLAC TROMETHAMINE 30 MG/ML
15 INJECTION, SOLUTION INTRAMUSCULAR; INTRAVENOUS ONCE
Status: COMPLETED | OUTPATIENT
Start: 2025-08-20 | End: 2025-08-20

## 2025-08-20 RX ORDER — HYDROCODONE BITARTRATE AND ACETAMINOPHEN 5; 325 MG/1; MG/1
1 TABLET ORAL EVERY 6 HOURS PRN
Qty: 5 TABLET | Refills: 0 | Status: SHIPPED | OUTPATIENT
Start: 2025-08-20 | End: 2025-08-23

## 2025-08-20 RX ADMIN — ONDANSETRON 4 MG: 2 INJECTION INTRAMUSCULAR; INTRAVENOUS at 20:38

## 2025-08-20 RX ADMIN — MORPHINE SULFATE 6 MG: 4 INJECTION, SOLUTION INTRAMUSCULAR; INTRAVENOUS at 20:38

## 2025-08-20 RX ADMIN — HYDROCODONE BITARTRATE AND ACETAMINOPHEN 1 TABLET: 5; 325 TABLET ORAL at 23:03

## 2025-08-20 RX ADMIN — KETOROLAC TROMETHAMINE 15 MG: 30 INJECTION, SOLUTION INTRAMUSCULAR at 18:38

## 2025-08-20 ASSESSMENT — PAIN - FUNCTIONAL ASSESSMENT: PAIN_FUNCTIONAL_ASSESSMENT: 0-10

## 2025-08-20 ASSESSMENT — PAIN SCALES - GENERAL: PAINLEVEL_OUTOF10: 9

## 2025-08-21 ENCOUNTER — HOSPITAL ENCOUNTER (EMERGENCY)
Facility: HOSPITAL | Age: 35
Discharge: HOME | End: 2025-08-21
Attending: EMERGENCY MEDICINE
Payer: MEDICARE

## 2025-08-21 VITALS
RESPIRATION RATE: 16 BRPM | OXYGEN SATURATION: 98 % | BODY MASS INDEX: 46.82 KG/M2 | SYSTOLIC BLOOD PRESSURE: 128 MMHG | DIASTOLIC BLOOD PRESSURE: 72 MMHG | HEART RATE: 98 BPM | HEIGHT: 65 IN | TEMPERATURE: 97.8 F | WEIGHT: 281 LBS

## 2025-08-21 DIAGNOSIS — T78.40XA ALLERGIC REACTION, INITIAL ENCOUNTER: Primary | ICD-10-CM

## 2025-08-21 PROCEDURE — 96374 THER/PROPH/DIAG INJ IV PUSH: CPT

## 2025-08-21 PROCEDURE — 2500000001 HC RX 250 WO HCPCS SELF ADMINISTERED DRUGS (ALT 637 FOR MEDICARE OP): Performed by: EMERGENCY MEDICINE

## 2025-08-21 PROCEDURE — 96361 HYDRATE IV INFUSION ADD-ON: CPT

## 2025-08-21 PROCEDURE — 99284 EMERGENCY DEPT VISIT MOD MDM: CPT | Mod: 25 | Performed by: EMERGENCY MEDICINE

## 2025-08-21 PROCEDURE — 96375 TX/PRO/DX INJ NEW DRUG ADDON: CPT

## 2025-08-21 PROCEDURE — 2500000004 HC RX 250 GENERAL PHARMACY W/ HCPCS (ALT 636 FOR OP/ED): Performed by: EMERGENCY MEDICINE

## 2025-08-21 RX ORDER — ACETAMINOPHEN 325 MG/1
975 TABLET ORAL ONCE
Status: COMPLETED | OUTPATIENT
Start: 2025-08-21 | End: 2025-08-21

## 2025-08-21 RX ORDER — PREDNISONE 20 MG/1
40 TABLET ORAL DAILY
Qty: 8 TABLET | Refills: 0 | Status: SHIPPED | OUTPATIENT
Start: 2025-08-21 | End: 2025-08-25

## 2025-08-21 RX ORDER — FAMOTIDINE 10 MG/ML
40 INJECTION, SOLUTION INTRAVENOUS ONCE
Status: COMPLETED | OUTPATIENT
Start: 2025-08-21 | End: 2025-08-21

## 2025-08-21 RX ORDER — DIPHENHYDRAMINE HYDROCHLORIDE 50 MG/ML
50 INJECTION, SOLUTION INTRAMUSCULAR; INTRAVENOUS ONCE
Status: COMPLETED | OUTPATIENT
Start: 2025-08-21 | End: 2025-08-21

## 2025-08-21 RX ORDER — EPINEPHRINE 0.3 MG/.3ML
1 INJECTION SUBCUTANEOUS ONCE AS NEEDED
Qty: 1 EACH | Refills: 0 | Status: SHIPPED | OUTPATIENT
Start: 2025-08-21

## 2025-08-21 RX ORDER — CETIRIZINE HYDROCHLORIDE 10 MG/1
10 TABLET ORAL DAILY
Qty: 14 TABLET | Refills: 0 | Status: SHIPPED | OUTPATIENT
Start: 2025-08-21 | End: 2025-09-04

## 2025-08-21 RX ADMIN — DIPHENHYDRAMINE HYDROCHLORIDE 50 MG: 50 INJECTION, SOLUTION INTRAMUSCULAR; INTRAVENOUS at 04:52

## 2025-08-21 RX ADMIN — ACETAMINOPHEN 975 MG: 325 TABLET ORAL at 04:51

## 2025-08-21 RX ADMIN — FAMOTIDINE 40 MG: 10 INJECTION, SOLUTION INTRAVENOUS at 04:51

## 2025-08-21 RX ADMIN — SODIUM CHLORIDE, SODIUM LACTATE, POTASSIUM CHLORIDE, AND CALCIUM CHLORIDE 1000 ML: .6; .31; .03; .02 INJECTION, SOLUTION INTRAVENOUS at 04:51

## 2025-08-21 RX ADMIN — METHYLPREDNISOLONE SODIUM SUCCINATE 125 MG: 125 INJECTION, POWDER, FOR SOLUTION INTRAMUSCULAR; INTRAVENOUS at 04:52

## 2025-08-21 ASSESSMENT — PAIN DESCRIPTION - DESCRIPTORS: DESCRIPTORS: SHARP

## 2025-08-21 ASSESSMENT — PAIN - FUNCTIONAL ASSESSMENT: PAIN_FUNCTIONAL_ASSESSMENT: 0-10

## 2025-08-21 ASSESSMENT — PAIN SCALES - GENERAL
PAINLEVEL_OUTOF10: 0 - NO PAIN
PAINLEVEL_OUTOF10: 10 - WORST POSSIBLE PAIN

## 2025-08-21 ASSESSMENT — PAIN DESCRIPTION - PAIN TYPE: TYPE: ACUTE PAIN

## 2025-08-22 LAB — BACTERIA UR CULT: NORMAL

## 2025-08-28 DIAGNOSIS — R10.2 PELVIC PAIN: Primary | ICD-10-CM

## 2025-08-28 RX ORDER — TIZANIDINE 2 MG/1
2 TABLET ORAL EVERY 6 HOURS PRN
Qty: 30 TABLET | Refills: 0 | Status: SHIPPED | OUTPATIENT
Start: 2025-08-28 | End: 2025-09-07

## 2025-09-03 ENCOUNTER — OFFICE VISIT (OUTPATIENT)
Dept: OBSTETRICS AND GYNECOLOGY | Facility: CLINIC | Age: 35
End: 2025-09-03
Payer: MEDICARE

## 2025-09-03 VITALS
DIASTOLIC BLOOD PRESSURE: 85 MMHG | BODY MASS INDEX: 46.48 KG/M2 | SYSTOLIC BLOOD PRESSURE: 156 MMHG | HEIGHT: 65 IN | HEART RATE: 89 BPM | WEIGHT: 279 LBS

## 2025-09-03 DIAGNOSIS — N83.202 CYST OF LEFT OVARY: ICD-10-CM

## 2025-09-03 DIAGNOSIS — R10.2 PELVIC PAIN: Primary | ICD-10-CM

## 2025-09-03 PROCEDURE — 3008F BODY MASS INDEX DOCD: CPT

## 2025-09-03 PROCEDURE — 99212 OFFICE O/P EST SF 10 MIN: CPT

## 2025-09-03 PROCEDURE — 99213 OFFICE O/P EST LOW 20 MIN: CPT

## 2025-09-03 ASSESSMENT — PAIN SCALES - GENERAL: PAINLEVEL_OUTOF10: 8

## 2025-10-07 ENCOUNTER — APPOINTMENT (OUTPATIENT)
Dept: CARDIOLOGY | Facility: CLINIC | Age: 35
End: 2025-10-07
Payer: MEDICARE

## 2025-11-10 ENCOUNTER — APPOINTMENT (OUTPATIENT)
Dept: CARDIOLOGY | Facility: CLINIC | Age: 35
End: 2025-11-10
Payer: MEDICARE

## 2025-11-21 ENCOUNTER — APPOINTMENT (OUTPATIENT)
Dept: ALLERGY | Facility: CLINIC | Age: 35
End: 2025-11-21
Payer: MEDICARE

## 2025-12-30 ENCOUNTER — APPOINTMENT (OUTPATIENT)
Dept: NEUROLOGY | Facility: CLINIC | Age: 35
End: 2025-12-30
Payer: MEDICARE

## 2026-02-23 ENCOUNTER — APPOINTMENT (OUTPATIENT)
Dept: ALLERGY | Facility: CLINIC | Age: 36
End: 2026-02-23
Payer: MEDICARE

## (undated) DEVICE — SCOPE WARMER, LAPAROSCOPE, BAG ONLY, LF

## (undated) DEVICE — COVER, TIP HOT SHEARS ENDOWRIST

## (undated) DEVICE — SUTURE, VICRYL PLUS, 0, 27IN, UR-6, VIOLET, BRAIDED

## (undated) DEVICE — SUTURE, PDS, 0, 18 IN, LIGATING LOOP, VIOLET

## (undated) DEVICE — SEALANT, HEMOSTATIC, FLOSEAL, 10 ML

## (undated) DEVICE — SYRINGE, 60 CC, LUER LOCK, MONOJECT

## (undated) DEVICE — STRIP, SKIN CLOSURE, STERI STRIP, REINFORCED, 0.5 X 4 IN

## (undated) DEVICE — HOLSTER, JET SAFETY

## (undated) DEVICE — IRRIGATION SET, CYSTOSCOPY, F/CONSTANT/INTERMITTENT, 8 GTT/CC, 77 IN

## (undated) DEVICE — TOWELS 4-PK

## (undated) DEVICE — DRAPE, ARM XI

## (undated) DEVICE — SEAL, UNIVERSAL 5-8MM  XI

## (undated) DEVICE — Device

## (undated) DEVICE — PUMP, STRYKERFLOW 2 & HANDPIECE W/10FT. IRRIGATION TUBING

## (undated) DEVICE — TROCAR SYSTEM, BALLOON, KII GELPORT, 12 X 100MM

## (undated) DEVICE — APPLICATOR, ENDOSCOPIC FLOSEAL

## (undated) DEVICE — KIT, ROBOTIC, CUSTOM UHC

## (undated) DEVICE — CLIP, ENDO, CLINCH II, W/RATCHET, ON/OFF, CLINCH II, 5 MM

## (undated) DEVICE — ADHESIVE, SKIN, DERMABOND ADVANCED, 15CM, PEN-STYLE

## (undated) DEVICE — SHEAR, W/UNIPOLAR CAUTERY, ENDOSHEAR, 5 MM

## (undated) DEVICE — DRAPE, COLUMN, DAVINCI XI

## (undated) DEVICE — CLIP, LIGATING, HEM-O-LOCK, MEDIUM/LARGE, LF, GREEN

## (undated) DEVICE — BAG, DRAIN METER, URINE, 350CC, LF

## (undated) DEVICE — SUTURE, MONOCRYL PLUS, 4-0, PS-2 UD 27IN

## (undated) DEVICE — TUBE SET, PNEUMOCLEAR, SMOKE EVACU, HIGH-FLOW

## (undated) DEVICE — CATHETER, URETHRAL, FOLEY, 2 WAY, 16 FR, 5 CC, SILICONE

## (undated) DEVICE — CATHETER, CHOLANGIOGRAM, 18 G X 11

## (undated) DEVICE — CATHETER, IV, ANGIOCATH, 14 G X 5.25 IN, FEP POLYMER

## (undated) DEVICE — APPLIER, 5MM ENDOSCOPIC, HEM-O-LOCK, W/15 ML CLIPS

## (undated) DEVICE — GLOVE, SURGICAL, PROTEXIS PI ORTHO, 7.0, PF, LF

## (undated) DEVICE — SYSTEM, TROCAR LAP, 5X100MM, SHIELD BLADED, KII ADVANCED FIX ABDOMINAL

## (undated) DEVICE — MANIFOLD, 4 PORT NEPTUNE STANDARD

## (undated) DEVICE — DRESSING, ADHESIVE, ISLAND, TELFA, 2 X 3.75 IN, LF

## (undated) DEVICE — DRAPE, PAD, PREP, W/ 9 IN CUFF, 24 X 41, LF, NS

## (undated) DEVICE — ADHESIVE, SKIN, MASTISOL, 2/3 CC VIAL

## (undated) DEVICE — DRAPE, C-ARM, W/12 IN COVER, LI XTRAY TUBE

## (undated) DEVICE — PREP TRAY, SKIN, DRY, W/GLOVES

## (undated) DEVICE — SUTURE, VICRYL, 0, 27 IN, UR-6, VIOLET

## (undated) DEVICE — SYRINGE, 60 CC, IRRIGATION, BULB, CONTRO-BULB, PAPER POUCH

## (undated) DEVICE — RETRIEVAL SYSTEM, MONARCH, 10MM DISP ENDOSCOPIC

## (undated) DEVICE — CANNULA, KII ADVANCED FIXATION, 5X100MM W/SEAL

## (undated) DEVICE — PROTECTOR, NERVE, ULNAR, PINK

## (undated) DEVICE — SEALER, VESSEL, EXTENDED

## (undated) DEVICE — TOWEL, SURGICAL, NEURO, O/R, 16 X 26, BLUE, STERILE

## (undated) DEVICE — GOWN, SURGICAL, SMARTGOWN, XLARGE, STERILE

## (undated) DEVICE — DRAPE, UNDERBUTTOCKS

## (undated) DEVICE — DRESSING, NON-ADHERENT, TELFA, 3 X 8 IN, NS

## (undated) DEVICE — DRAPE, LEGGINGS, 48 X 31 IN, STERILE, LF

## (undated) DEVICE — CORD, MONOPOLAR, HIGH FREQUENCY, W/8MM PLUG F/VALLEYLAB, 8FT/244CM, STRL

## (undated) DEVICE — DRAPE PACK, LAVH, W/ATTACHED LEGGINGS, W/POUCH, 100 X 114 IN, LF, STERILE

## (undated) DEVICE — SUTURE, MONOCRYL, 4-0, 18 IN, PS2, UNDYED